# Patient Record
Sex: FEMALE | Race: BLACK OR AFRICAN AMERICAN | NOT HISPANIC OR LATINO | Employment: OTHER | URBAN - METROPOLITAN AREA
[De-identification: names, ages, dates, MRNs, and addresses within clinical notes are randomized per-mention and may not be internally consistent; named-entity substitution may affect disease eponyms.]

---

## 2017-04-10 ENCOUNTER — HISTORICAL (OUTPATIENT)
Dept: RADIOLOGY | Facility: HOSPITAL | Age: 57
End: 2017-04-10

## 2018-10-30 ENCOUNTER — HISTORICAL (OUTPATIENT)
Dept: RADIOLOGY | Facility: HOSPITAL | Age: 58
End: 2018-10-30

## 2020-03-10 ENCOUNTER — HISTORICAL (OUTPATIENT)
Dept: RADIOLOGY | Facility: HOSPITAL | Age: 60
End: 2020-03-10

## 2021-04-07 ENCOUNTER — HISTORICAL (OUTPATIENT)
Dept: RADIOLOGY | Facility: HOSPITAL | Age: 61
End: 2021-04-07

## 2022-03-04 ENCOUNTER — HISTORICAL (OUTPATIENT)
Dept: ADMINISTRATIVE | Facility: HOSPITAL | Age: 62
End: 2022-03-04

## 2022-03-04 ENCOUNTER — HISTORICAL (OUTPATIENT)
Dept: RADIOLOGY | Facility: HOSPITAL | Age: 62
End: 2022-03-04

## 2022-03-08 ENCOUNTER — HISTORICAL (OUTPATIENT)
Dept: RADIOLOGY | Facility: HOSPITAL | Age: 62
End: 2022-03-08

## 2022-03-08 ENCOUNTER — HISTORICAL (OUTPATIENT)
Dept: ADMINISTRATIVE | Facility: HOSPITAL | Age: 62
End: 2022-03-08

## 2022-03-16 ENCOUNTER — HISTORICAL (OUTPATIENT)
Dept: RADIOLOGY | Facility: HOSPITAL | Age: 62
End: 2022-03-16

## 2022-03-16 ENCOUNTER — HISTORICAL (OUTPATIENT)
Dept: ADMINISTRATIVE | Facility: HOSPITAL | Age: 62
End: 2022-03-16

## 2022-04-06 ENCOUNTER — HISTORICAL (OUTPATIENT)
Dept: HEMATOLOGY/ONCOLOGY | Facility: CLINIC | Age: 62
End: 2022-04-06

## 2022-04-11 ENCOUNTER — HISTORICAL (OUTPATIENT)
Dept: ADMINISTRATIVE | Facility: HOSPITAL | Age: 62
End: 2022-04-11

## 2022-04-11 LAB — POC CREATININE: 0.7 (ref 0.6–1.3)

## 2022-05-02 PROBLEM — C50.411 MALIGNANT NEOPLASM OF UPPER-OUTER QUADRANT OF RIGHT BREAST IN FEMALE, ESTROGEN RECEPTOR POSITIVE: Status: ACTIVE | Noted: 2022-05-02

## 2022-05-02 PROBLEM — Z17.1 MALIGNANT NEOPLASM OF UPPER-OUTER QUADRANT OF LEFT BREAST IN FEMALE, ESTROGEN RECEPTOR NEGATIVE: Status: ACTIVE | Noted: 2022-05-02

## 2022-05-02 PROBLEM — Z17.0 MALIGNANT NEOPLASM OF UPPER-OUTER QUADRANT OF RIGHT BREAST IN FEMALE, ESTROGEN RECEPTOR POSITIVE: Status: ACTIVE | Noted: 2022-05-02

## 2022-05-02 PROBLEM — C50.412 MALIGNANT NEOPLASM OF UPPER-OUTER QUADRANT OF LEFT BREAST IN FEMALE, ESTROGEN RECEPTOR NEGATIVE: Status: ACTIVE | Noted: 2022-05-02

## 2022-05-03 ENCOUNTER — OFFICE VISIT (OUTPATIENT)
Dept: SURGERY | Facility: CLINIC | Age: 62
End: 2022-05-03
Payer: COMMERCIAL

## 2022-05-03 VITALS
HEART RATE: 65 BPM | WEIGHT: 205.69 LBS | SYSTOLIC BLOOD PRESSURE: 157 MMHG | TEMPERATURE: 98 F | DIASTOLIC BLOOD PRESSURE: 95 MMHG | HEIGHT: 66 IN | RESPIRATION RATE: 18 BRPM | BODY MASS INDEX: 33.06 KG/M2 | OXYGEN SATURATION: 99 %

## 2022-05-03 DIAGNOSIS — C50.919 METAPLASTIC CARCINOMA OF BREAST: ICD-10-CM

## 2022-05-03 DIAGNOSIS — C50.411 MALIGNANT NEOPLASM OF UPPER-OUTER QUADRANT OF RIGHT BREAST IN FEMALE, ESTROGEN RECEPTOR POSITIVE: Primary | ICD-10-CM

## 2022-05-03 DIAGNOSIS — Z17.1 MALIGNANT NEOPLASM OF UPPER-OUTER QUADRANT OF LEFT BREAST IN FEMALE, ESTROGEN RECEPTOR NEGATIVE: ICD-10-CM

## 2022-05-03 DIAGNOSIS — D05.11 DUCTAL CARCINOMA IN SITU (DCIS) OF RIGHT BREAST: ICD-10-CM

## 2022-05-03 DIAGNOSIS — C50.412 MALIGNANT NEOPLASM OF UPPER-OUTER QUADRANT OF LEFT BREAST IN FEMALE, ESTROGEN RECEPTOR NEGATIVE: ICD-10-CM

## 2022-05-03 DIAGNOSIS — Z17.0 MALIGNANT NEOPLASM OF UPPER-OUTER QUADRANT OF RIGHT BREAST IN FEMALE, ESTROGEN RECEPTOR POSITIVE: Primary | ICD-10-CM

## 2022-05-03 DIAGNOSIS — D05.01 LOBULAR CARCINOMA IN SITU (LCIS) OF RIGHT BREAST: ICD-10-CM

## 2022-05-03 PROCEDURE — 3077F PR MOST RECENT SYSTOLIC BLOOD PRESSURE >= 140 MM HG: ICD-10-PCS | Mod: CPTII,S$GLB,, | Performed by: SURGERY

## 2022-05-03 PROCEDURE — 1159F PR MEDICATION LIST DOCUMENTED IN MEDICAL RECORD: ICD-10-PCS | Mod: CPTII,S$GLB,, | Performed by: SURGERY

## 2022-05-03 PROCEDURE — 99215 PR OFFICE/OUTPT VISIT, EST, LEVL V, 40-54 MIN: ICD-10-PCS | Mod: S$GLB,,, | Performed by: SURGERY

## 2022-05-03 PROCEDURE — 99999 PR PBB SHADOW E&M-EST. PATIENT-LVL V: ICD-10-PCS | Mod: PBBFAC,,, | Performed by: SURGERY

## 2022-05-03 PROCEDURE — 1160F RVW MEDS BY RX/DR IN RCRD: CPT | Mod: CPTII,S$GLB,, | Performed by: SURGERY

## 2022-05-03 PROCEDURE — 1160F PR REVIEW ALL MEDS BY PRESCRIBER/CLIN PHARMACIST DOCUMENTED: ICD-10-PCS | Mod: CPTII,S$GLB,, | Performed by: SURGERY

## 2022-05-03 PROCEDURE — 3077F SYST BP >= 140 MM HG: CPT | Mod: CPTII,S$GLB,, | Performed by: SURGERY

## 2022-05-03 PROCEDURE — 99215 OFFICE O/P EST HI 40 MIN: CPT | Mod: S$GLB,,, | Performed by: SURGERY

## 2022-05-03 PROCEDURE — 1159F MED LIST DOCD IN RCRD: CPT | Mod: CPTII,S$GLB,, | Performed by: SURGERY

## 2022-05-03 PROCEDURE — 3008F BODY MASS INDEX DOCD: CPT | Mod: CPTII,S$GLB,, | Performed by: SURGERY

## 2022-05-03 PROCEDURE — 3080F PR MOST RECENT DIASTOLIC BLOOD PRESSURE >= 90 MM HG: ICD-10-PCS | Mod: CPTII,S$GLB,, | Performed by: SURGERY

## 2022-05-03 PROCEDURE — 3008F PR BODY MASS INDEX (BMI) DOCUMENTED: ICD-10-PCS | Mod: CPTII,S$GLB,, | Performed by: SURGERY

## 2022-05-03 PROCEDURE — 3080F DIAST BP >= 90 MM HG: CPT | Mod: CPTII,S$GLB,, | Performed by: SURGERY

## 2022-05-03 PROCEDURE — 99999 PR PBB SHADOW E&M-EST. PATIENT-LVL V: CPT | Mod: PBBFAC,,, | Performed by: SURGERY

## 2022-05-03 RX ORDER — AMLODIPINE BESYLATE 10 MG/1
10 TABLET ORAL DAILY
COMMUNITY
Start: 2022-04-25

## 2022-05-03 RX ORDER — ATORVASTATIN CALCIUM 20 MG/1
20 TABLET, FILM COATED ORAL NIGHTLY
COMMUNITY
Start: 2021-10-07 | End: 2022-11-08

## 2022-05-03 RX ORDER — NAPROXEN SODIUM 220 MG/1
81 TABLET, FILM COATED ORAL 2 TIMES DAILY
COMMUNITY
Start: 2021-10-07 | End: 2022-11-08

## 2022-05-03 RX ORDER — METFORMIN HYDROCHLORIDE 1000 MG/1
1000 TABLET ORAL
COMMUNITY
Start: 2022-03-03

## 2022-05-03 RX ORDER — LOSARTAN POTASSIUM AND HYDROCHLOROTHIAZIDE 25; 100 MG/1; MG/1
1 TABLET ORAL DAILY
COMMUNITY
Start: 2022-03-29

## 2022-05-03 NOTE — PROGRESS NOTES
[facility] Breast Surg  Breast Surgical Oncology  Follow-Up Patient Office Visit       Referring Provider: Beth Holly MD - GYN  PCP: Sharri Bass RT   Care Team: No care team member to display No care team member to display     Chief Complaint:   Chief Complaint   Patient presents with    Pre-op Exam     Pre-Op        Subjective:   Treatment History:  1.Genetic Testing - negative      Interval History:  5/3/2022 - Elina Ham presents for follow-up after CT imaging and genetic testing to discuss final surgical plan.      HPI:  Elina Ham initially presents on 24 at age 62 Years with bilateral breast cancer (1) AJCC 8th ed clinical anatomic stage IA / prognostic stage IA (cT1b cN0 M0) Right breast 10 o'clock 7 cm FN invasive ductal carcinoma, grade 1, with associated LCIS, ER 43%, VA 94%, HER2 0 - negative on IHC, Ki674% and 9 o'clock 5 cm FN Right breast invasive ductal carcinoma, grade 1, with associated DCIS, ER 81%, VA 3%, HER2 1+ - negative on IHC and Ki67 4%. 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       A detailed patient history was obtained and reviewed.      Additional Breast History:  Patient did not breast feed. Patient denies nipple discharge. Patient denies to previous breast biopsy. Patient denies to a personal history of breast cancer.    Imagin. 3/4/2022 BL SC MG at [facility]- which revealed a 2.7 cm mass in the left breast upper outer quadrant posterior depth. There is possible architectural distortion in the right breast at 9 o'clock anterior depth. There is a focal asymmetry with possible architectural distortion in the right breast at 9 o'clock posterior depth. BIRADS-0; additional imaging needed.    2. 3/8/2022 BL DG  MG/ BL US BREAST LIMITED at [facility]- which revealed on R MG at 9 o'clock anterior depth, there is a persistent area of architectural distortion spanning 0.8 cm. At 10 o'clock right breast, posterior depth, there is a persistent 1.0 cm focal asymmetry with associated architectural distortion and amorphous calcifications. At 1 o'clock left breast, middle depth, there is a 2.7 cm oval mass with irregular margins. These findings correspond to the areas recalled from screening examination dated 3/4/2022. In the left axilla there is an enlarged lymph node. No other suspicious masses, calcifications, or other signs of malignancy are identified. On R US, at 9 o'clock, 5 cm FN, there is an area of architectural distortion with posterior shadowing spanning 0.8 cm which corresponds to the mammographic finding in this region. at 10 o'clock right breast, 7 cm FN, there is a 1.0 x 0.4 x 0.9 cm oval, hypoechoic mass with circumscribed margins. This mass is stable mammographically dating back to the 2010 exam and is thus considered benign. On L US at 1 o'clock 7 cm FN there is a 2.7 x 2.0 x 2.1 mm oval, hypoechoic mass with irregular margins which corresponds to the mammographic finding in this region. In the left axilla, there is a single level 1 lymph node which demonstrates eccentric cortical thickening. This lymph node measures 1.3 x 1.0 x 1.0 cm and corresponds to the mammographic finding in this region. Additional benign appearing lymph nodes are noted in the bilateral axillae. BIRADS-5 highly suspicious; biopsy recommended.      Pathology:  1. 3/16/2022 Ultrasound-guided Core Needle Biopsy Right Breast 9 o'clock 5 cm FN Mass with Distortion - Invasive ductal carcinoma, grade 1, with associated focus of ductal carcinoma in situ, grade 1, and focal perineural invasion is present  ER 81%  MN 3%  HER2 Negative  Ki67 4%    2. 3/16/2022 Stereotactic guided Core Needle Biopsy Right Breast Mass 10 o'clock Posterior Depth Focal  Asymmetry, Architectural Distortion with Calcifications - Invasive ductal carcinoma, grade 1 and focal lobular carcinoma in situ  ER 43%  RI 94%  HER2 Negative  Ki67 4%    3. 3 Ultrasound-guided Core Needle Biopsy Left Breast 1 o'clock 7 cm FN Mass - High grade carcinoma with anaplastic/malignant giant cell component (suggestive for sarcomatoid/metaplastic change)  ER 0%  RI 0%  HER2 Negative  Ki67 High    4. 3 Ultrasound-guided Core Needle Biopsy Left Axillary Node - Portion of reactive lymph node; no evidence of metastatic carcinoma      OB/GYN History:  Age at Menarche Onset: 14  Menopausal Status: postmenopausal at age 52  Pregnancy History:   Age at first live birth: 31  Hormone Replacement Therapy: Nonone    Family History:  Family History   Problem Relation Age of Onset    Breast cancer Maternal Grandmother             Breast cancer Sister     Breast cancer Sister         remission    Breast cancer Maternal Aunt             Breast cancer Maternal Aunt             Breast cancer Maternal Aunt             Breast cancer Maternal Aunt     Breast cancer Maternal Uncle             Stroke Brother                 Other History:  Past Medical History:   Diagnosis Date    Diabetes mellitus     Hypercholesterolemia     Hypertension      History reviewed. No pertinent surgical history.     Social History     Socioeconomic History    Marital status:    Tobacco Use    Smoking status: Never Smoker    Smokeless tobacco: Never Used   Substance and Sexual Activity    Alcohol use: Not Currently     Alcohol/week: 1.0 standard drink     Types: 1 Glasses of wine per week     Comment: drink socially but haven't had a glass of wine since     Drug use: Never    Sexual activity: Yes     Partners: Male     Birth control/protection: None     Comment: We take STD test for the first 2 years we started dating  (7       Review of patient's allergies  "indicates:  No Known Allergies    Review of Systems:  Constitutional: denies fevers, chills, weight loss  HEENT: denies blurry/double vision, changes in hearing, odynophagia, dysphagia  Respiratory: denies cough, shortness of breath  Cardiovascular: denies palpitations, swelling of the extremities  GI: denies abdominal pain, nausea/vomiting, hematochezia, frequent stools  : denies frequency, dysuria, flank pain, hematuria  Skin: denies new rashes  Neurological: denies muscular/sensory deficiencies, loss of coordination, headaches, memory changes  Endo: denies hair loss/thinning, nervousness, hot flashes, heat/cold intolerance, lumps in the neck area  Heme: denies easy bruising and fatigue  Psychological: denies anxious/depressive moods  Musculoskeletal: denies bony pain, muscle cramps, swollen joints    Shortness of breath when walking (attributes it to weight gain)  Diarrhea- has slowed down in occurrences  Tingling to fingertips       Objective:     Vitals:  Blood pressure (!) 157/95, pulse 65, temperature 98.4 °F (36.9 °C), resp. rate 18, height 5' 5.75" (1.67 m), weight 93.3 kg (205 lb 11 oz), SpO2 99 %.      Physical Exam:  General: The patient is awake, alert and oriented times three. The patient is well nourished and in no acute distress.   Neck: There is no evidence of palpable cervical, supraclavicular or axillary adenopathy. The neck is supple. The thyroid is not enlarged.   Musculoskeletal: The patient has a normal range of motion of her bilateral upper extremities.   Chest: Examination of the chest wall fails to reveal any obvious abnormalities. The lungs are clear to auscultation bilaterally without rales, rhonchi, or wheezing.   Cardiovascular: The heart has a regular rate and rhythm without murmurs, gallops or rubs.   Breast: Examination of right breast fails to reveal any dominant masses or areas of significant focal nodularity. The nipple is everted without evidence of discharge. There is no skin " dimpling with movement of the pectoralis. There is post biopsy changes and resolving ecchymosis. Examination of the left breast reveals a dominant mass in the upper outer quadrant which is mobile and not fixed to surrounding tissue. The nipple is everted without evidence of discharge. There is no skin dimpling with movement of the pectoralis. There is no significant skin changes overlying the breast.  Abdomen: The abdomen is soft, flat, nontender and nondistended with no palpable masses or organomegaly.  Integumentary: no rashes or skin lesions present  Neurologic: cranial nerves intact, no signs of peripheral neurological deficit, motor/sensory function intact      Assessment and Plan:     Cancer Staging  Malignant neoplasm of upper-outer quadrant of left breast in female, estrogen receptor negative  Staging form: Breast, AJCC 8th Edition  - Clinical stage from 3/29/2022: Stage IIA (cT2, cN0(f), cM0, G1, ER-, OH-, HER2-) - Signed by Charlene Contreras MD on 5/3/2022    Malignant neoplasm of upper-outer quadrant of right breast in female, estrogen receptor positive  Staging form: Breast, AJCC 8th Edition  - Clinical stage from 3/29/2022: Stage IA (cT1b(2), cN0, cM0, G1, ER+, OH+, HER2-) - Signed by Charlene Contreras MD on 5/3/2022      Patient Active Problem List   Diagnosis    Malignant neoplasm of upper-outer quadrant of right breast in female, estrogen receptor positive    Malignant neoplasm of upper-outer quadrant of left breast in female, estrogen receptor negative        Elina was seen today for pre-op exam.    Diagnoses and all orders for this visit:    Malignant neoplasm of upper-outer quadrant of right breast in female, estrogen receptor positive    Malignant neoplasm of upper-outer quadrant of left breast in female, estrogen receptor negative    Metaplastic carcinoma of breast    Ductal carcinoma in situ (DCIS) of right breast    Lobular carcinoma in situ (LCIS) of right breast       We discussed the need  to proceed with local and systemic treatment. Local treatment options are surgery and radiation. The choices for surgical operations include mastectomy and lumpectomy with radiation. The general operative procedure of mastectomy, the skin sparing nature and attempts made to save underlying muscle with modified mastectomy were discussed. The options of primary reconstruction following mastectomy include either implant reconstruction or tissue transfer type of reconstruction. The pros and cons of both of these reconstructive methods were addressed. Both of these are performed in stages and second operation is usually required before the final completion of the reconstructive process. I also explained to the patient that the reconstructive options are generally by law required to be covered by insurance and would be considered part of a cancer operation and not a cosmetic operation. Sometimes also a reduction or mastopexy is performed on the opposite side for better match, and this operation by itself is also considered part of the cancer treatment.     Following explanation of the mastectomy option, I then explained to her the procedure of lumpectomy. The rationale for lumpectomy, the need to obtain clear margins was specifically addressed. The absolute necessity of adding radiation following lumpectomy with good margins was explained. The logistics of radiation were discussed at length. The patient will further discuss details of radiation with her Radiation Oncologist.     I informed her of the complications which include surgical site infections, hematoma or seroma requiring operation, necrosis of nipple-areola and/or mastectomy flaps requiring debridement or hyperbaric therapy, unplanned re-operations, and delay in adjuvant treatments.     Following this, I also explained to her the procedure of sentinel lymph node mapping and its rationale. We have a 98% success rate identifying the sentinel node. The need to use  radioactive dye and blue dye to ensure proper identification of the node was explained. MagTrace brown dye can also be used in the setting of a mastectomy. Also the small but real risk of anaphylactic shock with blue dye or MagTrace dye was discussed. The need to perform completion dissection should the sentinel lymph node be positive was also explained to the patient.     Following this, I have also briefly discussed with her the rationale for adjuvant systemic treatment in the form of either chemotherapy and/or hormonal therapy. This would depend on the presence of hormone receptors in the tumor. Further recommendations regarding the kind of chemo and the cycles would be finalized by Medical Oncology. She will discuss these issues at length with her medical oncologist. Specifically, we went over the local recurrence rate and survival rates with mastectomy and breast conserving therapy, the small but real risk of lymphedema should we need completion dissection, and the indications for post mastectomy radiation in certain subset of patients.       Plan:     1. Surgery - Bilateral Simple Mastectomy with Bilateral SLNB, MagTrace Injection (In-Office)  Tentative Date: 6/3/2022? Will confirm with Plastic Surgery  2. Preop testing - CMP, CBC, will review COVID vaccine status  3. Surgical Policies discussed  4. Plastic Surgery Referral - re: post-mastectomy reconstruction - Dr. Gibbs    All of questions were answered    Charlene Contreras MD  Breast Surgical Oncology    Total time includes both face-to-face and non-face-to-face time personally spent by myself on the day of the visit.    Non-face-to-face time included:  _X_ preparing to see the patient such as reviewing the patient record  __ obtaining and reviewing separately obtained history  _X_ independently interpreting results  _X_ documenting clinical information in electronic health record.    Face-to-face time included:  _X_ performing an appropriate history and  examination  _X_ communicating results to the patient  _X_ counseling and educating the patient  __ ordering appropriate medications  _X_ ordering appropriate tests  _X_ ordering appropriate procedures (including follow-up)  _X_ answering any questions the patient had    Total Time spent on date of visit: 40 minutes

## 2022-05-19 RX ORDER — SODIUM CHLORIDE, SODIUM LACTATE, POTASSIUM CHLORIDE, CALCIUM CHLORIDE 600; 310; 30; 20 MG/100ML; MG/100ML; MG/100ML; MG/100ML
INJECTION, SOLUTION INTRAVENOUS CONTINUOUS
Status: CANCELLED | OUTPATIENT
Start: 2022-05-20

## 2022-05-31 ENCOUNTER — CLINICAL SUPPORT (OUTPATIENT)
Dept: PREADMISSION TESTING | Facility: HOSPITAL | Age: 62
End: 2022-05-31
Attending: SURGERY
Payer: COMMERCIAL

## 2022-05-31 ENCOUNTER — OFFICE VISIT (OUTPATIENT)
Dept: SURGERY | Facility: CLINIC | Age: 62
End: 2022-05-31
Payer: COMMERCIAL

## 2022-05-31 ENCOUNTER — LAB VISIT (OUTPATIENT)
Dept: LAB | Facility: HOSPITAL | Age: 62
End: 2022-05-31
Attending: SURGERY
Payer: COMMERCIAL

## 2022-05-31 VITALS
WEIGHT: 205 LBS | RESPIRATION RATE: 18 BRPM | BODY MASS INDEX: 34.16 KG/M2 | DIASTOLIC BLOOD PRESSURE: 85 MMHG | HEART RATE: 61 BPM | OXYGEN SATURATION: 99 % | TEMPERATURE: 98 F | SYSTOLIC BLOOD PRESSURE: 141 MMHG | HEIGHT: 65 IN

## 2022-05-31 DIAGNOSIS — C50.412 MALIGNANT NEOPLASM OF UPPER-OUTER QUADRANT OF LEFT BREAST IN FEMALE, ESTROGEN RECEPTOR NEGATIVE: ICD-10-CM

## 2022-05-31 DIAGNOSIS — Z17.1 MALIGNANT NEOPLASM OF UPPER-OUTER QUADRANT OF LEFT BREAST IN FEMALE, ESTROGEN RECEPTOR NEGATIVE: ICD-10-CM

## 2022-05-31 DIAGNOSIS — C50.919 METAPLASTIC CARCINOMA OF BREAST: ICD-10-CM

## 2022-05-31 DIAGNOSIS — Z17.0 MALIGNANT NEOPLASM OF UPPER-OUTER QUADRANT OF RIGHT BREAST IN FEMALE, ESTROGEN RECEPTOR POSITIVE: ICD-10-CM

## 2022-05-31 DIAGNOSIS — Z01.818 PREOPERATIVE EXAMINATION, UNSPECIFIED: Primary | ICD-10-CM

## 2022-05-31 DIAGNOSIS — C50.411 MALIGNANT NEOPLASM OF UPPER-OUTER QUADRANT OF RIGHT BREAST IN FEMALE, ESTROGEN RECEPTOR POSITIVE: Primary | ICD-10-CM

## 2022-05-31 DIAGNOSIS — Z01.818 PREOPERATIVE EXAMINATION, UNSPECIFIED: ICD-10-CM

## 2022-05-31 DIAGNOSIS — Z17.0 MALIGNANT NEOPLASM OF UPPER-OUTER QUADRANT OF RIGHT BREAST IN FEMALE, ESTROGEN RECEPTOR POSITIVE: Primary | ICD-10-CM

## 2022-05-31 DIAGNOSIS — C50.411 MALIGNANT NEOPLASM OF UPPER-OUTER QUADRANT OF RIGHT BREAST IN FEMALE, ESTROGEN RECEPTOR POSITIVE: ICD-10-CM

## 2022-05-31 LAB
ALBUMIN SERPL-MCNC: 4.1 GM/DL (ref 3.4–4.8)
ALBUMIN/GLOB SERPL: 1.4 RATIO (ref 1.1–2)
ALP SERPL-CCNC: 94 UNIT/L (ref 40–150)
ALT SERPL-CCNC: 22 UNIT/L (ref 0–55)
AST SERPL-CCNC: 18 UNIT/L (ref 5–34)
BASOPHILS # BLD AUTO: 0.05 X10(3)/MCL (ref 0–0.2)
BASOPHILS NFR BLD AUTO: 0.7 %
BILIRUBIN DIRECT+TOT PNL SERPL-MCNC: 0.7 MG/DL
BUN SERPL-MCNC: 15.1 MG/DL (ref 9.8–20.1)
CALCIUM SERPL-MCNC: 9.5 MG/DL (ref 8.4–10.2)
CHLORIDE SERPL-SCNC: 100 MMOL/L (ref 98–107)
CO2 SERPL-SCNC: 27 MMOL/L (ref 23–31)
CREAT SERPL-MCNC: 0.87 MG/DL (ref 0.55–1.02)
EOSINOPHIL # BLD AUTO: 0.08 X10(3)/MCL (ref 0–0.9)
EOSINOPHIL NFR BLD AUTO: 1.1 %
ERYTHROCYTE [DISTWIDTH] IN BLOOD BY AUTOMATED COUNT: 12.4 % (ref 11.5–17)
GLOBULIN SER-MCNC: 3 GM/DL (ref 2.4–3.5)
GLUCOSE SERPL-MCNC: 164 MG/DL (ref 82–115)
HCT VFR BLD AUTO: 41.2 % (ref 37–47)
HGB BLD-MCNC: 13.3 GM/DL (ref 12–16)
IMM GRANULOCYTES # BLD AUTO: 0.02 X10(3)/MCL (ref 0–0.02)
IMM GRANULOCYTES NFR BLD AUTO: 0.3 % (ref 0–0.43)
LYMPHOCYTES # BLD AUTO: 2.84 X10(3)/MCL (ref 0.6–4.6)
LYMPHOCYTES NFR BLD AUTO: 37.5 %
MCH RBC QN AUTO: 29 PG (ref 27–31)
MCHC RBC AUTO-ENTMCNC: 32.3 MG/DL (ref 33–36)
MCV RBC AUTO: 90 FL (ref 80–94)
MONOCYTES # BLD AUTO: 0.49 X10(3)/MCL (ref 0.1–1.3)
MONOCYTES NFR BLD AUTO: 6.5 %
NEUTROPHILS # BLD AUTO: 4.1 X10(3)/MCL (ref 2.1–9.2)
NEUTROPHILS NFR BLD AUTO: 53.9 %
NRBC BLD AUTO-RTO: 0 %
PLATELET # BLD AUTO: 400 X10(3)/MCL (ref 130–400)
PMV BLD AUTO: 10.4 FL (ref 9.4–12.4)
POTASSIUM SERPL-SCNC: 3.8 MMOL/L (ref 3.5–5.1)
PROT SERPL-MCNC: 7.1 GM/DL (ref 5.8–7.6)
RBC # BLD AUTO: 4.58 X10(6)/MCL (ref 4.2–5.4)
SARS-COV-2 RNA RESP QL NAA+PROBE: NOT DETECTED
SODIUM SERPL-SCNC: 141 MMOL/L (ref 136–145)
WBC # SPEC AUTO: 7.6 X10(3)/MCL (ref 4.5–11.5)

## 2022-05-31 PROCEDURE — 38790 PR INJECTION FOR LYMPHATIC XRAY: ICD-10-PCS | Mod: 50,S$GLB,ICN, | Performed by: PHYSICIAN ASSISTANT

## 2022-05-31 PROCEDURE — 99999 PR PBB SHADOW E&M-EST. PATIENT-LVL IV: ICD-10-PCS | Mod: PBBFAC,,, | Performed by: PHYSICIAN ASSISTANT

## 2022-05-31 PROCEDURE — 85025 COMPLETE CBC W/AUTO DIFF WBC: CPT

## 2022-05-31 PROCEDURE — 93010 EKG 12-LEAD: ICD-10-PCS | Mod: ,,, | Performed by: INTERNAL MEDICINE

## 2022-05-31 PROCEDURE — 3079F DIAST BP 80-89 MM HG: CPT | Mod: CPTII,S$GLB,ICN, | Performed by: PHYSICIAN ASSISTANT

## 2022-05-31 PROCEDURE — 99499 UNLISTED E&M SERVICE: CPT | Mod: S$GLB,,, | Performed by: PHYSICIAN ASSISTANT

## 2022-05-31 PROCEDURE — 87635 SARS-COV-2 COVID-19 AMP PRB: CPT

## 2022-05-31 PROCEDURE — 38790 INJECT FOR LYMPHATIC X-RAY: CPT | Mod: 50,S$GLB,ICN, | Performed by: PHYSICIAN ASSISTANT

## 2022-05-31 PROCEDURE — 3077F PR MOST RECENT SYSTOLIC BLOOD PRESSURE >= 140 MM HG: ICD-10-PCS | Mod: CPTII,S$GLB,ICN, | Performed by: PHYSICIAN ASSISTANT

## 2022-05-31 PROCEDURE — 3079F PR MOST RECENT DIASTOLIC BLOOD PRESSURE 80-89 MM HG: ICD-10-PCS | Mod: CPTII,S$GLB,ICN, | Performed by: PHYSICIAN ASSISTANT

## 2022-05-31 PROCEDURE — 80053 COMPREHEN METABOLIC PANEL: CPT

## 2022-05-31 PROCEDURE — 3077F SYST BP >= 140 MM HG: CPT | Mod: CPTII,S$GLB,ICN, | Performed by: PHYSICIAN ASSISTANT

## 2022-05-31 PROCEDURE — 36415 COLL VENOUS BLD VENIPUNCTURE: CPT

## 2022-05-31 PROCEDURE — 1159F MED LIST DOCD IN RCRD: CPT | Mod: CPTII,S$GLB,ICN, | Performed by: PHYSICIAN ASSISTANT

## 2022-05-31 PROCEDURE — 93005 ELECTROCARDIOGRAM TRACING: CPT

## 2022-05-31 PROCEDURE — 99999 PR PBB SHADOW E&M-EST. PATIENT-LVL IV: CPT | Mod: PBBFAC,,, | Performed by: PHYSICIAN ASSISTANT

## 2022-05-31 PROCEDURE — 1159F PR MEDICATION LIST DOCUMENTED IN MEDICAL RECORD: ICD-10-PCS | Mod: CPTII,S$GLB,ICN, | Performed by: PHYSICIAN ASSISTANT

## 2022-05-31 PROCEDURE — 3008F BODY MASS INDEX DOCD: CPT | Mod: CPTII,S$GLB,ICN, | Performed by: PHYSICIAN ASSISTANT

## 2022-05-31 PROCEDURE — 3008F PR BODY MASS INDEX (BMI) DOCUMENTED: ICD-10-PCS | Mod: CPTII,S$GLB,ICN, | Performed by: PHYSICIAN ASSISTANT

## 2022-05-31 PROCEDURE — 99499 NO LOS: ICD-10-PCS | Mod: S$GLB,,, | Performed by: PHYSICIAN ASSISTANT

## 2022-05-31 PROCEDURE — 93010 ELECTROCARDIOGRAM REPORT: CPT | Mod: ,,, | Performed by: INTERNAL MEDICINE

## 2022-05-31 RX ORDER — MUPIROCIN 20 MG/G
OINTMENT TOPICAL
COMMUNITY
Start: 2022-05-30 | End: 2022-09-13

## 2022-05-31 RX ORDER — ONDANSETRON 4 MG/1
TABLET, ORALLY DISINTEGRATING ORAL
COMMUNITY
Start: 2022-05-30 | End: 2022-07-10

## 2022-05-31 RX ORDER — CYCLOBENZAPRINE HCL 5 MG
TABLET ORAL
COMMUNITY
Start: 2022-05-30 | End: 2022-07-10

## 2022-05-31 RX ORDER — SULFAMETHOXAZOLE AND TRIMETHOPRIM 800; 160 MG/1; MG/1
1 TABLET ORAL 2 TIMES DAILY
COMMUNITY
Start: 2022-05-30 | End: 2022-06-28

## 2022-05-31 NOTE — PROGRESS NOTES
DATE OF SERVICE: 05/31/2022    Provider: Viola Troncoso PA-C    PREOPERATIVE DIAGNOSIS:   Encounter Diagnoses   Name Primary?    Malignant neoplasm of upper-outer quadrant of right breast in female, estrogen receptor positive Yes    Malignant neoplasm of upper-outer quadrant of left breast in female, estrogen receptor negative        POSTOPERATIVE DIAGNOSIS:   Encounter Diagnoses   Name Primary?    Malignant neoplasm of upper-outer quadrant of right breast in female, estrogen receptor positive Yes    Malignant neoplasm of upper-outer quadrant of left breast in female, estrogen receptor negative        PROCEDURE: Injection of non-radioactive, non-contrast/non-visualization agent - 2mL of superparamagnetic iron oxide (SPIO) nanoparticles (56 mg Iron and 64 mg Carboxydextran/2 mL)    ANESTHESIA:  2.5 mL of 1% Lidocaine with epinephrine was injected at the lateral subareolar border of each of the bilateral breasts      TECHNIQUE:    1. 2.0 mL of superparamagnetic iron oxide (SPIO) nanoparticles was injected subareolar in each of the bilateral breasts for [place] node localization with a SentiMag probe.       PLAN:  Bilateral [place] Lymph Node Localization performed today as above with planned SentiMag probe localization anticipated at the time of bilateral SLNB        All of her questions were answered.     Viola Troncoso PA-C

## 2022-06-03 ENCOUNTER — HOSPITAL ENCOUNTER (OUTPATIENT)
Facility: HOSPITAL | Age: 62
Discharge: HOME | End: 2022-06-04
Attending: SURGERY | Admitting: SURGERY
Payer: COMMERCIAL

## 2022-06-03 ENCOUNTER — ANESTHESIA (OUTPATIENT)
Dept: SURGERY | Facility: HOSPITAL | Age: 62
End: 2022-06-03
Payer: COMMERCIAL

## 2022-06-03 ENCOUNTER — ANESTHESIA EVENT (OUTPATIENT)
Dept: SURGERY | Facility: HOSPITAL | Age: 62
End: 2022-06-03
Payer: COMMERCIAL

## 2022-06-03 DIAGNOSIS — C50.411 MALIGNANT NEOPLASM OF UPPER-OUTER QUADRANT OF RIGHT BREAST IN FEMALE, ESTROGEN RECEPTOR POSITIVE: ICD-10-CM

## 2022-06-03 DIAGNOSIS — C50.412 MALIGNANT NEOPLASM OF UPPER-OUTER QUADRANT OF LEFT BREAST IN FEMALE, ESTROGEN RECEPTOR NEGATIVE: ICD-10-CM

## 2022-06-03 DIAGNOSIS — Z85.3 PERSONAL HISTORY OF BREAST CANCER: ICD-10-CM

## 2022-06-03 DIAGNOSIS — Z17.1 MALIGNANT NEOPLASM OF UPPER-OUTER QUADRANT OF LEFT BREAST IN FEMALE, ESTROGEN RECEPTOR NEGATIVE: ICD-10-CM

## 2022-06-03 DIAGNOSIS — Z17.0 MALIGNANT NEOPLASM OF UPPER-OUTER QUADRANT OF RIGHT BREAST IN FEMALE, ESTROGEN RECEPTOR POSITIVE: ICD-10-CM

## 2022-06-03 DIAGNOSIS — Z01.818 PRE-OPERATIVE CLEARANCE: ICD-10-CM

## 2022-06-03 DIAGNOSIS — C50.919 METAPLASTIC CARCINOMA OF BREAST: ICD-10-CM

## 2022-06-03 DIAGNOSIS — Z42.1 ENCOUNTER FOR BREAST RECONSTRUCTION FOLLOWING MASTECTOMY: ICD-10-CM

## 2022-06-03 LAB
POCT GLUCOSE: 194 MG/DL (ref 70–110)
POCT GLUCOSE: 205 MG/DL (ref 70–110)
POCT GLUCOSE: 263 MG/DL (ref 70–110)
POCT GLUCOSE: 271 MG/DL (ref 70–110)

## 2022-06-03 PROCEDURE — 71000033 HC RECOVERY, INTIAL HOUR: Performed by: SURGERY

## 2022-06-03 PROCEDURE — 19303 PR MASTECTOMY, SIMPLE, COMPLETE: ICD-10-PCS | Mod: 50,,, | Performed by: SURGERY

## 2022-06-03 PROCEDURE — 15777 ACELLULAR DERM MATRIX IMPLT: CPT | Mod: ,,, | Performed by: SURGERY

## 2022-06-03 PROCEDURE — 19303 MAST SIMPLE COMPLETE: CPT | Mod: 50,,, | Performed by: SURGERY

## 2022-06-03 PROCEDURE — 15860 IV NJX TST VASC FLO FLAP/GRF: CPT | Mod: 51,,, | Performed by: SURGERY

## 2022-06-03 PROCEDURE — 63600175 PHARM REV CODE 636 W HCPCS

## 2022-06-03 PROCEDURE — 37000008 HC ANESTHESIA 1ST 15 MINUTES: Performed by: SURGERY

## 2022-06-03 PROCEDURE — 63600175 PHARM REV CODE 636 W HCPCS: Performed by: SURGERY

## 2022-06-03 PROCEDURE — 15777 PR ACELLULAR DERM MATRIX IMPLT: ICD-10-PCS | Mod: ,,, | Performed by: SURGERY

## 2022-06-03 PROCEDURE — 19357 PR BREAST RECONSTRUC W TISS EXPANDR: ICD-10-PCS | Mod: 50,,, | Performed by: SURGERY

## 2022-06-03 PROCEDURE — 38525 BIOPSY/REMOVAL LYMPH NODES: CPT | Mod: 50,,, | Performed by: SURGERY

## 2022-06-03 PROCEDURE — 38525 PR BIOPSY/REM LYMPH NODES, AXILLARY: ICD-10-PCS | Mod: 50,,, | Performed by: SURGERY

## 2022-06-03 PROCEDURE — 25000003 PHARM REV CODE 250: Performed by: ANESTHESIOLOGY

## 2022-06-03 PROCEDURE — 71000015 HC POSTOP RECOV 1ST HR: Performed by: SURGERY

## 2022-06-03 PROCEDURE — 63600175 PHARM REV CODE 636 W HCPCS: Performed by: NURSE ANESTHETIST, CERTIFIED REGISTERED

## 2022-06-03 PROCEDURE — 25000003 PHARM REV CODE 250: Performed by: SURGERY

## 2022-06-03 PROCEDURE — 25000003 PHARM REV CODE 250

## 2022-06-03 PROCEDURE — 82962 GLUCOSE BLOOD TEST: CPT | Performed by: SURGERY

## 2022-06-03 PROCEDURE — 15860 PR IV INJ TO TEST BLOOD FLOW IN FLAP/GRAFT: ICD-10-PCS | Mod: 51,,, | Performed by: SURGERY

## 2022-06-03 PROCEDURE — C9290 INJ, BUPIVACAINE LIPOSOME: HCPCS | Performed by: SURGERY

## 2022-06-03 PROCEDURE — 63600175 PHARM REV CODE 636 W HCPCS: Performed by: ANESTHESIOLOGY

## 2022-06-03 PROCEDURE — 37000009 HC ANESTHESIA EA ADD 15 MINS: Performed by: SURGERY

## 2022-06-03 PROCEDURE — 71000016 HC POSTOP RECOV ADDL HR: Performed by: SURGERY

## 2022-06-03 PROCEDURE — C1729 CATH, DRAINAGE: HCPCS | Performed by: SURGERY

## 2022-06-03 PROCEDURE — 38792 PR IDENTIFY SENTINEL 2DE: ICD-10-PCS | Mod: 50,,, | Performed by: SURGERY

## 2022-06-03 PROCEDURE — 71000039 HC RECOVERY, EACH ADD'L HOUR: Performed by: SURGERY

## 2022-06-03 PROCEDURE — 36000707: Performed by: SURGERY

## 2022-06-03 PROCEDURE — 27201423 OPTIME MED/SURG SUP & DEVICES STERILE SUPPLY: Performed by: SURGERY

## 2022-06-03 PROCEDURE — C1789 PROSTHESIS, BREAST, IMP: HCPCS | Performed by: SURGERY

## 2022-06-03 PROCEDURE — 38792 RA TRACER ID OF SENTINL NODE: CPT | Mod: 50,,, | Performed by: SURGERY

## 2022-06-03 PROCEDURE — 19357 TISS XPNDR PLMT BRST RCNSTJ: CPT | Mod: 50,,, | Performed by: SURGERY

## 2022-06-03 PROCEDURE — 36000706: Performed by: SURGERY

## 2022-06-03 PROCEDURE — 25000003 PHARM REV CODE 250: Performed by: NURSE ANESTHETIST, CERTIFIED REGISTERED

## 2022-06-03 PROCEDURE — 63600175 PHARM REV CODE 636 W HCPCS: Performed by: PHYSICIAN ASSISTANT

## 2022-06-03 DEVICE — TISSUE ALLODERM SELECT 1.2-2.0: Type: IMPLANTABLE DEVICE | Site: BREAST | Status: FUNCTIONAL

## 2022-06-03 DEVICE — EXPANDER NATRELLE FH EP 450CC: Type: IMPLANTABLE DEVICE | Site: BREAST | Status: FUNCTIONAL

## 2022-06-03 RX ORDER — GLUCAGON 1 MG
1 KIT INJECTION
Status: DISCONTINUED | OUTPATIENT
Start: 2022-06-03 | End: 2022-06-04 | Stop reason: HOSPADM

## 2022-06-03 RX ORDER — GENTAMICIN SULFATE 40 MG/ML
INJECTION, SOLUTION INTRAMUSCULAR; INTRAVENOUS
Status: DISCONTINUED | OUTPATIENT
Start: 2022-06-03 | End: 2022-06-03 | Stop reason: HOSPADM

## 2022-06-03 RX ORDER — GENTAMICIN SULFATE 40 MG/ML
INJECTION, SOLUTION INTRAMUSCULAR; INTRAVENOUS
Status: DISPENSED
Start: 2022-06-03 | End: 2022-06-03

## 2022-06-03 RX ORDER — CEFAZOLIN SODIUM 1 G/3ML
INJECTION, POWDER, FOR SOLUTION INTRAMUSCULAR; INTRAVENOUS
Status: DISPENSED
Start: 2022-06-03 | End: 2022-06-03

## 2022-06-03 RX ORDER — ONDANSETRON 2 MG/ML
4 INJECTION INTRAMUSCULAR; INTRAVENOUS ONCE
Status: COMPLETED | OUTPATIENT
Start: 2022-06-03 | End: 2022-06-03

## 2022-06-03 RX ORDER — CYCLOBENZAPRINE HCL 10 MG
10 TABLET ORAL 3 TIMES DAILY PRN
Status: DISCONTINUED | OUTPATIENT
Start: 2022-06-03 | End: 2022-06-04 | Stop reason: HOSPADM

## 2022-06-03 RX ORDER — PROPOFOL 10 MG/ML
VIAL (ML) INTRAVENOUS
Status: DISCONTINUED | OUTPATIENT
Start: 2022-06-03 | End: 2022-06-03

## 2022-06-03 RX ORDER — ACETAMINOPHEN 325 MG/1
TABLET ORAL
Status: COMPLETED
Start: 2022-06-03 | End: 2022-06-03

## 2022-06-03 RX ORDER — HYDROCODONE BITARTRATE AND ACETAMINOPHEN 5; 325 MG/1; MG/1
1 TABLET ORAL
Status: DISCONTINUED | OUTPATIENT
Start: 2022-06-03 | End: 2022-06-04 | Stop reason: HOSPADM

## 2022-06-03 RX ORDER — SODIUM CHLORIDE, SODIUM LACTATE, POTASSIUM CHLORIDE, CALCIUM CHLORIDE 600; 310; 30; 20 MG/100ML; MG/100ML; MG/100ML; MG/100ML
INJECTION, SOLUTION INTRAVENOUS CONTINUOUS
Status: DISCONTINUED | OUTPATIENT
Start: 2022-06-03 | End: 2022-06-03

## 2022-06-03 RX ORDER — BUPIVACAINE HYDROCHLORIDE AND EPINEPHRINE 5; 5 MG/ML; UG/ML
INJECTION, SOLUTION EPIDURAL; INTRACAUDAL; PERINEURAL
Status: DISCONTINUED | OUTPATIENT
Start: 2022-06-03 | End: 2022-06-03 | Stop reason: HOSPADM

## 2022-06-03 RX ORDER — HYDROMORPHONE HYDROCHLORIDE 2 MG/ML
INJECTION, SOLUTION INTRAMUSCULAR; INTRAVENOUS; SUBCUTANEOUS
Status: DISCONTINUED | OUTPATIENT
Start: 2022-06-03 | End: 2022-06-03

## 2022-06-03 RX ORDER — CEFAZOLIN SODIUM 2 G/50ML
2 SOLUTION INTRAVENOUS
Status: DISCONTINUED | OUTPATIENT
Start: 2022-06-03 | End: 2022-06-03

## 2022-06-03 RX ORDER — METHYLENE BLUE 5 MG/ML
INJECTION INTRAVENOUS
Status: DISPENSED
Start: 2022-06-03 | End: 2022-06-03

## 2022-06-03 RX ORDER — ONDANSETRON 2 MG/ML
INJECTION INTRAMUSCULAR; INTRAVENOUS
Status: DISPENSED
Start: 2022-06-03 | End: 2022-06-03

## 2022-06-03 RX ORDER — CEFAZOLIN SODIUM 1 G/3ML
INJECTION, POWDER, FOR SOLUTION INTRAMUSCULAR; INTRAVENOUS
Status: DISCONTINUED | OUTPATIENT
Start: 2022-06-03 | End: 2022-06-03 | Stop reason: HOSPADM

## 2022-06-03 RX ORDER — GENTAMICIN SULFATE 80 MG/100ML
80 INJECTION, SOLUTION INTRAVENOUS
Status: DISCONTINUED | OUTPATIENT
Start: 2022-06-03 | End: 2022-06-03

## 2022-06-03 RX ORDER — PROCHLORPERAZINE EDISYLATE 5 MG/ML
5 INJECTION INTRAMUSCULAR; INTRAVENOUS EVERY 6 HOURS PRN
Status: DISCONTINUED | OUTPATIENT
Start: 2022-06-03 | End: 2022-06-04 | Stop reason: HOSPADM

## 2022-06-03 RX ORDER — IBUPROFEN 200 MG
16 TABLET ORAL
Status: DISCONTINUED | OUTPATIENT
Start: 2022-06-03 | End: 2022-06-04 | Stop reason: HOSPADM

## 2022-06-03 RX ORDER — FENTANYL CITRATE 50 UG/ML
INJECTION, SOLUTION INTRAMUSCULAR; INTRAVENOUS
Status: DISCONTINUED | OUTPATIENT
Start: 2022-06-03 | End: 2022-06-03

## 2022-06-03 RX ORDER — CEFAZOLIN SODIUM 2 G/50ML
2 SOLUTION INTRAVENOUS
Status: COMPLETED | OUTPATIENT
Start: 2022-06-03 | End: 2022-06-03

## 2022-06-03 RX ORDER — SODIUM CHLORIDE 0.9 % (FLUSH) 0.9 %
10 SYRINGE (ML) INJECTION
Status: DISCONTINUED | OUTPATIENT
Start: 2022-06-03 | End: 2022-06-04 | Stop reason: HOSPADM

## 2022-06-03 RX ORDER — WATER FOR INJECTION,STERILE
VIAL (ML) INJECTION
Status: DISPENSED
Start: 2022-06-03 | End: 2022-06-03

## 2022-06-03 RX ORDER — MIDAZOLAM HYDROCHLORIDE 1 MG/ML
INJECTION INTRAMUSCULAR; INTRAVENOUS
Status: COMPLETED
Start: 2022-06-03 | End: 2022-06-03

## 2022-06-03 RX ORDER — FAMOTIDINE 10 MG/ML
20 INJECTION INTRAVENOUS ONCE
Status: COMPLETED | OUTPATIENT
Start: 2022-06-03 | End: 2022-06-03

## 2022-06-03 RX ORDER — DIPHENHYDRAMINE HYDROCHLORIDE 50 MG/ML
25 INJECTION INTRAMUSCULAR; INTRAVENOUS ONCE AS NEEDED
Status: DISCONTINUED | OUTPATIENT
Start: 2022-06-03 | End: 2022-06-04 | Stop reason: HOSPADM

## 2022-06-03 RX ORDER — MORPHINE SULFATE 4 MG/ML
2 INJECTION, SOLUTION INTRAMUSCULAR; INTRAVENOUS EVERY 4 HOURS PRN
Status: DISCONTINUED | OUTPATIENT
Start: 2022-06-03 | End: 2022-06-04 | Stop reason: HOSPADM

## 2022-06-03 RX ORDER — MEPERIDINE HYDROCHLORIDE 25 MG/ML
12.5 INJECTION INTRAMUSCULAR; INTRAVENOUS; SUBCUTANEOUS EVERY 10 MIN PRN
Status: ACTIVE | OUTPATIENT
Start: 2022-06-03 | End: 2022-06-03

## 2022-06-03 RX ORDER — MIDAZOLAM HYDROCHLORIDE 1 MG/ML
2 INJECTION INTRAMUSCULAR; INTRAVENOUS ONCE
Status: COMPLETED | OUTPATIENT
Start: 2022-06-03 | End: 2022-06-03

## 2022-06-03 RX ORDER — CLINDAMYCIN PHOSPHATE 600 MG/50ML
INJECTION, SOLUTION INTRAVENOUS
Status: DISPENSED
Start: 2022-06-03 | End: 2022-06-03

## 2022-06-03 RX ORDER — CLINDAMYCIN PHOSPHATE 150 MG/ML
600 INJECTION, SOLUTION INTRAVENOUS
Status: DISCONTINUED | OUTPATIENT
Start: 2022-06-03 | End: 2022-06-04 | Stop reason: HOSPADM

## 2022-06-03 RX ORDER — INSULIN ASPART 100 [IU]/ML
1-10 INJECTION, SOLUTION INTRAVENOUS; SUBCUTANEOUS
Status: DISCONTINUED | OUTPATIENT
Start: 2022-06-03 | End: 2022-06-04 | Stop reason: HOSPADM

## 2022-06-03 RX ORDER — BUPIVACAINE HYDROCHLORIDE AND EPINEPHRINE 5; 5 MG/ML; UG/ML
INJECTION, SOLUTION EPIDURAL; INTRACAUDAL; PERINEURAL
Status: DISPENSED
Start: 2022-06-03 | End: 2022-06-03

## 2022-06-03 RX ORDER — FENTANYL CITRATE 50 UG/ML
25 INJECTION, SOLUTION INTRAMUSCULAR; INTRAVENOUS EVERY 5 MIN PRN
Status: DISCONTINUED | OUTPATIENT
Start: 2022-06-03 | End: 2022-06-04 | Stop reason: HOSPADM

## 2022-06-03 RX ORDER — METHYLENE BLUE 5 MG/ML
INJECTION INTRAVENOUS
Status: DISCONTINUED | OUTPATIENT
Start: 2022-06-03 | End: 2022-06-03 | Stop reason: HOSPADM

## 2022-06-03 RX ORDER — HYDROMORPHONE HYDROCHLORIDE 2 MG/ML
0.2 INJECTION, SOLUTION INTRAMUSCULAR; INTRAVENOUS; SUBCUTANEOUS EVERY 5 MIN PRN
Status: DISCONTINUED | OUTPATIENT
Start: 2022-06-03 | End: 2022-06-04 | Stop reason: HOSPADM

## 2022-06-03 RX ORDER — LIDOCAINE HYDROCHLORIDE 10 MG/ML
INJECTION, SOLUTION EPIDURAL; INFILTRATION; INTRACAUDAL; PERINEURAL
Status: DISCONTINUED | OUTPATIENT
Start: 2022-06-03 | End: 2022-06-03

## 2022-06-03 RX ORDER — IBUPROFEN 200 MG
24 TABLET ORAL
Status: DISCONTINUED | OUTPATIENT
Start: 2022-06-03 | End: 2022-06-04 | Stop reason: HOSPADM

## 2022-06-03 RX ORDER — GABAPENTIN 300 MG/1
300 CAPSULE ORAL ONCE
Status: COMPLETED | OUTPATIENT
Start: 2022-06-03 | End: 2022-06-03

## 2022-06-03 RX ORDER — LIDOCAINE HYDROCHLORIDE 10 MG/ML
1 INJECTION, SOLUTION EPIDURAL; INFILTRATION; INTRACAUDAL; PERINEURAL ONCE
Status: DISCONTINUED | OUTPATIENT
Start: 2022-06-03 | End: 2022-06-04 | Stop reason: HOSPADM

## 2022-06-03 RX ORDER — GENTAMICIN SULFATE 80 MG/50ML
INJECTION, SOLUTION INTRAVENOUS
Status: DISPENSED
Start: 2022-06-03 | End: 2022-06-03

## 2022-06-03 RX ORDER — ACETAMINOPHEN 325 MG/1
650 TABLET ORAL ONCE
Status: COMPLETED | OUTPATIENT
Start: 2022-06-03 | End: 2022-06-03

## 2022-06-03 RX ORDER — SODIUM CHLORIDE 9 MG/ML
INJECTION, SOLUTION INTRAVENOUS CONTINUOUS
Status: DISCONTINUED | OUTPATIENT
Start: 2022-06-03 | End: 2022-06-04 | Stop reason: HOSPADM

## 2022-06-03 RX ORDER — GABAPENTIN 300 MG/1
CAPSULE ORAL
Status: DISPENSED
Start: 2022-06-03 | End: 2022-06-03

## 2022-06-03 RX ORDER — FAMOTIDINE 10 MG/ML
INJECTION INTRAVENOUS
Status: COMPLETED
Start: 2022-06-03 | End: 2022-06-03

## 2022-06-03 RX ORDER — ONDANSETRON 2 MG/ML
4 INJECTION INTRAMUSCULAR; INTRAVENOUS EVERY 12 HOURS PRN
Status: DISCONTINUED | OUTPATIENT
Start: 2022-06-03 | End: 2022-06-04 | Stop reason: HOSPADM

## 2022-06-03 RX ORDER — ROCURONIUM BROMIDE 10 MG/ML
INJECTION, SOLUTION INTRAVENOUS
Status: DISCONTINUED | OUTPATIENT
Start: 2022-06-03 | End: 2022-06-03

## 2022-06-03 RX ORDER — HYDROCODONE BITARTRATE AND ACETAMINOPHEN 5; 325 MG/1; MG/1
1 TABLET ORAL EVERY 4 HOURS PRN
Status: DISCONTINUED | OUTPATIENT
Start: 2022-06-03 | End: 2022-06-04 | Stop reason: HOSPADM

## 2022-06-03 RX ADMIN — ROCURONIUM BROMIDE 50 MG: 10 SOLUTION INTRAVENOUS at 07:06

## 2022-06-03 RX ADMIN — HYDROCODONE BITARTRATE AND ACETAMINOPHEN 1 TABLET: 5; 325 TABLET ORAL at 08:06

## 2022-06-03 RX ADMIN — LIDOCAINE HYDROCHLORIDE 40 MG: 10 INJECTION, SOLUTION EPIDURAL; INFILTRATION; INTRACAUDAL; PERINEURAL at 07:06

## 2022-06-03 RX ADMIN — SODIUM CHLORIDE, POTASSIUM CHLORIDE, SODIUM LACTATE AND CALCIUM CHLORIDE: 600; 310; 30; 20 INJECTION, SOLUTION INTRAVENOUS at 06:06

## 2022-06-03 RX ADMIN — ACETAMINOPHEN 650 MG: 325 TABLET ORAL at 07:06

## 2022-06-03 RX ADMIN — FAMOTIDINE 20 MG: 10 INJECTION INTRAVENOUS at 07:06

## 2022-06-03 RX ADMIN — CEFAZOLIN SODIUM 2 G: 2 SOLUTION INTRAVENOUS at 12:06

## 2022-06-03 RX ADMIN — CEFAZOLIN SODIUM 2 G: 2 SOLUTION INTRAVENOUS at 07:06

## 2022-06-03 RX ADMIN — HYDROCODONE BITARTRATE AND ACETAMINOPHEN 1 TABLET: 5; 325 TABLET ORAL at 02:06

## 2022-06-03 RX ADMIN — SUGAMMADEX 120 MG: 100 INJECTION, SOLUTION INTRAVENOUS at 10:06

## 2022-06-03 RX ADMIN — MIDAZOLAM HYDROCHLORIDE 2 MG: 1 INJECTION, SOLUTION INTRAMUSCULAR; INTRAVENOUS at 07:06

## 2022-06-03 RX ADMIN — SODIUM CHLORIDE: 9 INJECTION, SOLUTION INTRAVENOUS at 05:06

## 2022-06-03 RX ADMIN — INSULIN HUMAN 10 UNITS: 100 INJECTION, SOLUTION PARENTERAL at 12:06

## 2022-06-03 RX ADMIN — ONDANSETRON 4 MG: 2 INJECTION INTRAMUSCULAR; INTRAVENOUS at 03:06

## 2022-06-03 RX ADMIN — FENTANYL CITRATE 100 MCG: 50 INJECTION, SOLUTION INTRAMUSCULAR; INTRAVENOUS at 07:06

## 2022-06-03 RX ADMIN — GABAPENTIN 300 MG: 300 CAPSULE ORAL at 07:06

## 2022-06-03 RX ADMIN — INSULIN HUMAN 5 UNITS: 100 INJECTION, SOLUTION PARENTERAL at 11:06

## 2022-06-03 RX ADMIN — GENTAMICIN SULFATE 50 ML: 40 INJECTION, SOLUTION INTRAMUSCULAR; INTRAVENOUS at 07:06

## 2022-06-03 RX ADMIN — HYDROMORPHONE HYDROCHLORIDE 0.4 MG: 2 INJECTION INTRAMUSCULAR; INTRAVENOUS; SUBCUTANEOUS at 08:06

## 2022-06-03 RX ADMIN — INSULIN ASPART 4 UNITS: 100 INJECTION, SOLUTION INTRAVENOUS; SUBCUTANEOUS at 04:06

## 2022-06-03 RX ADMIN — PROPOFOL 150 MG: 10 INJECTION, EMULSION INTRAVENOUS at 07:06

## 2022-06-03 RX ADMIN — INSULIN ASPART 4 UNITS: 100 INJECTION, SOLUTION INTRAVENOUS; SUBCUTANEOUS at 09:06

## 2022-06-03 RX ADMIN — MIDAZOLAM HYDROCHLORIDE 2 MG: 1 INJECTION INTRAMUSCULAR; INTRAVENOUS at 07:06

## 2022-06-03 RX ADMIN — CLINDAMYCIN PHOSPHATE 600 MG: 150 INJECTION, SOLUTION INTRAVENOUS at 07:06

## 2022-06-03 RX ADMIN — ONDANSETRON 4 MG: 2 INJECTION INTRAMUSCULAR; INTRAVENOUS at 11:06

## 2022-06-03 NOTE — H&P
[facility] Breast Surg  Breast Surgical Oncology  Follow-Up Patient Office Visit       Referring Provider: Beth Holly MD - GYN  PCP: Sharri Bass, RT   Care Team:     Chief Complaint:   Breast cancer     Subjective:   Treatment History:  1.Genetic Testing - negative      Interval History:  6/3/2022 - She presents today for surgery.      HPI:  Elina Ham initially presents on 22 at age 62 Years with bilateral breast cancer (1) AJCC 8th ed clinical anatomic stage IA / prognostic stage IA (cT1b cN0 M0) Right breast 10 o'clock 7 cm FN invasive ductal carcinoma, grade 1, with associated LCIS, ER 43%, WA 94%, HER2 0 - negative on IHC, Ki674% and 9 o'clock 5 cm FN Right breast invasive ductal carcinoma, grade 1, with associated DCIS, ER 81%, WA 3%, HER2 1+ - negative on IHC and Ki67 4%. 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       A detailed patient history was obtained and reviewed.      Additional Breast History:  Patient did not breast feed. Patient denies nipple discharge. Patient denies to previous breast biopsy. Patient denies to a personal history of breast cancer.    Imagin. 3/4/2022 BL SC MG at [facility]- which revealed a 2.7 cm mass in the left breast upper outer quadrant posterior depth. There is possible architectural distortion in the right breast at 9 o'clock anterior depth. There is a focal asymmetry with possible architectural distortion in the right breast at 9 o'clock posterior depth. BIRADS-0; additional imaging needed.    2. 3/8/2022 BL DG MG/ BL US BREAST LIMITED at [facility]- which revealed on R MG at 9 o'clock anterior depth, there is a persistent area of architectural distortion spanning 0.8 cm. At 10 o'clock right breast,  posterior depth, there is a persistent 1.0 cm focal asymmetry with associated architectural distortion and amorphous calcifications. At 1 o'clock left breast, middle depth, there is a 2.7 cm oval mass with irregular margins. These findings correspond to the areas recalled from screening examination dated 3/4/2022. In the left axilla there is an enlarged lymph node. No other suspicious masses, calcifications, or other signs of malignancy are identified. On R US, at 9 o'clock, 5 cm FN, there is an area of architectural distortion with posterior shadowing spanning 0.8 cm which corresponds to the mammographic finding in this region. at 10 o'clock right breast, 7 cm FN, there is a 1.0 x 0.4 x 0.9 cm oval, hypoechoic mass with circumscribed margins. This mass is stable mammographically dating back to the 2010 exam and is thus considered benign. On L US at 1 o'clock 7 cm FN there is a 2.7 x 2.0 x 2.1 mm oval, hypoechoic mass with irregular margins which corresponds to the mammographic finding in this region. In the left axilla, there is a single level 1 lymph node which demonstrates eccentric cortical thickening. This lymph node measures 1.3 x 1.0 x 1.0 cm and corresponds to the mammographic finding in this region. Additional benign appearing lymph nodes are noted in the bilateral axillae. BIRADS-5 highly suspicious; biopsy recommended.      Pathology:  1. 3/16/2022 Ultrasound-guided Core Needle Biopsy Right Breast 9 o'clock 5 cm FN Mass with Distortion - Invasive ductal carcinoma, grade 1, with associated focus of ductal carcinoma in situ, grade 1, and focal perineural invasion is present  ER 81%  WV 3%  HER2 Negative  Ki67 4%    2. 3/16/2022 Stereotactic guided Core Needle Biopsy Right Breast Mass 10 o'clock Posterior Depth Focal Asymmetry, Architectural Distortion with Calcifications - Invasive ductal carcinoma, grade 1 and focal lobular carcinoma in situ  ER 43%  WV 94%  HER2 Negative  Ki67 4%    3. 3/16/2022  Ultrasound-guided Core Needle Biopsy Left Breast 1 o'clock 7 cm FN Mass - High grade carcinoma with anaplastic/malignant giant cell component (suggestive for sarcomatoid/metaplastic change)  ER 0%  CT 0%  HER2 Negative  Ki67 High    4. 3 Ultrasound-guided Core Needle Biopsy Left Axillary Node - Portion of reactive lymph node; no evidence of metastatic carcinoma      OB/GYN History:  Age at Menarche Onset: 14  Menopausal Status: postmenopausal at age 52  Pregnancy History:   Age at first live birth: 31  Hormone Replacement Therapy: Nonone    Family History:  Family History   Problem Relation Age of Onset    Breast cancer Maternal Grandmother             Breast cancer Sister     Breast cancer Sister         remission    Breast cancer Maternal Aunt             Breast cancer Maternal Aunt             Breast cancer Maternal Aunt             Breast cancer Maternal Aunt     Breast cancer Maternal Uncle             Stroke Brother                 Other History:  Past Medical History:   Diagnosis Date    Cancer 3/21/2022    Diabetes mellitus     Hypercholesterolemia     Hypertension      Past Surgical History:   Procedure Laterality Date     SECTION  92 and 3/15/95        Social History     Socioeconomic History    Marital status:    Tobacco Use    Smoking status: Never Smoker    Smokeless tobacco: Never Used   Substance and Sexual Activity    Alcohol use: Never     Types: 1 Glasses of wine per week     Comment: drink socially but haven't had a glass of wine since     Drug use: Never    Sexual activity: Yes     Partners: Male     Birth control/protection: None     Comment: We take STD test for the first 2 years we started dating  (5       Review of patient's allergies indicates:  No Known Allergies    Review of Systems:  Constitutional: denies fevers, chills, weight loss  HEENT: denies blurry/double vision, changes in hearing,  "odynophagia, dysphagia  Respiratory: denies cough, shortness of breath  Cardiovascular: denies palpitations, swelling of the extremities  GI: denies abdominal pain, nausea/vomiting, hematochezia, frequent stools  : denies frequency, dysuria, flank pain, hematuria  Skin: denies new rashes  Neurological: denies muscular/sensory deficiencies, loss of coordination, headaches, memory changes  Endo: denies hair loss/thinning, nervousness, hot flashes, heat/cold intolerance, lumps in the neck area  Heme: denies easy bruising and fatigue  Psychological: denies anxious/depressive moods  Musculoskeletal: denies bony pain, muscle cramps, swollen joints    Shortness of breath when walking (attributes it to weight gain)  Diarrhea- has slowed down in occurrences  Tingling to fingertips       Objective:     Vitals:  Blood pressure (!) 148/81, pulse 65, temperature 98.3 °F (36.8 °C), temperature source Oral, resp. rate 20, height 5' 6" (1.676 m), weight 94 kg (207 lb 3.7 oz), SpO2 98 %, not currently breastfeeding.      Physical Exam:  General: The patient is awake, alert and oriented times three. The patient is well nourished and in no acute distress.   Neck: There is no evidence of palpable cervical, supraclavicular or axillary adenopathy. The neck is supple. The thyroid is not enlarged.   Musculoskeletal: The patient has a normal range of motion of her bilateral upper extremities.   Chest: Examination of the chest wall fails to reveal any obvious abnormalities. The lungs are clear to auscultation bilaterally without rales, rhonchi, or wheezing.   Cardiovascular: The heart has a regular rate and rhythm without murmurs, gallops or rubs.   Breast: Examination of right breast fails to reveal any dominant masses or areas of significant focal nodularity. The nipple is everted without evidence of discharge. There is no skin dimpling with movement of the pectoralis. There is post biopsy changes and resolving ecchymosis. Examination of " the left breast reveals a dominant mass in the upper outer quadrant which is mobile and not fixed to surrounding tissue. The nipple is everted without evidence of discharge. There is no skin dimpling with movement of the pectoralis. There is no significant skin changes overlying the breast.  Abdomen: The abdomen is soft, flat, nontender and nondistended with no palpable masses or organomegaly.  Integumentary: no rashes or skin lesions present  Neurologic: cranial nerves intact, no signs of peripheral neurological deficit, motor/sensory function intact      Assessment and Plan:     Cancer Staging  Malignant neoplasm of upper-outer quadrant of left breast in female, estrogen receptor negative  Staging form: Breast, AJCC 8th Edition  - Clinical stage from 3/29/2022: Stage IIA (cT2, cN0(f), cM0, G1, ER-, DE-, HER2-) - Signed by Charlene Contreras MD on 5/3/2022    Malignant neoplasm of upper-outer quadrant of right breast in female, estrogen receptor positive  Staging form: Breast, AJCC 8th Edition  - Clinical stage from 3/29/2022: Stage IA (cT1b(2), cN0, cM0, G1, ER+, DE+, HER2-) - Signed by Charlene Contreras MD on 5/3/2022      Patient Active Problem List   Diagnosis    Malignant neoplasm of upper-outer quadrant of right breast in female, estrogen receptor positive    Malignant neoplasm of upper-outer quadrant of left breast in female, estrogen receptor negative        Elina was seen today for pre-op exam.    Diagnoses and all orders for this visit:    Malignant neoplasm of upper-outer quadrant of right breast in female, estrogen receptor positive    Malignant neoplasm of upper-outer quadrant of left breast in female, estrogen receptor negative    Metaplastic carcinoma of breast    Ductal carcinoma in situ (DCIS) of right breast    Lobular carcinoma in situ (LCIS) of right breast       We discussed the need to proceed with local and systemic treatment. Local treatment options are surgery and radiation. The choices  for surgical operations include mastectomy and lumpectomy with radiation. The general operative procedure of mastectomy, the skin sparing nature and attempts made to save underlying muscle with modified mastectomy were discussed. The options of primary reconstruction following mastectomy include either implant reconstruction or tissue transfer type of reconstruction. The pros and cons of both of these reconstructive methods were addressed. Both of these are performed in stages and second operation is usually required before the final completion of the reconstructive process. I also explained to the patient that the reconstructive options are generally by law required to be covered by insurance and would be considered part of a cancer operation and not a cosmetic operation. Sometimes also a reduction or mastopexy is performed on the opposite side for better match, and this operation by itself is also considered part of the cancer treatment.     Following explanation of the mastectomy option, I then explained to her the procedure of lumpectomy. The rationale for lumpectomy, the need to obtain clear margins was specifically addressed. The absolute necessity of adding radiation following lumpectomy with good margins was explained. The logistics of radiation were discussed at length. The patient will further discuss details of radiation with her Radiation Oncologist.     I informed her of the complications which include surgical site infections, hematoma or seroma requiring operation, necrosis of nipple-areola and/or mastectomy flaps requiring debridement or hyperbaric therapy, unplanned re-operations, and delay in adjuvant treatments.     Following this, I also explained to her the procedure of sentinel lymph node mapping and its rationale. We have a 98% success rate identifying the sentinel node. The need to use radioactive dye and blue dye to ensure proper identification of the node was explained. MagTrace brown dye  can also be used in the setting of a mastectomy. Also the small but real risk of anaphylactic shock with blue dye or MagTrace dye was discussed. The need to perform completion dissection should the sentinel lymph node be positive was also explained to the patient.     Following this, I have also briefly discussed with her the rationale for adjuvant systemic treatment in the form of either chemotherapy and/or hormonal therapy. This would depend on the presence of hormone receptors in the tumor. Further recommendations regarding the kind of chemo and the cycles would be finalized by Medical Oncology. She will discuss these issues at length with her medical oncologist. Specifically, we went over the local recurrence rate and survival rates with mastectomy and breast conserving therapy, the small but real risk of lymphedema should we need completion dissection, and the indications for post mastectomy radiation in certain subset of patients.       Plan:     1. Surgery - Bilateral Simple Mastectomy with Bilateral SLNB, MagTrace Injection (In-Office)  2. Plastic Surgery Referral - re: post-mastectomy reconstruction - Dr. Gibbs

## 2022-06-03 NOTE — ANESTHESIA PREPROCEDURE EVALUATION
06/03/2022  Elina Ham is a 62 y.o., female.      Pre-op Assessment    I have reviewed the Patient Summary Reports.     I have reviewed the Nursing Notes. I have reviewed the NPO Status.   I have reviewed the Medications.     Review of Systems  Anesthesia Hx:  No problems with previous Anesthesia    Social:  Non-Smoker    Cardiovascular:   Hypertension Denies MI.    Denies Angina.  Denies CHF.    Pulmonary:   Denies COPD.  Denies Asthma.    Renal/:   Denies Chronic Renal Disease.     Hepatic/GI:   Denies Hiatal Hernia.  Denies GERD.    Neurological:   Denies CVA. Denies Seizures.    Endocrine:   Diabetes, well controlled, type 2 Denies Hypothyroidism. Denies Hyperthyroidism.  Obesity / BMI > 30      Physical Exam  General: Well nourished, Cooperative, Alert and Oriented    Airway:  Mallampati: I   Mouth Opening: Normal  TM Distance: Normal  Tongue: Normal  Neck ROM: Normal ROM    Dental:  Intact        Anesthesia Plan  Type of Anesthesia, risks & benefits discussed:    Anesthesia Type: Gen ETT, Gen Supraglottic Airway  Intra-op Monitoring Plan: Standard ASA Monitors  Post Op Pain Control Plan: multimodal analgesia  Induction:  IV  Airway Plan: Direct  Informed Consent: Informed consent signed with the Patient and all parties understand the risks and agree with anesthesia plan.  All questions answered.   ASA Score: 2    Ready For Surgery From Anesthesia Perspective.     .

## 2022-06-03 NOTE — TRANSFER OF CARE
"Anesthesia Transfer of Care Note    Patient: Elina Ham    Procedure(s) Performed: Procedure(s) (LRB):  MASTECTOMY, BILATERAL (Bilateral)  BIOPSY, LYMPH NODE, SENTINEL (Bilateral)  INSERTION, TISSUE EXPANDER, BREAST (Bilateral breast reconstruction with tissue expanders and acellular dermal matrix and use of intraoperative fluoroscein angiogragry) (Bilateral)    Patient location: PACU    Anesthesia Type: general    Transport from OR: Transported from OR on room air with adequate spontaneous ventilation    Post pain: adequate analgesia    Post assessment: no apparent anesthetic complications    Post vital signs: stable    Level of consciousness: responds to stimulation    Nausea/Vomiting: no nausea/vomiting    Complications: none    Transfer of care protocol was followed      Last vitals:   Visit Vitals  /89 (BP Location: Right arm, Patient Position: Lying)   Pulse 83   Temp 36.1 °C (97 °F) (Temporal)   Resp 16   Ht 5' 6" (1.676 m)   Wt 94 kg (207 lb 3.7 oz)   SpO2 100%   Breastfeeding No   BMI 33.45 kg/m²     "

## 2022-06-03 NOTE — PLAN OF CARE
Problem: Adult Inpatient Plan of Care  Goal: Plan of Care Review  6/3/2022 1453 by Barbara Narvaez RN  Outcome: Ongoing, Progressing  6/3/2022 1452 by Barbara Narvaez RN  Outcome: Ongoing, Progressing  Goal: Patient-Specific Goal (Individualized)  6/3/2022 1453 by Barbara Narvaez RN  Outcome: Ongoing, Progressing  6/3/2022 1452 by Barbara Narvaez RN  Outcome: Ongoing, Progressing  Goal: Absence of Hospital-Acquired Illness or Injury  6/3/2022 1453 by Barbara Narvaez RN  Outcome: Ongoing, Progressing  6/3/2022 1452 by Barbara Narvaez RN  Outcome: Ongoing, Progressing  Intervention: Prevent Infection  6/3/2022 1453 by Barbara Narvaez RN  Flowsheets (Taken 6/3/2022 1453)  Infection Prevention:   hand hygiene promoted   rest/sleep promoted  6/3/2022 1452 by Barbara Narvaez RN  Flowsheets (Taken 6/3/2022 1452)  Infection Prevention:   hand hygiene promoted   rest/sleep promoted  Goal: Optimal Comfort and Wellbeing  6/3/2022 1453 by Barbara Narvaez RN  Outcome: Ongoing, Progressing  6/3/2022 1452 by Barbara Narvaez RN  Outcome: Ongoing, Progressing  Intervention: Provide Person-Centered Care  Flowsheets (Taken 6/3/2022 1452)  Trust Relationship/Rapport:   care explained   emotional support provided   thoughts/feelings acknowledged  Goal: Readiness for Transition of Care  6/3/2022 1453 by Barbara Narvaez RN  Outcome: Ongoing, Progressing  6/3/2022 1452 by Barbara Narvaez RN  Outcome: Ongoing, Progressing     Problem: Infection  Goal: Absence of Infection Signs and Symptoms  6/3/2022 1453 by Barbara Narvaez RN  Outcome: Ongoing, Progressing  6/3/2022 1452 by Barbara Narvaez RN  Outcome: Ongoing, Progressing  Intervention: Prevent or Manage Infection  Flowsheets (Taken 6/3/2022 1453)  Isolation Precautions: precautions initiated

## 2022-06-03 NOTE — ANESTHESIA POSTPROCEDURE EVALUATION
Anesthesia Post Evaluation    Patient: Elina Ham    Procedure(s) Performed: Procedure(s) (LRB):  MASTECTOMY, BILATERAL (Bilateral)  BIOPSY, LYMPH NODE, SENTINEL (Bilateral)  INSERTION, TISSUE EXPANDER, BREAST (Bilateral breast reconstruction with tissue expanders and acellular dermal matrix and use of intraoperative fluoroscein angiogragry) (Bilateral)    Final Anesthesia Type: general      Patient location during evaluation: PACU  Patient participation: Yes- Able to Participate  Level of consciousness: awake and alert  Post-procedure vital signs: reviewed and stable  Pain management: adequate  Airway patency: patent    PONV status at discharge: No PONV  Anesthetic complications: no      Cardiovascular status: blood pressure returned to baseline  Respiratory status: unassisted and spontaneous ventilation  Hydration status: euvolemic  Follow-up not needed.          Vitals Value Taken Time   /66 06/03/22 1200   Temp 36.1 °C (97 °F) 06/03/22 1112   Pulse 76 06/03/22 1201   Resp 24 06/03/22 1201   SpO2 98 % 06/03/22 1201   Vitals shown include unvalidated device data.      No case tracking events are documented in the log.      Pain/Loan Score: Pain Rating Prior to Med Admin: 0 (6/3/2022  7:15 AM)  Loan Score: 9 (6/3/2022 11:40 AM)

## 2022-06-03 NOTE — PROGRESS NOTES
Report received from Silva Gay RN, patient in stable condition. Alert and oriented.  Pain 6/10 to bilateral breast. HILL drains x 4. Cedeno draining clear yellow urine. Dressing to breast clean dry and intact.

## 2022-06-03 NOTE — CARE UPDATE
Updated Dr. Nael LAWS on patients post-op cbg results x2 and the 5 and 10 units of regular insulin given per anesthesia order.  She stated she will order  sliding scale cbg orders.

## 2022-06-03 NOTE — OP NOTE
[facility] [address] [place] [address]    PATIENT NAME:      BARB CRAFT   YOB: 1960  CSN:               [id number]  MRN:               [id number]  ADMIT DATE:        06/03/2022 05:55:00  PHYSICIAN:         Nick Gibbs MD                          OPERATIVE REPORT      DATE OF SURGERY:    06/03/2022 00:00:00    SURGEON:  Nick Gibbs MD    PROCEDURES:    1. Bilateral breast reconstruction and placement of tissue expanders and   acellular dermal matrix.  2. Use of intraoperative fluorescein angiography.    INDICATION FOR PROCEDURE:  This is a 62-year-old female with recently diagnosed   bilateral breast cancer.  She is referred to me by Dr. Contreras for evaluation for   reconstruction.  Options were discussed with the patient.  She elected to   proceed with first stage reconstruction, placement of tissue expanders and   acellular dermal matrix.  The risks, benefits and potential complications of   surgery were discussed in detail with the patient.  She signed the informed   consent.    DESCRIPTION OF PROCEDURE:  The patient was identified in the preoperative   holding area.  Informed consent was reviewed with her.  She was marked.  She was   then taken to the operating room and placed in supine position.  General   endotracheal anesthesia was provided. Prepped and draped in a sterile surgical   fashion.  Time-out was taken. Everyone were in agreement. We started the   procedure on the patient's right breast after Dr. Contreras completed her portion   of the surgery on the right breast and copiously irrigated out the right breast   pocket with Betadine and Constantino solution.  Mastectomy flaps were noted to be in   excellent condition.  We used electrocautery ensuring hemostasis throughout and   then elevated the pectoralis major muscle off the chest wall, releasing its IMF   and inferior-most  sternal insertions using electrocautery and then contour   perforated AlloDerm was then obtained, soaked in Betadine and then secured along   the IMF into the pectoralis major muscle on the lateral chest wall.  450 cc   Allergan tissue expander was then obtained after we once again copiously   irrigated out the wound, changed our gloves and prepped the patient again.  Air   was removed from the expander. It was then placed underneath the AlloDerm and   pectoralis major muscle. It was infiltrated with 300 cc of sterile saline using   a sterile filling system. AlloDerm was then approximated to the pectoralis major   muscle using 0 Vicryl sutures in interrupted fashion.  Exparel was injected   throughout the field.  Two 15 round Aneudy drains were placed in the wound bed,   exited inferolaterally secured to the skin with 2-0 silk suture.  Intraoperative fluorescein   angiography was performed.  Excellent perfusion was noted to the skin flaps.    The wound was then closed with 0 Vicryl sutures in interrupted fashion along the   deep dermis and then a 2-0 subcuticular Stratafix.  Dr. Contreras finished her   portion of the procedure. Please refer to her dictation.  Similarly, I copiously   irrigated out the left breast pocket, ensured hemostasis throughout, I elevated   the pectoralis major muscle, releasing its IMF and lower sternal insertion,   soaked contour perforated AlloDerm and some Betadine and then secured to the IMF   pectoralis major muscle and the lateral chest wall.  450 cc tissue expander   similarly placed after changing our gloves once again prepping the patient and   copiously irrigating out the wound with Betadine and Constantino solution. 300 cc of   sterile saline was infiltrated into the tissue expander and was further   approximated to the pectoralis major muscle using 0 Vicryl sutures.  After it   was injected throughout the field.  Two 15-round Aneudy drains were placed   through the skin secured 2-0 silk  suture.  intraoperative fluorescein angiography   was used.  Excellent perfusion was noted of the skin flaps.  Wounds were then   closed with 0 Vicryl sutures in an interrupted fashion in deep dermis and 2-0   subcuticular Stratafix.  Dermabond tape was used as a dressing.  There were no   complications.  All counts were correct.        ______________________________  MD VAHE Rodriguez/EVELIO  DD:  06/03/2022  Time:  11:36AM  DT:  06/03/2022  Time:  01:39PM  Job #:  [id number]      OPERATIVE REPORT

## 2022-06-03 NOTE — ANESTHESIA PROCEDURE NOTES
Intubation    Date/Time: 6/3/2022 7:34 AM  Performed by: Fran Hurt CRNA  Authorized by: Senthil Bledsoe DO     Intubation:     Induction:  Inhalational - ETT/trach    Intubated:  Postinduction    Mask Ventilation:  Easy mask    Attempts:  1    Attempted By:  CRNA    Method of Intubation:  Direct    Blade:  Andreina 3    Laryngeal View Grade: Grade I - full view of cords      Difficult Airway Encountered?: No      Complications:  None    Airway Device:  Oral endotracheal tube    Airway Device Size:  7.0    Style/Cuff Inflation:  Cuffed (inflated to minimal occlusive pressure)    Tube secured:  21    Secured at:  The lips    Placement Verified By:  Capnometry    Complicating Factors:  None    Findings Post-Intubation:  BS equal bilateral and atraumatic/condition of teeth unchanged

## 2022-06-03 NOTE — PATIENT INSTRUCTIONS
BREAST SURGERY POST-OP INSTRUCTIONS  DR. CARLOS HUNT PA-C     YOU HAVE HAD:  Bilateral Simple Mastectomy and Bilateral [place] Lymph Node Biopsy     How do I care for my incisions?  You and your caregiver should look at your incision daily. Call your doctor if you see any redness or drainage from your incision.  You will be given a support bra, wear this for 24 hours a day for first week (unless showering or sponge bathing) for comfort and to help decrease amount of swelling. If the bra fits too loose or too tight you may go to your local store a purchase a front opening sports bra that fits snug.   Your incision will be closed with sutures (stitches) under your skin. These sutures dissolve on their own, so they do not need to be removed.  · If you go home with Steri-StripsTM on your incision, they will loosen and fall off by themselves. If they havent fallen off within 14 days, you may remove them.  · If you go home with glue over your sutures (stitches), it will also loosen and peel off, similarly to the Steri-Strips.  ** If you have had a Mastectomy and/or Reconstruction and/or Axillary Lymph Node Dissection:  You may have 1-2 Gary-Victor Drains in place. Please refer to the additional instructions discussing care of your drains.     Is it normal to feel new sensations?  As you are healing, you may feel a several different sensations in your breast. Tenderness, numbness, and twinges are common examples. These sensations usually come and go, and will lessen over time, usually within the first few months after surgery. As you continue to heal, you may feel scar tissue along your incision site. It will feel hard. This is common and will soften over the next several months.     Can I shower?  Please discuss when/if you can shower with your plastic surgeon.  Do not take tub baths, swim, or use hot tubs or saunas until you discuss it with your doctor at the first appointment after your  surgery.    Will I have pain when I am home?  The length of time each person has pain or discomfort varies. You will be given a prescription for pain medication before you go home. Follow the guidelines below to manage your pain.  · Take your medication as directed and as needed.  · Call your doctor if the pain medication prescribed for you doesnt relieve your pain.  · Do not drive or drink alcohol while you are taking prescription pain medication.  · As your incision heals, you will have less pain and need less pain medication. A mild pain reliever such as acetaminophen (Tylenol) or ibuprofen (Advil) will relieve aches and discomfort. However, large quantities of acetaminophen may be harmful to your liver. Do not take more acetaminophen than the amount directed on the bottle or as instructed by your doctor or nurse.  · Pain medication should help you as you resume your normal activities. Pain medication is most effective 30 to 45 minutes after taking it.  · Keep track of when you take your pain medication. Taking it when your pain first begins is more effective than waiting for the pain to get worse.  Pain medication may cause constipation (having fewer bowel movements than what is normal for you).    How can I prevent constipation?  · Go to the bathroom at the same time every day. Your body will get used to going at that time.  · If you feel the urge to go, do not put it off. Try to use the bathroom 5 to 15 minutes after meals.  · After breakfast is a good time to move your bowels because the reflexes in your colon are strongest then.  · Exercise if you can; walking is an excellent form of exercise.  · Drink 8 (8-ounce) glasses (2 liters) of liquids daily, if you can. Drink water, juices, soups, ice cream shakes, and other drinks that do not have caffeine. Beverages with caffeine, such as coffee and soda, pull fluid out of the body.  · Slowly increase the fiber in your diet to 25 to 35 grams per day. Fruits,  vegetables, whole grains, and cereals contain fiber. If you have an ostomy or have had recent bowel surgery, check with your doctor or nurse before making any changes in your diet.  · Both over-the-counter and prescription medications are available to treat constipation. Start with 1 of the following over-the-counter medications first:  o Docusate sodium (Colace®) 100 mg. Take __1___ capsules __1___ time a day. This is a stool softener that causes few side effects. Do not take it with mineral oil.  o Polyethylene glycol (MiraLAX®) 17 grams daily.  o Senna (Senokot®) 2 tablets at bedtime. This is a stimulant laxative, which can cause cramping.  · If you havent had a bowel movement in 2 days, call your doctor or nurse.    Will I be able to eat?  You can resume eating when you go home after surgery. Eating a balanced diet high in protein will help you heal after surgery. Your diet should include a healthy protein source at each meal, as well as fruits, vegetables, and whole grains. If you have questions about your diet, ask to see a dietitian.    When is it safe for me to drive and what activities can I perform?  Please discuss when/if you can drive with your plastic surgeon.  You should not lift anything heavier than 10 the first week. After this time, you may gradually increase the amount of weight but discuss with your plastic surgeon first. You want to take it easy the first 2 weeks, no strenuous or repetitive movements such as vacuuming or scrubbing. Walking is okay. Ask the doctor if you have questions.    How long until I have the pathology results?  The pathology report usually takes to 7 to 10 business days.    When is my first appointment after my surgery?  You should be given or schedule a follow-up appointment with Dr. Contreras's office (Viola Troncoso PA-C) 1 to 2 weeks after your surgery. Follow up with Plastic Surgeon as directed.    How can I cope with my feelings?   After surgery for a serious  illness, you may have new and upsetting feelings. Many patients say they felt sad, worried, nervous, irritable, or angry at one time or another. You may find that you cannot control some of these feelings. If this happens, its a good idea to seek emotional support.  The first step in coping is to talk about how you feel. Family and friends can help. Your nurse, doctor, and  can reassure, support, and guide you. It is always a good idea to let these professionals know how you, your family, and your friends are feeling emotionally. Many resources are available to patients and their families. Whether you are in the hospital or at home, we are here to help you and your family and friends handle the emotional aspects of your illness.    What if I have other questions?  If you have any questions or concerns, please talk with your doctor or nurse. You can reach them Monday through Thursday from 9:00 AM to 5:00 PM and Friday from 9:00 AM to 12:00 PM at [phone].  After 5:00 PM M-Th or 12:00 PM Fri, during the weekend, and on holidays, please call [phone] and ask for the doctor on call.    PLEASE CALL YOUR DOCTOR OR NURSE IF YOU HAVE:  · A temperature of 101° F (38.3° C) or higher  · Shortness of breath  · Warmer than normal skin around your incision  · Increased discomfort in the area  · Increased redness around your incision  · New or increased swelling around your incision  · Discharge from your incision

## 2022-06-03 NOTE — BRIEF OP NOTE
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]    Post-op Diagnosis:  Post-Op Diagnosis Codes:     * Malignant neoplasm of upper-outer quadrant of right breast in female, estrogen receptor positive [C50.411, Z17.0]     * Malignant neoplasm of upper-outer quadrant of left breast in female, estrogen receptor negative [C50.412, Z17.1]     * 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 Bilateral normal appearing lymph nodes      Estimated Blood Loss: 20 ml         Specimens:   Specimen (24h ago, onward)             Start     Ordered    06/03/22 1019  Specimen to Pathology  RELEASE UPON ORDERING        Comments: Specimen B: RIGHT AXILLARY SENTINEL NODE #1 HOT AND BROWN @ 9999    Specimen C: RIGHT AXILLARY SENTINEL NODE #2 HOT AND BROWN @ 3450    Specimen D: RIGHT AXILLARY SENTINEL NODE #3 HOT AND BROWN @ 9999            Specimen G: LEFT AXILLARY SENTINEL NODE #1 HOT AND BROWN @ 9999    Specimen H: LEFT AXILLARY SENTINEL NODE #2  HOT AND BROWN @ 9999    Specimen I: LEFT AXILLARY SENTINEL NODE #3 BROWN AND PALPABLE    Specimen J: LEFT AXILLARY SENTINEL NODE #4 HOT AND BROWN @ 9999    Specimen K: LEFT AXILLA MIGUEL SENTINEL NODE #5 HOT AND BROWN @ 9999    Specimen L: LEFT AXILLARY SENTINEL NODE #6 HOT AND BROWN @ 5064     References:    Click here for ordering Quick Tip   Question:  Release to patient  Answer:  Immediate    06/03/22 3466

## 2022-06-03 NOTE — CARE UPDATE
Post-op [id number] Dr. Peña.  He ordered regular insulin 5 units IV now-see mar for time given

## 2022-06-03 NOTE — CARE UPDATE
Patient received from the OR, she is asleep, respirations full-regular-deep-clear,hob up 30 degrees,oral airway in place, chin lift maintained.

## 2022-06-03 NOTE — OP NOTE
DATE OF PROCEDURE: 6/3/2022    SURGEON: Charlene Contreras M.D.    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]     POSTOPERATIVE DIAGNOSIS: Post-Op Diagnosis Codes:     * Malignant neoplasm of upper-outer quadrant of right breast in female, estrogen receptor positive [C50.411, Z17.0]     * Malignant neoplasm of upper-outer quadrant of left breast in female, estrogen receptor negative [C50.412, Z17.1]     * 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 female brought to the operating room for definitive surgical management of bilateral breast cancer. The patient has elected to undergo bilateral simple mastectomy with sentinel lymph node biopsy for ewa assessment. The patient was informed of the possible risks and complications of the procedure, including but not limited to anesthetic risks, bleeding, infection, and need for additional surgery.  The patient concurred with the proposed plan, and has given informed consent.  The site of surgery was properly noted/marked in the preoperative holding area.    The patient's bilateral breast was injected with Superparamagnetic iron oxide (MagTrace) in office to facilitate sentinel lymph node identification. The patient was then brought to the operating room and placed in the supine position with both  upper extremities extended.  General anesthesia was administered. Perioperative antibiotics were administered and a time out was performed confirming the patient, site, and procedure. The bilateral chest and axilla was then prepped and draped in the usual sterile fashion.    The Right Mastectomy  The right mastectomy was performed first. A large elliptical skin incision was made to right breast tissue that included the nipple-areolar complex. Flaps were raised in the avascular plane between subcutaneous tissue and breast tissue was used for to raise flaps from the clavicle superiorly, the sternum medially, the anterior rectus sheath inferiorly, and the lateral border of the pectoralis major muscle laterally. Hemostasis was maintained in the flaps. Next, the breast tissue and underlying pectoralis fascia were excised from the pectoralis major muscle, progressing from medial to lateral. At the lateral border of the pectoralis major muscle, the breast tissue was sung laterally and a lateral pedicle identified where breast tissue gave way to fat of axilla. The lateral pedicle was incised, removed and the specimen was marked. Superiorly was a short suture and laterally was a long suture. The resulting mastectomy specimen was marked using a short stitch superiorly and a long stitch laterally. The breast was sent to pathology for permanent evaluation. The operative field was irrigated with normal saline and all bleeding points were secured with Bovie electrocautery.     Right [place] Lymph Node Biopsy was then performed  Using the hand-held SentiMag probe, activity was noted and localized in the right axilla. A small transverse inferior axillary incision over the area of activity. Dissection was carried down through the clavipectoral fascia using electrocautery. The lateral border of the pectoralis of the right axilla was excised and the axilla was entered. Using a hand-held SentiMag probe the axilla was assessed for a  sentinel lymph node. A Brown afferent lymphatic channel coming from the breast upper outer quadrant to level 1 axillary lymph tissue. [place] lymph nodes were identified as follows:  right axillary sentinel lymph node #1, Superparamagnetic iron oxide (MagTrace) identified and count: 9999  right axillary sentinel lymph node #2, Superparamagnetic iron oxide (MagTrace) identified and count: 3450  right axillary sentinel lymph node #3, Superparamagnetic iron oxide (MagTrace) identified and count: 9999      The substances used for sentinel lymph node biopsy in this patient: Superparamagnetic iron oxide (MagTrace)  Number of lymph nodes removed with MagTrace: 3  Number of lymph nodes removed with Isosulfan (or Methylene) blue dye and Radiotracer: 0  Number of lymph nodes removed with only Radiotracer signal: 0  Number of lymph nodes removed with only Isosulfan (or Methylene) blue dye: 0  Number of lymph nodes removed with only palpable: 0    The sentinel lymph nodes were not sent to Pathologist for intra-operative Frozen Section and permanent pathology review will be performed.    No further dissection was undertaken.    Drain placement and incision closer was completed by the plastic surgery service.        The Left Mastectomy  The left mastectomy was performed next. A large elliptical skin incision was made to right breast tissue that included the nipple-areolar complex as well as palpable mass in the upper outer quadrant. Flaps were raised in the avascular plane between subcutaneous tissue and breast tissue was used for to raise flaps from the clavicle superiorly, the sternum medially, the anterior rectus sheath inferiorly, and the lateral border of the pectoralis major muscle laterally. Hemostasis was maintained in the flaps. Next, the breast tissue and underlying pectoralis fascia were excised from the pectoralis major muscle, progressing from medial to lateral. At the lateral border of the pectoralis major muscle,  the breast tissue was sung laterally and a lateral pedicle identified where breast tissue gave way to fat of axilla. The lateral pedicle was incised, removed and the specimen was marked. Superiorly was a short suture and laterally was a long suture. The resulting mastectomy specimen was marked using a short stitch superiorly and a long stitch laterally. The breast was sent to pathology for permanent evaluation. The operative field was irrigated with normal saline and all bleeding points were secured with Bovie electrocautery.    Left [place] [address] Lymph Node Biopsy was then performed  Using the hand-held SentiMag probe, activity was noted and localized in the left axilla. A small transverse inferior axillary incision over the area of activity. Dissection was carried down through the clavipectoral fascia using electrocautery. The lateral border of the pectoralis of the left axilla was excised and the axilla was entered. Using a hand-held SentiMag probe the axilla was assessed for a sentinel lymph node. A Brown afferent lymphatic channel coming from the breast upper outer quadrant to level 1 axillary lymph tissue. [address] lymph nodes were identified as follows:  left axillary sentinel lymph node #1, Superparamagnetic iron oxide (MagTrace) identified and count: 9999  left axillary sentinel lymph node #2, Superparamagnetic iron oxide (MagTrace) identified and count: 9999  left axillary sentinel lymph node #3, Superparamagnetic iron oxide (MagTrace) identified and count: 243 and mostly palpable  left axillary sentinel lymph node #4, Superparamagnetic iron oxide (MagTrace) identified and count: 9999  left axillary sentinel lymph node #5, Superparamagnetic iron oxide (MagTrace) identified and count: 9999  left axillary sentinel lymph node #6, Superparamagnetic iron oxide (MagTrace) identified and count: 3467    The substances used for sentinel lymph node biopsy in this patient: Superparamagnetic iron oxide (MagTrace)   Number  of lymph nodes removed with MagTrace: 6  Number of lymph nodes removed with Isosulfan (or Methylene) blue dye and Radiotracer: 0  Number of lymph nodes removed with only Radiotracer signal: 0  Number of lymph nodes removed with only Isosulfan (or Methylene) blue dye: 0  Number of lymph nodes removed with only palpable: 0    The sentinel lymph nodes were not sent to Pathologist for intra-operative Frozen Section and permanent pathology review will be performed.    No further dissection was undertaken.      Drain placement and incision closer was completed by the plastic surgery service.      Significant Surgical Tasks Conducted by the Assistant(s), if Applicable: The skilled assistance of the Physician Assistant, Viola Troncoso PA-C, was necessary for the successful completion of this case. She was essential for proper positioning of the patient, manipulation of instruments, proper exposure, manipulation of tissue, and wound closure.       ESTIMATED BLOOD LOSS: 20 ml    Implants:   Implant Name Type Inv. Item Serial No.  Lot No. LRB No. Used Action   TISSUE ALLODERM SELECT 1.2-2.0 - [id number]  TISSUE ALLODERM SELECT 1.2-2.0  [employer] [id number] Right 1 Implanted   TISSUE ALLODERM SELECT 1.2-2.0 - [id number]  TISSUE ALLODERM SELECT 1.2-2.0  [employer] [id number] Left 1 Implanted   EXPANDER NATRELLE  EP 450CC - [id number]  EXPANDER NATRELLE  EP 450CC [id number] [employer] [id number] Right 1 Implanted   EXPANDER NATRELLE  EP 450CC - [id number]  EXPANDER NATRELLE  EP 450CC [id number] [employer] [id number] Left 1 Implanted       Specimens:   Specimen (24h ago, onward)             Start     Ordered    06/03/22 1019  Specimen to Pathology  RELEASE UPON ORDERING        Comments: Specimen B: RIGHT AXILLARY SENTINEL NODE #1 HOT AND BROWN @ 9999    Specimen C: RIGHT AXILLARY SENTINEL NODE #2 HOT AND BROWN @ 3450    Specimen D: RIGHT AXILLARY SENTINEL NODE #3 HOT AND BROWN @  9999            Specimen G: LEFT AXILLARY SENTINEL NODE #1 HOT AND BROWN @ 9999    Specimen H: LEFT AXILLARY SENTINEL NODE #2 HOT AND BROWN @ 9999    Specimen I: LEFT AXILLARY SENTINEL NODE #3 BROWN AND PALPABLE    Specimen J: LEFT AXILLARY SENTINEL NODE #4 HOT AND BROWN @ 9999    Specimen K: LEFT AXILLA MIGUEL SENTINEL NODE #5 HOT AND BROWN @ 9999    Specimen L: LEFT AXILLARY SENTINEL NODE #6 HOT AND BROWN @ 3467     References:    Click here for ordering Quick Tip   Question:  Release to patient  Answer:  Immediate    06/03/22 1019                        Condition: Good    Disposition: PACU - hemodynamically stable.

## 2022-06-03 NOTE — CARE UPDATE
Patient awakened,yu,rejected oral airway,oriented/reassured her,she denied c/o,respirations full-regular-deep-clear, will maintain quiet for her to rest.

## 2022-06-04 PROCEDURE — 63600175 PHARM REV CODE 636 W HCPCS: Performed by: PHYSICIAN ASSISTANT

## 2022-06-04 PROCEDURE — 25000003 PHARM REV CODE 250: Performed by: SURGERY

## 2022-06-04 RX ORDER — FAMOTIDINE 10 MG/ML
INJECTION INTRAVENOUS
Status: DISCONTINUED
Start: 2022-06-04 | End: 2022-06-04 | Stop reason: HOSPADM

## 2022-06-04 RX ADMIN — INSULIN ASPART 2 UNITS: 100 INJECTION, SOLUTION INTRAVENOUS; SUBCUTANEOUS at 07:06

## 2022-06-04 RX ADMIN — HYDROCODONE BITARTRATE AND ACETAMINOPHEN 1 TABLET: 5; 325 TABLET ORAL at 01:06

## 2022-06-04 NOTE — PROGRESS NOTES
PRS  No complaints  AFVSS  Incisions c/d/I, drains serosanguinous  Flaps well perfused, no signs of infection, hematoma or breakdown    A/P:  Stable for discharge home.  Follow up next week.  Do not remove dressings, sponge bathe. Drain care teaching.  Prescriptions given.

## 2022-06-04 NOTE — PROGRESS NOTES
Dr. Contreras at bedside, incisions assessed, okay to discharge and follow up in office. Patient verbalized understanding and agrees with plan of care.

## 2022-06-04 NOTE — PROGRESS NOTES
at bedside, incisions assessed, okay to discharge patient, follow up in office on Monday 6/6 @ 9 am

## 2022-06-04 NOTE — DISCHARGE INSTRUCTIONS
Patient Education       Mastectomy Discharge Instructions   About this topic   This is a surgery to remove breast tissue. This is done to:  Treat breast cancer  Prevent breast cancer  Treat side effects from your past breast cancer care             What care is needed at home?  Take your medication as ordered by your doctor.  Keep your dressing and incision clean, intact, and dry. Do not remove dressing until doctor say otherwise.  Do not soak in a bathtub, hot tub, or swimming pool until your surgery cuts are fully healed or told otherwise by the doctor.  Do not raise your arms above the level of heart or over your head.  Walk around the house often when you get home. Try to walk a little more each day.  Your doctor may have you wear special stockings. These will help to prevent blood clots.  Keep your breast area clean and dry.  If you have drainage tubes, empty the bulb when it gets full. You may have to measure the amount of fluid and write the amount on a paper or diary. The amount of drainage should go down or become a little less each day.   Talk to your doctor about when you can wear a bra. You can meet with the staff about the best place to try a new bra. They can tell you about getting a weight or breast mold also called a prosthetic breast to wear in your bra cup.  No blood pressure or lab draws on both arms.   Do not lift anything over 10 pounds (4.5 kg).   Ask your doctor when you may go back to your normal activities like work, driving, or sex.  What follow-up care is needed?   Be sure to keep your follow up visit.  If you have stitches or staples, you will need to have them taken out. Your doctor will often want to do this in 1 to 2 weeks. If the doctor used skin glue, the glue will fall off on its own.  The test results from the tissue will help your doctor understand what kind of problem you have in your breast. Talk to your doctor about breast reconstruction surgery and if that would be right for  you. Together you can make a plan for more care.  Talk to your doctor about other treatments you may need like radiation or chemotherapy. You may need to see other experts like a plastic surgeon, radiation or medical oncologist, or a physical therapist.  If you had lymph nodes removed, you may need special care to reduce swelling in your arm.  What drugs may be needed?   The doctor may order drugs to:  Help with pain  Prevent or fight an infection  Kill cancer cells  Block hormone receptors  Will physical activity be limited?   You may have to limit your activity for a while. Talk to your doctor about the right amount of activity for you.  What problems could happen?   Infection  Fluid buildup in your arm or hand if lymph nodes were removed  You may feel sad or depressed about the change of your body  When do I need to call the doctor?   Signs of infection. These include a fever of 100.4°F (38°C) or higher, chills, wound that will not heal.  Signs of wound infection. These include swelling, redness, warmth around the wound; too much pain when touched; yellowish, greenish, or bloody discharge; foul smell coming from the cut site; cut site opens up.  Drain seems clogged and there is fluid under your surgery cuts  Stitches that hold the drain to your skin are coming loose or are missing.  Cough, shortness of breath, chest pain  Upset stomach and throwing up that are not helped by nausea drugs  Redness, warmth, swelling, stiffness, or hardness in the arm or hand on the side where the lymph nodes were taken out  Lumps or skin changes in tissue left on mastectomy side  Low mood  Heavy bleeding coming from the cut sites or the area where the drains come through your skin.  What follow-up care is needed?   Your doctor may ask you to make visits to the office to check on your progress. Be sure to keep these visits.  If you have stitches or staples, you will need to have them taken out. Your doctor will often want to do this  in 1 to 2 weeks. If the doctor used skin glue, the glue will fall off on its own.  The test results from the tissue will help your doctor understand what kind of problem you have in your breast. Talk to your doctor about breast reconstruction surgery and if that would be right for you. Together you can make a plan for more care.  Talk to your doctor about other treatments you may need like radiation or chemotherapy. You may need to see other experts like a plastic surgeon, radiation or medical oncologist, or a physical therapist.  If you had lymph nodes removed, you may need special care to reduce swelling in your arm.  What drugs may be needed?   The doctor may order drugs to:  Help with pain  Prevent or fight an infection  Kill cancer cells  Block hormone receptors  Will physical activity be limited?   You may have to limit your activity for a while. Talk to your doctor about the right amount of activity for you.  What problems could happen?   Infection  Fluid buildup in your arm or hand if lymph nodes were removed  You may feel sad or depressed about the change of your body  When do I need to call the doctor?   Signs of infection. These include a fever of 100.4°F (38°C) or higher, chills, wound that will not heal.  Signs of wound infection. These include swelling, redness, warmth around the wound; too much pain when touched; yellowish, greenish, or bloody discharge; foul smell coming from the cut site; cut site opens up.  Drain seems clogged and there is fluid under your surgery cuts  Stitches that hold the drain to your skin are coming loose or are missing.  Cough, shortness of breath, chest pain  Upset stomach and throwing up that are not helped by nausea drugs  Redness, warmth, swelling, stiffness, or hardness in the arm or hand on the side where the lymph nodes were taken out  Lumps or skin changes in tissue left on mastectomy side  Low mood  Heavy bleeding coming from the cut sites or the area where the  drains come through your skin.    Do not remove dressing or bra, sponge bathe, keep scheduled follow up appointments    Where can I learn more?   American Cancer Society  http://www.cancer.org/cancer/breastcancer/detailedguide/breast-cancer-treating-surgery   American Cancer Society  https://www.cancer.org/treatment/treatments-and-side-effects/physical-side-effects/lymphedema/for-people-with-lymphedema.html   American Society of Clinical Oncology  http://www.cancer.net/patient/All+About+Cancer/Cancer.Net+Feature+Articles/After+Treatment+and+Survivorship/Mastectomy%3A+What+to+Know   Last Reviewed Date   2020-10-28  Consumer Information Use and Disclaimer   This information is not specific medical advice and does not replace information you receive from your health care provider. This is only a brief summary of general information. It does NOT include all information about conditions, illnesses, injuries, tests, procedures, treatments, therapies, discharge instructions or life-style choices that may apply to you. You must talk with your health care provider for complete information about your health and treatment options. This information should not be used to decide whether or not to accept your health care providers advice, instructions or recommendations. Only your health care provider has the knowledge and training to provide advice that is right for you.  Copyright   Copyright © 2021 [employer], [employer]. and its affiliates and/or licensors. All rights reserved.

## 2022-06-04 NOTE — NURSING
Report received from Leanne Hanna LPN, patient in stable condtion, HILL drains x 4 intact, dressing to bilateral breast clean, dry and intact. Patient states pain to bilateral breast 5/10. Assessment within normal limits.

## 2022-06-04 NOTE — DISCHARGE SUMMARY
[facility] Surgical - Med Surg  Discharge Note  Short Stay    Procedure(s) (LRB):  MASTECTOMY, BILATERAL (Bilateral)  BIOPSY, LYMPH NODE, SENTINEL (Bilateral)  INSERTION, TISSUE EXPANDER, BREAST (Bilateral breast reconstruction with tissue expanders and acellular dermal matrix and use of intraoperative fluoroscein angiogragry) (Bilateral)    OUTCOME: Patient tolerated treatment/procedure well without complication and is now ready for discharge.    DISPOSITION: Home or Self Care    FINAL DIAGNOSIS:  <principal problem not specified>    FOLLOWUP: In clinic    DISCHARGE INSTRUCTIONS:  No discharge procedures on file.     TIME SPENT ON DISCHARGE: 5 minutes

## 2022-06-04 NOTE — PLAN OF CARE
Patient discharged in stable condition via wheelchair. Accompanied by friend Ashley. Discharge instructions given to patient and friend, verbalized understanding

## 2022-06-05 LAB — POCT GLUCOSE: 156 MG/DL (ref 70–110)

## 2022-06-06 LAB — POCT GLUCOSE: 229 MG/DL (ref 70–110)

## 2022-06-07 VITALS
RESPIRATION RATE: 18 BRPM | HEART RATE: 68 BPM | SYSTOLIC BLOOD PRESSURE: 150 MMHG | OXYGEN SATURATION: 97 % | DIASTOLIC BLOOD PRESSURE: 78 MMHG | BODY MASS INDEX: 33.31 KG/M2 | HEIGHT: 66 IN | WEIGHT: 207.25 LBS | TEMPERATURE: 99 F

## 2022-06-14 ENCOUNTER — TELEPHONE (OUTPATIENT)
Dept: SURGERY | Facility: CLINIC | Age: 62
End: 2022-06-14
Payer: COMMERCIAL

## 2022-06-14 NOTE — TELEPHONE ENCOUNTER
Patient called with her pathology results.            ----- Message from Charlene Contreras MD sent at 6/13/2022  7:01 PM CDT -----  Please call the patient and inform pathology results revealed the cancer was completely removed, but cancer was found in 1 of the left axillary lymph nodes. The all the other lymph nodes from both the right and left axilla were normal. Further discussion will be had at their post-op/follow-up appointment.

## 2022-06-15 ENCOUNTER — OFFICE VISIT (OUTPATIENT)
Dept: SURGERY | Facility: CLINIC | Age: 62
End: 2022-06-15
Payer: COMMERCIAL

## 2022-06-15 VITALS
DIASTOLIC BLOOD PRESSURE: 84 MMHG | RESPIRATION RATE: 18 BRPM | WEIGHT: 202 LBS | OXYGEN SATURATION: 99 % | HEIGHT: 66 IN | HEART RATE: 72 BPM | BODY MASS INDEX: 32.47 KG/M2 | TEMPERATURE: 98 F | SYSTOLIC BLOOD PRESSURE: 133 MMHG

## 2022-06-15 DIAGNOSIS — D05.01 LOBULAR CARCINOMA IN SITU (LCIS) OF RIGHT BREAST: ICD-10-CM

## 2022-06-15 DIAGNOSIS — Z17.0 MALIGNANT NEOPLASM OF UPPER-OUTER QUADRANT OF RIGHT BREAST IN FEMALE, ESTROGEN RECEPTOR POSITIVE: Primary | ICD-10-CM

## 2022-06-15 DIAGNOSIS — Z90.13 S/P BILATERAL MASTECTOMY: ICD-10-CM

## 2022-06-15 DIAGNOSIS — Z17.1 MALIGNANT NEOPLASM OF UPPER-OUTER QUADRANT OF LEFT BREAST IN FEMALE, ESTROGEN RECEPTOR NEGATIVE: ICD-10-CM

## 2022-06-15 DIAGNOSIS — D05.11 DUCTAL CARCINOMA IN SITU (DCIS) OF RIGHT BREAST: ICD-10-CM

## 2022-06-15 DIAGNOSIS — C50.411 MALIGNANT NEOPLASM OF UPPER-OUTER QUADRANT OF RIGHT BREAST IN FEMALE, ESTROGEN RECEPTOR POSITIVE: Primary | ICD-10-CM

## 2022-06-15 DIAGNOSIS — C50.412 MALIGNANT NEOPLASM OF UPPER-OUTER QUADRANT OF LEFT BREAST IN FEMALE, ESTROGEN RECEPTOR NEGATIVE: ICD-10-CM

## 2022-06-15 PROCEDURE — 99999 PR PBB SHADOW E&M-EST. PATIENT-LVL IV: ICD-10-PCS | Mod: PBBFAC,,,

## 2022-06-15 PROCEDURE — 99999 PR PBB SHADOW E&M-EST. PATIENT-LVL IV: CPT | Mod: PBBFAC,,,

## 2022-06-15 PROCEDURE — 1159F PR MEDICATION LIST DOCUMENTED IN MEDICAL RECORD: ICD-10-PCS | Mod: CPTII,S$GLB,, | Performed by: PHYSICIAN ASSISTANT

## 2022-06-15 PROCEDURE — 3079F DIAST BP 80-89 MM HG: CPT | Mod: CPTII,S$GLB,, | Performed by: PHYSICIAN ASSISTANT

## 2022-06-15 PROCEDURE — 3008F PR BODY MASS INDEX (BMI) DOCUMENTED: ICD-10-PCS | Mod: CPTII,S$GLB,, | Performed by: PHYSICIAN ASSISTANT

## 2022-06-15 PROCEDURE — 1159F MED LIST DOCD IN RCRD: CPT | Mod: CPTII,S$GLB,, | Performed by: PHYSICIAN ASSISTANT

## 2022-06-15 PROCEDURE — 3075F SYST BP GE 130 - 139MM HG: CPT | Mod: CPTII,S$GLB,, | Performed by: PHYSICIAN ASSISTANT

## 2022-06-15 PROCEDURE — 3079F PR MOST RECENT DIASTOLIC BLOOD PRESSURE 80-89 MM HG: ICD-10-PCS | Mod: CPTII,S$GLB,, | Performed by: PHYSICIAN ASSISTANT

## 2022-06-15 PROCEDURE — 3075F PR MOST RECENT SYSTOLIC BLOOD PRESS GE 130-139MM HG: ICD-10-PCS | Mod: CPTII,S$GLB,, | Performed by: PHYSICIAN ASSISTANT

## 2022-06-15 PROCEDURE — 1160F PR REVIEW ALL MEDS BY PRESCRIBER/CLIN PHARMACIST DOCUMENTED: ICD-10-PCS | Mod: CPTII,S$GLB,, | Performed by: PHYSICIAN ASSISTANT

## 2022-06-15 PROCEDURE — 1160F RVW MEDS BY RX/DR IN RCRD: CPT | Mod: CPTII,S$GLB,, | Performed by: PHYSICIAN ASSISTANT

## 2022-06-15 PROCEDURE — 99024 POSTOP FOLLOW-UP VISIT: CPT | Mod: S$GLB,,, | Performed by: PHYSICIAN ASSISTANT

## 2022-06-15 PROCEDURE — 99024 PR POST-OP FOLLOW-UP VISIT: ICD-10-PCS | Mod: S$GLB,,, | Performed by: PHYSICIAN ASSISTANT

## 2022-06-15 PROCEDURE — 3008F BODY MASS INDEX DOCD: CPT | Mod: CPTII,S$GLB,, | Performed by: PHYSICIAN ASSISTANT

## 2022-06-15 NOTE — PROGRESS NOTES
[facility] Breast Surg  Breast Surgical Oncology  Post Op Patient Office Visit       Referring Provider: Beth Holly MD - GYN  PCP: Sharri Bass, RT   Care Team: No care team member to display No care team member to display     Chief Complaint:   Chief Complaint   Patient presents with    Post-op Evaluation     Post op visit        Subjective:   Treatment History:  1.Genetic Testing - negative  2. S/p bilateral simple mastectomy, bilateral sentinel lymph node biopsy, and TE placement - 6/3/2022     Interval History:  6/15/2022 - Elina Ham presents for post op. She is s/p bilateral simple mastectomy, bilateral sentinel lymph node biopsy, and TE placement.  Surgical pathology of the left mastectomy revealed a single focus of invasive ductal carcinoma grade 3 measuring 25 mm. Margins clear. Six sentinel lymph nodes were examined with one positive for metastatic carcinoma, 5 mm. Pathological staging of the left is consistent with pT2 pN1a.  Surgical pathology of the right mastectomy revealed two foci of invasive ductal carcinoma grade 1 measuring 10 mm and 4 mm. Low grade DCIS and LCIS were also present. All margins clear. Four sentinel lymph ndoes are negative for metastatic carcinoma. Pathological staging of the right is consistent with pT1b pN0.     HPI:  Elina Ham initially presents on 03/29/22 at age 62 Years with bilateral breast cancer (1) AJCC 8th ed clinical anatomic stage IA / prognostic stage IA (cT1b cN0 M0) Right breast 10 o'clock 7 cm FN invasive ductal carcinoma, grade 1, with associated LCIS, ER 43%, AR 94%, HER2 0 - negative on IHC, Ki674% and 9 o'clock 5 cm FN Right breast invasive ductal carcinoma, grade 1, with associated DCIS, ER 81%, AR 3%, HER2 1+ - negative on IHC and Ki67 4%. (2) AJCC 8th ed clinical anatomic stage IIA / prognostic stage IIB (cT2 cN0 M0) Left breast1 o'clock 7 cm FN carcinoma with anaplastic malignant giant cell formation, suggestive of  sarcomatoid/metaplastic change, grade 3, ER 0%, DC 0%, HER2 0 - negative on IHC and Ki67 92%.  She had genetic testing which was negative.    A detailed patient history was obtained and reviewed.    Imagin. 3/4/2022 BL SC MG at [facility]- which revealed a 2.7 cm mass in the left breast upper outer quadrant posterior depth. There is possible architectural distortion in the right breast at 9 o'clock anterior depth. There is a focal asymmetry with possible architectural distortion in the right breast at 9 o'clock posterior depth. BIRADS-0; additional imaging needed.    2. 3/8/2022 BL DG MG/ BL US BREAST LIMITED at [facility]- which revealed on R MG at 9 o'clock anterior depth, there is a persistent area of architectural distortion spanning 0.8 cm. At 10 o'clock right breast, posterior depth, there is a persistent 1.0 cm focal asymmetry with associated architectural distortion and amorphous calcifications. At 1 o'clock left breast, middle depth, there is a 2.7 cm oval mass with irregular margins. These findings correspond to the areas recalled from screening examination dated 3/4/2022. In the left axilla there is an enlarged lymph node. No other suspicious masses, calcifications, or other signs of malignancy are identified. On R US, at 9 o'clock, 5 cm FN, there is an area of architectural distortion with posterior shadowing spanning 0.8 cm which corresponds to the mammographic finding in this region. at 10 o'clock right breast, 7 cm FN, there is a 1.0 x 0.4 x 0.9 cm oval, hypoechoic mass with circumscribed margins. This mass is stable mammographically dating back to the  exam and is thus considered benign. On L US at 1 o'clock 7 cm FN there is a 2.7 x 2.0 x 2.1 mm oval, hypoechoic mass with irregular margins which corresponds to the mammographic finding in this region. In the left axilla, there is a single level 1 lymph node which demonstrates eccentric cortical thickening. This lymph node measures 1.3 x 1.0 x 1.0 cm  and corresponds to the mammographic finding in this region. Additional benign appearing lymph nodes are noted in the bilateral axillae. BIRADS-5 highly suspicious; biopsy recommended.      Pathology:  1. 3 Ultrasound-guided Core Needle Biopsy Right Breast 9 o'clock 5 cm FN Mass with Distortion - Invasive ductal carcinoma, grade 1, with associated focus of ductal carcinoma in situ, grade 1, and focal perineural invasion is present  ER 81%  AL 3%  HER2 Negative  Ki67 4%    2. 3 Stereotactic guided Core Needle Biopsy Right Breast Mass 10 o'clock Posterior Depth Focal Asymmetry, Architectural Distortion with Calcifications - Invasive ductal carcinoma, grade 1 and focal lobular carcinoma in situ  ER 43%  AL 94%  HER2 Negative  Ki67 4%    3. 3 Ultrasound-guided Core Needle Biopsy Left Breast 1 o'clock 7 cm FN Mass - High grade carcinoma with anaplastic/malignant giant cell component (suggestive for sarcomatoid/metaplastic change)  ER 0%  AL 0%  HER2 Negative  Ki67 High    4. 3 Ultrasound-guided Core Needle Biopsy Left Axillary Node - Portion of reactive lymph node; no evidence of metastatic carcinoma    5. Bilateral simple mastectomy and Bilateral SLNB 6/3/2022  - Left mastectomy and SLNB - a single focus of invasive ductal carcinoma grade 3 measuring 25 mm. Margins clear. Six sentinel lymph nodes were examined with one positive for metastatic carcinoma, 5 mm.   - Right mastectomy and SLNB - two foci of invasive ductal carcinoma grade 1 measuring 10 mm and 4 mm. Low grade DCIS and LCIS were also present. All margins clear. Four sentinel lymph ndoes are negative for metastatic carcinoma.    OB/GYN History:  Age at Menarche Onset: 14  Menopausal Status: postmenopausal at age 52  Pregnancy History:   Age at first live birth: 31  Hormone Replacement Therapy: None    Family History:  Family History   Problem Relation Age of Onset    Breast cancer Maternal Grandmother              Breast cancer Sister     Breast cancer Sister         remission    Breast cancer Maternal Aunt             Breast cancer Maternal Aunt             Breast cancer Maternal Aunt             Breast cancer Maternal Aunt     Breast cancer Maternal Uncle             Stroke Brother                 Other History:  Past Medical History:   Diagnosis Date    Cancer 3/21/2022    Diabetes mellitus     Hypercholesterolemia     Hypertension      Past Surgical History:   Procedure Laterality Date    BILATERAL MASTECTOMY Bilateral 6/3/2022    Procedure: MASTECTOMY, BILATERAL;  Surgeon: Charlene Contreras MD;  Location: [facility] OR;  Service: General;  Laterality: Bilateral;     SECTION  92 and 3/15/95    INSERTION OF BREAST TISSUE EXPANDER Bilateral 6/3/2022    Procedure: INSERTION, TISSUE EXPANDER, BREAST (Bilateral breast reconstruction with tissue expanders and acellular dermal matrix and use of intraoperative fluoroscein angiogragry);  Surgeon: Nick Gibbs MD;  Location: [facility] OR;  Service: Plastics;  Laterality: Bilateral;    SENTINEL LYMPH NODE BIOPSY Bilateral 6/3/2022    Procedure: BIOPSY, LYMPH NODE, SENTINEL;  Surgeon: Charlene Contreras MD;  Location: [facility] OR;  Service: General;  Laterality: Bilateral;  MAGTRACE Injection in office  Need SentiMag Probe        Social History     Socioeconomic History    Marital status:    Tobacco Use    Smoking status: Never Smoker    Smokeless tobacco: Never Used   Substance and Sexual Activity    Alcohol use: Never     Types: 1 Glasses of wine per week     Comment: drink socially but haven't had a glass of wine since     Drug use: Never    Sexual activity: Yes     Partners: Male     Birth control/protection: None     Comment: We take STD test for the first 2 years we started dating  (5       Review of patient's allergies indicates:  No Known Allergies    Review of Systems:  Pertinent items are noted in HPI.        Objective:     Vitals:  Vitals:    06/15/22 1045   BP: 133/84   Pulse: 72   Resp: 18   Temp: 98.1 °F (36.7 °C)     Physical Exam:  General: The patient is awake, alert and oriented times three. The patient is well nourished and in no acute distress.    Musculoskeletal: The patient has a normal range of motion of her bilateral upper extremities.    Breast: The incisions are healing well. No tenderness, swelling, or redness.  Integumentary: no rashes or skin lesions present  Neurologic: cranial nerves intact, no signs of peripheral neurological deficit, motor/sensory function intact      Assessment and Plan:     Cancer Staging  Malignant neoplasm of upper-outer quadrant of left breast in female, estrogen receptor negative  Staging form: Breast, AJCC 8th Edition  - Clinical stage from 3/29/2022: Stage IIA (cT2, cN0(f), cM0, G1, ER-, NH-, HER2-) - Signed by Charlene Contreras MD on 5/3/2022  - Pathologic stage from 6/3/2022: Stage IIIA (pT2, pN1a, cM0, G3, ER-, NH-, HER2-) - Signed by VETO eBllo on 6/15/2022    Malignant neoplasm of upper-outer quadrant of right breast in female, estrogen receptor positive  Staging form: Breast, AJCC 8th Edition  - Clinical stage from 3/29/2022: Stage IA (cT1b(2), cN0, cM0, G1, ER+, NH+, HER2-) - Signed by Charlene Contreras MD on 5/3/2022  - Pathologic stage from 6/3/2022: Stage IA (pT1b, pN0, cM0, G1, ER+, NH+, HER2-) - Signed by VETO Bello on 6/15/2022      Patient Active Problem List   Diagnosis    Malignant neoplasm of upper-outer quadrant of right breast in female, estrogen receptor positive    Malignant neoplasm of upper-outer quadrant of left breast in female, estrogen receptor negative        Elina was seen today for post-op evaluation.    Diagnoses and all orders for this visit:    Malignant neoplasm of upper-outer quadrant of right breast in female, estrogen receptor positive    Malignant neoplasm of upper-outer quadrant of left breast in female,  estrogen receptor negative    Ductal carcinoma in situ (DCIS) of right breast    Lobular carcinoma in situ (LCIS) of right breast    S/P bilateral mastectomy            Plan:     1. Referral to medical oncology and radiation oncology to discuss adjuvant treatment.     2. Continue follow up with plastics for reconstruction.    3. RTC in 3 months.        All of her questions were answered.     Viola Troncoso PA-C

## 2022-06-27 ENCOUNTER — DOCUMENTATION ONLY (OUTPATIENT)
Dept: HEMATOLOGY/ONCOLOGY | Facility: CLINIC | Age: 62
End: 2022-06-27
Payer: COMMERCIAL

## 2022-06-27 NOTE — PROGRESS NOTES
Heme/Onc Progress Note    PATIENT: Elina Ham  MRN: [id number]  DATE: 6/27/2022  Chief Complaint: No chief complaint on file.    1.Stage IA (pT1b, pN0, cM0, G1, ER+, TN+, HER2-)   Malignant neoplasm of upper-outer quadrant of right breast     2. Stage IIIA (pT2, pN1a, cM0, G3, ER-, TN-, HER2-)  Malignant neoplasm of upper-outer left breast, triple negative           3/16/2022 Cancer Staged     1. Ultrasound-guided Core Needle Biopsy Right Breast 9 o'clock 5 cm FN Mass with Distortion - Invasive ductal carcinoma, grade 1, with associated focus of ductal carcinoma in situ, grade 1, and focal perineural invasion is present  ER 81%TN 3%HER2 Negative  Ki67 4%    2.Stereotactic guided Core Needle Biopsy Right Breast Mass 10 o'clock Posterior Depth Focal Asymmetry, Architectural Distortion with Calcifications - Invasive ductal carcinoma, grade 1 and focal lobular carcinoma in situ  ER 43%TN 94%HER2 Negative  Ki67 4%    3. Ultrasound-guided Core Needle Biopsy Left Breast 1 o'clock 7 cm FN Mass - High grade carcinoma with anaplastic/malignant giant cell component (suggestive for sarcomatoid/metaplastic change)  ER 0%TN 0%HER2 Negative  Ki67 High    4.Ultrasound-guided Core Needle Biopsy Left Axillary Node - Portion of reactive lymph node; no evidence of metastatic carcinoma      6/3/2022 Surgery     Bilateral simple mastectomy and Bilateral SLNB 6/3/2022  - Left mastectomy and SLNB - a single focus of invasive ductal carcinoma grade 3 measuring 25 mm. Margins clear. Six sentinel lymph nodes were examined with one positive for metastatic carcinoma, 5 mm.   G3, ER-, TN-, HER2     - Right mastectomy and SLNB - two foci of invasive ductal carcinoma grade 1 measuring 10 mm and 4 mm. Low grade DCIS and LCIS were also present. All margins clear. Four sentinel lymph ndoes are negative for metastatic carcinoma.  G1, ER+, TN+, HER2-)      06/27/2022  HPI    Past Medical History:   Diagnosis Date    Cancer 3/21/2022     Diabetes mellitus     Hypercholesterolemia     Hypertension         Current Outpatient Medications:     amLODIPine (NORVASC) 10 MG tablet, Take 10 mg by mouth once daily., Disp: , Rfl:     aspirin 81 MG Chew, Take 81 mg by mouth 2 (two) times a day. Stopped using until after sx, Disp: , Rfl:     atorvastatin (LIPITOR) 20 MG tablet, Take 20 mg by mouth every evening., Disp: , Rfl:     cyclobenzaprine (FLEXERIL) 5 MG tablet, Has not started yet, Disp: , Rfl:     losartan-hydrochlorothiazide 100-25 mg (HYZAAR) 100-25 mg per tablet, Take by mouth., Disp: , Rfl:     metFORMIN (GLUCOPHAGE) 1000 MG tablet, TAKE ONE TABLET BY MOUTH TWICE A DAY WITH A MEAL, Disp: , Rfl:     mupirocin (BACTROBAN) 2 % ointment, APPLY TO DRAIN SITES TWICE DAILY, Disp: , Rfl:     ondansetron (ZOFRAN-ODT) 4 MG TbDL, DISSOLVE 1 TABLET UNDER THE TONGUE EVERY 4-6 HOURS AS NEEDED FOR NAUSEA, Disp: , Rfl:     sulfamethoxazole-trimethoprim 800-160mg (BACTRIM DS) 800-160 mg Tab, Take 1 tablet by mouth 2 (two) times daily. for 14 days., Disp: , Rfl:      Review of Systems:   Pertinent positives and negatives included in the HPI. Otherwise a complete review of systems is negative.      Objective:   There were no vitals filed for this visit.      Physical Exam    ECOG SCORE          Assessment and Plan     Problem List Items Addressed This Visit        Oncology    Malignant neoplasm of upper-outer quadrant of right breast in female, estrogen receptor positive - Primary    Malignant neoplasm of upper-outer quadrant of left breast in female, estrogen receptor negative            No follow-ups on file.      Answers for HPI/ROS submitted by the patient on 6/25/2022  appetite change : No  unexpected weight change: No  mouth sores: No  visual disturbance: No  cough: No  shortness of breath: No  chest pain: No  abdominal pain: No  diarrhea: No  frequency: No  back pain: No  rash: No  headaches: No  adenopathy: No  nervous/ anxious: No

## 2022-06-27 NOTE — ASSESSMENT & PLAN NOTE
Repeat ER [facility] breast panel on surgical specimen  Echo results from cardiology   bone scan   Port placement with surgery  CBC, CMP, CA 19 9, CA15 3  Return to clinic in 2-3 weeks with the above    She will eventually need follow-up of the abnormal CT of the abdomen pelvis with question of pancreatic cysts.  Will eventually get her to Dr. Jeromy Queen for follow-up.  Think this patient would be best served by adjuvant chemotherapy with Adriamycin Cytoxan, and weekly Taxol.  I still think she would benefit from an adjuvant aromatase inhibitor from the contralateral side as it was ER positive.  After she completes therapy.   I Do think this patient could tolerate dose dense chemotherapy.

## 2022-06-28 ENCOUNTER — OFFICE VISIT (OUTPATIENT)
Dept: HEMATOLOGY/ONCOLOGY | Facility: CLINIC | Age: 62
End: 2022-06-28
Payer: COMMERCIAL

## 2022-06-28 VITALS
HEART RATE: 85 BPM | SYSTOLIC BLOOD PRESSURE: 126 MMHG | HEIGHT: 66 IN | DIASTOLIC BLOOD PRESSURE: 84 MMHG | OXYGEN SATURATION: 98 % | TEMPERATURE: 98 F | BODY MASS INDEX: 32.99 KG/M2 | WEIGHT: 205.25 LBS

## 2022-06-28 DIAGNOSIS — Z17.1 MALIGNANT NEOPLASM OF UPPER-OUTER QUADRANT OF LEFT BREAST IN FEMALE, ESTROGEN RECEPTOR NEGATIVE: ICD-10-CM

## 2022-06-28 DIAGNOSIS — C50.411 MALIGNANT NEOPLASM OF UPPER-OUTER QUADRANT OF RIGHT BREAST IN FEMALE, ESTROGEN RECEPTOR POSITIVE: Primary | ICD-10-CM

## 2022-06-28 DIAGNOSIS — Z17.0 MALIGNANT NEOPLASM OF UPPER-OUTER QUADRANT OF RIGHT BREAST IN FEMALE, ESTROGEN RECEPTOR POSITIVE: Primary | ICD-10-CM

## 2022-06-28 DIAGNOSIS — C50.412 MALIGNANT NEOPLASM OF UPPER-OUTER QUADRANT OF LEFT BREAST IN FEMALE, ESTROGEN RECEPTOR NEGATIVE: ICD-10-CM

## 2022-06-28 LAB
ALBUMIN SERPL-MCNC: 4 GM/DL (ref 3.4–4.8)
ALBUMIN/GLOB SERPL: 1.2 RATIO (ref 1.1–2)
ALP SERPL-CCNC: 128 UNIT/L (ref 40–150)
ALT SERPL-CCNC: 38 UNIT/L (ref 0–55)
AST SERPL-CCNC: 22 UNIT/L (ref 5–34)
BASOPHILS # BLD AUTO: 0.04 X10(3)/MCL (ref 0–0.2)
BASOPHILS NFR BLD AUTO: 0.5 %
BILIRUBIN DIRECT+TOT PNL SERPL-MCNC: 0.6 MG/DL
BUN SERPL-MCNC: 11.7 MG/DL (ref 9.8–20.1)
CALCIUM SERPL-MCNC: 10.5 MG/DL (ref 8.4–10.2)
CANCER AG19-9 SERPL-ACNC: <2.06 UNIT/ML (ref 0–37)
CHLORIDE SERPL-SCNC: 102 MMOL/L (ref 98–107)
CO2 SERPL-SCNC: 30 MMOL/L (ref 23–31)
CREAT SERPL-MCNC: 0.8 MG/DL (ref 0.55–1.02)
EOSINOPHIL # BLD AUTO: 0.09 X10(3)/MCL (ref 0–0.9)
EOSINOPHIL NFR BLD AUTO: 1.1 %
ERYTHROCYTE [DISTWIDTH] IN BLOOD BY AUTOMATED COUNT: 12.5 % (ref 11.5–17)
GLOBULIN SER-MCNC: 3.3 GM/DL (ref 2.4–3.5)
GLUCOSE SERPL-MCNC: 149 MG/DL (ref 82–115)
HCT VFR BLD AUTO: 39.9 % (ref 37–47)
HGB BLD-MCNC: 12.9 GM/DL (ref 12–16)
IMM GRANULOCYTES # BLD AUTO: 0.01 X10(3)/MCL (ref 0–0.02)
IMM GRANULOCYTES NFR BLD AUTO: 0.1 % (ref 0–0.43)
LYMPHOCYTES # BLD AUTO: 1.95 X10(3)/MCL (ref 0.6–4.6)
LYMPHOCYTES NFR BLD AUTO: 24 %
MCH RBC QN AUTO: 29.1 PG (ref 27–31)
MCHC RBC AUTO-ENTMCNC: 32.3 MG/DL (ref 33–36)
MCV RBC AUTO: 90.1 FL (ref 80–94)
MONOCYTES # BLD AUTO: 0.57 X10(3)/MCL (ref 0.1–1.3)
MONOCYTES NFR BLD AUTO: 7 %
NEUTROPHILS # BLD AUTO: 5.5 X10(3)/MCL (ref 2.1–9.2)
NEUTROPHILS NFR BLD AUTO: 67.3 %
PLATELET # BLD AUTO: 415 X10(3)/MCL (ref 130–400)
PMV BLD AUTO: 9.3 FL (ref 9.4–12.4)
POTASSIUM SERPL-SCNC: 4 MMOL/L (ref 3.5–5.1)
PROT SERPL-MCNC: 7.3 GM/DL (ref 5.8–7.6)
RBC # BLD AUTO: 4.43 X10(6)/MCL (ref 4.2–5.4)
SODIUM SERPL-SCNC: 141 MMOL/L (ref 136–145)
WBC # SPEC AUTO: 8.1 X10(3)/MCL (ref 4.5–11.5)

## 2022-06-28 PROCEDURE — 3008F BODY MASS INDEX DOCD: CPT | Mod: CPTII,S$GLB,, | Performed by: INTERNAL MEDICINE

## 2022-06-28 PROCEDURE — 3008F PR BODY MASS INDEX (BMI) DOCUMENTED: ICD-10-PCS | Mod: CPTII,S$GLB,, | Performed by: INTERNAL MEDICINE

## 2022-06-28 PROCEDURE — 80053 COMPREHEN METABOLIC PANEL: CPT | Performed by: INTERNAL MEDICINE

## 2022-06-28 PROCEDURE — 85025 COMPLETE CBC W/AUTO DIFF WBC: CPT | Performed by: INTERNAL MEDICINE

## 2022-06-28 PROCEDURE — 36415 COLL VENOUS BLD VENIPUNCTURE: CPT | Performed by: INTERNAL MEDICINE

## 2022-06-28 PROCEDURE — 99205 OFFICE O/P NEW HI 60 MIN: CPT | Mod: S$GLB,,, | Performed by: INTERNAL MEDICINE

## 2022-06-28 PROCEDURE — 1159F MED LIST DOCD IN RCRD: CPT | Mod: CPTII,S$GLB,, | Performed by: INTERNAL MEDICINE

## 2022-06-28 PROCEDURE — 1160F RVW MEDS BY RX/DR IN RCRD: CPT | Mod: CPTII,S$GLB,, | Performed by: INTERNAL MEDICINE

## 2022-06-28 PROCEDURE — 86301 IMMUNOASSAY TUMOR CA 19-9: CPT | Performed by: INTERNAL MEDICINE

## 2022-06-28 PROCEDURE — 3079F DIAST BP 80-89 MM HG: CPT | Mod: CPTII,S$GLB,, | Performed by: INTERNAL MEDICINE

## 2022-06-28 PROCEDURE — 1160F PR REVIEW ALL MEDS BY PRESCRIBER/CLIN PHARMACIST DOCUMENTED: ICD-10-PCS | Mod: CPTII,S$GLB,, | Performed by: INTERNAL MEDICINE

## 2022-06-28 PROCEDURE — 3074F PR MOST RECENT SYSTOLIC BLOOD PRESSURE < 130 MM HG: ICD-10-PCS | Mod: CPTII,S$GLB,, | Performed by: INTERNAL MEDICINE

## 2022-06-28 PROCEDURE — 99205 PR OFFICE/OUTPT VISIT, NEW, LEVL V, 60-74 MIN: ICD-10-PCS | Mod: S$GLB,,, | Performed by: INTERNAL MEDICINE

## 2022-06-28 PROCEDURE — 3079F PR MOST RECENT DIASTOLIC BLOOD PRESSURE 80-89 MM HG: ICD-10-PCS | Mod: CPTII,S$GLB,, | Performed by: INTERNAL MEDICINE

## 2022-06-28 PROCEDURE — 99999 PR PBB SHADOW E&M-EST. PATIENT-LVL IV: CPT | Mod: PBBFAC,,, | Performed by: INTERNAL MEDICINE

## 2022-06-28 PROCEDURE — 99999 PR PBB SHADOW E&M-EST. PATIENT-LVL IV: ICD-10-PCS | Mod: PBBFAC,,, | Performed by: INTERNAL MEDICINE

## 2022-06-28 PROCEDURE — 3074F SYST BP LT 130 MM HG: CPT | Mod: CPTII,S$GLB,, | Performed by: INTERNAL MEDICINE

## 2022-06-28 PROCEDURE — 1159F PR MEDICATION LIST DOCUMENTED IN MEDICAL RECORD: ICD-10-PCS | Mod: CPTII,S$GLB,, | Performed by: INTERNAL MEDICINE

## 2022-06-28 NOTE — PROGRESS NOTES
Subjective:        PATIENT: Elina Ham  MRN: [id number]  DATE: 6/28/2022  Chief Complaint: New BC patient    Current Therapy: [Could not find a treatment plan. This SmartLink may be configured incorrectly. Contact a  for help.]  Current Treatment: [Could not find a treatment plan. This SmartLink may be configured incorrectly. Contact a  for help.]         Oncology History Overview Note   Oncology History   Malignant neoplasm of upper-outer quadrant of right breast in female, estrogen receptor positive   3/16/2022 Cancer Staged    1. Ultrasound-guided Core Needle Biopsy Right Breast 9 o'clock 5 cm FN Mass with Distortion - Invasive ductal carcinoma, grade 1, with associated focus of ductal carcinoma in situ, grade 1, and focal perineural invasion is present  ER 81%MO 3%HER2 Negative  Ki67 4%    2.Stereotactic guided Core Needle Biopsy Right Breast Mass 10 o'clock Posterior Depth Focal Asymmetry, Architectural Distortion with Calcifications - Invasive ductal carcinoma, grade 1 and focal lobular carcinoma in situ  ER 43%MO 94%HER2 Negative  Ki67 4%    3. Ultrasound-guided Core Needle Biopsy Left Breast 1 o'clock 7 cm FN Mass - High grade carcinoma with anaplastic/malignant giant cell component (suggestive for sarcomatoid/metaplastic change)  ER 0%MO 0%HER2 Negative  Ki67 High    4.Ultrasound-guided Core Needle Biopsy Left Axillary Node - Portion of reactive lymph node; no evidence of metastatic carcinoma     6/3/2022 Surgery    Bilateral simple mastectomy and Bilateral SLNB 6/3/2022  - Left mastectomy and SLNB - a single focus of invasive ductal carcinoma grade 3 measuring 25 mm. Margins clear. Six sentinel lymph nodes were examined with one positive for metastatic carcinoma, 5 mm.   G3, ER-, MO-, HER2    - Right mastectomy and SLNB - two foci of invasive ductal carcinoma grade 1 measuring 10 mm and 4 mm. Low grade DCIS and LCIS were also present. All margins clear. Four  sentinel lymph ndoes are negative for metastatic carcinoma.  G1, ER+, NJ+, HER2-)      Malignant neoplasm of upper-outer quadrant of left breast in female, estrogen receptor negative   6/3/2022 Surgery    Bilateral simple mastectomy and Bilateral SLNB 6/3/2022  - Left mastectomy and SLNB - a single focus of invasive ductal carcinoma grade 3 measuring 25 mm. Margins clear. Six sentinel lymph nodes were examined with one positive for metastatic carcinoma, 5 mm.   G3, ER-, NJ-, HER2    - Right mastectomy and SLNB - two foci of invasive ductal carcinoma grade 1 measuring 10 mm and 4 mm. Low grade DCIS and LCIS were also present. All margins clear. Four sentinel lymph ndoes are negative for metastatic carcinoma.  G1, ER+, NJ+, HER2-)           Malignant neoplasm of upper-outer quadrant of right breast in female, estrogen receptor positive   Malignant neoplasm of upper-outer quadrant of left breast in female, estrogen receptor negative       06/28/2022  This is a 62-year-old female initially had abnormal mammograms in March of 2022. She was noted to have bilateral breast cancer, stage I A right breast carcinoma clinically and a T2 left breast carcinoma.  She underwent CT scan of chest abdomen pelvis on 04/11/2022 which showed a small cystic pancreatic tail that needs to be followed.  There was no convincing evidence of metastatic disease.  But she did have some suspected left axillary adenopathy.  She underwent high risk genetic testing because of her family history and bilateral breast cancer.  She was found to have a variant of undetermined significance.    Genetic testing was appropriate for this patient because of her personal and family history. Analysis consisted of sequencing and large rearrangement analyses of the following genes: APC, LIZZY, BARD1, BMPR1A, BRCA1, BRCA2, BRIP1, CDH1, CDK4, CDKN2A (p14ARF and [id number]), CHEK2, EPCAM (large rearrangement only), GREM1, MLH1, MSH2, MSH6, MUTYH, NBN, PALB2, PMS2, POLE,  POLD1, PTEN, RAD51C, RAD51D, SMAD4, STK11, TP53. . This comprehensive genetic analysis indicated that there was no deleterious mutation found in any of these genes. However, there was a variant of uncertain significance (VUS):AXIN2 [id number].    The ultrasound-guided biopsy of left axillary node showed no evidence of active disease.  As well the patient was then taken to the operating room, on 2022 and underwent, bilateral mastectomy sentinel lymph node biopsy.  In addition she underwent bilateral breast tissue expanders potential reconstructive surgery.            Past Medical History:   Diagnosis Date    Cancer 3/21/2022    Diabetes mellitus     Hypercholesterolemia     Hypertension       .  Past Surgical History:   Procedure Laterality Date    BILATERAL MASTECTOMY Bilateral 6/3/2022    Procedure: MASTECTOMY, BILATERAL;  Surgeon: Charlene Contreras MD;  Location: [facility] OR;  Service: General;  Laterality: Bilateral;     SECTION  92 and 3/15/95    COLONOSCOPY      INSERTION OF BREAST TISSUE EXPANDER Bilateral 6/3/2022    Procedure: INSERTION, TISSUE EXPANDER, BREAST (Bilateral breast reconstruction with tissue expanders and acellular dermal matrix and use of intraoperative fluoroscein angiogragry);  Surgeon: Nick Gibbs MD;  Location: [facility] OR;  Service: Plastics;  Laterality: Bilateral;    SENTINEL LYMPH NODE BIOPSY Bilateral 6/3/2022    Procedure: BIOPSY, LYMPH NODE, SENTINEL;  Surgeon: Charlene Contreras MD;  Location: [facility] OR;  Service: General;  Laterality: Bilateral;  MAGTRACE Injection in office  Need SentiMag Probe      .  Family History   Problem Relation Age of Onset    Breast cancer Maternal Grandmother             Breast cancer Sister     Breast cancer Sister         remission    Breast cancer Maternal Aunt             Breast cancer Maternal Aunt             Breast cancer Maternal Aunt             Breast cancer Maternal Aunt     Breast cancer  Maternal Uncle             Stroke Brother             Pancreatic cancer Brother       Social History     Socioeconomic History    Marital status:    Tobacco Use    Smoking status: Never Smoker    Smokeless tobacco: Never Used   Substance and Sexual Activity    Alcohol use: Never     Types: 1 Glasses of wine per week     Comment: drink socially but haven't had a glass of wine since     Drug use: Never    Sexual activity: Yes     Partners: Male     Birth control/protection: None     Comment: We take STD test for the first 2 years we started dating  (5      .Review of patient's allergies indicates:  No Known Allergies     Current Outpatient Medications:     amLODIPine (NORVASC) 10 MG tablet, Take 10 mg by mouth once daily., Disp: , Rfl:     aspirin 81 MG Chew, Take 81 mg by mouth 2 (two) times a day. Stopped using until after sx, Disp: , Rfl:     cyclobenzaprine (FLEXERIL) 5 MG tablet, Has not started yet, Disp: , Rfl:     losartan-hydrochlorothiazide 100-25 mg (HYZAAR) 100-25 mg per tablet, Take by mouth., Disp: , Rfl:     metFORMIN (GLUCOPHAGE) 1000 MG tablet, TAKE ONE TABLET BY MOUTH TWICE A DAY WITH A MEAL, Disp: , Rfl:     mupirocin (BACTROBAN) 2 % ointment, APPLY TO DRAIN SITES TWICE DAILY, Disp: , Rfl:     atorvastatin (LIPITOR) 20 MG tablet, Take 20 mg by mouth every evening., Disp: , Rfl:     ondansetron (ZOFRAN-ODT) 4 MG TbDL, DISSOLVE 1 TABLET UNDER THE TONGUE EVERY 4-6 HOURS AS NEEDED FOR NAUSEA, Disp: , Rfl:    Review of Systems   Constitutional: Negative for appetite change and unexpected weight change.   HENT: Negative for mouth sores.    Eyes: Negative for visual disturbance.   Respiratory: Negative for cough and shortness of breath.    Cardiovascular: Negative for chest pain.   Gastrointestinal: Negative for abdominal pain and diarrhea.   Genitourinary: Negative for frequency.   Musculoskeletal: Negative for back pain.   Integumentary:  Negative for rash.    Neurological: Negative for headaches.   Hematological: Negative for adenopathy.   Psychiatric/Behavioral: The patient is not nervous/anxious.          Objective:     Vitals:    06/28/22 1404   BP: 126/84   Pulse: 85   Temp: 98.2 °F (36.8 °C)         Physical Exam  Constitutional:       Appearance: Normal appearance. She is normal weight.   HENT:      Head: Normocephalic and atraumatic.      Mouth/Throat:      Mouth: Mucous membranes are moist.      Pharynx: Oropharynx is clear.   Eyes:      Conjunctiva/sclera: Conjunctivae normal.      Pupils: Pupils are equal, round, and reactive to light.   Cardiovascular:      Rate and Rhythm: Normal rate and regular rhythm.      Pulses: Normal pulses.      Heart sounds: Normal heart sounds.   Pulmonary:      Effort: Pulmonary effort is normal.      Breath sounds: Normal breath sounds.   Chest:      Chest wall: Mass present.   Breasts:      Right: Absent.      Left: Absent.        Comments: Bilateral expanders  Abdominal:      General: Abdomen is flat. Bowel sounds are normal.      Palpations: Abdomen is soft.   Musculoskeletal:         General: Normal range of motion.      Cervical back: Neck supple.   Skin:     General: Skin is warm and dry.      Capillary Refill: Capillary refill takes less than 2 seconds.   Neurological:      General: No focal deficit present.      Mental Status: She is alert. Mental status is at baseline.   Psychiatric:         Mood and Affect: Mood normal.         Behavior: Behavior normal.         Thought Content: Thought content normal.         Judgment: Judgment normal.           ECOG SCORE    0 - Fully active-able to carry on all pre-disease performance without restriction        .Lab Review:  No visits with results within 1 Week(s) from this visit.   Latest known visit with results is:   Admission on 06/03/2022, Discharged on 06/04/2022   Component Date Value Ref Range Status    POCT Glucose 06/03/2022 194 (A) 70 - 110 mg/dL Final    Case Report  06/03/2022    Final                    Value:Surgical Pathology                                Case: [id number]                                  Authorizing Provider:  Charlene Contreras MD       Collected:           06/03/2022 08:51 AM          Ordering Location:     [facility] Surgical Received:            06/03/2022 01:40 PM                                 - Periop Services                                                            Pathologist:           Austin Lopez MD                                                           Specimens:   1) - Breast, Right, RIGHT BREAST SIMPLE MASTECTOMY ( SHORT SUPERIOR LONG LATERAL)                   2) - Axilla, Right, RIGHT AXILLARY SENTINEL NODE #1 HOT AND BROWN @ 9999                            3) - Axilla, Right, RIGHT AXILLARY SENTINEL NODE #2 HOT AND BROWN @ 3450                            4) - Axilla, Right, RIGHT AXILLARY SENTINEL NODE #3 HOT AND BROWN @ 9999                            5) - Axilla, Right, RIGHT AXILLARY SENTINEL NODE #4 PALPABLE                                                                  6) - Breast, Left, LEFT SIMPLE MASTECTOMY( SHORT SUPERIOR, LONG LATERAL)                            7) - Axilla, Left, LEFT AXILLARY SENTINEL NODE #1 HOT AND BROWN @ 9999                              8) - Axilla, Left, LEFT AXILLARY SENTINEL NODE #2 HOT AND BROWN @ 9999                              9) - Axilla, Left, LEFT AXILLARY SENTINEL NODE #3 BROWN AND PALPABLE                                10) - Axilla, Left, LEFT AXILLARY SENTINEL NODE #4 HOT AND BROWN @ 9999                             11) - Axilla, Left, LEFT AXILLARY SENTINEL NODE #5 HOT AND BROWN @ 9999                             12) - Axilla, Left, LEFT AXILLARY SENTINEL NODE #6 HOT AND BROWN @ 3467                    Final Diagnosis 06/03/2022    Final                    Value:This result contains rich text formatting which cannot be displayed here.    Comments 06/03/2022    Final                     Value:This result contains rich text formatting which cannot be displayed here.    Clinical Information 06/03/2022    Final                    Value:This result contains rich text formatting which cannot be displayed here.    Microscopic Description 06/03/2022    Final                    Value:This result contains rich text formatting which cannot be displayed here.    Gross Description 06/03/2022    Final                    Value:This result contains rich text formatting which cannot be displayed here.    Report Footnotes 06/03/2022    Final                    Value:This result contains rich text formatting which cannot be displayed here.    POCT Glucose 06/03/2022 263 (A) 70 - 110 mg/dL Final    POCT Glucose 06/03/2022 271 (A) 70 - 110 mg/dL Final    POCT Glucose 06/03/2022 205 (A) 70 - 110 mg/dL Final    POCT Glucose 06/04/2022 156 (A) 70 - 110 mg/dL Final    POCT Glucose 06/03/2022 229 (A) 70 - 110 mg/dL Final           Assessment/Plan:     Bilateral breast cancer  NCCN guidelines discussed  Adjuvant therapy  Indications for adjuvant therapy:  Endocrine, chemotherapy, radiation  Endocrine Therapy .  The side effects of anti-hormonal therapy were discussed.  Including but not limited to: Vasomotor hot flashes, dysuria, arthralgia, hyperlipidemia, osteopenia, osteoporosis. patient had the opportunity to ask questions.    Chemo side effects  The side effects of chemotherapy were discussed.  Including but not limited to: Nausea, vomiting, alopecia, neuropathy, neutropenia, blood transfusion, nephropathy, secondary malignancies, congestive heart failure, allergic reactions to name a few. patient with the opportunity to have questions answered.    Cool cap  Port-A-Cath placement        Problem List Items Addressed This Visit        Oncology    Malignant neoplasm of upper-outer quadrant of right breast in female, estrogen receptor positive - Primary    Relevant Orders    CBC Auto Differential     Comprehensive Metabolic Panel    Cancer Antigen 15-3    Cancer Antigen 19-9    NM Bone Scan Whole Body    Malignant neoplasm of upper-outer quadrant of left breast in female, estrogen receptor negative    Current Assessment & Plan     Repeat ER NH breast panel on surgical specimen  Echo results from cardiology   bone scan   Port placement with surgery  CBC, CMP, CA 19 9, CA15 3  Return to clinic in 2-3 weeks with the above    She will eventually need follow-up of the abnormal CT of the abdomen pelvis with question of pancreatic cysts.  Will eventually get her to Dr. Jeromy Queen for follow-up.  Think this patient would be best served by adjuvant chemotherapy with Adriamycin Cytoxan, and weekly Taxol.  I still think she would benefit from an adjuvant aromatase inhibitor from the contralateral side as it was ER positive.  After she completes therapy.   I Do think this patient could tolerate dose dense chemotherapy.               Relevant Orders    CBC Auto Differential    Comprehensive Metabolic Panel    Cancer Antigen 15-3    Cancer Antigen 19-9    NM Bone Scan Whole Body            Follow up in about 3 weeks (around 7/19/2022) for EOV.

## 2022-07-01 LAB — CANCER AG15-3 SERPL IA-ACNC: 12 U/ML

## 2022-07-05 ENCOUNTER — HOSPITAL ENCOUNTER (OUTPATIENT)
Dept: RADIOLOGY | Facility: HOSPITAL | Age: 62
Discharge: HOME | End: 2022-07-05
Attending: INTERNAL MEDICINE
Payer: COMMERCIAL

## 2022-07-05 DIAGNOSIS — Z17.0 MALIGNANT NEOPLASM OF UPPER-OUTER QUADRANT OF RIGHT BREAST IN FEMALE, ESTROGEN RECEPTOR POSITIVE: ICD-10-CM

## 2022-07-05 DIAGNOSIS — Z17.1 MALIGNANT NEOPLASM OF UPPER-OUTER QUADRANT OF LEFT BREAST IN FEMALE, ESTROGEN RECEPTOR NEGATIVE: ICD-10-CM

## 2022-07-05 DIAGNOSIS — C50.412 MALIGNANT NEOPLASM OF UPPER-OUTER QUADRANT OF LEFT BREAST IN FEMALE, ESTROGEN RECEPTOR NEGATIVE: ICD-10-CM

## 2022-07-05 DIAGNOSIS — C50.411 MALIGNANT NEOPLASM OF UPPER-OUTER QUADRANT OF RIGHT BREAST IN FEMALE, ESTROGEN RECEPTOR POSITIVE: ICD-10-CM

## 2022-07-05 PROCEDURE — 78306 BONE IMAGING WHOLE BODY: CPT | Mod: TC

## 2022-07-05 PROCEDURE — A9503 TC99M MEDRONATE: HCPCS

## 2022-07-07 ENCOUNTER — OFFICE VISIT (OUTPATIENT)
Dept: SURGERY | Facility: CLINIC | Age: 62
End: 2022-07-07
Payer: COMMERCIAL

## 2022-07-07 VITALS
SYSTOLIC BLOOD PRESSURE: 152 MMHG | BODY MASS INDEX: 33.53 KG/M2 | TEMPERATURE: 98 F | RESPIRATION RATE: 18 BRPM | WEIGHT: 208.63 LBS | OXYGEN SATURATION: 100 % | HEART RATE: 71 BPM | DIASTOLIC BLOOD PRESSURE: 91 MMHG | HEIGHT: 66 IN

## 2022-07-07 DIAGNOSIS — C50.411 MALIGNANT NEOPLASM OF UPPER-OUTER QUADRANT OF RIGHT BREAST IN FEMALE, ESTROGEN RECEPTOR POSITIVE: ICD-10-CM

## 2022-07-07 DIAGNOSIS — Z17.1 MALIGNANT NEOPLASM OF UPPER-OUTER QUADRANT OF LEFT BREAST IN FEMALE, ESTROGEN RECEPTOR NEGATIVE: ICD-10-CM

## 2022-07-07 DIAGNOSIS — Z17.0 MALIGNANT NEOPLASM OF UPPER-OUTER QUADRANT OF RIGHT BREAST IN FEMALE, ESTROGEN RECEPTOR POSITIVE: ICD-10-CM

## 2022-07-07 DIAGNOSIS — I87.2 VENOUS INSUFFICIENCY: Primary | ICD-10-CM

## 2022-07-07 DIAGNOSIS — C50.412 MALIGNANT NEOPLASM OF UPPER-OUTER QUADRANT OF LEFT BREAST IN FEMALE, ESTROGEN RECEPTOR NEGATIVE: ICD-10-CM

## 2022-07-07 PROCEDURE — 3008F BODY MASS INDEX DOCD: CPT | Mod: CPTII,S$GLB,, | Performed by: SURGERY

## 2022-07-07 PROCEDURE — 99999 PR PBB SHADOW E&M-EST. PATIENT-LVL V: ICD-10-PCS | Mod: PBBFAC,,, | Performed by: SURGERY

## 2022-07-07 PROCEDURE — 99999 PR PBB SHADOW E&M-EST. PATIENT-LVL V: CPT | Mod: PBBFAC,,, | Performed by: SURGERY

## 2022-07-07 PROCEDURE — 1159F MED LIST DOCD IN RCRD: CPT | Mod: CPTII,S$GLB,, | Performed by: SURGERY

## 2022-07-07 PROCEDURE — 1160F RVW MEDS BY RX/DR IN RCRD: CPT | Mod: CPTII,S$GLB,, | Performed by: SURGERY

## 2022-07-07 PROCEDURE — 3077F SYST BP >= 140 MM HG: CPT | Mod: CPTII,S$GLB,, | Performed by: SURGERY

## 2022-07-07 PROCEDURE — 3008F PR BODY MASS INDEX (BMI) DOCUMENTED: ICD-10-PCS | Mod: CPTII,S$GLB,, | Performed by: SURGERY

## 2022-07-07 PROCEDURE — 99215 OFFICE O/P EST HI 40 MIN: CPT | Mod: 24,S$GLB,, | Performed by: SURGERY

## 2022-07-07 PROCEDURE — 99215 PR OFFICE/OUTPT VISIT, EST, LEVL V, 40-54 MIN: ICD-10-PCS | Mod: 24,S$GLB,, | Performed by: SURGERY

## 2022-07-07 PROCEDURE — 1160F PR REVIEW ALL MEDS BY PRESCRIBER/CLIN PHARMACIST DOCUMENTED: ICD-10-PCS | Mod: CPTII,S$GLB,, | Performed by: SURGERY

## 2022-07-07 PROCEDURE — 1159F PR MEDICATION LIST DOCUMENTED IN MEDICAL RECORD: ICD-10-PCS | Mod: CPTII,S$GLB,, | Performed by: SURGERY

## 2022-07-07 PROCEDURE — 3080F PR MOST RECENT DIASTOLIC BLOOD PRESSURE >= 90 MM HG: ICD-10-PCS | Mod: CPTII,S$GLB,, | Performed by: SURGERY

## 2022-07-07 PROCEDURE — 3080F DIAST BP >= 90 MM HG: CPT | Mod: CPTII,S$GLB,, | Performed by: SURGERY

## 2022-07-07 PROCEDURE — 3077F PR MOST RECENT SYSTOLIC BLOOD PRESSURE >= 140 MM HG: ICD-10-PCS | Mod: CPTII,S$GLB,, | Performed by: SURGERY

## 2022-07-08 NOTE — PROGRESS NOTES
[facility] Breast Surg  Breast Surgical Oncology  Follow-Up Patient Office Visit       Referring Provider: No ref. provider found  PCP:  Sharri Bass NP  Medical Oncology: Gato Briones MD    Radiation Oncology: [facility]    Chief Complaint:   Chief Complaint   Patient presents with    Follow-up     Followup, no complaints        Subjective:   Treatment History:  1.Genetic Testing - negative  2. S/p bilateral simple mastectomy, bilateral sentinel lymph node biopsy, and TE placement - 6/3/2022       Interval History:  2022 - Elina Ham after evaluation by Medical Oncology and review of her final pathology the recommendation is to proceed with chemotherapy.    HPI:  Elina Ham initially presents on 24 at age 62 Years with bilateral breast cancer (1) AJCC 8th ed clinical anatomic stage IA / prognostic stage IA (cT1b cN0 M0) Right breast 10 o'clock 7 cm FN invasive ductal carcinoma, grade 1, with associated LCIS, ER 43%, SC 94%, HER2 0 - negative on IHC, Ki674% and 9 o'clock 5 cm FN Right breast invasive ductal carcinoma, grade 1, with associated DCIS, ER 81%, SC 3%, HER2 1+ - negative on IHC and Ki67 4%. 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       A detailed patient history was obtained and reviewed.      Additional Breast History:  Patient did not breast feed. Patient denies nipple discharge. Patient denies to previous breast biopsy. Patient denies to a personal history of breast cancer.    Imagin. 3/4/2022 BL SC MG at [facility]- which revealed a 2.7 cm mass in the left breast upper outer quadrant posterior depth. There is possible architectural distortion in the right breast at 9 o'clock anterior depth. There is a focal asymmetry  with possible architectural distortion in the right breast at 9 o'clock posterior depth. BIRADS-0; additional imaging needed.    2. 3/8/2022 BL DG MG/ BL US BREAST LIMITED at [facility]- which revealed on R MG at 9 o'clock anterior depth, there is a persistent area of architectural distortion spanning 0.8 cm. At 10 o'clock right breast, posterior depth, there is a persistent 1.0 cm focal asymmetry with associated architectural distortion and amorphous calcifications. At 1 o'clock left breast, middle depth, there is a 2.7 cm oval mass with irregular margins. These findings correspond to the areas recalled from screening examination dated 3/4/2022. In the left axilla there is an enlarged lymph node. No other suspicious masses, calcifications, or other signs of malignancy are identified. On R US, at 9 o'clock, 5 cm FN, there is an area of architectural distortion with posterior shadowing spanning 0.8 cm which corresponds to the mammographic finding in this region. at 10 o'clock right breast, 7 cm FN, there is a 1.0 x 0.4 x 0.9 cm oval, hypoechoic mass with circumscribed margins. This mass is stable mammographically dating back to the 2010 exam and is thus considered benign. On L US at 1 o'clock 7 cm FN there is a 2.7 x 2.0 x 2.1 mm oval, hypoechoic mass with irregular margins which corresponds to the mammographic finding in this region. In the left axilla, there is a single level 1 lymph node which demonstrates eccentric cortical thickening. This lymph node measures 1.3 x 1.0 x 1.0 cm and corresponds to the mammographic finding in this region. Additional benign appearing lymph nodes are noted in the bilateral axillae. BIRADS-5 highly suspicious; biopsy recommended.      Pathology:  1. 3/16/2022 Ultrasound-guided Core Needle Biopsy Right Breast 9 o'clock 5 cm FN Mass with Distortion - Invasive ductal carcinoma, grade 1, with associated focus of ductal carcinoma in situ, grade 1, and focal perineural invasion is present  ER  81%  KY 3%  HER2 Negative  Ki67 4%    2. 3 Stereotactic guided Core Needle Biopsy Right Breast Mass 10 o'clock Posterior Depth Focal Asymmetry, Architectural Distortion with Calcifications - Invasive ductal carcinoma, grade 1 and focal lobular carcinoma in situ  ER 43%  KY 94%  HER2 Negative  Ki67 4%    3. 3 Ultrasound-guided Core Needle Biopsy Left Breast 1 o'clock 7 cm FN Mass - High grade carcinoma with anaplastic/malignant giant cell component (suggestive for sarcomatoid/metaplastic change)  ER 0%  KY 0%  HER2 Negative  Ki67 High    4. 3 Ultrasound-guided Core Needle Biopsy Left Axillary Node - Portion of reactive lymph node; no evidence of metastatic carcinoma    5. Bilateral simple mastectomy and Bilateral SLNB 6/3/2022  - Left mastectomy and SLNB - a single focus of invasive ductal carcinoma grade 3 measuring 25 mm. Margins clear. Six sentinel lymph nodes were examined with one positive for metastatic carcinoma, 5 mm.   - Right mastectomy and SLNB - two foci of invasive ductal carcinoma grade 1 measuring 10 mm and 4 mm. Low grade DCIS and LCIS were also present. All margins clear. Four sentinel lymph ndoes are negative for metastatic carcinoma    OB/GYN History:  Age at Menarche Onset: 14  Menopausal Status: postmenopausal at age 52  Pregnancy History:   Age at first live birth: 31  Hormone Replacement Therapy: None    Other Relevant History:  Prior thoracic RT: none  Genetic testing: Yes, VUS  Ashkenazi Mandaeism descent: No    Family History:  Family History   Problem Relation Age of Onset    Breast cancer Maternal Grandmother             Breast cancer Sister     Breast cancer Sister         remission    Breast cancer Maternal Aunt             Breast cancer Maternal Aunt             Breast cancer Maternal Aunt             Breast cancer Maternal Aunt     Breast cancer Maternal Uncle             Stroke Brother             Pancreatic  cancer Brother         Past History:  Past Medical History:   Diagnosis Date    Cancer 3/21/2022    Diabetes mellitus     Hypercholesterolemia     Hypertension         Past Surgical History:   Procedure Laterality Date    BILATERAL MASTECTOMY Bilateral 6/3/2022    Procedure: MASTECTOMY, BILATERAL;  Surgeon: Charlene Contreras MD;  Location: [facility] OR;  Service: General;  Laterality: Bilateral;     SECTION  92 and 3/15/95    COLONOSCOPY      INSERTION OF BREAST TISSUE EXPANDER Bilateral 6/3/2022    Procedure: INSERTION, TISSUE EXPANDER, BREAST (Bilateral breast reconstruction with tissue expanders and acellular dermal matrix and use of intraoperative fluoroscein angiogragry);  Surgeon: Nick Gibbs MD;  Location: [facility] OR;  Service: Plastics;  Laterality: Bilateral;    SENTINEL LYMPH NODE BIOPSY Bilateral 6/3/2022    Procedure: BIOPSY, LYMPH NODE, SENTINEL;  Surgeon: Charlene Contreras MD;  Location: [facility] OR;  Service: General;  Laterality: Bilateral;  MAGTRACE Injection in office  Need SentiMag Probe        Social History     Socioeconomic History    Marital status:    Tobacco Use    Smoking status: Never Smoker    Smokeless tobacco: Never Used   Substance and Sexual Activity    Alcohol use: Never     Types: 1 Glasses of wine per week     Comment: drink socially but haven't had a glass of wine since     Drug use: Never    Sexual activity: Yes     Partners: Male     Birth control/protection: None     Comment: We take STD test for the first 2 years we started dating  (5        Body mass index is 33.67 kg/m².     Allergy/Medications:   Review of patient's allergies indicates:  No Known Allergies       Current Outpatient Medications:     amLODIPine (NORVASC) 10 MG tablet, Take 10 mg by mouth once daily., Disp: , Rfl:     aspirin 81 MG Chew, Take 81 mg by mouth 2 (two) times a day. Stopped using until after sx, Disp: , Rfl:     atorvastatin (LIPITOR) 20 MG tablet, Take 20 mg by  "mouth every evening., Disp: , Rfl:     cyclobenzaprine (FLEXERIL) 5 MG tablet, Has not started yet, Disp: , Rfl:     losartan-hydrochlorothiazide 100-25 mg (HYZAAR) 100-25 mg per tablet, Take by mouth., Disp: , Rfl:     metFORMIN (GLUCOPHAGE) 1000 MG tablet, TAKE ONE TABLET BY MOUTH TWICE A DAY WITH A MEAL, Disp: , Rfl:     mupirocin (BACTROBAN) 2 % ointment, APPLY TO DRAIN SITES TWICE DAILY, Disp: , Rfl:     ondansetron (ZOFRAN-ODT) 4 MG TbDL, DISSOLVE 1 TABLET UNDER THE TONGUE EVERY 4-6 HOURS AS NEEDED FOR NAUSEA, Disp: , Rfl:        Review of Systems:  Constitutional: denies fevers, chills, weight loss  HEENT: denies blurry/double vision, changes in hearing, odynophagia, dysphagia  Respiratory: denies cough, shortness of breath  Cardiovascular: denies palpitations, swelling of the extremities  GI: denies abdominal pain, nausea/vomiting, hematochezia, frequent stools  : denies frequency, dysuria, flank pain, hematuria  Skin: denies new rashes  Neurological: denies muscular/sensory deficiencies, loss of coordination, headaches, memory changes  Endo: denies hair loss/thinning, nervousness, hot flashes, heat/cold intolerance, lumps in the neck area  Heme: denies easy bruising and fatigue  Psychological: denies anxious/depressive moods  Musculoskeletal: denies bony pain, muscle cramps, swollen joints    Objective:     Vitals:  Blood pressure (!) 152/91, pulse 71, temperature 97.9 °F (36.6 °C), temperature source Oral, resp. rate 18, height 5' 6" (1.676 m), weight 94.6 kg (208 lb 9.6 oz), SpO2 100 %.      Physical Exam:  General: The patient is awake, alert and oriented times three. The patient is well nourished and in no acute distress.   Neck: There is no evidence of palpable cervical, supraclavicular or axillary adenopathy. The neck is supple. The thyroid is not enlarged.   Musculoskeletal: The patient has a normal range of motion of her bilateral upper extremities.   Chest: Examination of the chest wall " fails to reveal any obvious abnormalities. The lungs are clear to auscultation bilaterally without rales, rhonchi, or wheezing.   Cardiovascular: The heart has a regular rate and rhythm without murmurs, gallops or rubs.   Abdomen: The abdomen is soft. No tenderness  Integumentary: no rashes or skin lesions present  Neurologic: cranial nerves intact, no signs of peripheral neurological deficit, motor/sensory function intact    Assessment and Plan:     Encounter Diagnoses   Name Primary?    Malignant neoplasm of upper-outer quadrant of left breast in female, estrogen receptor negative Yes    Malignant neoplasm of upper-outer quadrant of right breast in female, estrogen receptor positive     Venous insufficiency         She requires chemotherapy and has venous insufficiency.     I then spent about twenty minutes with the patient explaining the procedure of the port placement.  I explained to her that this would be an outpatient procedure with local anesthesia and sedation.  The port would be placed under her skin with a catheter which runs into the major vein in her neck or chest.  I explained to her my initial access site would be the subclavian vein which runs beneath the clavicle. If not successful, then the second option would be to do another incision in the neck to access the internal jugular vein.  The risks and complications of pain, bleeding, infection, blood clot, scarring, the inability to access the port, clogging of the port and pneumothorax/hemothorax requiring chest tube or additional surgery were all explained in detail.      Informed consent was obtained, and we shall arrange the surgery at the earliest available opportunity.         Plan:       1. Surgery - Mediport placement  Tentative Date: 7/15/2022  2. Preop - CBC, CMP, and EKG as appropriate and if not already completed  3. Surgical policies and instructions were discussed in detail    All of questions were answered    Charlene Contreras  MD  Breast Surgical Oncology     OFFICE VISIT CODING:    Non-face-to-face time included:  Yes Preparing to see the patient such as reviewing the patient record  Yes Obtaining and reviewing separately obtained history  No Independently interpreting results  Yes Documenting clinical information in electronic health record  No Ordering appropriate medications  Yes Ordering appropriate tests  Yes Ordering appropriate procedures (including follow-up)  Yes Referring and communicating with other health care professionals (not separately reported)  Yes Care Coordination (not separately reported)    Face-to-face time included:  Yes Performing a medically necessary appropriate history, examination, and/or evaluation  Yes Communicating results to the patient/family/caregiver  Yes Counseling and educating the patient/family/caregiver  Yes Answering patient/family/caregiver questions    Total Time: 40 minutes    Total time includes both face-to-face and non-face-to-face time personally spent by myself on the day of the visit.

## 2022-07-08 NOTE — H&P (VIEW-ONLY)
[facility] Breast Surg  Breast Surgical Oncology  Follow-Up Patient Office Visit       Referring Provider: No ref. provider found  PCP:  Sharri Bass NP  Medical Oncology: Gato Briones MD    Radiation Oncology: Oncologics    Chief Complaint:   Chief Complaint   Patient presents with    Follow-up     Followup, no complaints        Subjective:   Treatment History:  1.Genetic Testing - negative  2. S/p bilateral simple mastectomy, bilateral sentinel lymph node biopsy, and TE placement - 6/3/2022       Interval History:  2022 - Elina Ham after evaluation by Medical Oncology and review of her final pathology the recommendation is to proceed with chemotherapy.    HPI:  Elina Ham initially presents on 22 at age 62 Years with bilateral breast cancer (1) AJCC 8th ed clinical anatomic stage IA / prognostic stage IA (cT1b cN0 M0) Right breast 10 o'clock 7 cm FN invasive ductal carcinoma, grade 1, with associated LCIS, ER 43%, ME 94%, HER2 0 - negative on IHC, Ki674% and 9 o'clock 5 cm FN Right breast invasive ductal carcinoma, grade 1, with associated DCIS, ER 81%, ME 3%, HER2 1+ - negative on IHC and Ki67 4%. 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       A detailed patient history was obtained and reviewed.      Additional Breast History:  Patient did not breast feed. Patient denies nipple discharge. Patient denies to previous breast biopsy. Patient denies to a personal history of breast cancer.    Imagin. 3/4/2022 BL SC MG at [facility]- which revealed a 2.7 cm mass in the left breast upper outer quadrant posterior depth. There is possible architectural distortion in the right breast at 9 o'clock anterior depth. There is a focal asymmetry  with possible architectural distortion in the right breast at 9 o'clock posterior depth. BIRADS-0; additional imaging needed.    2. 3/8/2022 BL DG MG/ BL US BREAST LIMITED at [facility]- which revealed on R MG at 9 o'clock anterior depth, there is a persistent area of architectural distortion spanning 0.8 cm. At 10 o'clock right breast, posterior depth, there is a persistent 1.0 cm focal asymmetry with associated architectural distortion and amorphous calcifications. At 1 o'clock left breast, middle depth, there is a 2.7 cm oval mass with irregular margins. These findings correspond to the areas recalled from screening examination dated 3/4/2022. In the left axilla there is an enlarged lymph node. No other suspicious masses, calcifications, or other signs of malignancy are identified. On R US, at 9 o'clock, 5 cm FN, there is an area of architectural distortion with posterior shadowing spanning 0.8 cm which corresponds to the mammographic finding in this region. at 10 o'clock right breast, 7 cm FN, there is a 1.0 x 0.4 x 0.9 cm oval, hypoechoic mass with circumscribed margins. This mass is stable mammographically dating back to the 2010 exam and is thus considered benign. On L US at 1 o'clock 7 cm FN there is a 2.7 x 2.0 x 2.1 mm oval, hypoechoic mass with irregular margins which corresponds to the mammographic finding in this region. In the left axilla, there is a single level 1 lymph node which demonstrates eccentric cortical thickening. This lymph node measures 1.3 x 1.0 x 1.0 cm and corresponds to the mammographic finding in this region. Additional benign appearing lymph nodes are noted in the bilateral axillae. BIRADS-5 highly suspicious; biopsy recommended.      Pathology:  1. 3/16/2022 Ultrasound-guided Core Needle Biopsy Right Breast 9 o'clock 5 cm FN Mass with Distortion - Invasive ductal carcinoma, grade 1, with associated focus of ductal carcinoma in situ, grade 1, and focal perineural invasion is present  ER  81%  WA 3%  HER2 Negative  Ki67 4%    2. 3 Stereotactic guided Core Needle Biopsy Right Breast Mass 10 o'clock Posterior Depth Focal Asymmetry, Architectural Distortion with Calcifications - Invasive ductal carcinoma, grade 1 and focal lobular carcinoma in situ  ER 43%  WA 94%  HER2 Negative  Ki67 4%    3. 3 Ultrasound-guided Core Needle Biopsy Left Breast 1 o'clock 7 cm FN Mass - High grade carcinoma with anaplastic/malignant giant cell component (suggestive for sarcomatoid/metaplastic change)  ER 0%  WA 0%  HER2 Negative  Ki67 High    4. 3 Ultrasound-guided Core Needle Biopsy Left Axillary Node - Portion of reactive lymph node; no evidence of metastatic carcinoma    5. Bilateral simple mastectomy and Bilateral SLNB 6/3/2022  - Left mastectomy and SLNB - a single focus of invasive ductal carcinoma grade 3 measuring 25 mm. Margins clear. Six sentinel lymph nodes were examined with one positive for metastatic carcinoma, 5 mm.   - Right mastectomy and SLNB - two foci of invasive ductal carcinoma grade 1 measuring 10 mm and 4 mm. Low grade DCIS and LCIS were also present. All margins clear. Four sentinel lymph ndoes are negative for metastatic carcinoma    OB/GYN History:  Age at Menarche Onset: 14  Menopausal Status: postmenopausal at age 52  Pregnancy History:   Age at first live birth: 31  Hormone Replacement Therapy: None    Other Relevant History:  Prior thoracic RT: none  Genetic testing: Yes, VUS  Ashkenazi Rastafari descent: No    Family History:  Family History   Problem Relation Age of Onset    Breast cancer Maternal Grandmother             Breast cancer Sister     Breast cancer Sister         remission    Breast cancer Maternal Aunt             Breast cancer Maternal Aunt             Breast cancer Maternal Aunt             Breast cancer Maternal Aunt     Breast cancer Maternal Uncle             Stroke Brother             Pancreatic  cancer Brother         Past History:  Past Medical History:   Diagnosis Date    Cancer 3/21/2022    Diabetes mellitus     Hypercholesterolemia     Hypertension         Past Surgical History:   Procedure Laterality Date    BILATERAL MASTECTOMY Bilateral 6/3/2022    Procedure: MASTECTOMY, BILATERAL;  Surgeon: Charlene Contreras MD;  Location: [facility] OR;  Service: General;  Laterality: Bilateral;     SECTION  92 and 3/15/95    COLONOSCOPY      INSERTION OF BREAST TISSUE EXPANDER Bilateral 6/3/2022    Procedure: INSERTION, TISSUE EXPANDER, BREAST (Bilateral breast reconstruction with tissue expanders and acellular dermal matrix and use of intraoperative fluoroscein angiogragry);  Surgeon: Nick Gibbs MD;  Location: [facility] OR;  Service: Plastics;  Laterality: Bilateral;    SENTINEL LYMPH NODE BIOPSY Bilateral 6/3/2022    Procedure: BIOPSY, LYMPH NODE, SENTINEL;  Surgeon: Charlene Contreras MD;  Location: [facility] OR;  Service: General;  Laterality: Bilateral;  MAGTRACE Injection in office  Need SentiMag Probe        Social History     Socioeconomic History    Marital status:    Tobacco Use    Smoking status: Never Smoker    Smokeless tobacco: Never Used   Substance and Sexual Activity    Alcohol use: Never     Types: 1 Glasses of wine per week     Comment: drink socially but haven't had a glass of wine since     Drug use: Never    Sexual activity: Yes     Partners: Male     Birth control/protection: None     Comment: We take STD test for the first 2 years we started dating  (5        Body mass index is 33.67 kg/m².     Allergy/Medications:   Review of patient's allergies indicates:  No Known Allergies       Current Outpatient Medications:     amLODIPine (NORVASC) 10 MG tablet, Take 10 mg by mouth once daily., Disp: , Rfl:     aspirin 81 MG Chew, Take 81 mg by mouth 2 (two) times a day. Stopped using until after sx, Disp: , Rfl:     atorvastatin (LIPITOR) 20 MG tablet, Take 20 mg by  "mouth every evening., Disp: , Rfl:     cyclobenzaprine (FLEXERIL) 5 MG tablet, Has not started yet, Disp: , Rfl:     losartan-hydrochlorothiazide 100-25 mg (HYZAAR) 100-25 mg per tablet, Take by mouth., Disp: , Rfl:     metFORMIN (GLUCOPHAGE) 1000 MG tablet, TAKE ONE TABLET BY MOUTH TWICE A DAY WITH A MEAL, Disp: , Rfl:     mupirocin (BACTROBAN) 2 % ointment, APPLY TO DRAIN SITES TWICE DAILY, Disp: , Rfl:     ondansetron (ZOFRAN-ODT) 4 MG TbDL, DISSOLVE 1 TABLET UNDER THE TONGUE EVERY 4-6 HOURS AS NEEDED FOR NAUSEA, Disp: , Rfl:        Review of Systems:  Constitutional: denies fevers, chills, weight loss  HEENT: denies blurry/double vision, changes in hearing, odynophagia, dysphagia  Respiratory: denies cough, shortness of breath  Cardiovascular: denies palpitations, swelling of the extremities  GI: denies abdominal pain, nausea/vomiting, hematochezia, frequent stools  : denies frequency, dysuria, flank pain, hematuria  Skin: denies new rashes  Neurological: denies muscular/sensory deficiencies, loss of coordination, headaches, memory changes  Endo: denies hair loss/thinning, nervousness, hot flashes, heat/cold intolerance, lumps in the neck area  Heme: denies easy bruising and fatigue  Psychological: denies anxious/depressive moods  Musculoskeletal: denies bony pain, muscle cramps, swollen joints    Objective:     Vitals:  Blood pressure (!) 152/91, pulse 71, temperature 97.9 °F (36.6 °C), temperature source Oral, resp. rate 18, height 5' 6" (1.676 m), weight 94.6 kg (208 lb 9.6 oz), SpO2 100 %.      Physical Exam:  General: The patient is awake, alert and oriented times three. The patient is well nourished and in no acute distress.   Neck: There is no evidence of palpable cervical, supraclavicular or axillary adenopathy. The neck is supple. The thyroid is not enlarged.   Musculoskeletal: The patient has a normal range of motion of her bilateral upper extremities.   Chest: Examination of the chest wall " fails to reveal any obvious abnormalities. The lungs are clear to auscultation bilaterally without rales, rhonchi, or wheezing.   Cardiovascular: The heart has a regular rate and rhythm without murmurs, gallops or rubs.   Abdomen: The abdomen is soft. No tenderness  Integumentary: no rashes or skin lesions present  Neurologic: cranial nerves intact, no signs of peripheral neurological deficit, motor/sensory function intact    Assessment and Plan:     Encounter Diagnoses   Name Primary?    Malignant neoplasm of upper-outer quadrant of left breast in female, estrogen receptor negative Yes    Malignant neoplasm of upper-outer quadrant of right breast in female, estrogen receptor positive     Venous insufficiency         She requires chemotherapy and has venous insufficiency.     I then spent about twenty minutes with the patient explaining the procedure of the port placement.  I explained to her that this would be an outpatient procedure with local anesthesia and sedation.  The port would be placed under her skin with a catheter which runs into the major vein in her neck or chest.  I explained to her my initial access site would be the subclavian vein which runs beneath the clavicle. If not successful, then the second option would be to do another incision in the neck to access the internal jugular vein.  The risks and complications of pain, bleeding, infection, blood clot, scarring, the inability to access the port, clogging of the port and pneumothorax/hemothorax requiring chest tube or additional surgery were all explained in detail.      Informed consent was obtained, and we shall arrange the surgery at the earliest available opportunity.         Plan:       1. Surgery - Mediport placement  Tentative Date: 7/15/2022  2. Preop - CBC, CMP, and EKG as appropriate and if not already completed  3. Surgical policies and instructions were discussed in detail    All of questions were answered    Charlene Contreras  MD  Breast Surgical Oncology     OFFICE VISIT CODING:    Non-face-to-face time included:  Yes Preparing to see the patient such as reviewing the patient record  Yes Obtaining and reviewing separately obtained history  No Independently interpreting results  Yes Documenting clinical information in electronic health record  No Ordering appropriate medications  Yes Ordering appropriate tests  Yes Ordering appropriate procedures (including follow-up)  Yes Referring and communicating with other health care professionals (not separately reported)  Yes Care Coordination (not separately reported)    Face-to-face time included:  Yes Performing a medically necessary appropriate history, examination, and/or evaluation  Yes Communicating results to the patient/family/caregiver  Yes Counseling and educating the patient/family/caregiver  Yes Answering patient/family/caregiver questions    Total Time: 40 minutes    Total time includes both face-to-face and non-face-to-face time personally spent by myself on the day of the visit.

## 2022-07-12 ENCOUNTER — HOSPITAL ENCOUNTER (OUTPATIENT)
Dept: RADIOLOGY | Facility: HOSPITAL | Age: 62
Discharge: HOME | End: 2022-07-12
Attending: SURGERY
Payer: COMMERCIAL

## 2022-07-12 DIAGNOSIS — Z17.1 MALIGNANT NEOPLASM OF UPPER-OUTER QUADRANT OF LEFT BREAST IN FEMALE, ESTROGEN RECEPTOR NEGATIVE: ICD-10-CM

## 2022-07-12 DIAGNOSIS — I87.2 VENOUS INSUFFICIENCY: ICD-10-CM

## 2022-07-12 DIAGNOSIS — C50.412 MALIGNANT NEOPLASM OF UPPER-OUTER QUADRANT OF LEFT BREAST IN FEMALE, ESTROGEN RECEPTOR NEGATIVE: ICD-10-CM

## 2022-07-12 DIAGNOSIS — Z17.0 MALIGNANT NEOPLASM OF UPPER-OUTER QUADRANT OF RIGHT BREAST IN FEMALE, ESTROGEN RECEPTOR POSITIVE: ICD-10-CM

## 2022-07-12 DIAGNOSIS — C50.411 MALIGNANT NEOPLASM OF UPPER-OUTER QUADRANT OF RIGHT BREAST IN FEMALE, ESTROGEN RECEPTOR POSITIVE: ICD-10-CM

## 2022-07-12 PROCEDURE — 71045 X-RAY EXAM CHEST 1 VIEW: CPT | Mod: TC

## 2022-07-12 PROCEDURE — 93005 ELECTROCARDIOGRAM TRACING: CPT

## 2022-07-12 PROCEDURE — 93010 EKG 12-LEAD: ICD-10-PCS | Mod: ,,, | Performed by: INTERNAL MEDICINE

## 2022-07-12 PROCEDURE — 93010 ELECTROCARDIOGRAM REPORT: CPT | Mod: ,,, | Performed by: INTERNAL MEDICINE

## 2022-07-15 ENCOUNTER — ANESTHESIA EVENT (OUTPATIENT)
Dept: SURGERY | Facility: HOSPITAL | Age: 62
End: 2022-07-15
Payer: COMMERCIAL

## 2022-07-15 ENCOUNTER — ANESTHESIA (OUTPATIENT)
Dept: SURGERY | Facility: HOSPITAL | Age: 62
End: 2022-07-15
Payer: COMMERCIAL

## 2022-07-15 ENCOUNTER — HOSPITAL ENCOUNTER (OUTPATIENT)
Facility: HOSPITAL | Age: 62
Discharge: HOME | End: 2022-07-15
Attending: SURGERY | Admitting: SURGERY
Payer: COMMERCIAL

## 2022-07-15 DIAGNOSIS — C50.412 MALIGNANT NEOPLASM OF UPPER-OUTER QUADRANT OF LEFT BREAST IN FEMALE, ESTROGEN RECEPTOR NEGATIVE: ICD-10-CM

## 2022-07-15 DIAGNOSIS — C50.411 MALIGNANT NEOPLASM OF UPPER-OUTER QUADRANT OF RIGHT BREAST IN FEMALE, ESTROGEN RECEPTOR POSITIVE: ICD-10-CM

## 2022-07-15 DIAGNOSIS — Z17.0 MALIGNANT NEOPLASM OF UPPER-OUTER QUADRANT OF RIGHT BREAST IN FEMALE, ESTROGEN RECEPTOR POSITIVE: ICD-10-CM

## 2022-07-15 DIAGNOSIS — I87.2 VENOUS INSUFFICIENCY: ICD-10-CM

## 2022-07-15 DIAGNOSIS — Z17.1 MALIGNANT NEOPLASM OF UPPER-OUTER QUADRANT OF LEFT BREAST IN FEMALE, ESTROGEN RECEPTOR NEGATIVE: ICD-10-CM

## 2022-07-15 LAB — POCT GLUCOSE: 195 MG/DL (ref 70–110)

## 2022-07-15 PROCEDURE — 36561 INSERT TUNNELED CV CATH: CPT | Mod: RT,,, | Performed by: SURGERY

## 2022-07-15 PROCEDURE — C1788 PORT, INDWELLING, IMP: HCPCS | Performed by: SURGERY

## 2022-07-15 PROCEDURE — 36000707: Performed by: SURGERY

## 2022-07-15 PROCEDURE — 36000706: Performed by: SURGERY

## 2022-07-15 PROCEDURE — 63600175 PHARM REV CODE 636 W HCPCS: Performed by: SURGERY

## 2022-07-15 PROCEDURE — 71000015 HC POSTOP RECOV 1ST HR: Performed by: SURGERY

## 2022-07-15 PROCEDURE — 25000003 PHARM REV CODE 250: Performed by: SURGERY

## 2022-07-15 PROCEDURE — 71000016 HC POSTOP RECOV ADDL HR: Performed by: SURGERY

## 2022-07-15 PROCEDURE — 25000003 PHARM REV CODE 250: Performed by: NURSE ANESTHETIST, CERTIFIED REGISTERED

## 2022-07-15 PROCEDURE — 36561 PR INSERT TUNNELED CV CATH WITH PORT: ICD-10-PCS | Mod: RT,,, | Performed by: SURGERY

## 2022-07-15 PROCEDURE — 63600175 PHARM REV CODE 636 W HCPCS: Performed by: ANESTHESIOLOGY

## 2022-07-15 PROCEDURE — 82962 GLUCOSE BLOOD TEST: CPT | Performed by: SURGERY

## 2022-07-15 PROCEDURE — 37000009 HC ANESTHESIA EA ADD 15 MINS: Performed by: SURGERY

## 2022-07-15 PROCEDURE — 63600175 PHARM REV CODE 636 W HCPCS

## 2022-07-15 PROCEDURE — 37000008 HC ANESTHESIA 1ST 15 MINUTES: Performed by: SURGERY

## 2022-07-15 PROCEDURE — 63600175 PHARM REV CODE 636 W HCPCS: Performed by: NURSE ANESTHETIST, CERTIFIED REGISTERED

## 2022-07-15 DEVICE — PORT POWER SLIM: Type: IMPLANTABLE DEVICE | Site: CHEST | Status: FUNCTIONAL

## 2022-07-15 RX ORDER — MEPERIDINE HYDROCHLORIDE 25 MG/ML
12.5 INJECTION INTRAMUSCULAR; INTRAVENOUS; SUBCUTANEOUS ONCE
Status: CANCELLED | OUTPATIENT
Start: 2022-07-15 | End: 2022-07-15

## 2022-07-15 RX ORDER — CEFAZOLIN SODIUM 1 G/3ML
INJECTION, POWDER, FOR SOLUTION INTRAMUSCULAR; INTRAVENOUS
Status: DISCONTINUED | OUTPATIENT
Start: 2022-07-15 | End: 2022-07-15 | Stop reason: HOSPADM

## 2022-07-15 RX ORDER — PROPOFOL 10 MG/ML
VIAL (ML) INTRAVENOUS
Status: DISCONTINUED | OUTPATIENT
Start: 2022-07-15 | End: 2022-07-15

## 2022-07-15 RX ORDER — ONDANSETRON 2 MG/ML
INJECTION INTRAMUSCULAR; INTRAVENOUS
Status: DISCONTINUED | OUTPATIENT
Start: 2022-07-15 | End: 2022-07-15

## 2022-07-15 RX ORDER — CEFAZOLIN SODIUM 1 G/3ML
INJECTION, POWDER, FOR SOLUTION INTRAMUSCULAR; INTRAVENOUS
Status: DISCONTINUED
Start: 2022-07-15 | End: 2022-07-15 | Stop reason: HOSPADM

## 2022-07-15 RX ORDER — HEPARIN SODIUM 5000 [USP'U]/ML
INJECTION, SOLUTION INTRAVENOUS; SUBCUTANEOUS
Status: DISCONTINUED
Start: 2022-07-15 | End: 2022-07-15 | Stop reason: HOSPADM

## 2022-07-15 RX ORDER — LIDOCAINE HYDROCHLORIDE 10 MG/ML
INJECTION, SOLUTION EPIDURAL; INFILTRATION; INTRACAUDAL; PERINEURAL
Status: DISCONTINUED | OUTPATIENT
Start: 2022-07-15 | End: 2022-07-15

## 2022-07-15 RX ORDER — SODIUM CHLORIDE 9 MG/ML
INJECTION, SOLUTION INTRAMUSCULAR; INTRAVENOUS; SUBCUTANEOUS
Status: DISCONTINUED
Start: 2022-07-15 | End: 2022-07-15 | Stop reason: HOSPADM

## 2022-07-15 RX ORDER — PROPOFOL 10 MG/ML
VIAL (ML) INTRAVENOUS CONTINUOUS PRN
Status: DISCONTINUED | OUTPATIENT
Start: 2022-07-15 | End: 2022-07-15

## 2022-07-15 RX ORDER — SODIUM CHLORIDE, SODIUM LACTATE, POTASSIUM CHLORIDE, CALCIUM CHLORIDE 600; 310; 30; 20 MG/100ML; MG/100ML; MG/100ML; MG/100ML
INJECTION, SOLUTION INTRAVENOUS CONTINUOUS
Status: DISCONTINUED | OUTPATIENT
Start: 2022-07-15 | End: 2022-07-15 | Stop reason: HOSPADM

## 2022-07-15 RX ORDER — MIDAZOLAM HYDROCHLORIDE 1 MG/ML
2 INJECTION INTRAMUSCULAR; INTRAVENOUS ONCE AS NEEDED
Status: COMPLETED | OUTPATIENT
Start: 2022-07-15 | End: 2022-07-15

## 2022-07-15 RX ORDER — LIDOCAINE HYDROCHLORIDE 10 MG/ML
INJECTION INFILTRATION; PERINEURAL
Status: DISCONTINUED | OUTPATIENT
Start: 2022-07-15 | End: 2022-07-15 | Stop reason: HOSPADM

## 2022-07-15 RX ORDER — HYDROMORPHONE HYDROCHLORIDE 2 MG/ML
0.4 INJECTION, SOLUTION INTRAMUSCULAR; INTRAVENOUS; SUBCUTANEOUS EVERY 5 MIN PRN
Status: CANCELLED | OUTPATIENT
Start: 2022-07-15

## 2022-07-15 RX ORDER — HEPARIN SODIUM 5000 [USP'U]/ML
INJECTION, SOLUTION INTRAVENOUS; SUBCUTANEOUS
Status: DISCONTINUED | OUTPATIENT
Start: 2022-07-15 | End: 2022-07-15 | Stop reason: HOSPADM

## 2022-07-15 RX ORDER — MIDAZOLAM HYDROCHLORIDE 1 MG/ML
INJECTION INTRAMUSCULAR; INTRAVENOUS
Status: COMPLETED
Start: 2022-07-15 | End: 2022-07-15

## 2022-07-15 RX ORDER — ONDANSETRON 2 MG/ML
4 INJECTION INTRAMUSCULAR; INTRAVENOUS ONCE
Status: CANCELLED | OUTPATIENT
Start: 2022-07-15 | End: 2022-07-15

## 2022-07-15 RX ORDER — CEFAZOLIN SODIUM 2 G/50ML
2 SOLUTION INTRAVENOUS
Status: COMPLETED | OUTPATIENT
Start: 2022-07-15 | End: 2022-07-15

## 2022-07-15 RX ORDER — SODIUM CHLORIDE 0.9 % (FLUSH) 0.9 %
10 SYRINGE (ML) INJECTION
Status: CANCELLED | OUTPATIENT
Start: 2022-07-15

## 2022-07-15 RX ORDER — SODIUM CHLORIDE, SODIUM GLUCONATE, SODIUM ACETATE, POTASSIUM CHLORIDE AND MAGNESIUM CHLORIDE 30; 37; 368; 526; 502 MG/100ML; MG/100ML; MG/100ML; MG/100ML; MG/100ML
1000 INJECTION, SOLUTION INTRAVENOUS CONTINUOUS
Status: DISCONTINUED | OUTPATIENT
Start: 2022-07-15 | End: 2022-07-15 | Stop reason: HOSPADM

## 2022-07-15 RX ORDER — LIDOCAINE HYDROCHLORIDE 10 MG/ML
INJECTION INFILTRATION; PERINEURAL
Status: DISCONTINUED
Start: 2022-07-15 | End: 2022-07-15 | Stop reason: HOSPADM

## 2022-07-15 RX ORDER — FENTANYL CITRATE 50 UG/ML
INJECTION, SOLUTION INTRAMUSCULAR; INTRAVENOUS
Status: DISCONTINUED | OUTPATIENT
Start: 2022-07-15 | End: 2022-07-15

## 2022-07-15 RX ORDER — DEXAMETHASONE SODIUM PHOSPHATE 4 MG/ML
INJECTION, SOLUTION INTRA-ARTICULAR; INTRALESIONAL; INTRAMUSCULAR; INTRAVENOUS; SOFT TISSUE
Status: DISCONTINUED | OUTPATIENT
Start: 2022-07-15 | End: 2022-07-15

## 2022-07-15 RX ORDER — BUPIVACAINE HYDROCHLORIDE 2.5 MG/ML
INJECTION, SOLUTION EPIDURAL; INFILTRATION; INTRACAUDAL
Status: DISCONTINUED | OUTPATIENT
Start: 2022-07-15 | End: 2022-07-15 | Stop reason: HOSPADM

## 2022-07-15 RX ORDER — BUPIVACAINE HYDROCHLORIDE 2.5 MG/ML
INJECTION, SOLUTION EPIDURAL; INFILTRATION; INTRACAUDAL
Status: DISCONTINUED
Start: 2022-07-15 | End: 2022-07-15 | Stop reason: HOSPADM

## 2022-07-15 RX ADMIN — MIDAZOLAM HYDROCHLORIDE 2 MG: 1 INJECTION INTRAMUSCULAR; INTRAVENOUS at 09:07

## 2022-07-15 RX ADMIN — FENTANYL CITRATE 50 MCG: 50 INJECTION, SOLUTION INTRAMUSCULAR; INTRAVENOUS at 10:07

## 2022-07-15 RX ADMIN — LIDOCAINE HYDROCHLORIDE 50 MG: 10 INJECTION, SOLUTION EPIDURAL; INFILTRATION; INTRACAUDAL; PERINEURAL at 10:07

## 2022-07-15 RX ADMIN — CEFAZOLIN SODIUM 2 G: 2 SOLUTION INTRAVENOUS at 10:07

## 2022-07-15 RX ADMIN — MIDAZOLAM HYDROCHLORIDE 2 MG: 1 INJECTION, SOLUTION INTRAMUSCULAR; INTRAVENOUS at 09:07

## 2022-07-15 RX ADMIN — DEXAMETHASONE SODIUM PHOSPHATE 4 MG: 4 INJECTION, SOLUTION INTRA-ARTICULAR; INTRALESIONAL; INTRAMUSCULAR; INTRAVENOUS; SOFT TISSUE at 10:07

## 2022-07-15 RX ADMIN — PROPOFOL 30 MG: 10 INJECTION, EMULSION INTRAVENOUS at 10:07

## 2022-07-15 RX ADMIN — PROPOFOL 70 MCG/KG/MIN: 10 INJECTION, EMULSION INTRAVENOUS at 10:07

## 2022-07-15 RX ADMIN — SODIUM CHLORIDE, POTASSIUM CHLORIDE, SODIUM LACTATE AND CALCIUM CHLORIDE: 600; 310; 30; 20 INJECTION, SOLUTION INTRAVENOUS at 09:07

## 2022-07-15 RX ADMIN — ONDANSETRON 4 MG: 2 INJECTION INTRAMUSCULAR; INTRAVENOUS at 10:07

## 2022-07-15 NOTE — PLAN OF CARE
1315 Pt has met all discharge criteria. Pt with no complaints or concerns. Pt states ready to go home. Son here to  pt.

## 2022-07-15 NOTE — OP NOTE
DATE OF PROCEDURE: 7/15/2022    SURGEON: Charlene Contreras M.D.    ASSISTANT:  None    PREOPERATIVE DIAGNOSIS: Malignant neoplasm of upper-outer quadrant of left breast in female, estrogen receptor negative [C50.412, Z17.1]  Malignant neoplasm of upper-outer quadrant of right breast in female, estrogen receptor positive [C50.411, Z17.0]     POSTOPERATIVE DIAGNOSIS: Post-Op Diagnosis Codes:     * Malignant neoplasm of upper-outer quadrant of left breast in female, estrogen receptor negative [C50.412, Z17.1]     * Malignant neoplasm of upper-outer quadrant of right breast in female, estrogen receptor positive [C50.411, Z17.0]     * Venous insufficiency [I87.2]     ANESTHESIA: General Anesthesiologist: Jorge Luis Mckeon MD  CRNA: Laureen Roberson CRNA     PROCEDURES PERFORMED:   Placement, Mediport:    Right Internal Jugular Vein Mediport placement with ultrasound and fluoroscopic guidance    PROCEDURE IN DETAIL:   She is in need of adjuvant chemotherapy and has venous insufficiency. I explained to her that this would be an outpatient procedure with local anesthesia and sedation. The port would be placed under her skin with a catheter which runs into the major vein in her neck or chest. I explained to her my initial access site would be the subclavian vein beneath the clavicle on the left side. If not successful, then the second option would be to do another incision in the neck to access the internal jugular vein. The risks and complications of pain, bleeding, infection, blood clot, scarring, the inability to access the port, clogging of the port, and pneumo/hemothorax with possible chest tube or additional surgery were all explained in detail. Informed consent was obtained.    The patient was then brought to the operating room and placed supine on the operative table. Sedation anesthesia was initiated. The skin of the Left and Right neck, chest, and shoulder were prepped and draped in standard sterile surgical  fashion. A time-out was completed verifying correct patient, procedure, site, positioning and equipment prior to beginning the procedure.     The Mediport placement  The skin of the Left and Right neck, chest, and shoulder were prepped and draped in standard sterile surgical fashion. Initial attempts were made at the left and right subclavian veins without success. Using ultrasound, the right internal jugular vein was assessed and found to be of good caliber. An incision was planned of the right infraclavicular area about 2 fingerbreadths below the clavicle. The incision was made with a 15-blade. The subcutaneous tissue was dissected using electrocautery. Hemostasis was checked and maintained. Using ultrasound-guidance and Seldinger technique the right external jugular vein was again assessed and accessed (images from ultrasound-guidance present in ). A guide-wire was then introduced. Using the C-Arm the positioning of the wire was checked and determine to be in the appropriate location. A sheath was then inserted over the guide-wire and the inner cannula and guide-wire were both removed. The catheter which had already been flushed and checked was then inserted into the subclavian vein. The C-Arm was then used to confirm and adjust the catheter so that it was positioned in the superior vena cava. The length of the catheter was measure at 25 cm from the entry point.    A subcutaneous pocket was then created for the Mediport. The catheter was cut and attached to the Mediport. The port was flushed and checked with Heparinized solution. The port was placed into the pocket. The Mediport was checked and flushed with heparinized solution. The subcutaneous tissues were closed with 3-0 Monocryl and the skin closed with running 4-0 Monocryl subcuticular stitches. Dermabond was applied followed by sterile dressings.         ESTIMATED BLOOD LOSS: 5 ml    Implants:   Implant Name Type Inv. Item Serial No.   Lot No. LRB No. Used Action   PORT POWER SLIM - [id number]  PORT POWER SLIM [id number] [employer]  Right 1 Implanted       Specimens:   Specimen (24h ago, onward)            None                  Condition: Good    Disposition: PACU - hemodynamically stable.    Attestation: I performed the procedure.

## 2022-07-15 NOTE — ANESTHESIA POSTPROCEDURE EVALUATION
Anesthesia Post Evaluation    Patient: Elina Ham    Procedure(s) Performed: Procedure(s) (LRB):  Placement, Mediport (Right)    Final Anesthesia Type: MAC      Patient location during evaluation: OPS  Patient participation: Yes- Able to Participate  Level of consciousness: awake and alert  Post-procedure vital signs: reviewed and stable  Pain management: adequate  Airway patency: patent    PONV status at discharge: No PONV  Anesthetic complications: no      Cardiovascular status: blood pressure returned to baseline  Respiratory status: unassisted and room air  Hydration status: euvolemic  Follow-up not needed.          Vitals Value Taken Time   /81 07/15/22 1126   Temp 36.5 07/15/22 1126   Pulse 67 07/15/22 1126   Resp 16 07/15/22 1126   SpO2 99 07/15/22 1126         No case tracking events are documented in the log.      Pain/Loan Score: No data recorded

## 2022-07-15 NOTE — DISCHARGE INSTRUCTIONS
Mediport/Portacath Discharge Instructions     About this topic   A portacath or port is a type of central line. A central line catheter is a very long intravenous (IV) line. A port has two main parts: A round disc and long tube called a catheter. The port looks like a small bump under your skin. It is most often in the chest area, but may be in your upper arm or lower abdomen. The long catheter tube goes under the skin and into a large vein near your heart. Once you have a port, you do not need to have IV needles stuck into your arms to have lab tests drawn or fluids given.    Once this port is in place, the doctor or nurse will use a special needle to get into the disc part. This is called accessing the port. The needle can stay in for a week at a time and will be covered with a clear bandage and taped in place. When the needle is not in the port, you will not have any holes in your skin because the port is under the skin. It can stay in place for months or years until your treatment is no longer needed. Sometimes, this kind of IV is used to draw blood for tests.  Your doctor can give you any kind of fluids, drugs, or blood products through your port. They will not bother your veins because the port goes into such a large vein in your body. You can also have all kinds of blood draws through your port. If your port is not accessed, you will just see a small bump under your skin. There will be nothing that is sticking out from your skin.         What care is needed at home?   This is what you will need to know:  You can take the bandage off in 48 hours.  Wash your hands before and after touching your dressing and incision. You may take a bath or shower after removing the bandage. Wash your incision with soap and water, pat dry, then leave open to air. The skin glue (dermabond) will peel off within 1-2 weeks. Do not peel or pick at it.  Check your incision daily for signs and symptoms of infection: redness, drainage  or pus, foul odor, warmth, and swelling.   No lifting anything over 10 pounds (4.5 kg) until the doctor say otherwise.  Gradually go back to your normal activities like work, driving, or sex.  You may use an ice pack for comfort and swelling. Place ice pack over incision with a towel between your skin and the ice pack. Leave on 15 minutes and take off. Repeat 3-4 times during the day.   How to care for your port.  Wash your hands with soap and water for at least 30 seconds before touching the needle or port. Do not allow anyone to touch your needle or port with dirty hands or if they have not had special training.  If there is no needle in it, the port will be fully under your skin and you will be able to do your normal activities.  Your port will need to be flushed so a clot does not form at the end. A nurse will flush the port with saline and then heparin to avoid having a clot form inside or at the end of the tube. The nurse will do this each time you are finished getting fluid or drugs, and when the it's time to pull the needle out of your skin. If you are allergic to heparin or had a bad side effect from it, talk to your doctor.  You will get an ID card that will tell other people that you have an implanted port. It is very important to keep this card with you at all times to let doctors and nurses know what kind of port you have.  Will physical activity be limited?   You may have to limit your activity when you have a port. Avoid activities that make you tired. Avoid sports where you might get hit in the port. Talk to your doctor about the right amount of activity for you.  Avoid activities that pull on your arms or chest.  No pulling with arm on the side of the mediport and not picking up heavy objects with the arm on the side of the port.  What problems could happen?   Infection  Bruising  Blood clot  Scar inside the large blood vessel if the port is in for a long time  When do I need to call the doctor?    Signs of infection. These include a fever of 100.4°F (38°C) or higher, chills.  Signs of wound infection. These include swelling, redness, warmth around the wound; too much pain when touched; yellowish, greenish, or bloody discharge; foul smell coming from the cut site; cut site opens up.  Pain, irritation, discomfort, or swelling at the port site or other areas of your chest when using the port.  If you hear a rushing sound in your ear when you flush your port.  You have concerns about your port.

## 2022-07-15 NOTE — ANESTHESIA POSTPROCEDURE EVALUATION
Anesthesia Post Evaluation    Patient: Elina Ham    Procedure(s) Performed: Procedure(s) (LRB):  Placement, Mediport (Right)    Final Anesthesia Type: general      Patient location during evaluation: PACU  Patient participation: Yes- Able to Participate  Level of consciousness: awake  Post-procedure vital signs: reviewed and stable  Pain management: adequate  Airway patency: patent    PONV status at discharge: vomiting (controlled) and nausea (inadequately controlled)  Anesthetic complications: no      Cardiovascular status: hemodynamically stable  Respiratory status: spontaneous ventilation and unassisted  Hydration status: euvolemic  Follow-up not needed.  Comments:              Vitals Value Taken Time   /80 07/15/22 1248   Temp  07/15/22 1314   Pulse 61 07/15/22 1248   Resp  07/15/22 1314   SpO2 95 % 07/15/22 1248         No case tracking events are documented in the log.      Pain/Loan Score: Loan Score: 10 (7/15/2022 11:35 AM)

## 2022-07-15 NOTE — PATIENT INSTRUCTIONS
Patient Education        How to Care for a Portacath   About this topic   A central line catheter is a very long intravenous (IV) line. A portacath or port is a type of central line. A port has 2 main parts, a round disc and a long tube called a catheter. The port and catheter are completely inside your body. The port looks like a small bump under your skin. It is most often in the chest area, but may be in your upper arm or lower abdomen. The long catheter goes under the skin and into a large vein that leads just above your heart.  Some ports can be used to inject dye when you have special tests. This is a power-injectable port. Ask your doctor about your port before you have any tests. Also, your doctor will give you a card that tells information about your port. Keep this with you and share it with your other doctors.  Once this port is in place, the doctor or nurse will use a special needle to get into the disc part. This is called accessing the port. The needle can stay in for a week at a time and will be covered with a clear bandage and taped in place. When the needle is not in the port, you will not have any holes in your skin because the port is under the skin. It can stay in place for months or years until your treatment is no longer needed. This kind of IV is used to draw blood for tests whenever possible to save your other veins from needlesticks.  General   Always wash your hands carefully before you touch your port, needle, tubing, or dressing. Everyone who touches your port or access needle should wash their hands first and wear gloves.       When you are at home, there may be times when your port is still accessed. This means it still has a needle in place.  Do not get your port wet while the needle is in place. Take a sponge bath or cover the area with plastic wrap before you shower.  Make sure your dressing stays clean and dry. This will help keep the needle secure and in place.  If your port is  not accessed, it will just look like a small raised area under the skin.  You can shower, take a bath, or swim in a pool.  You do not need to have a dressing over your port.  Wear clothes that do not rub on your port.  Talk to your doctor about what activities are safe for you.  Flush your port before and after drugs and blood draws. Flush your port at least one time every 4 weeks. Talk to your doctor about what you should use to flush your port with.  Changing the Port Dressing: Keeping the skin clean and changing the dressing as ordered may help to prevent infection. You will always change the dressing if it is wet, loose, or dirty. You or someone in your family can learn how to do this. Others have a nurse come to your home to help with this care.  Gather supplies and place them on a clean work space. Your supplies may come in a kit. You will need clean gloves, sterile gloves, masks, chlorhexidine swab sticks, skin preparation wipes, tape, and sterile dressings.  Always wash your hands well with soap and water before touching the needle and tubing. This will help to avoid spreading germs. Wear a mask when changing the dressing. Also, have the patient wear a mask or keep their head turned away while the dressing is being changed. Wear clean gloves when taking off the old dressing.  Take off the old dressing. Avoid using scissors or sharp tools. They could cut the extension tubing that is connected to the needle. Do not pull on the tubing when taking off the dressing.  Check the site for swelling, drainage, or redness.  Wash your hands again. Put on sterile gloves.  Wash the site with a chlorhexidine swab using a back-and-forth scrubbing motion for 30 seconds. Start at the exit site, wash the tubing if it is staying in, and wash away from the site. Clean the entire area where the new dressing will go, about 4 inches (10 cm). Let the cleaned area air dry for at least 30 seconds or until completely dry. Do not fan,  wave, or blow on the site to help it dry faster.  Your doctor may order a small sponge to go around the needle where it goes into the port. Put this sponge on the skin so it circles the needle. Make sure it is right-side up as marked on the sponge.  Once the cleaned area is dry, use a skin preparation wipe around the port where the new dressing will go. Allow this skin prep to dry completely before putting the dressing on. This protects the skin, helping to prevent irritation from changing the dressing and helps the dressing stick.  Lay the tubing on the skin in a loose [place] if it is long or in a straight line if it is short. It should not lay on top of where the port is in the skin or lay on itself. Put the dressing on all sides of the tubing. Be sure to cover where the needle goes in to the skin. The dressing should be snug but not stretched or it will pull on the skin. Most dressings are clear and will stick to the skin. If it is not sticking, put medical tape on the edges only to hold it in place. If you often have trouble with it sticking, talk to your doctor or nurse about the dressing you use or the way you are changing your dressing. They will help you get the dressing to stick better.  Throw away the old dressing, mask, used swabs, and used wipes.  Remove gloves.  Wash your hands with soap and water.  Accessing and Flushing the Port: The port should be flushed each month with a sterile fluid. It should also be flushed before and after each use. This will help keep the port and catheter from getting blocked.  Wash your hands with soap and water.  Gather supplies and place them on a clean work space. You will need:  Clean gloves  Masks for the person who is accessing the port and one for the patient  Chlorhexidine swab sticks  Alcohol wipes  Special needle to access the port connected to tubing. This needle is bent in the middle and connected to a short piece of clear tubing.  10 mL syringes filled with the  fluid to flush the catheter. Your doctor may order heparin, normal saline, or a special flush for you to use. Your doctor may have you use a smaller amount of fluid to flush the line if the catheter is in a child.  Injection cap  Numbing cream is sometimes used to numb the skin before accessing the port.  Wear a mask when changing the dressing. Also, have the patient wear a mask or keep their head turned away while the dressing is being changed.  Put on clean gloves and take off the old dressing. Throw it away.  Wash your hands with soap and water.  Apply numbing cream on skin over port and wait required time for cream to work.  Wipe off cream with clean gauze.  Now put on sterile gloves. Connect the injection cap to the tubing. Fill injection cap, tubing, and needle with flush solution. Clamp tubing and leave the syringe attached to the tubing.  Clean skin over port with chlorhexidine swabs and allow to dry. Do not fan, wave, or blow on the site to help it dry faster.  Hold the edges of the port in place with your thumb and index finger of the hand that you do not write with. Do not touch the place where you will insert the needle because it is clean.  Insert the needle at a 90-degree angle from the skin in the center of the port. Push the needle in firmly until it touches the back of the port. Do not push more once you reach the back of the port.  Unclamp the tubing and pull back on the syringe to check for a blood return. You do not need to fill the syringe with blood, just enough into the tubing to check that the blood is there. If there is a blood return, flush slowly and steadily until the syringe is almost empty. Clamp the tubing before you get to the very last amount of flush solution, less than 1 mL in the syringe.  If there is not a blood return, reposition the needle and try again. If there is still no blood return, start over with a new needle. If you are not able to get a blood return with a new needle,  contact your doctor.  Take off syringe.  Cover needle and tubing with a clear dressing.  Throw out any used swabs, wipes, or materials.  Wash your hands with soap and water.  Flushing the Port and Taking Out the Needle (Deaccessing)   Wash your hands with soap and water.  Gather supplies and place them on a clean work space. You will need:  Masks for the person who is accessing the port and one for the patient  Clean gloves  Alcohol wipe  10 mL syringes filled with the fluid to flush the catheter. Your doctor may order heparin, normal saline, or a special flush solution for you to use. Your doctor may have you use a smaller amount of fluid to flush the line if the catheter is in a child.  Wash your hands with soap and water.  Wearing clean gloves, scrub the injection cap with an alcohol wipe for 15 seconds and let dry. Do not fan, wave, or blow on the site to help it dry faster.  Attach the flush syringe to the injection cap. Unclamp the tubing and pull back on the syringe to check for a blood return. If there is a blood return, flush slowly and steadily until the syringe is almost empty. Clamp the tubing before you get to the very last bit of flush solution, less than 1 mL, and remove the syringe.  If there is not a blood return, reposition the needle and try again. If there is still no blood return, start over with a new needle. If you are not able to get a blood return with a new needle, contact your doctor.  After flushing the port, put on the mask and remove the clear dressing. Hold the port steady with your thumb and index fingers of one hand. With your other hand, remove the port needle. Take care that you do not stick your fingers or skin with the needle. Most needles have a safety catch or cover. Your nurse will show you how to cover the needle when it is out.  Dispose of the needle in the proper container.  If there is bleeding, put gentle pressure on the site with a sterile gauze until it stops.  Throw  out your mask, used swabs, wipes, or materials.  Wash your hands with soap and water.  What problems could happen?   Infection  Bruising  Blood clot  Scar inside the large blood vessel if the port is in for a long time  Catheter moves to wrong position inside the vein  Fluid leaks into the surrounding skin and tissue  When do I need to call the doctor?   Fever of 100.4°F (38°C) or higher  Problems with your port site like:  Site starts bleeding and does not stop with gentle pressure  Site becomes more red, or the skin over or around your port breaks open  Rash, bumps, itching, or irritation where the dressing goes onto the skin  Drainage or pus from the port site  Problems with the port like:  Your port seems to have moved under your skin  Not able to get the needle into the port  Pain, irritation, or swelling when you flush the port  Not able to get the drugs or flush solution through the port  Not able to get a blood return from your port  You have any concerns about your port  Sudden shortness of breath or chest pain  Pain in your arm or chest when you flush the port  Swishing sound in one of your ears when you flush the port  Swelling in your neck, jaw, face, or upper chest  Your veins in your face, upper arms, neck, or chest are swelling up or sticking out  Change in skin color on your upper chest, neck, jaw, or face  Teach Back: Helping You Understand   The Teach Back Method helps you understand the information we are giving you. After you talk with the staff, tell them in your own words what you learned. This helps to make sure the staff has described each thing clearly. It also helps to explain things that may have been confusing. Before going home, make sure you can do these:  I can tell you about my condition.  I can tell you how I will take care of my port.  I can tell you what I will do if I have fever, redness, or drainage from the port site.  Where can I learn more?   American Society of Clinical  Oncology  https://www.cancer.net/navigating-cancer-care/how-cancer-treated/chemotherapy/catheters-and-ports-cancer-treatment   [facility]  https://www.[facility].org.uk/information-and-support/treating/chemotherapy/being-treated-with-chemotherapy/implantable-ports.html#14524   Last Reviewed Date   2021-09-27  Consumer Information Use and Disclaimer   This information is not specific medical advice and does not replace information you receive from your health care provider. This is only a brief summary of general information. It does NOT include all information about conditions, illnesses, injuries, tests, procedures, treatments, therapies, discharge instructions or life-style choices that may apply to you. You must talk with your health care provider for complete information about your health and treatment options. This information should not be used to decide whether or not to accept your health care providers advice, instructions or recommendations. Only your health care provider has the knowledge and training to provide advice that is right for you.  Copyright   Copyright © 2021 [employer] and its affiliates and/or licensors. All rights reserved.

## 2022-07-15 NOTE — ANESTHESIA PREPROCEDURE EVALUATION
07/15/2022  Elina Ham is a 62 y.o., female   Pre-operative evaluation for Procedure(s) (LRB):  Placement, Mediport (N/A)    BP (!) 155/82   Pulse 65   Temp 36.7 °C (98.1 °F) (Oral)   SpO2 98%   Breastfeeding No     Patient Active Problem List   Diagnosis    Malignant neoplasm of upper-outer quadrant of right breast in female, estrogen receptor positive    Malignant neoplasm of upper-outer quadrant of left breast in female, estrogen receptor negative    Venous insufficiency       Review of patient's allergies indicates:  No Known Allergies    Current Outpatient Medications   Medication Instructions    amLODIPine (NORVASC) 10 mg, Oral, Daily    aspirin 81 mg, Oral, 2 times daily, Stopped using until after sx    atorvastatin (LIPITOR) 20 mg, Oral, Nightly    losartan-hydrochlorothiazide 100-25 mg (HYZAAR) 100-25 mg per tablet 1 tablet, Oral, Daily    metFORMIN (GLUCOPHAGE) 1,000 mg, Oral, With breakfast    mupirocin (BACTROBAN) 2 % ointment APPLY TO DRAIN SITES TWICE DAILY       Past Surgical History:   Procedure Laterality Date    BILATERAL MASTECTOMY Bilateral 6/3/2022    Procedure: MASTECTOMY, BILATERAL;  Surgeon: Charlene Contreras MD;  Location: [facility] OR;  Service: General;  Laterality: Bilateral;     SECTION  92 and 3/15/95    COLONOSCOPY      INSERTION OF BREAST TISSUE EXPANDER Bilateral 6/3/2022    Procedure: INSERTION, TISSUE EXPANDER, BREAST (Bilateral breast reconstruction with tissue expanders and acellular dermal matrix and use of intraoperative fluoroscein angiogragry);  Surgeon: Nick Gibbs MD;  Location: [facility] OR;  Service: Plastics;  Laterality: Bilateral;    SENTINEL LYMPH NODE BIOPSY Bilateral 6/3/2022    Procedure: BIOPSY, LYMPH NODE, SENTINEL;  Surgeon: Charlene Contreras MD;  Location: [facility] OR;  Service: General;  Laterality: Bilateral;  MAGTRACE Injection  in office  Need SentiMag Probe       Social History     Socioeconomic History    Marital status:    Tobacco Use    Smoking status: Never Smoker    Smokeless tobacco: Never Used   Substance and Sexual Activity    Alcohol use: Not Currently     Alcohol/week: 1.0 standard drink     Types: 1 Glasses of wine per week     Comment: drink socially but haven't had a glass of wine since 2/13    Drug use: Never    Sexual activity: Yes     Partners: Male     Birth control/protection: None     Comment: We take STD test for the first 2 years we started dating  (5       Lab Results   Component Value Date    WBC 8.1 06/28/2022    HGB 12.9 06/28/2022    HCT 39.9 06/28/2022    MCV 90.1 06/28/2022     (H) 06/28/2022          BMP  Lab Results   Component Value Date     06/28/2022    K 4.0 06/28/2022    CHLORIDE 102 06/28/2022    CO2 30 06/28/2022    GLUCOSE 149 (H) 06/28/2022    BUN 11.7 06/28/2022    CREATININE 0.80 06/28/2022    CALCIUM 10.5 (H) 06/28/2022    EGFRNONAA >60 10/07/2021        INR  No results for input(s): PT, INR, PROTIME, APTT in the last 72 hours.        Diagnostic Studies:      EKG:  Results for orders placed or performed during the hospital encounter of 06/03/22   EKG 12-LEAD    Collection Time: 07/12/22 10:41 AM    Narrative    Test Reason : C50.412,Z17.1,C50.411,Z17.0,C50.919,    Vent. Rate : 070 BPM     Atrial Rate : 070 BPM     P-R Int : 174 ms          QRS Dur : 080 ms      QT Int : 408 ms       P-R-T Axes : 036 043 022 degrees     QTc Int : 440 ms    Normal sinus rhythm  Normal ECG  When compared with ECG of 31-MAY-2022 09:51,  No significant change was found  Confirmed by Jaylen Dudley MD ([id number]) on 7/12/2022 9:17:56 PM    Referred By: CARLOS RODRIGUEZ           Confirmed By:Jaylen Dudley MD       .      Pre-op Assessment          Review of Systems  Anesthesia Hx:  No problems with previous Anesthesia  Denies Family Hx of Anesthesia complications.    Cardiovascular:  Cardiovascular Normal   No Cardiac Complaints   Pulmonary:  Pulmonary Normal No Pulmonary Complaints   Hepatic/GI:   No Current GERD Sx       Physical Exam  General: Alert and Oriented    Airway:  Mallampati: II   Mouth Opening: Normal  TM Distance: Normal  Tongue: Normal  Neck ROM: Normal ROM    Dental:  Intact    Chest/Lungs:  Clear to auscultation, Normal Respiratory Rate    Heart:  Rate: Normal  Rhythm: Regular Rhythm        Anesthesia Plan  Type of Anesthesia, risks & benefits discussed:    Anesthesia Type: Gen Supraglottic Airway  Intra-op Monitoring Plan: Standard ASA Monitors  Post Op Pain Control Plan: multimodal analgesia  Induction:  IV and Inhalation  Airway Plan: Direct  Informed Consent: Informed consent signed with the Patient and all parties understand the risks and agree with anesthesia plan.  All questions answered. Patient consented to blood products? No  ASA Score: 3  Day of Surgery Review of History & Physical: H&P Update referred to the surgeon/provider.  Anesthesia Plan Notes: Disc possible need to convert to GETA    Discussed Anesthetic Plan w/ Pt/Family. Questions Entertained. Accepted.      Ready For Surgery From Anesthesia Perspective.     .

## 2022-07-15 NOTE — TRANSFER OF CARE
"Anesthesia Transfer of Care Note    Patient: Elina Ham    Procedure(s) Performed: Procedure(s) (LRB):  Placement, Mediport (Right)    Patient location: PACU    Anesthesia Type: MAC    Transport from OR: Transported from OR on room air with adequate spontaneous ventilation    Post pain: adequate analgesia    Post assessment: no apparent anesthetic complications    Post vital signs: stable    Level of consciousness: awake    Nausea/Vomiting: no nausea/vomiting    Complications: none    Transfer of care protocol was followed      Last vitals:   Visit Vitals  BP (!) 155/82   Pulse 65   Temp 36.7 °C (98.1 °F) (Oral)   Ht 5' 6" (1.676 m)   Wt 94.2 kg (207 lb 10.8 oz)   SpO2 98%   Breastfeeding No   BMI 33.52 kg/m²     "

## 2022-07-15 NOTE — INTERVAL H&P NOTE
The patient has been examined and the H&P has been reviewed:    I concur with the findings and no changes have occurred since H&P was written.    Surgery risks, benefits and alternative options discussed and understood by patient/family.          Active Hospital Problems    Diagnosis  POA    *Venous insufficiency [I87.2]  Unknown      Resolved Hospital Problems   No resolved problems to display.     Mediport placement today    All of questions were answered    Charlene Contreras MD  Breast Surgical Oncology

## 2022-07-15 NOTE — BRIEF OP NOTE
[facility]  Brief Operative Note     SUMMARY     Surgery Date: 7/15/2022     Surgeon: Charlene Contreras MD    Assist: None    Pre-op Diagnosis:  Malignant neoplasm of upper-outer quadrant of left breast in female, estrogen receptor negative [C50.412, Z17.1]  Malignant neoplasm of upper-outer quadrant of right breast in female, estrogen receptor positive [C50.411, Z17.0]    Post-op Diagnosis:  Post-Op Diagnosis Codes:     * Malignant neoplasm of upper-outer quadrant of left breast in female, estrogen receptor negative [C50.412, Z17.1]     * Malignant neoplasm of upper-outer quadrant of right breast in female, estrogen receptor positive [C50.411, Z17.0]     * Venous insufficiency [I87.2]    Procedure(s) (LRB):  Placement, Mediport (Right)   Right Internal Jugular Vein Mediport placement with ultrasound and fluoroscopic guidance    Anesthesia: General    Findings/Key Components: Placement of Mediport for adjuvant chemotherapy into the internal jugular vein    Estimated Blood Loss: 5 ml         Specimens:   Specimen (24h ago, onward)            None          Discharge Note    SUMMARY     Admit Date: 7/15/2022    Discharge Date and Time:  07/15/2022 12:44 PM    Hospital Course (synopsis of major diagnoses, care, treatment, and services provided during the course of the hospital stay): She is status post left internal jugular vein Mediport placement     Final Diagnosis: Post-Op Diagnosis Codes:     * Malignant neoplasm of upper-outer quadrant of left breast in female, estrogen receptor negative [C50.412, Z17.1]     * Malignant neoplasm of upper-outer quadrant of right breast in female, estrogen receptor positive [C50.411, Z17.0]     * Venous insufficiency [I87.2]    Disposition: Home or Self Care    Follow Up/Patient Instructions: Follow-up to be scheduled.     Medications:  Reconciled Home Medications:      Medication List      ASK your doctor about these medications    amLODIPine 10 MG  tablet  Commonly known as: NORVASC  Take 10 mg by mouth once daily.     aspirin 81 MG Chew  Take 81 mg by mouth 2 (two) times a day. Stopped using until after sx     atorvastatin 20 MG tablet  Commonly known as: LIPITOR  Take 20 mg by mouth every evening.     losartan-hydrochlorothiazide 100-25 mg 100-25 mg per tablet  Commonly known as: HYZAAR  Take 1 tablet by mouth once daily.     metFORMIN 1000 MG tablet  Commonly known as: GLUCOPHAGE  Take 1,000 mg by mouth daily with breakfast.     mupirocin 2 % ointment  Commonly known as: BACTROBAN  APPLY TO DRAIN SITES TWICE DAILY          No discharge procedures on file.

## 2022-07-18 ENCOUNTER — PATIENT MESSAGE (OUTPATIENT)
Dept: HEMATOLOGY/ONCOLOGY | Facility: CLINIC | Age: 62
End: 2022-07-18
Payer: COMMERCIAL

## 2022-07-20 VITALS
HEART RATE: 67 BPM | RESPIRATION RATE: 17 BRPM | DIASTOLIC BLOOD PRESSURE: 85 MMHG | WEIGHT: 207.69 LBS | BODY MASS INDEX: 33.38 KG/M2 | SYSTOLIC BLOOD PRESSURE: 135 MMHG | TEMPERATURE: 98 F | OXYGEN SATURATION: 95 % | HEIGHT: 66 IN

## 2022-07-20 DIAGNOSIS — C50.411 MALIGNANT NEOPLASM OF UPPER-OUTER QUADRANT OF RIGHT BREAST IN FEMALE, ESTROGEN RECEPTOR POSITIVE: Primary | ICD-10-CM

## 2022-07-20 DIAGNOSIS — Z17.0 MALIGNANT NEOPLASM OF UPPER-OUTER QUADRANT OF RIGHT BREAST IN FEMALE, ESTROGEN RECEPTOR POSITIVE: Primary | ICD-10-CM

## 2022-07-22 NOTE — PROGRESS NOTES
Subjective:        PATIENT: Elina Ham  MRN: [id number]  DATE: 7/25/2022  Chief Complaint: Results (Scan results)    Current Treatment: OP BREAST PACLITAXEL WEEKLY FOLLOWED BY DOSE DENSE DOXORUBICIN CYCLOPHOSPHAMIDE Q2W     Oncology History Overview Note   Oncology History   Malignant neoplasm of upper-outer quadrant of right breast in female, estrogen receptor positive   3/16/2022 Cancer Staged    1. Ultrasound-guided Core Needle Biopsy Right Breast 9 o'clock 5 cm FN Mass with Distortion - Invasive ductal carcinoma, grade 1, with associated focus of ductal carcinoma in situ, grade 1, and focal perineural invasion is present  ER 81%MO 3%HER2 Negative  Ki67 4%    2.Stereotactic guided Core Needle Biopsy Right Breast Mass 10 o'clock Posterior Depth Focal Asymmetry, Architectural Distortion with Calcifications - Invasive ductal carcinoma, grade 1 and focal lobular carcinoma in situ  ER 43%MO 94%HER2 Negative  Ki67 4%    3. Ultrasound-guided Core Needle Biopsy Left Breast 1 o'clock 7 cm FN Mass - High grade carcinoma with anaplastic/malignant giant cell component (suggestive for sarcomatoid/metaplastic change)  ER 0%MO 0%HER2 Negative  Ki67 High    4.Ultrasound-guided Core Needle Biopsy Left Axillary Node - Portion of reactive lymph node; no evidence of metastatic carcinoma     6/3/2022 Surgery    Bilateral simple mastectomy and Bilateral SLNB 6/3/2022  - Left mastectomy and SLNB - a single focus of invasive ductal carcinoma grade 3 measuring 25 mm. Margins clear. Six sentinel lymph nodes were examined with one positive for metastatic carcinoma, 5 mm.   G3, ER-, MO-, HER2    - Right mastectomy and SLNB - two foci of invasive ductal carcinoma grade 1 measuring 10 mm and 4 mm. Low grade DCIS and LCIS were also present. All margins clear. Four sentinel lymph ndoes are negative for metastatic carcinoma.  G1, ER+, MO+, HER2-)      Malignant neoplasm of upper-outer quadrant of left breast in female, estrogen receptor  negative   6/3/2022 Surgery    Bilateral simple mastectomy and Bilateral SLNB 6/3/2022  - Left mastectomy and SLNB - a single focus of invasive ductal carcinoma grade 3 measuring 25 mm. Margins clear. Six sentinel lymph nodes were examined with one positive for metastatic carcinoma, 5 mm.   G3, ER-, HI-, HER2    - Right mastectomy and SLNB - two foci of invasive ductal carcinoma grade 1 measuring 10 mm and 4 mm. Low grade DCIS and LCIS were also present. All margins clear. Four sentinel lymph ndoes are negative for metastatic carcinoma.  G1, ER+, HI+, HER2-)           Malignant neoplasm of upper-outer quadrant of right breast in female, estrogen receptor positive   Malignant neoplasm of upper-outer quadrant of left breast in female, estrogen receptor negative   8/2/2022 -  Chemotherapy    Treatment Summary   Plan Name: OP BREAST PACLITAXEL WEEKLY FOLLOWED BY DOSE DENSE DOXORUBICIN CYCLOPHOSPHAMIDE Q2W  Treatment Goal: Curative  Status: Active  Start Date: 8/2/2022 (Planned)  End Date: 12/7/2022 (Planned)  Provider: Gato Briones MD  Chemotherapy: dexAMETHasone (DECADRON) 4 MG Tab, 4 mg (100 % of original dose 4 mg), Oral, Daily, 0 of 1 cycle, Start date: 8/2/2022, End date: --  Dose modification: 4 mg (original dose 4 mg, Cycle 0)  DOXOrubicin chemo injection 124 mg, 60 mg/m2, Intravenous, Clinic/HOD 1 time, 0 of 4 cycles  cyclophosphamide 600 mg/m2 = 1,240 mg in sodium chloride 0.9% 250 mL chemo infusion, 600 mg/m2, Intravenous, Clinic/HOD 1 time, 0 of 4 cycles  PACLitaxeL (TAXOL) 80 mg/m2 = 168 mg in sodium chloride 0.9% 250 mL chemo infusion, 80 mg/m2 = 168 mg, Intravenous, Clinic/HOD 1 time, 0 of 12 cycles         07/25/2022  This is a 62-year-old female initially had abnormal mammograms in March of 2022. She was noted to have bilateral breast cancer, stage I A right breast carcinoma clinically and a T2 left breast carcinoma.  She underwent CT scan of chest abdomen pelvis on 04/11/2022 which showed a small  cystic pancreatic tail that needs to be followed.  There was no convincing evidence of metastatic disease.  But she did have some suspected left axillary adenopathy.  She underwent high risk genetic testing because of her family history and bilateral breast cancer.  She was found to have a variant of undetermined significance.    Genetic testing was appropriate for this patient because of her personal and family history. Analysis consisted of sequencing and large rearrangement analyses of the following genes: APC, LIZZY, BARD1, BMPR1A, BRCA1, BRCA2, BRIP1, CDH1, CDK4, CDKN2A (p14ARF and [id number]), CHEK2, EPCAM (large rearrangement only), GREM1, MLH1, MSH2, MSH6, MUTYH, NBN, PALB2, PMS2, POLE, POLD1, PTEN, RAD51C, RAD51D, SMAD4, STK11, TP53. . This comprehensive genetic analysis indicated that there was no deleterious mutation found in any of these genes. However, there was a variant of uncertain significance (VUS):AXIN2 [id number].    The ultrasound-guided biopsy of left axillary node showed no evidence of active disease.  As well the patient was then taken to the operating room, on 2022 and underwent, bilateral mastectomy sentinel lymph node biopsy.  In addition she underwent bilateral breast tissue expanders potential reconstructive surgery.      2022:Patient's nuclear medicine bone scan shows no evidence of metastatic disease.  she did not get a repeat CT scan of the chest abdomen pelvis.  She did have a port placed.  And there is no recent echocardiogram.      Past Medical History:   Diagnosis Date    Cancer 3/21/2022    Diabetes mellitus     Hypercholesterolemia     Hypertension       .  Past Surgical History:   Procedure Laterality Date    BILATERAL MASTECTOMY Bilateral 6/3/2022    Procedure: MASTECTOMY, BILATERAL;  Surgeon: Charlene Contreras MD;  Location: [facility];  Service: General;  Laterality: Bilateral;     SECTION  92 and 3/15/95    COLONOSCOPY      INSERTION OF BREAST TISSUE  EXPANDER Bilateral 6/3/2022    Procedure: INSERTION, TISSUE EXPANDER, BREAST (Bilateral breast reconstruction with tissue expanders and acellular dermal matrix and use of intraoperative fluoroscein angiogragry);  Surgeon: Nick Gibbs MD;  Location: [facility] OR;  Service: Plastics;  Laterality: Bilateral;    MEDIPORT INSERTION, SINGLE      SENTINEL LYMPH NODE BIOPSY Bilateral 6/3/2022    Procedure: BIOPSY, LYMPH NODE, SENTINEL;  Surgeon: Charlene Contreras MD;  Location: [facility] OR;  Service: General;  Laterality: Bilateral;  MAGTRACE Injection in office  Need SentiMag Probe      .  Family History   Problem Relation Age of Onset    Breast cancer Maternal Grandmother             Breast cancer Sister     Breast cancer Sister         remission    Breast cancer Maternal Aunt             Breast cancer Maternal Aunt             Breast cancer Maternal Aunt             Breast cancer Maternal Aunt     Breast cancer Maternal Uncle             Stroke Brother             Pancreatic cancer Brother       Social History     Socioeconomic History    Marital status:    Tobacco Use    Smoking status: Never Smoker    Smokeless tobacco: Never Used   Substance and Sexual Activity    Alcohol use: Not Currently     Alcohol/week: 1.0 standard drink     Types: 1 Glasses of wine per week     Comment: drink socially but haven't had a glass of wine since     Drug use: Never    Sexual activity: Yes     Partners: Male     Birth control/protection: None     Comment: We take STD test for the first 2 years we started dating  (5      .Review of patient's allergies indicates:  No Known Allergies     Current Outpatient Medications:     amLODIPine (NORVASC) 10 MG tablet, Take 10 mg by mouth once daily., Disp: , Rfl:     aspirin 81 MG Chew, Take 81 mg by mouth 2 (two) times a day. Stopped using until after sx, Disp: , Rfl:     atorvastatin (LIPITOR) 20 MG tablet, Take 20 mg by mouth  every evening., Disp: , Rfl:     losartan-hydrochlorothiazide 100-25 mg (HYZAAR) 100-25 mg per tablet, Take 1 tablet by mouth once daily., Disp: , Rfl:     metFORMIN (GLUCOPHAGE) 1000 MG tablet, Take 1,000 mg by mouth daily with breakfast., Disp: , Rfl:     mupirocin (BACTROBAN) 2 % ointment, APPLY TO DRAIN SITES TWICE DAILY, Disp: , Rfl:     [START ON 8/2/2022] dexAMETHasone (DECADRON) 4 MG Tab, Take 1 tablet (4 mg total) by mouth once daily. Take as directed on days 2, 3, and 4 of your chemotherapy cycle., Disp: 24 tablet, Rfl: 0    [START ON 8/1/2022] ondansetron (ZOFRAN-ODT) 8 MG TbDL, Take 1 tablet (8 mg total) by mouth every 8 (eight) hours as needed (nausea/vomiting)., Disp: 60 tablet, Rfl: 5   Review of Systems   Constitutional: Negative for appetite change and unexpected weight change.   HENT: Negative for mouth sores.    Eyes: Negative for visual disturbance.   Respiratory: Negative for cough and shortness of breath.    Cardiovascular: Negative for chest pain.   Gastrointestinal: Negative for abdominal pain and diarrhea.   Genitourinary: Negative for frequency.   Musculoskeletal: Negative for back pain.   Integumentary:  Negative for rash.   Neurological: Negative for headaches.   Hematological: Negative for adenopathy.   Psychiatric/Behavioral: The patient is not nervous/anxious.          Objective:     Vitals:    07/25/22 1455   BP: (!) 148/83   Pulse: 78   Temp: 98.2 °F (36.8 °C)         Physical Exam  Constitutional:       Appearance: Normal appearance. She is normal weight.   HENT:      Head: Normocephalic and atraumatic.      Mouth/Throat:      Mouth: Mucous membranes are moist.      Pharynx: Oropharynx is clear.   Eyes:      Conjunctiva/sclera: Conjunctivae normal.      Pupils: Pupils are equal, round, and reactive to light.   Cardiovascular:      Rate and Rhythm: Normal rate and regular rhythm.      Pulses: Normal pulses.      Heart sounds: Normal heart sounds.   Pulmonary:      Effort:  Pulmonary effort is normal.      Breath sounds: Normal breath sounds.   Chest:      Chest wall: Mass present.   Breasts:      Right: Absent.      Left: Absent.        Comments: Bilateral expanders  Abdominal:      General: Abdomen is flat. Bowel sounds are normal.      Palpations: Abdomen is soft.   Musculoskeletal:         General: Normal range of motion.      Cervical back: Neck supple.   Skin:     General: Skin is warm and dry.      Capillary Refill: Capillary refill takes less than 2 seconds.   Neurological:      General: No focal deficit present.      Mental Status: She is alert. Mental status is at baseline.   Psychiatric:         Mood and Affect: Mood normal.         Behavior: Behavior normal.         Thought Content: Thought content normal.         Judgment: Judgment normal.         ECOG--0     .Lab Review:  Office Visit on 07/25/2022   Component Date Value Ref Range Status    WBC 07/25/2022 7.0  4.5 - 11.5 x10(3)/mcL Final    RBC 07/25/2022 4.36  4.20 - 5.40 x10(6)/mcL Final    Hgb 07/25/2022 12.7  12.0 - 16.0 gm/dL Final    Hct 07/25/2022 38.8  37.0 - 47.0 % Final    MCV 07/25/2022 89.0  80.0 - 94.0 fL Final    MCH 07/25/2022 29.1  27.0 - 31.0 pg Final    MCHC 07/25/2022 32.7 (A) 33.0 - 36.0 mg/dL Final    RDW 07/25/2022 12.4  11.5 - 17.0 % Final    Platelet 07/25/2022 403 (A) 130 - 400 x10(3)/mcL Final    MPV 07/25/2022 9.5  7.4 - 10.4 fL Final    Neut % 07/25/2022 63.1  % Final    Lymph % 07/25/2022 28.3  % Final    Mono % 07/25/2022 7.2  % Final    Eos % 07/25/2022 0.7  % Final    Basophil % 07/25/2022 0.6  % Final    Lymph # 07/25/2022 1.98  0.6 - 4.6 x10(3)/mcL Final    Neut # 07/25/2022 4.4  2.1 - 9.2 x10(3)/mcL Final    Mono # 07/25/2022 0.50  0.1 - 1.3 x10(3)/mcL Final    Eos # 07/25/2022 0.05  0 - 0.9 x10(3)/mcL Final    Baso # 07/25/2022 0.04  0 - 0.2 x10(3)/mcL Final    IG# 07/25/2022 0.01  0 - 0.04 x10(3)/mcL Final    IG% 07/25/2022 0.1  % Final     Assessment/Plan:      Problem List Items Addressed This Visit        Oncology    Malignant neoplasm of upper-outer quadrant of right breast in female, estrogen receptor positive - Primary    Relevant Orders    Ambulatory referral/consult to [facility]    Ambulatory referral/consult to Chemotherapy Infusion    CBC Auto Differential (Completed)    Comprehensive Metabolic Panel    CBC Auto Differential    Comprehensive Metabolic Panel    Malignant neoplasm of upper-outer quadrant of left breast in female, estrogen receptor negative    Current Assessment & Plan     Repeat ER NC breast panel on surgical specimen  Echo results from cardiology  Bilateral breast cancer  NCCN guidelines discussed  Adjuvant therapy  Indications for adjuvant therapy:  Endocrine, chemotherapy, radiation  Endocrine Therapy .  The side effects of anti-hormonal therapy were discussed.  Including but not limited to: Vasomotor hot flashes, dysuria, arthralgia, hyperlipidemia, osteopenia, osteoporosis. patient had the opportunity to ask questions.    Chemo side effects  The side effects of chemotherapy were discussed.  Including but not limited to: Nausea, vomiting, alopecia, neuropathy, neutropenia, blood transfusion, nephropathy, secondary malignancies, congestive heart failure, allergic reactions to name a few. patient with the opportunity to have questions answered.    Cool cap    She will eventually need follow-up of the abnormal CT of the abdomen pelvis with question of pancreatic cysts.  Will eventually get her to Dr. Jeromy Queen for follow-up.  Think this patient would be best served by adjuvant chemotherapy with Adriamycin Cytoxan, and weekly Taxol.  I still think she would benefit from an adjuvant aromatase inhibitor from the contralateral side as it was ER positive.  After she completes therapy.   I Do think this patient could tolerate dose dense chemotherapy.      Start Taxol weekly--wean off steroids  F/u on repeat ER/pr  GET echo  Report  Plan repeat  CT abd/pelvis after Taxol    Plan Taxol x 12 followed by dose dense AC  And then Femara (for contralateral side) --xrt           Relevant Medications    dexAMETHasone (DECADRON) 4 MG Tab (Start on 8/2/2022)    ondansetron (ZOFRAN-ODT) 8 MG TbDL (Start on 8/1/2022)    Other Relevant Orders    Ambulatory referral/consult to [facility]    Ambulatory referral/consult to Chemotherapy Infusion            Follow up in about 15 days (around 8/9/2022) for with Sujatha, Treatment visit.    Orders, CBC every 2 weeks, CMP every 3 weeks  Wean off steroids ASAP

## 2022-07-22 NOTE — ASSESSMENT & PLAN NOTE
Repeat ER DC breast panel on surgical specimen  Echo results from cardiology  Bilateral breast cancer  NCCN guidelines discussed  Adjuvant therapy  Indications for adjuvant therapy:  Endocrine, chemotherapy, radiation  Endocrine Therapy .  The side effects of anti-hormonal therapy were discussed.  Including but not limited to: Vasomotor hot flashes, dysuria, arthralgia, hyperlipidemia, osteopenia, osteoporosis. patient had the opportunity to ask questions.    Chemo side effects  The side effects of chemotherapy were discussed.  Including but not limited to: Nausea, vomiting, alopecia, neuropathy, neutropenia, blood transfusion, nephropathy, secondary malignancies, congestive heart failure, allergic reactions to name a few. patient with the opportunity to have questions answered.    Cool cap    She will eventually need follow-up of the abnormal CT of the abdomen pelvis with question of pancreatic cysts.  Will eventually get her to Dr. Jeromy Queen for follow-up.  Think this patient would be best served by adjuvant chemotherapy with Adriamycin Cytoxan, and weekly Taxol.  I still think she would benefit from an adjuvant aromatase inhibitor from the contralateral side as it was ER positive.  After she completes therapy.   I Do think this patient could tolerate dose dense chemotherapy.      Start Taxol weekly--wean off steroids  F/u on repeat ER/pr  GET echo  Report  Plan repeat CT abd/pelvis after Taxol    Plan Taxol x 12 followed by dose dense AC  And then Femara (for contralateral side) --xrt

## 2022-07-22 NOTE — PLAN OF CARE
START ON PATHWAY REGIMEN - Breast    [id number]        Paclitaxel (Taxol)       Doxorubicin (Adriamycin)       Cyclophosphamide       [id number]           Additional Orders: Assess LVEF prior to initiation of doxorubicin and as   clinically indicated during and after treatment. Doxorubicin can cause   myocardial damage, including acute left ventricular failure. Risk of   cardiomyopathy is generally proportional to cumulative   anthracycline/anthracenedione exposure. Use of concomitant cardiotoxic agents,   previous radiotherapy to the mediastinum, or presence/history of cardiovascular   disease can additionally increase cardiomyopathy risk. See PI for details.    **Always confirm dose/schedule in your pharmacy ordering system**    Patient Characteristics:  Postoperative without Neoadjuvant Therapy (Pathologic Staging), Invasive   Disease, Adjuvant Therapy, HER2 Negative/Unknown/Equivocal, ER Negative/Unknown,   Node Positive  Therapeutic Status: Postoperative without Neoadjuvant Therapy (Pathologic   Staging)  AJCC Grade: G3  AJCC N Category: pN1  AJCC M Category: cM0  ER Status: Negative (-)  AJCC 8 Stage Grouping: IIIA  HER2 Status: Negative (-)  Oncotype Dx Recurrence Score: Not Appropriate  AJCC T Category: pT2  TN Status: Negative (-)  Adjuvant Therapy Status: No Adjuvant Therapy Received Yet or Changing Initial   Adjuvant Regimen due to Tolerance  Intent of Therapy:  Curative Intent, Discussed with Patient

## 2022-07-25 ENCOUNTER — OFFICE VISIT (OUTPATIENT)
Dept: HEMATOLOGY/ONCOLOGY | Facility: CLINIC | Age: 62
End: 2022-07-25
Payer: COMMERCIAL

## 2022-07-25 VITALS
OXYGEN SATURATION: 100 % | HEIGHT: 66 IN | TEMPERATURE: 98 F | WEIGHT: 206.56 LBS | BODY MASS INDEX: 33.2 KG/M2 | HEART RATE: 78 BPM | DIASTOLIC BLOOD PRESSURE: 83 MMHG | SYSTOLIC BLOOD PRESSURE: 148 MMHG

## 2022-07-25 DIAGNOSIS — Z17.1 MALIGNANT NEOPLASM OF UPPER-OUTER QUADRANT OF LEFT BREAST IN FEMALE, ESTROGEN RECEPTOR NEGATIVE: ICD-10-CM

## 2022-07-25 DIAGNOSIS — Z17.0 MALIGNANT NEOPLASM OF UPPER-OUTER QUADRANT OF RIGHT BREAST IN FEMALE, ESTROGEN RECEPTOR POSITIVE: Primary | ICD-10-CM

## 2022-07-25 DIAGNOSIS — C50.411 MALIGNANT NEOPLASM OF UPPER-OUTER QUADRANT OF RIGHT BREAST IN FEMALE, ESTROGEN RECEPTOR POSITIVE: Primary | ICD-10-CM

## 2022-07-25 DIAGNOSIS — C50.412 MALIGNANT NEOPLASM OF UPPER-OUTER QUADRANT OF LEFT BREAST IN FEMALE, ESTROGEN RECEPTOR NEGATIVE: ICD-10-CM

## 2022-07-25 LAB
ALBUMIN SERPL-MCNC: 4.4 GM/DL (ref 3.4–4.8)
ALBUMIN/GLOB SERPL: 1.3 RATIO (ref 1.1–2)
ALP SERPL-CCNC: 114 UNIT/L (ref 40–150)
ALT SERPL-CCNC: 18 UNIT/L (ref 0–55)
AST SERPL-CCNC: 15 UNIT/L (ref 5–34)
BASOPHILS # BLD AUTO: 0.04 X10(3)/MCL (ref 0–0.2)
BASOPHILS NFR BLD AUTO: 0.6 %
BILIRUBIN DIRECT+TOT PNL SERPL-MCNC: 0.9 MG/DL
BUN SERPL-MCNC: 10.6 MG/DL (ref 9.8–20.1)
CALCIUM SERPL-MCNC: 10 MG/DL (ref 8.4–10.2)
CHLORIDE SERPL-SCNC: 102 MMOL/L (ref 98–107)
CO2 SERPL-SCNC: 27 MMOL/L (ref 23–31)
CREAT SERPL-MCNC: 0.82 MG/DL (ref 0.55–1.02)
EOSINOPHIL # BLD AUTO: 0.05 X10(3)/MCL (ref 0–0.9)
EOSINOPHIL NFR BLD AUTO: 0.7 %
ERYTHROCYTE [DISTWIDTH] IN BLOOD BY AUTOMATED COUNT: 12.4 % (ref 11.5–17)
GLOBULIN SER-MCNC: 3.3 GM/DL (ref 2.4–3.5)
GLUCOSE SERPL-MCNC: 172 MG/DL (ref 82–115)
HCT VFR BLD AUTO: 38.8 % (ref 37–47)
HGB BLD-MCNC: 12.7 GM/DL (ref 12–16)
IMM GRANULOCYTES # BLD AUTO: 0.01 X10(3)/MCL (ref 0–0.04)
IMM GRANULOCYTES NFR BLD AUTO: 0.1 %
LYMPHOCYTES # BLD AUTO: 1.98 X10(3)/MCL (ref 0.6–4.6)
LYMPHOCYTES NFR BLD AUTO: 28.3 %
MCH RBC QN AUTO: 29.1 PG (ref 27–31)
MCHC RBC AUTO-ENTMCNC: 32.7 MG/DL (ref 33–36)
MCV RBC AUTO: 89 FL (ref 80–94)
MONOCYTES # BLD AUTO: 0.5 X10(3)/MCL (ref 0.1–1.3)
MONOCYTES NFR BLD AUTO: 7.2 %
NEUTROPHILS # BLD AUTO: 4.4 X10(3)/MCL (ref 2.1–9.2)
NEUTROPHILS NFR BLD AUTO: 63.1 %
PLATELET # BLD AUTO: 403 X10(3)/MCL (ref 130–400)
PMV BLD AUTO: 9.5 FL (ref 7.4–10.4)
POTASSIUM SERPL-SCNC: 3.8 MMOL/L (ref 3.5–5.1)
PROT SERPL-MCNC: 7.7 GM/DL (ref 5.8–7.6)
RBC # BLD AUTO: 4.36 X10(6)/MCL (ref 4.2–5.4)
SODIUM SERPL-SCNC: 140 MMOL/L (ref 136–145)
WBC # SPEC AUTO: 7 X10(3)/MCL (ref 4.5–11.5)

## 2022-07-25 PROCEDURE — 3079F DIAST BP 80-89 MM HG: CPT | Mod: CPTII,S$GLB,, | Performed by: INTERNAL MEDICINE

## 2022-07-25 PROCEDURE — 85025 COMPLETE CBC W/AUTO DIFF WBC: CPT | Performed by: INTERNAL MEDICINE

## 2022-07-25 PROCEDURE — 1159F MED LIST DOCD IN RCRD: CPT | Mod: CPTII,S$GLB,, | Performed by: INTERNAL MEDICINE

## 2022-07-25 PROCEDURE — 1160F RVW MEDS BY RX/DR IN RCRD: CPT | Mod: CPTII,S$GLB,, | Performed by: INTERNAL MEDICINE

## 2022-07-25 PROCEDURE — 3077F SYST BP >= 140 MM HG: CPT | Mod: CPTII,S$GLB,, | Performed by: INTERNAL MEDICINE

## 2022-07-25 PROCEDURE — 3079F PR MOST RECENT DIASTOLIC BLOOD PRESSURE 80-89 MM HG: ICD-10-PCS | Mod: CPTII,S$GLB,, | Performed by: INTERNAL MEDICINE

## 2022-07-25 PROCEDURE — 80053 COMPREHEN METABOLIC PANEL: CPT | Performed by: INTERNAL MEDICINE

## 2022-07-25 PROCEDURE — 99999 PR PBB SHADOW E&M-EST. PATIENT-LVL V: CPT | Mod: PBBFAC,,, | Performed by: INTERNAL MEDICINE

## 2022-07-25 PROCEDURE — 3008F PR BODY MASS INDEX (BMI) DOCUMENTED: ICD-10-PCS | Mod: CPTII,S$GLB,, | Performed by: INTERNAL MEDICINE

## 2022-07-25 PROCEDURE — 1160F PR REVIEW ALL MEDS BY PRESCRIBER/CLIN PHARMACIST DOCUMENTED: ICD-10-PCS | Mod: CPTII,S$GLB,, | Performed by: INTERNAL MEDICINE

## 2022-07-25 PROCEDURE — 36415 COLL VENOUS BLD VENIPUNCTURE: CPT | Performed by: INTERNAL MEDICINE

## 2022-07-25 PROCEDURE — 3077F PR MOST RECENT SYSTOLIC BLOOD PRESSURE >= 140 MM HG: ICD-10-PCS | Mod: CPTII,S$GLB,, | Performed by: INTERNAL MEDICINE

## 2022-07-25 PROCEDURE — 3008F BODY MASS INDEX DOCD: CPT | Mod: CPTII,S$GLB,, | Performed by: INTERNAL MEDICINE

## 2022-07-25 PROCEDURE — 99999 PR PBB SHADOW E&M-EST. PATIENT-LVL V: ICD-10-PCS | Mod: PBBFAC,,, | Performed by: INTERNAL MEDICINE

## 2022-07-25 PROCEDURE — 1159F PR MEDICATION LIST DOCUMENTED IN MEDICAL RECORD: ICD-10-PCS | Mod: CPTII,S$GLB,, | Performed by: INTERNAL MEDICINE

## 2022-07-25 PROCEDURE — 99214 PR OFFICE/OUTPT VISIT, EST, LEVL IV, 30-39 MIN: ICD-10-PCS | Mod: S$GLB,,, | Performed by: INTERNAL MEDICINE

## 2022-07-25 PROCEDURE — 99214 OFFICE O/P EST MOD 30 MIN: CPT | Mod: S$GLB,,, | Performed by: INTERNAL MEDICINE

## 2022-07-25 RX ORDER — DIPHENHYDRAMINE HYDROCHLORIDE 50 MG/ML
50 INJECTION INTRAMUSCULAR; INTRAVENOUS ONCE AS NEEDED
Status: CANCELLED | OUTPATIENT
Start: 2022-08-02

## 2022-07-25 RX ORDER — SODIUM CHLORIDE 0.9 % (FLUSH) 0.9 %
10 SYRINGE (ML) INJECTION
Status: CANCELLED | OUTPATIENT
Start: 2022-08-02

## 2022-07-25 RX ORDER — EPINEPHRINE 0.3 MG/.3ML
0.3 INJECTION SUBCUTANEOUS ONCE AS NEEDED
Status: CANCELLED | OUTPATIENT
Start: 2022-08-02

## 2022-07-25 RX ORDER — FAMOTIDINE 10 MG/ML
20 INJECTION INTRAVENOUS
Status: CANCELLED | OUTPATIENT
Start: 2022-08-02

## 2022-07-25 RX ORDER — ONDANSETRON 8 MG/1
8 TABLET, ORALLY DISINTEGRATING ORAL EVERY 8 HOURS PRN
Qty: 60 TABLET | Refills: 5 | Status: SHIPPED | OUTPATIENT
Start: 2022-08-01 | End: 2022-07-25 | Stop reason: SDUPTHER

## 2022-07-25 RX ORDER — DEXAMETHASONE 4 MG/1
4 TABLET ORAL DAILY
Qty: 24 TABLET | Refills: 0 | Status: SHIPPED | OUTPATIENT
Start: 2022-08-02 | End: 2022-07-25 | Stop reason: SDUPTHER

## 2022-07-25 RX ORDER — DEXAMETHASONE 4 MG/1
4 TABLET ORAL DAILY
Qty: 24 TABLET | Refills: 0 | Status: SHIPPED | OUTPATIENT
Start: 2022-08-02 | End: 2022-08-08

## 2022-07-25 RX ORDER — HEPARIN 100 UNIT/ML
500 SYRINGE INTRAVENOUS
Status: CANCELLED | OUTPATIENT
Start: 2022-08-02

## 2022-07-25 RX ORDER — ONDANSETRON 8 MG/1
8 TABLET, ORALLY DISINTEGRATING ORAL EVERY 8 HOURS PRN
Qty: 60 TABLET | Refills: 5 | Status: SHIPPED | OUTPATIENT
Start: 2022-08-01 | End: 2022-12-27

## 2022-07-26 NOTE — PATIENT INSTRUCTIONS
Paclitaxel (TAXOL) Weekly  Instructions for Anti nausea regimen  Day of treatment: Eat breakfast and take regular morning medication. Wear a button down shirt and apply LMX numbing cream to mediport site at least 30 minutes before treatment and cover with large bandaid or plastic wrap. Bring something to read, listen to music or laptop computer. There is free wireless internet available.  Eating ice starting 30 minutes before chemotherapy and continuing for at least 30 minutes after completion may help reduce risk of mouth sores.  Icing the fingers and toes starting 30 minutes before chemotherapy and continuing for at least 30 minutes after completion may help reduce numbness and tingling in hands and feet (neuropathy).  Day 2,3,4 of treatment: Dexamethasone tab 1 with breakfast  Continue every day throughout treatment:   Nausea: Ondansetron (Zofran) 8mg every 8 hours as needed for nausea and vomiting    Call clinic [phone] if Zofran not relieving nausea  Diarrhea (loose bowels): Imodium AD as needed  Constipation: Stool softener twice a day (Dulcolax, Senokot S, Colace, Surfak, Miralax) may take generic of any medication. If no bowel movement in 2 days, take a laxative like Milk of Magnesia, Ex-lax or Magnesium citrate  Indigestion/Heartburn: Over the counter Pepcid, Prilosec, Rolaids, Tums, Mylanta  Mouth rinse 3 to 4 times a day. Swish and spit every day throughout treatment  1 cup warm water  1/2 tsp salt & 1/2 tsp baking soda  And/or Use Biotene toothpaste and mouthwash or any alcohol free mouthwash     Drink 4-6 pints of liquids a day (non-caffeine, non alcohol). Can have caffeine but does not count toward liquid intake.        What safety precautions must I take while on chemotherapy?  Chemotherapy medicines are very strong. They will be in all of your bodily fluids, including your urine, stool, saliva, sweat, tears, vaginal secretions, and semen. You must carefully follow some safety precautions to  prevent harm to others while you are using these medicines. Here are some recommended precautions:     Make sure that people who help care for you wear disposable gloves if they are going to come into contact with any of your bodily fluids for 48 hours. Women who are pregnant or breastfeeding should not handle any of your bodily fluids.     Wash any clothes, towels, and linens that may have your bodily fluids on them twice in a washing machine using very hot water.     Dispose of adult diapers, tampons, and sanitary napkins by first sealing them in a plastic bag.     Use a condom when having sex for at least 2 weeks after receiving your chemotherapy.     Do not share beverages or food.     If you are issued a hazardous waste container, make sure you understand the directions for using it.     Wash your hands thoroughly with warm water and soap after using the bathroom. Dry your hands with disposable paper towels.    When using the toilet for 48 hours after chemotherapy:     Close the lid of the toilet prior to flushing. This helps to avoid splashing.     Both men and women should sit to use the toilet. This helps avoid splashing.      Flush it twice after each use, including after vomiting.      Patient Treatment Information  Paclitaxel  Your chemotherapy treatment is called paclitaxel ([id number]) or Taxol® (TAX-all). It is commonly used to  treat breast cancer and has also been used to treat other diseases. Paclitaxel prevents cancer cells from dividing  and growing, and can cause the cancer cells to shrink and die.  What Do I Need to Know Before Starting Treatment?  Be sure to tell your healthcare provider about any prescription or over-the-counter products you are taking,  including dietary supplements, vitamins, herbal medicines and homeopathic remedies.  Use an effective birth control method during your treatment. Paclitaxel can cause harm to a fetus, so tell your  healthcare provider right away if you or  your partner becomes pregnant.  Do not breastfeed during treatment. It is not known if paclitaxel passes into breast milk.  Some chemotherapy drugs can cause sterility. Talk with your healthcare provider about your options if you want  to have children in the future.  Do not get any immunizations or vaccinations while taking paclitaxel without the approval of your healthcare  provider.  Chemotherapy can sometimes cause changes in the amount of electrolytes in your body, such as sodium,  potassium and magnesium. Your healthcare provider will check your blood for these changes and treat any  problems that are found.  What Do I Need to Know Before Starting Paclitaxel?  Paclitaxel can cause an allergic reaction. Your healthcare provider will give you medicine before your treatment  to reduce your risk of an allergic reaction. Tell your healthcare provider right away if you have trouble breathing,  flushing, a rapid heartbeat, chest pain, stomach pain, sudden swelling, hives or a rash during your treatment.  Paclitaxel can increase your risk of bleeding and infection, including pneumonia (see What Are the Possible  Side Effects?). Tell your healthcare provider right away if you have any signs of infection, such as a fever;  cough; flu-like symptoms; painful or frequent urination; warm, red or painful skin; or red bumps or bruises on  your skin. Also tell your healthcare provider if you have an illness that puts you at high risk for infections, such as  other cancers, HIV or diabetes that is not well controlled. Your healthcare provider will check your blood cells  during treatment and decide if your dose needs to be changed or stopped.  Paclitaxel can cause some of the nerve cells in your hands and feet to stop working properly. This condition is  called neuropathy (see What Are the Possible Side Effects?). Neuropathy usually improves slowly a few weeks  after treatment is done. You may have a higher risk for  neuropathy if you had this condition before starting  treatment or if you have taken the drug cisplatin in the past. Tell your healthcare provider if you have numbness,  tingling, pain or a burning sensation in your hands or feet during treatment.  Paclitaxel can cause skin irritation if it accidentally leaks out of your vein during the infusion or if the same vein is  used for every treatment. Tell your healthcare provider right away if you have redness, pain, burning or swelling  around the injection site during your treatment.  In rare cases, paclitaxel can cause an irregular heartbeat. Tell your healthcare provider if your heart is beating  faster or slower than usual or if it is beating erratically. You may need a pacemaker to treat an irregular  heartbeat.  In very rare cases, paclitaxel can cause Ashley-Jacques syndrome or toxic epidermal necrolysis, two serious  skin conditions. Tell your healthcare provider right away if you have swelling around the eyes, a rash that is  severe, or a rash that looks like pimples or blisters on the skin, in the mouth or on the genitals.  [facility]  Phone Number: [phone]  Paclitaxel (Breast) 2  Paclitaxel can cause blood pressure changes during the infusion. Tell your healthcare provider if you feel  lightheaded or have a headache during your treatment.  Tell your healthcare provider if you have liver disease or liver problems. You may have a higher risk of side  effects if your liver doesnt work well. Paclitaxel can also cause liver problems. Tell your healthcare provider if  your skin or the whites of your eyes look yellow.  In rare cases, the allergic reactions, skin conditions, infections, bleeding, and heart and liver problems caused by  paclitaxel can be severe and life threatening.  Your treatment can interact with other substances, including:   Phenytoin (Dilantin®), fosphenytoin (Cerebyx®), phenobarbital (Luminal®) or carbamazepine  (Tegretol®)   Rifabutin (Mycobutin®), rifampin (Rifadin®) or isoniazid   [facility]   Fluconazole (Diflucan®), itraconazole (Sporanox®), ketoconazole (Nizoral®), voriconazole (Vfend®) or  posaconazole (Noxafil®)   Aprepitant or fosaprepitant (Emend®)   Fluvoxamine (Luvox®)   Amiodarone (Cordarone® or Nexterone®)   Verapamil (Verelan®, Calan® or Covera-HS®), diltiazem (Cardizem®), nifedipine (Procardia XL®, Procardia®  or Adalat® CC) or felodipine (Plendil®)   Drugs used to treat HIV, such as amprenavir (Agenerase®), atazanavir (Reyataz®), boceprevir (Victrelis®),  darunavir (Prezista®), etravirine (Intelence®), indinavir (Crixivan®), nelfinavir (Viracept®), ritonavir (Norvir®),  saquinavir (Invirase®), delavirdine (Rescriptor®), fosamprenavir (Lexiva®), efavirenz (Sustiva®) or nevirapine  (Viramune®)   Boceprevir (Victrelis®) or telaprevir (IncivekÔ)   Clarithromycin (Biaxin®), erythromycin (E-mycin®) or telithromycin (Ketek®)   Cyclosporine (Gengraf®, Neoral® or Sandimmune®), sirolimus (Rapamune®) or tacrolimus (Prograf®)   Grapefruit juice or starfruit   Rosiglitazone (Avandia®) or repaglinide (Prandin®)   Gemfibrozil (Lopid®)   Midazolam (Versed®) or triazolam (Halcion®)   Buspirone (Buspar®)   Eletriptan (Relpax®)   Sildenafil (Viagra®)   Lovastatin (Mevacor® or Altoprev®) or simvastatin (Zocor®)   Montelukast (Singulair®)  Please note this list is a summary and does not contain all possible interactions. Call your healthcare provider if  you are taking any substances that can interact with your treatment.  You should not take this treatment if you are allergic to paclitaxel; other drugs that contain Cremophor EL®  (polyoxyethylated castor oil), such as cyclosporine injection (Sandimmune®) or teniposide (Vumon®); or any  components of these drugs.  How Is the Treatment Given?  Your healthcare provider will give you your treatment by injection into a vein. Your dose will be based on your  weight and height.  Your healthcare provider will determine your number of treatments.  Your healthcare provider may give you a medicine called dexamethasone (dex-a-METH-a-sone) to take by  mouth at home before your treatment. You may also get other medicines by injection before your treatment.  These medicines help prevent certain side effects caused by paclitaxel. If you take dexamethasone at home,  make sure to take it as directed. If you miss a dose or take the medicine at the wrong time, tell your healthcare  provider before your treatment.  Your healthcare provider will give you medicines before your treatment for nausea and vomiting. You may take  these medicines either by mouth or by injection into a vein.  Do not share any medicine with others. Sharing medicine with anyone else could be harmful.  Paclitaxel (Breast) 3  When Should I Call My Healthcare Provider?  Call your healthcare provider right away if you have any of the following symptoms:   Shaking chills or a fever of 100.5º F or higher   Unusual bleeding, easy bruising or pinpoint red spots on your skin   Vomiting that is severe or that lasts several hours   Painful or frequent urination, blood in your urine, urinating less or not being able to urinate   Diarrhea that causes an additional four bowel movements a day, diarrhea that lasts more than one day,  diarrhea at night, or diarrhea with fever, cramps or bloody stools   Constipation that lasts more than two to three days or constipation with stomach pain   Irregular or rapid heartbeat, chest pain, chest tightness or shortness of breath   Severe rash with itching, shortness of breath, or a swollen tongue or lips   Severe fluid retention that causes sudden weight gain or swelling in the stomach, hands or feet   Swelling around the eyes   A severe rash or a rash that looks like pimples or blisters on the skin, in the mouth or on the genitals   Seizures, dizziness or lightheadedness   Inability to eat or weight  "loss  What Are the Possible Side Effects?  All drugs can cause side effects, but every person reacts differently to each drug. The following chart lists the  possible side effects that can occur with your treatment, how to recognize and minimize symptoms, and possible  treatments. Side effects listed as "Common" usually occur in more than 25 percent of patients; those listed as  "Less Common" usually occur in about 5 to 25 percent of patients; and those that are "Rare" usually occur in  fewer than 5 percent of patients. Other side effects may vary in how often they occur.  Side Effect How to Minimize Side Effect Possible Treatments  Risk of Infection (Common)   Fever and chills   Painful urination   Sore throat   Cough, shortness of breath or  difficulty breathing   Nasal congestion or runny nose   Swelling or redness of the skin at  the site of a wound   Wash your hands often.   Brush and floss your teeth daily.   Clean cuts right away with warm water, soap  and antiseptic.   When your white blood cell count is low, stay  away from crowds and people with colds or  other illnesses.   Your healthcare provider may give  you an antibiotic to treat or prevent  infection or a medicine to increase  your white blood cell count.   Your healthcare provider may  decrease your dose or delay further  treatment.  Nausea/Vomiting (Common.  Symptoms are generally mild to  moderate but can be severe.)   Feeling queasy or sick to your  stomach   Eat small, frequent meals and bland foods, such  as bananas, rice, applesauce and toast.   Eat food cold or at room temperature so the  smell of food will not bother you.   Avoid fried, spicy or fatty foods.   Eat and drink slowly.   Drink plenty of liquids during the day, but to  avoid bloating, drink small amounts of liquid  during meals.   Your healthcare provider will give  you medicine to help reduce nausea  and vomiting.  Neuropathy (Common. Symptoms  are generally mild to moderate " but  can be severe.)   Numbness or tingling feeling in the  hands or feet   Muscle cramps   Loss of balance   Difficulty buttoning buttons or  picking up objects   Decreased awareness of heat or  cold in fingertips and toes   Difficulty hearing   Try to avoid the cold or extreme heat.   Wear mittens or gloves, and socks and scarves.   If your fingers are numb, be careful with sharp  objects.   Beware of hot coffee mugs, pots and pans and  dishwater--you may not feel the heat until you  are burned.   If you feel unsteady, be careful on stairs and in  the shower.   Your healthcare provider may  decrease your dose or delay further  treatment.  Paclitaxel (Breast) 4  Side Effect How to Minimize Side Effect Possible Treatments  Fluid Retention (Less Common.  Symptoms are generally mild to  moderate but can be severe.)   Swelling around the eyes, lower  legs, ankles, feet or abdominal area   Slight weight gain   Check your weight regularly.   Try to avoid eating salty foods, as this can  cause fluid retention.   Your healthcare provider may give  you a diuretic (water pill) to reduce  the amount of fluid in your body.   Your healthcare provider may  decrease your dose or delay further  treatment.  Mouth Sores and Pain (Less  Common. Symptoms are generally  mild to moderate.)   Pain, swelling or redness of the  mouth, tongue and throat   Coated tongue   Difficulty talking, swallowing or  eating   Bleeding ulcers and infection   Brush teeth two to four times a day using a soft  bristle brush and fluoride toothpaste.   Use dental floss daily.   Ask your healthcare provider to recommend a  mouthwash that does not contain alcohol.   Sip water during the day and use sugar-free  candy or gum to keep your mouth wet.   Eat food cold or at room temperature.   Eat soft or pureed food.   Avoid food that is acidic, spicy, salty, dry or  rough, such as toast.   Your healthcare provider may give  you medicine to help treat pain.    Your healthcare provider may give  you medicine to treat fungal or viral  infections.  Diarrhea (Less Common.  Symptoms are generally mild to  moderate but can be severe.)   Loose or watery stools several  times a day   Abdominal cramping, gas and  bloating   Eat small, frequent meals and bland foods, such  as bananas, rice, applesauce and toast.   Avoid caffeine; alcohol; raw fruits and  vegetables; raw eggs; undercooked meats;  spicy, fatty and greasy foods; milk and dairy  products; foods that cause gas, such as beans  and other legumes; high fiber and high-fat foods;  foods left un-refrigerated for more than two  hours (one hour for egg dishes and cream or  mayonnaise-based foods); bulk laxatives; and  stool softeners.   Drink eight to ten glasses of clear  liquids every day.   Your healthcare provider may  prescribe medicine to help treat  diarrhea.  Anemia (Less Common)   Fatigue or weakness   Dizziness   Pale skin   Feeling out of breath   Feeling cold   Plan rest periods throughout the day.   Organize daily activities so that you conserve  your energy.   Try to eat a well balanced diet and drink plenty  of fluids.   Stand up slowly to avoid getting dizzy.   Your healthcare provider may give  you a medicine to increase your red  blood cell count.   Your healthcare provider may  decrease your dose or delay further  treatment.  Risk of Bleeding (Rare)   Unusual bleeding or easy bruising   Black or tar-like stools   Blood in your urine   Pinpoint red spots on your skin   Bleeding gums or nosebleeds   Avoid aspirin and aspirin-like drugs, such as  ibuprofen.   Use caution with sharp objects, such as razors  and nail cutters.   Avoid activities that can cause cuts, bumps and  bruises.   Your healthcare provider may give  you medicine to increase your  platelet count.   Your healthcare provider may  decrease your dose or delay further  treatment.  The following side effects are known to occur with paclitaxel but  were not reported in the clinical trial.  Alopecia or Hair Loss (Usually  causes complete hair loss.)   More than normal amount of hair  loss in your brush, in the shower or  on your pillow after sleeping   Loss of body hair  You may not be able to prevent alopecia, but here are tips to help with hair loss:   Use a soft hairbrush. Do not use brush rollers, color treat your hair or get a permanent.   Dont wash your hair every day. When you do wash, use a mild shampoo.   Avoid using a hairdryer, or use a low setting if you must use one.   Have your hair cut short; this will make it look lou.   Your insurance might cover a wig. Ask your healthcare provider for a prescription for a hair  prosthesis. You can match your hair color and style more easily if you shop for a wig before  losing a lot of hair.   Use sunscreen or wear a hat or scarf to protect your scalp from the sun.  Nail Changes   Darkening of the nails that usually  grows out with the nail   Vertical or horizontal bands on the  nails   Trim nails often and keep them clean.   Wear gloves for cleaning and gardening to  minimize damage and prevent infection.   Dont wear nail polish or false fingernails until the  nails have grown out and returned to normal.   Your healthcare provider may give  you an antibiotic if you have an  infection in the nail bed.  Paclitaxel (Breast) 5  Side Effect How to Minimize Side Effect Possible Treatments  Constipation   No bowel movement for one to two  days   Small, hard dry stools   Bloating, gas, cramps and pain   Drink plenty of fluids to help loosen your bowels.   Drink warm or hot liquids if you do not have  mouth sores.   Your healthcare provider may suggest eating  foods that are high in fiber, such as bran,  vegetables, whole wheat breads and fruit.   Have prunes or prune juice, which act like  laxatives.   Try exercising to help loosen bowels.   Your healthcare provider may  recommend a stool softener.  Radiation  Recall   Redness, tenderness or swelling on  areas of the skin that have  previously been treated with  radiation   May cause wet sores, peeling skin  or discoloration after the skin has  healed   Stay out of the sun and avoid tanning beds.   If you are in the sun, wear protective clothing  and use sunscreen with an SPF of 30 or higher.   Your healthcare provider may give  you a corticosteroid to reduce  swelling.  Anorexia or Appetite Loss   Not having an appetite   Feeling too nauseated to eat   Metallic or medicinal taste   Change in taste causing dislike for  certain foods   Try eating six to eight small meals or snacks  each day instead of three larger meals.   Vary your diet and try new foods and recipes.   Take a walk before meals, when possible. This  may make you feel hungrier.   Eat with friends or family. When eating alone,  listen to the radio or watch TV.   Cook dinners ahead of time and  freeze them in small portions to  minimize cooking smells.   Let others help with meals, but ask  that they prepare food in small  portions that you can freeze. Dont  hesitate to tell others which foods  you dont like.   Add mild spices to change flavor.   Have meals delivered to you through  a program such as Meals on Wheels.  What Are the Other Possible Side Effects?  The chart below lists additional side effects found with this treatment. It does not list all possible side effects. For  more information, talk with your healthcare provider.  Common Side Effects Rare Side Effects   Muscle or joint pain   Weakness   Heart failure or heart attack   Blood clots in the lungs or respiratory failure   Dizziness or headache   Seizures   Vision changes, eye irritation, increased tearing or  conjunctivitis   Decreased hearing or ringing in ears   Decreased kidney function  Notes  Paclitaxel (Breast) 6

## 2022-07-27 ENCOUNTER — OFFICE VISIT (OUTPATIENT)
Dept: SURGERY | Facility: CLINIC | Age: 62
End: 2022-07-27
Payer: COMMERCIAL

## 2022-07-27 VITALS
DIASTOLIC BLOOD PRESSURE: 85 MMHG | BODY MASS INDEX: 33.17 KG/M2 | OXYGEN SATURATION: 99 % | SYSTOLIC BLOOD PRESSURE: 147 MMHG | TEMPERATURE: 98 F | HEIGHT: 66 IN | HEART RATE: 70 BPM | WEIGHT: 206.38 LBS | RESPIRATION RATE: 18 BRPM

## 2022-07-27 DIAGNOSIS — I87.2 VENOUS INSUFFICIENCY: ICD-10-CM

## 2022-07-27 DIAGNOSIS — Z17.1 MALIGNANT NEOPLASM OF UPPER-OUTER QUADRANT OF LEFT BREAST IN FEMALE, ESTROGEN RECEPTOR NEGATIVE: ICD-10-CM

## 2022-07-27 DIAGNOSIS — Z17.0 MALIGNANT NEOPLASM OF UPPER-OUTER QUADRANT OF RIGHT BREAST IN FEMALE, ESTROGEN RECEPTOR POSITIVE: Primary | ICD-10-CM

## 2022-07-27 DIAGNOSIS — C50.412 MALIGNANT NEOPLASM OF UPPER-OUTER QUADRANT OF LEFT BREAST IN FEMALE, ESTROGEN RECEPTOR NEGATIVE: ICD-10-CM

## 2022-07-27 DIAGNOSIS — C50.411 MALIGNANT NEOPLASM OF UPPER-OUTER QUADRANT OF RIGHT BREAST IN FEMALE, ESTROGEN RECEPTOR POSITIVE: Primary | ICD-10-CM

## 2022-07-27 PROBLEM — E11.9 DIABETES MELLITUS WITHOUT COMPLICATION: Status: ACTIVE | Noted: 2022-07-27

## 2022-07-27 PROCEDURE — 99999 PR PBB SHADOW E&M-EST. PATIENT-LVL IV: CPT | Mod: PBBFAC,,, | Performed by: PHYSICIAN ASSISTANT

## 2022-07-27 PROCEDURE — 3079F PR MOST RECENT DIASTOLIC BLOOD PRESSURE 80-89 MM HG: ICD-10-PCS | Mod: CPTII,S$GLB,, | Performed by: PHYSICIAN ASSISTANT

## 2022-07-27 PROCEDURE — 3008F PR BODY MASS INDEX (BMI) DOCUMENTED: ICD-10-PCS | Mod: CPTII,S$GLB,, | Performed by: PHYSICIAN ASSISTANT

## 2022-07-27 PROCEDURE — 1159F MED LIST DOCD IN RCRD: CPT | Mod: CPTII,S$GLB,, | Performed by: PHYSICIAN ASSISTANT

## 2022-07-27 PROCEDURE — 3077F SYST BP >= 140 MM HG: CPT | Mod: CPTII,S$GLB,, | Performed by: PHYSICIAN ASSISTANT

## 2022-07-27 PROCEDURE — 3008F BODY MASS INDEX DOCD: CPT | Mod: CPTII,S$GLB,, | Performed by: PHYSICIAN ASSISTANT

## 2022-07-27 PROCEDURE — 99024 PR POST-OP FOLLOW-UP VISIT: ICD-10-PCS | Mod: S$GLB,,, | Performed by: PHYSICIAN ASSISTANT

## 2022-07-27 PROCEDURE — 3077F PR MOST RECENT SYSTOLIC BLOOD PRESSURE >= 140 MM HG: ICD-10-PCS | Mod: CPTII,S$GLB,, | Performed by: PHYSICIAN ASSISTANT

## 2022-07-27 PROCEDURE — 3079F DIAST BP 80-89 MM HG: CPT | Mod: CPTII,S$GLB,, | Performed by: PHYSICIAN ASSISTANT

## 2022-07-27 PROCEDURE — 99999 PR PBB SHADOW E&M-EST. PATIENT-LVL IV: ICD-10-PCS | Mod: PBBFAC,,, | Performed by: PHYSICIAN ASSISTANT

## 2022-07-27 PROCEDURE — 99024 POSTOP FOLLOW-UP VISIT: CPT | Mod: S$GLB,,, | Performed by: PHYSICIAN ASSISTANT

## 2022-07-27 PROCEDURE — 1159F PR MEDICATION LIST DOCUMENTED IN MEDICAL RECORD: ICD-10-PCS | Mod: CPTII,S$GLB,, | Performed by: PHYSICIAN ASSISTANT

## 2022-07-27 NOTE — PROGRESS NOTES
[facility] Breast Surg  Breast Surgical Oncology  Follow-Up Patient Office Visit       Referring Provider: No ref. provider found  PCP:  Sharri Bass NP  Medical Oncology: Gato Briones MD    Radiation Oncology: [facility]    Chief Complaint:   Chief Complaint   Patient presents with    Post-op Evaluation     Post-op evaluation, no pain, healing well        Subjective:   Treatment History:  1.Genetic Testing - negative  2. S/p bilateral simple mastectomy, bilateral sentinel lymph node biopsy, and TE placement - 6/3/2022   3. Mediport placement 7/15/2022    Interval History:  2022 - Elina Ham is here for follow up after Mediport placement.     HPI:  Elina Ham initially presents on 24 at age 62 Years with bilateral breast cancer (1) AJCC 8th ed clinical anatomic stage IA / prognostic stage IA (cT1b cN0 M0) Right breast 10 o'clock 7 cm FN invasive ductal carcinoma, grade 1, with associated LCIS, ER 43%, TN 94%, HER2 0 - negative on IHC, Ki674% and 9 o'clock 5 cm FN Right breast invasive ductal carcinoma, grade 1, with associated DCIS, ER 81%, TN 3%, HER2 1+ - negative on IHC and Ki67 4%. 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     A detailed patient history was obtained and reviewed.    Additional Breast History:  Patient did not breast feed. Patient denies nipple discharge. Patient denies to previous breast biopsy. Patient denies to a personal history of breast cancer.    Imagin. 3/4/2022 BL SC MG at [facility]- which revealed a 2.7 cm mass in the left breast upper outer quadrant posterior depth. There is possible architectural distortion in the right breast at 9 o'clock anterior depth. There is a focal asymmetry with possible  architectural distortion in the right breast at 9 o'clock posterior depth. BIRADS-0; additional imaging needed.    2. 3/8/2022 BL DG MG/ BL US BREAST LIMITED at [facility]- which revealed on R MG at 9 o'clock anterior depth, there is a persistent area of architectural distortion spanning 0.8 cm. At 10 o'clock right breast, posterior depth, there is a persistent 1.0 cm focal asymmetry with associated architectural distortion and amorphous calcifications. At 1 o'clock left breast, middle depth, there is a 2.7 cm oval mass with irregular margins. These findings correspond to the areas recalled from screening examination dated 3/4/2022. In the left axilla there is an enlarged lymph node. No other suspicious masses, calcifications, or other signs of malignancy are identified. On R US, at 9 o'clock, 5 cm FN, there is an area of architectural distortion with posterior shadowing spanning 0.8 cm which corresponds to the mammographic finding in this region. at 10 o'clock right breast, 7 cm FN, there is a 1.0 x 0.4 x 0.9 cm oval, hypoechoic mass with circumscribed margins. This mass is stable mammographically dating back to the 2010 exam and is thus considered benign. On L US at 1 o'clock 7 cm FN there is a 2.7 x 2.0 x 2.1 mm oval, hypoechoic mass with irregular margins which corresponds to the mammographic finding in this region. In the left axilla, there is a single level 1 lymph node which demonstrates eccentric cortical thickening. This lymph node measures 1.3 x 1.0 x 1.0 cm and corresponds to the mammographic finding in this region. Additional benign appearing lymph nodes are noted in the bilateral axillae. BIRADS-5 highly suspicious; biopsy recommended.    Pathology:  1. 3/16/2022 Ultrasound-guided Core Needle Biopsy Right Breast 9 o'clock 5 cm FN Mass with Distortion - Invasive ductal carcinoma, grade 1, with associated focus of ductal carcinoma in situ, grade 1, and focal perineural invasion is present  ER 81%  TX 3%  HER2  Negative  Ki67 4%    2. 3 Stereotactic guided Core Needle Biopsy Right Breast Mass 10 o'clock Posterior Depth Focal Asymmetry, Architectural Distortion with Calcifications - Invasive ductal carcinoma, grade 1 and focal lobular carcinoma in situ  ER 43%  GA 94%  HER2 Negative  Ki67 4%    3. 3 Ultrasound-guided Core Needle Biopsy Left Breast 1 o'clock 7 cm FN Mass - High grade carcinoma with anaplastic/malignant giant cell component (suggestive for sarcomatoid/metaplastic change)  ER 0%  GA 0%  HER2 Negative  Ki67 High    4. 3 Ultrasound-guided Core Needle Biopsy Left Axillary Node - Portion of reactive lymph node; no evidence of metastatic carcinoma    5. Bilateral simple mastectomy and Bilateral SLNB 6/3/2022  - Left mastectomy and SLNB - a single focus of invasive ductal carcinoma grade 3 measuring 25 mm. Margins clear. Six sentinel lymph nodes were examined with one positive for metastatic carcinoma, 5 mm.   - Right mastectomy and SLNB - two foci of invasive ductal carcinoma grade 1 measuring 10 mm and 4 mm. Low grade DCIS and LCIS were also present. All margins clear. Four sentinel lymph ndoes are negative for metastatic carcinoma    OB/GYN History:  Age at Menarche Onset: 14  Menopausal Status: postmenopausal at age 52  Pregnancy History:   Age at first live birth: 31  Hormone Replacement Therapy: None    Other Relevant History:  Prior thoracic RT: none  Genetic testing: Yes, VUS  Ashkenazi Holiness descent: No    Family History:  Family History   Problem Relation Age of Onset    Breast cancer Maternal Grandmother             Breast cancer Sister     Breast cancer Sister         remission    Breast cancer Maternal Aunt             Breast cancer Maternal Aunt             Breast cancer Maternal Aunt             Breast cancer Maternal Aunt     Breast cancer Maternal Uncle             Stroke Brother             Pancreatic cancer Brother          Past History:  Past Medical History:   Diagnosis Date    Cancer 3/21/2022    Diabetes mellitus     Hypercholesterolemia     Hypertension         Past Surgical History:   Procedure Laterality Date    BILATERAL MASTECTOMY Bilateral 6/3/2022    Procedure: MASTECTOMY, BILATERAL;  Surgeon: Charlene Contreras MD;  Location: [facility] OR;  Service: General;  Laterality: Bilateral;     SECTION  92 and 3/15/95    COLONOSCOPY      INSERTION OF BREAST TISSUE EXPANDER Bilateral 6/3/2022    Procedure: INSERTION, TISSUE EXPANDER, BREAST (Bilateral breast reconstruction with tissue expanders and acellular dermal matrix and use of intraoperative fluoroscein angiogragry);  Surgeon: Nick Gibbs MD;  Location: [facility] OR;  Service: Plastics;  Laterality: Bilateral;    MEDIPORT INSERTION, SINGLE      SENTINEL LYMPH NODE BIOPSY Bilateral 6/3/2022    Procedure: BIOPSY, LYMPH NODE, SENTINEL;  Surgeon: Charlene Contreras MD;  Location: [facility] OR;  Service: General;  Laterality: Bilateral;  MAGTRACE Injection in office  Need SentiMag Probe        Social History     Socioeconomic History    Marital status:    Tobacco Use    Smoking status: Never Smoker    Smokeless tobacco: Never Used   Substance and Sexual Activity    Alcohol use: Not Currently     Alcohol/week: 1.0 standard drink     Types: 1 Glasses of wine per week     Comment: drink socially but haven't had a glass of wine since     Drug use: Never    Sexual activity: Yes     Partners: Male     Birth control/protection: None     Comment: We take STD test for the first 2 years we started dating  (5        Body mass index is 33.31 kg/m².     Allergy/Medications:   Review of patient's allergies indicates:  No Known Allergies       Current Outpatient Medications:     amLODIPine (NORVASC) 10 MG tablet, Take 10 mg by mouth once daily., Disp: , Rfl:     aspirin 81 MG Chew, Take 81 mg by mouth 2 (two) times a day. Stopped using until after sx, Disp: ,  "Rfl:     atorvastatin (LIPITOR) 20 MG tablet, Take 20 mg by mouth every evening., Disp: , Rfl:     [START ON 8/2/2022] dexAMETHasone (DECADRON) 4 MG Tab, Take 1 tablet (4 mg total) by mouth once daily. Take as directed on days 2, 3, and 4 of your chemotherapy cycle., Disp: 24 tablet, Rfl: 0    losartan-hydrochlorothiazide 100-25 mg (HYZAAR) 100-25 mg per tablet, Take 1 tablet by mouth once daily., Disp: , Rfl:     metFORMIN (GLUCOPHAGE) 1000 MG tablet, Take 1,000 mg by mouth daily with breakfast., Disp: , Rfl:     mupirocin (BACTROBAN) 2 % ointment, APPLY TO DRAIN SITES TWICE DAILY, Disp: , Rfl:     [START ON 8/1/2022] ondansetron (ZOFRAN-ODT) 8 MG TbDL, Take 1 tablet (8 mg total) by mouth every 8 (eight) hours as needed (nausea/vomiting)., Disp: 60 tablet, Rfl: 5       Review of Systems:  Pertinent items are noted in HPI.      Objective:     Vitals:  Blood pressure (!) 147/85, pulse 70, temperature 97.7 °F (36.5 °C), temperature source Oral, resp. rate 18, height 5' 6" (1.676 m), weight 93.6 kg (206 lb 6.4 oz), SpO2 99 %.      Physical Exam:  General: The patient is awake, alert and oriented times three. The patient is well nourished and in no acute distress.    Musculoskeletal: The patient has a normal range of motion of her bilateral upper extremities.    Breast: The incisions are healing well. No tenderness, swelling, or redness. Mediport incnision healing well.  Integumentary: no rashes or skin lesions present  Neurologic: cranial nerves intact, no signs of peripheral neurological deficit, motor/sensory function intact      Assessment and Plan:     No diagnosis found.     She is doing well post op. Incision is healing well.       Plan:       RTC in 3 months for CBE and breast cancer surviellance.    She is scheduled to start chemotherapy next week.       All of her questions were answered. She was advised to call if she develops any questions or concerns.    Viola Troncoso PA-C          "

## 2022-07-27 NOTE — PROGRESS NOTES
Prescription sent on 3/24/20 # 60 with 2 refills.    Refill denied at this time.   Subjective:       Patient ID: Elina Ham is a 62 y.o. female.      Chief Complaint: Chemotherapy Education      Current Treatment: OP BREAST PACLITAXEL WEEKLY FOLLOWED BY DOSE DENSE DOXORUBICIN CYCLOPHOSPHAMIDE Q2W           Oncology History Overview Note        Oncology History   Malignant neoplasm of upper-outer quadrant of right breast in female, estrogen receptor positive   3/16/2022 Cancer Staged     1. Ultrasound-guided Core Needle Biopsy Right Breast 9 o'clock 5 cm FN Mass with Distortion - Invasive ductal carcinoma, grade 1, with associated focus of ductal carcinoma in situ, grade 1, and focal perineural invasion is present  ER 81%TX 3%HER2 Negative  Ki67 4%    2.Stereotactic guided Core Needle Biopsy Right Breast Mass 10 o'clock Posterior Depth Focal Asymmetry, Architectural Distortion with Calcifications - Invasive ductal carcinoma, grade 1 and focal lobular carcinoma in situ  ER 43%TX 94%HER2 Negative  Ki67 4%    3. Ultrasound-guided Core Needle Biopsy Left Breast 1 o'clock 7 cm FN Mass - High grade carcinoma with anaplastic/malignant giant cell component (suggestive for sarcomatoid/metaplastic change)  ER 0%TX 0%HER2 Negative  Ki67 High    4.Ultrasound-guided Core Needle Biopsy Left Axillary Node - Portion of reactive lymph node; no evidence of metastatic carcinoma      6/3/2022 Surgery     Bilateral simple mastectomy and Bilateral SLNB 6/3/2022  - Left mastectomy and SLNB - a single focus of invasive ductal carcinoma grade 3 measuring 25 mm. Margins clear. Six sentinel lymph nodes were examined with one positive for metastatic carcinoma, 5 mm.   G3, ER-, TX-, HER2     - Right mastectomy and SLNB - two foci of invasive ductal carcinoma grade 1 measuring 10 mm and 4 mm. Low grade DCIS and LCIS were also present. All margins clear. Four sentinel lymph ndoes are negative for metastatic carcinoma.  G1, ER+, TX+, HER2-)       Malignant neoplasm of upper-outer quadrant of left breast in female, estrogen  receptor negative   6/3/2022 Surgery     Bilateral simple mastectomy and Bilateral SLNB 6/3/2022  - Left mastectomy and SLNB - a single focus of invasive ductal carcinoma grade 3 measuring 25 mm. Margins clear. Six sentinel lymph nodes were examined with one positive for metastatic carcinoma, 5 mm.   G3, ER-, VT-, HER2     - Right mastectomy and SLNB - two foci of invasive ductal carcinoma grade 1 measuring 10 mm and 4 mm. Low grade DCIS and LCIS were also present. All margins clear. Four sentinel lymph ndoes are negative for metastatic carcinoma.  G1, ER+, VT+, HER2-)              Malignant neoplasm of upper-outer quadrant of right breast in female, estrogen receptor positive    Malignant neoplasm of upper-outer quadrant of left breast in female, estrogen receptor negative    8/2/2022 -  Chemotherapy      Treatment Summary   Plan Name: OP BREAST PACLITAXEL WEEKLY FOLLOWED BY DOSE DENSE DOXORUBICIN CYCLOPHOSPHAMIDE Q2W  Treatment Goal: Curative  Status: Active  Start Date: 8/2/2022 (Planned)  End Date: 12/7/2022 (Planned)  Provider: Gato Briones MD  Chemotherapy: dexAMETHasone (DECADRON) 4 MG Tab, 4 mg (100 % of original dose 4 mg), Oral, Daily, 0 of 1 cycle, Start date: 8/2/2022, End date: --  Dose modification: 4 mg (original dose 4 mg, Cycle 0)  DOXOrubicin chemo injection 124 mg, 60 mg/m2, Intravenous, Clinic/HOD 1 time, 0 of 4 cycles  cyclophosphamide 600 mg/m2 = 1,240 mg in sodium chloride 0.9% 250 mL chemo infusion, 600 mg/m2, Intravenous, Clinic/HOD 1 time, 0 of 4 cycles  PACLitaxeL (TAXOL) 80 mg/m2 = 168 mg in sodium chloride 0.9% 250 mL chemo infusion, 80 mg/m2 = 168 mg, Intravenous, Clinic/HOD 1 time, 0 of 12 cycles           07/25/2022  This is a 62-year-old female initially had abnormal mammograms in March of 2022. She was noted to have bilateral breast cancer, stage I A right breast carcinoma clinically and a T2 left breast carcinoma.  She underwent CT scan of chest abdomen pelvis on 04/11/2022  which showed a small cystic pancreatic tail that needs to be followed.  There was no convincing evidence of metastatic disease.  But she did have some suspected left axillary adenopathy.  She underwent high risk genetic testing because of her family history and bilateral breast cancer.  She was found to have a variant of undetermined significance.     Genetic testing was appropriate for this patient because of her personal and family history. Analysis consisted of sequencing and large rearrangement analyses of the following genes: APC, LIZZY, BARD1, BMPR1A, BRCA1, BRCA2, BRIP1, CDH1, CDK4, CDKN2A (p14ARF and [id number]), CHEK2, EPCAM (large rearrangement only), GREM1, MLH1, MSH2, MSH6, MUTYH, NBN, PALB2, PMS2, POLE, POLD1, PTEN, RAD51C, RAD51D, SMAD4, STK11, TP53. . This comprehensive genetic analysis indicated that there was no deleterious mutation found in any of these genes. However, there was a variant of uncertain significance (VUS):AXIN2 [id number].     The ultrasound-guided biopsy of left axillary node showed no evidence of active disease.  As well the patient was then taken to the operating room, on 06/03/2022 and underwent, bilateral mastectomy sentinel lymph node biopsy.  In addition she underwent bilateral breast tissue expanders potential reconstructive surgery.        07/25/2022:Patient's nuclear medicine bone scan shows no evidence of metastatic disease.  she did not get a repeat CT scan of the chest abdomen pelvis.  She did have a port placed.  And there is no recent echocardiogram.    Past Medical History:   Diagnosis Date    Cancer 3/21/2022    Diabetes mellitus     Hypercholesterolemia     Hypertension       Review of patient's allergies indicates:  No Known Allergies     Current Outpatient Medications:     amLODIPine (NORVASC) 10 MG tablet, Take 10 mg by mouth once daily., Disp: , Rfl:     aspirin 81 MG Chew, Take 81 mg by mouth 2 (two) times a day. Stopped using until after sx, Disp: , Rfl:      atorvastatin (LIPITOR) 20 MG tablet, Take 20 mg by mouth every evening., Disp: , Rfl:     [START ON 8/2/2022] dexAMETHasone (DECADRON) 4 MG Tab, Take 1 tablet (4 mg total) by mouth once daily. Take as directed on days 2, 3, and 4 of your chemotherapy cycle. (Patient taking differently: Take 4 mg by mouth once daily. Take as directed on days 2, 3, and 4 of your chemotherapy cycle.), Disp: 24 tablet, Rfl: 0    losartan-hydrochlorothiazide 100-25 mg (HYZAAR) 100-25 mg per tablet, Take 1 tablet by mouth once daily., Disp: , Rfl:     metFORMIN (GLUCOPHAGE) 1000 MG tablet, Take 1,000 mg by mouth daily with breakfast., Disp: , Rfl:     mupirocin (BACTROBAN) 2 % ointment, APPLY TO DRAIN SITES TWICE DAILY, Disp: , Rfl:     [START ON 8/1/2022] ondansetron (ZOFRAN-ODT) 8 MG TbDL, Take 1 tablet (8 mg total) by mouth every 8 (eight) hours as needed (nausea/vomiting)., Disp: 60 tablet, Rfl: 5  Review of Systems   Constitutional: Negative.  Negative for activity change, appetite change, chills, diaphoresis, fatigue, fever and unexpected weight change.   HENT: Negative.  Negative for mouth dryness, mouth sores, sore throat and trouble swallowing.    Eyes: Negative.  Negative for photophobia and visual disturbance.   Respiratory: Negative for cough, chest tightness, shortness of breath and wheezing.    Cardiovascular: Negative for chest pain and leg swelling.        Right Upper chest wall mediport  2D Echocardiogram done in February. Results requested.   Gastrointestinal: Negative for abdominal pain, constipation, diarrhea and nausea.   Endocrine: Negative.    Genitourinary: Negative.  Negative for difficulty urinating, frequency and hot flashes.   Musculoskeletal: Negative.  Negative for arthralgias and back pain.   Integumentary:  Negative for rash.        Tissue expanders bilateral chest wall   Allergic/Immunologic: Negative.  Negative for food allergies.   Neurological: Negative.  Negative for dizziness, weakness,  light-headedness and headaches.   Hematological: Negative for adenopathy.   Psychiatric/Behavioral: Negative.  Negative for sleep disturbance. The patient is not nervous/anxious.               ECOG SCORE    0 - Fully active-able to carry on all pre-disease performance without restriction       Office Visit on 07/25/2022   Component Date Value    Sodium Level 07/25/2022 140     Potassium Level 07/25/2022 3.8     Chloride 07/25/2022 102     Carbon Dioxide 07/25/2022 27     Glucose Level 07/25/2022 172 (A)    Blood Urea Nitrogen 07/25/2022 10.6     Creatinine 07/25/2022 0.82     Calcium Level Total 07/25/2022 10.0     Protein Total 07/25/2022 7.7 (A)    Albumin Level 07/25/2022 4.4     Globulin 07/25/2022 3.3     Albumin/Globulin Ratio 07/25/2022 1.3     Bilirubin Total 07/25/2022 0.9     Alkaline Phosphatase 07/25/2022 114     Alanine Aminotransferase 07/25/2022 18     Aspartate Aminotransfera* 07/25/2022 15     Estimated GFR- Am* 07/25/2022 >60     WBC 07/25/2022 7.0     RBC 07/25/2022 4.36     Hgb 07/25/2022 12.7     Hct 07/25/2022 38.8     MCV 07/25/2022 89.0     MCH 07/25/2022 29.1     MCHC 07/25/2022 32.7 (A)    RDW 07/25/2022 12.4     Platelet 07/25/2022 403 (A)    MPV 07/25/2022 9.5     Neut % 07/25/2022 63.1     Lymph % 07/25/2022 28.3     Mono % 07/25/2022 7.2     Eos % 07/25/2022 0.7     Basophil % 07/25/2022 0.6     Lymph # 07/25/2022 1.98     Neut # 07/25/2022 4.4     Mono # 07/25/2022 0.50     Eos # 07/25/2022 0.05     Baso # 07/25/2022 0.04     IG# 07/25/2022 0.01     IG% 07/25/2022 0.1             Chemotherapy Patient Education  Education  Chemo Medications: Paclitaxel weekly x 12 weeks  Intoduction to Unit Hours, Services, Routines, After Hours Telephone Number: Verbal Instructions given to patient/family, Written Instructions given to patient/family, Patient/Family verbalizes understanding  Consent Form Signed: Verbal Instructions given to patient/family,  Written Instructions given to patient/family, Patient/Family verbalizes understanding  Chemo Teaching Packet: Verbal Instructions given to patient/family, Written Instructions given to patient/family, Patient/Family verbalizes understanding  Community Resources: Verbal Instructions given to patient/family, Patient/Family verbalizes understanding  Dental Care During Chemo: Verbal Instructions given to patient/family, Written Instructions given to patient/family, Patient/Family verbalizes understanding  Vascular Access/Intravenous Therapy: Verbal Instructions given to patient/family, Written Instructions given to patient/family, Patient/Family verbalizes understanding  Bowel Prophylaxis/Protocol: Verbal Instructions given to patient/family, Written Instructions given to patient/family, Patient/Family verbalizes understanding    Instructions in expected side effects  Nausea/Vomiting : Verbal Instructions given to patient/family, Written Instructions given to patient/family, Patient/Family verbalizes understanding  Myelosuppression: Verbal Instructions given to patient/family, Written Instructions given to patient/family, Patient/Family verbalizes understanding  Fatigue: Verbal Instructions given to patient/family, Written Instructions given to patient/family, Patient/Family verbalizes understanding  Anorexia: Verbal Instructions given to patient/family, Written Instructions given to patient/family, Patient/Family verbalizes understanding  Alopecia: Verbal Instructions given to patient/family, Written Instructions given to patient/family, Patient/Family verbalizes understanding  Stomatitis: Verbal Instructions given to patient/family, Written Instructions given to patient/family, Patient/Family verbalizes understanding  Diarrhea: Verbal Instructions given to patient/family, Written Instructions given to patient/family, Patient/Family verbalizes understanding  Gastritis: Verbal Instructions given to patient/family,  Written Instructions given to patient/family, Patient/Family verbalizes understanding  Instructed to report increase of 2-3 loose stools/day over pretreatment: Verbal Instructions given to patient/family, Written Instructions given to patient/family, Patient/Family verbalizes understanding    S/S Infection   Report Temperature of 100.4 or >: Verbal Instructions given to patient/family, Written Instructions given to patient/family, Patient/Family verbalizes understanding         Nutritional Screening   Nutritional Screening for Dietary Consult:  (Seen by Dietician)    Basic Nutritional Instructions  Taste Alterations: Verbal Instructions given to patient/family, Patient/Family verbalizes understanding    Hydration Instructions  Renal Toxicity: Verbal Instructions given to patient/family, Written Instructions given to patient/family, Patient/Family verbalizes understanding  Drink 2 Liters of fluids daily: Verbal Instructions given to patient/family, Written Instructions given to patient/family, Patient/Family verbalizes understanding    Thrombocytopenia Precautions  Bruising: Verbal Instructions given to patient/family, Written Instructions given to patient/family, Patient/Family verbalizes understanding  Bleeding: Verbal Instructions given to patient/family, Written Instructions given to patient/family, Patient/Family verbalizes understanding    Assessment:       Problem List Items Addressed This Visit        Cardiac/Vascular    Hypertension       Oncology    Malignant neoplasm of upper-outer quadrant of right breast in female, estrogen receptor positive - Primary    Malignant neoplasm of upper-outer quadrant of left breast in female, estrogen receptor negative       Endocrine    Diabetes mellitus without complication             Plan:         Prescriptions per Dr. Briones called in to [facility] on [facility]: Zofran ODT 8mg tab 1 PO every 8 hours PRN nausea. Dispense 30refill 4; Dexamethasone 4mg tab 1 PO with  breakfast for 3 days after chemotherapy. Dispense 18 refill 0.    RTC 8/2/2022 at 1430 week #1 of chemotherapy      Treatment Plan Information   OP BREAST PACLITAXEL WEEKLY FOLLOWED BY DOSE DENSE DOXORUBICIN CYCLOPHOSPHAMIDE Q2W   Gato Briones MD   Upcoming Treatment Dates - OP BREAST PACLITAXEL WEEKLY FOLLOWED BY DOSE DENSE DOXORUBICIN CYCLOPHOSPHAMIDE Q2W    8/2/2022       Pre-Medications       diphenhydrAMINE (BENADRYL) 50 mg in sodium chloride 0.9% 50 mL IVPB       dexamethasone (DECADRON) 10 mg in sodium chloride 0.9% 50 mL IVPB       famotidine (PF) injection 20 mg       Chemotherapy       PACLitaxeL (TAXOL) 80 mg/m2 = 168 mg in sodium chloride 0.9% 250 mL chemo infusion  8/9/2022       Pre-Medications       diphenhydrAMINE (BENADRYL) 50 mg in sodium chloride 0.9% 50 mL IVPB       dexamethasone (DECADRON) 10 mg in sodium chloride 0.9% 50 mL IVPB       famotidine (PF) injection 20 mg       Chemotherapy       PACLitaxeL (TAXOL) 80 mg/m2 = 168 mg in sodium chloride 0.9% 250 mL chemo infusion  8/16/2022       Pre-Medications       diphenhydrAMINE (BENADRYL) 50 mg in sodium chloride 0.9% 50 mL IVPB       dexamethasone (DECADRON) 10 mg in sodium chloride 0.9% 50 mL IVPB       famotidine (PF) injection 20 mg       Chemotherapy       PACLitaxeL (TAXOL) 80 mg/m2 = 168 mg in sodium chloride 0.9% 250 mL chemo infusion  8/23/2022       Pre-Medications       diphenhydrAMINE (BENADRYL) 50 mg in sodium chloride 0.9% 50 mL IVPB       dexamethasone (DECADRON) 10 mg in sodium chloride 0.9% 50 mL IVPB       famotidine (PF) injection 20 mg       Chemotherapy       PACLitaxeL (TAXOL) 80 mg/m2 = 168 mg in sodium chloride 0.9% 250 mL chemo infusion

## 2022-07-29 ENCOUNTER — DOCUMENTATION ONLY (OUTPATIENT)
Dept: HEMATOLOGY/ONCOLOGY | Facility: CLINIC | Age: 62
End: 2022-07-29

## 2022-07-29 ENCOUNTER — OFFICE VISIT (OUTPATIENT)
Dept: HEMATOLOGY/ONCOLOGY | Facility: CLINIC | Age: 62
End: 2022-07-29
Payer: COMMERCIAL

## 2022-07-29 ENCOUNTER — CLINICAL SUPPORT (OUTPATIENT)
Dept: HEMATOLOGY/ONCOLOGY | Facility: CLINIC | Age: 62
End: 2022-07-29
Payer: COMMERCIAL

## 2022-07-29 VITALS
SYSTOLIC BLOOD PRESSURE: 142 MMHG | WEIGHT: 207.13 LBS | DIASTOLIC BLOOD PRESSURE: 79 MMHG | HEART RATE: 77 BPM | OXYGEN SATURATION: 100 % | BODY MASS INDEX: 33.43 KG/M2

## 2022-07-29 DIAGNOSIS — E11.9 DIABETES MELLITUS WITHOUT COMPLICATION: ICD-10-CM

## 2022-07-29 DIAGNOSIS — Z17.1 MALIGNANT NEOPLASM OF UPPER-OUTER QUADRANT OF LEFT BREAST IN FEMALE, ESTROGEN RECEPTOR NEGATIVE: ICD-10-CM

## 2022-07-29 DIAGNOSIS — Z17.0 MALIGNANT NEOPLASM OF UPPER-OUTER QUADRANT OF RIGHT BREAST IN FEMALE, ESTROGEN RECEPTOR POSITIVE: Primary | ICD-10-CM

## 2022-07-29 DIAGNOSIS — C50.411 MALIGNANT NEOPLASM OF UPPER-OUTER QUADRANT OF RIGHT BREAST IN FEMALE, ESTROGEN RECEPTOR POSITIVE: Primary | ICD-10-CM

## 2022-07-29 DIAGNOSIS — C50.412 MALIGNANT NEOPLASM OF UPPER-OUTER QUADRANT OF LEFT BREAST IN FEMALE, ESTROGEN RECEPTOR NEGATIVE: ICD-10-CM

## 2022-07-29 DIAGNOSIS — I10 HYPERTENSION, UNSPECIFIED TYPE: ICD-10-CM

## 2022-07-29 PROCEDURE — 99215 OFFICE O/P EST HI 40 MIN: CPT | Mod: S$GLB,,, | Performed by: CLINICAL NURSE SPECIALIST

## 2022-07-29 PROCEDURE — 3008F PR BODY MASS INDEX (BMI) DOCUMENTED: ICD-10-PCS | Mod: CPTII,S$GLB,, | Performed by: CLINICAL NURSE SPECIALIST

## 2022-07-29 PROCEDURE — 99999 PR PBB SHADOW E&M-EST. PATIENT-LVL IV: CPT | Mod: PBBFAC,,, | Performed by: CLINICAL NURSE SPECIALIST

## 2022-07-29 PROCEDURE — 3078F DIAST BP <80 MM HG: CPT | Mod: CPTII,S$GLB,, | Performed by: CLINICAL NURSE SPECIALIST

## 2022-07-29 PROCEDURE — 1159F PR MEDICATION LIST DOCUMENTED IN MEDICAL RECORD: ICD-10-PCS | Mod: CPTII,S$GLB,, | Performed by: CLINICAL NURSE SPECIALIST

## 2022-07-29 PROCEDURE — 99999 PR PBB SHADOW E&M-EST. PATIENT-LVL IV: ICD-10-PCS | Mod: PBBFAC,,, | Performed by: CLINICAL NURSE SPECIALIST

## 2022-07-29 PROCEDURE — 3077F PR MOST RECENT SYSTOLIC BLOOD PRESSURE >= 140 MM HG: ICD-10-PCS | Mod: CPTII,S$GLB,, | Performed by: CLINICAL NURSE SPECIALIST

## 2022-07-29 PROCEDURE — 99999 PR PBB SHADOW E&M-EST. PATIENT-LVL I: ICD-10-PCS | Mod: PBBFAC,,,

## 2022-07-29 PROCEDURE — 99215 PR OFFICE/OUTPT VISIT, EST, LEVL V, 40-54 MIN: ICD-10-PCS | Mod: S$GLB,,, | Performed by: CLINICAL NURSE SPECIALIST

## 2022-07-29 PROCEDURE — 3008F BODY MASS INDEX DOCD: CPT | Mod: CPTII,S$GLB,, | Performed by: CLINICAL NURSE SPECIALIST

## 2022-07-29 PROCEDURE — 1159F MED LIST DOCD IN RCRD: CPT | Mod: CPTII,S$GLB,, | Performed by: CLINICAL NURSE SPECIALIST

## 2022-07-29 PROCEDURE — 3078F PR MOST RECENT DIASTOLIC BLOOD PRESSURE < 80 MM HG: ICD-10-PCS | Mod: CPTII,S$GLB,, | Performed by: CLINICAL NURSE SPECIALIST

## 2022-07-29 PROCEDURE — 99999 PR PBB SHADOW E&M-EST. PATIENT-LVL I: CPT | Mod: PBBFAC,,,

## 2022-07-29 PROCEDURE — 3077F SYST BP >= 140 MM HG: CPT | Mod: CPTII,S$GLB,, | Performed by: CLINICAL NURSE SPECIALIST

## 2022-07-29 NOTE — NURSING
I met with Elina for patient education and explained my role at the Cancer Center. We discussed her emotional wellbeing and the support she can receive from me. She denied feelings of depression or anxiety. We toured the infusion room and knows she can reach out to me if any needs arise.   Oncology Navigation   Intake  Date of Diagnosis: 3/29/2022  Cancer Type: Breast  Date Worked: 7/29/2022  Start of Treatment: 8/3/2022  Diagnosis to Treat Timeline (days): 127 days     Treatment     Surgery: Completed  Surgical Oncologist: Charlene Contreras MD  Type of Surgery: double mastectomy  Surgery Schedule Date: 6/3/2022    Medical Oncologist: Sergio Briones MD  Chemotherapy: Planned  Chemotherapy Regimen: Paclitaxel             ER: Negative  ID: Negative  Her2: Negative       Support Systems: Children; Friends / neighbors     Acuity      Follow Up  No follow-ups on file.

## 2022-07-29 NOTE — PROGRESS NOTES
"Oncology Nutrition Evaluation      Elina Ham   1960    Referring Provider:   Gato Briones MD    Reason for Visit:  Patient education    Nutrition Recommendations:  1. Regular diet as tolerated          Nutrition Assessment    This is a 62 y.o.female with a medical diagnosis of breast CA. She reports good appetite and po intake, wt is stable. No c/o n/v/c/d.       PMHx:   Past Medical History:   Diagnosis Date    Cancer 3/21/2022    Diabetes mellitus     Hypercholesterolemia     Hypertension        Allergies: Patient has no known allergies.    Current Medications:    Current Outpatient Medications:     amLODIPine (NORVASC) 10 MG tablet, Take 10 mg by mouth once daily., Disp: , Rfl:     aspirin 81 MG Chew, Take 81 mg by mouth 2 (two) times a day. Stopped using until after sx, Disp: , Rfl:     atorvastatin (LIPITOR) 20 MG tablet, Take 20 mg by mouth every evening., Disp: , Rfl:     [START ON 8/2/2022] dexAMETHasone (DECADRON) 4 MG Tab, Take 1 tablet (4 mg total) by mouth once daily. Take as directed on days 2, 3, and 4 of your chemotherapy cycle. (Patient taking differently: Take 4 mg by mouth once daily. Take as directed on days 2, 3, and 4 of your chemotherapy cycle.), Disp: 24 tablet, Rfl: 0    losartan-hydrochlorothiazide 100-25 mg (HYZAAR) 100-25 mg per tablet, Take 1 tablet by mouth once daily., Disp: , Rfl:     metFORMIN (GLUCOPHAGE) 1000 MG tablet, Take 1,000 mg by mouth daily with breakfast., Disp: , Rfl:     mupirocin (BACTROBAN) 2 % ointment, APPLY TO DRAIN SITES TWICE DAILY, Disp: , Rfl:     [START ON 8/1/2022] ondansetron (ZOFRAN-ODT) 8 MG TbDL, Take 1 tablet (8 mg total) by mouth every 8 (eight) hours as needed (nausea/vomiting)., Disp: 60 tablet, Rfl: 5    Labs: 7/25: WNL      Anthropometrics    Weight:   Wt Readings from Last 1 Encounters:   07/29/22 93.9 kg (207 lb 1.6 oz)     Height:   Ht Readings from Last 1 Encounters:   07/27/22 5' 6" (1.676 m)       Usual Body Weight: 93.9 " kg (207 lb)  % Weight Change: 0%     BMI: 33.4    Ideal Weight: 59 kg (130 lb)  Percent Ideal Weight: 159%        Nutrition Diagnosis    Nutrition Problem  none    Nutrition Intervention    Interventions(treatment strategy):  none    Nutrition Monitoring and Evaluation    Ongoing monitoring not warranted at this time. Please consult [facility]ADE prn.      Marycruz Luna MS, RD, , LDN

## 2022-08-01 ENCOUNTER — HOSPITAL ENCOUNTER (OUTPATIENT)
Dept: RADIOLOGY | Facility: HOSPITAL | Age: 62
Discharge: HOME | End: 2022-08-01
Attending: SURGERY
Payer: COMMERCIAL

## 2022-08-01 DIAGNOSIS — Z17.0 MALIGNANT NEOPLASM OF UPPER-OUTER QUADRANT OF RIGHT BREAST IN FEMALE, ESTROGEN RECEPTOR POSITIVE: ICD-10-CM

## 2022-08-01 DIAGNOSIS — C50.411 MALIGNANT NEOPLASM OF UPPER-OUTER QUADRANT OF RIGHT BREAST IN FEMALE, ESTROGEN RECEPTOR POSITIVE: ICD-10-CM

## 2022-08-01 LAB
CREAT SERPL-MCNC: 0.7 MG/DL (ref 0.5–1.4)
SAMPLE: NORMAL

## 2022-08-01 PROCEDURE — 74174 CTA ABD&PLVS W/CONTRAST: CPT | Mod: TC

## 2022-08-01 PROCEDURE — 25500020 PHARM REV CODE 255: Performed by: SURGERY

## 2022-08-01 RX ADMIN — IOPAMIDOL 100 ML: 755 INJECTION, SOLUTION INTRAVENOUS at 03:08

## 2022-08-03 ENCOUNTER — INFUSION (OUTPATIENT)
Dept: INFUSION THERAPY | Facility: HOSPITAL | Age: 62
End: 2022-08-03
Attending: INTERNAL MEDICINE
Payer: COMMERCIAL

## 2022-08-03 VITALS
SYSTOLIC BLOOD PRESSURE: 131 MMHG | DIASTOLIC BLOOD PRESSURE: 80 MMHG | TEMPERATURE: 98 F | RESPIRATION RATE: 18 BRPM | HEART RATE: 80 BPM

## 2022-08-03 DIAGNOSIS — C50.411 MALIGNANT NEOPLASM OF UPPER-OUTER QUADRANT OF RIGHT BREAST IN FEMALE, ESTROGEN RECEPTOR POSITIVE: Primary | ICD-10-CM

## 2022-08-03 DIAGNOSIS — C50.412 MALIGNANT NEOPLASM OF UPPER-OUTER QUADRANT OF LEFT BREAST IN FEMALE, ESTROGEN RECEPTOR NEGATIVE: ICD-10-CM

## 2022-08-03 DIAGNOSIS — Z17.1 MALIGNANT NEOPLASM OF UPPER-OUTER QUADRANT OF LEFT BREAST IN FEMALE, ESTROGEN RECEPTOR NEGATIVE: ICD-10-CM

## 2022-08-03 DIAGNOSIS — Z17.0 MALIGNANT NEOPLASM OF UPPER-OUTER QUADRANT OF RIGHT BREAST IN FEMALE, ESTROGEN RECEPTOR POSITIVE: Primary | ICD-10-CM

## 2022-08-03 PROCEDURE — 96415 CHEMO IV INFUSION ADDL HR: CPT

## 2022-08-03 PROCEDURE — 25000003 PHARM REV CODE 250: Performed by: INTERNAL MEDICINE

## 2022-08-03 PROCEDURE — 96375 TX/PRO/DX INJ NEW DRUG ADDON: CPT

## 2022-08-03 PROCEDURE — 63600175 PHARM REV CODE 636 W HCPCS: Performed by: INTERNAL MEDICINE

## 2022-08-03 PROCEDURE — 96367 TX/PROPH/DG ADDL SEQ IV INF: CPT

## 2022-08-03 PROCEDURE — 96413 CHEMO IV INFUSION 1 HR: CPT

## 2022-08-03 RX ORDER — HEPARIN 100 UNIT/ML
500 SYRINGE INTRAVENOUS
Status: DISCONTINUED | OUTPATIENT
Start: 2022-08-03 | End: 2022-08-03 | Stop reason: HOSPADM

## 2022-08-03 RX ORDER — SODIUM CHLORIDE 0.9 % (FLUSH) 0.9 %
10 SYRINGE (ML) INJECTION
Status: DISCONTINUED | OUTPATIENT
Start: 2022-08-03 | End: 2022-08-03 | Stop reason: HOSPADM

## 2022-08-03 RX ORDER — EPINEPHRINE 0.3 MG/.3ML
0.3 INJECTION SUBCUTANEOUS ONCE AS NEEDED
Status: DISCONTINUED | OUTPATIENT
Start: 2022-08-03 | End: 2022-08-03 | Stop reason: HOSPADM

## 2022-08-03 RX ORDER — DIPHENHYDRAMINE HYDROCHLORIDE 50 MG/ML
50 INJECTION INTRAMUSCULAR; INTRAVENOUS ONCE AS NEEDED
Status: DISCONTINUED | OUTPATIENT
Start: 2022-08-03 | End: 2022-08-03 | Stop reason: HOSPADM

## 2022-08-03 RX ORDER — FAMOTIDINE 10 MG/ML
20 INJECTION INTRAVENOUS
Status: COMPLETED | OUTPATIENT
Start: 2022-08-03 | End: 2022-08-03

## 2022-08-03 RX ADMIN — DEXAMETHASONE SODIUM PHOSPHATE 10 MG: 4 INJECTION INTRA-ARTICULAR; INTRALESIONAL; INTRAMUSCULAR; INTRAVENOUS; SOFT TISSUE at 10:08

## 2022-08-03 RX ADMIN — FAMOTIDINE 20 MG: 10 INJECTION, SOLUTION INTRAVENOUS at 10:08

## 2022-08-03 RX ADMIN — DIPHENHYDRAMINE HYDROCHLORIDE 50 MG: 50 INJECTION, SOLUTION INTRAMUSCULAR; INTRAVENOUS at 10:08

## 2022-08-03 RX ADMIN — ONDANSETRON 8 MG: 2 INJECTION INTRAMUSCULAR; INTRAVENOUS at 10:08

## 2022-08-03 RX ADMIN — PACLITAXEL 168 MG: 6 INJECTION, SOLUTION INTRAVENOUS at 10:08

## 2022-08-04 LAB
C1INH SERPL-MCNC: NORMAL MG/DL
DHEA SERPL-MCNC: NORMAL
ESTRIOL SERPL-MCNC: NORMAL NG/ML
ESTROGEN SERPL-MCNC: NORMAL PG/ML
INSULIN SERPL-ACNC: NORMAL U[IU]/ML
LAB AP CLINICAL INFORMATION: NORMAL
LAB AP GROSS DESCRIPTION: NORMAL
LAB AP REPORT FOOTNOTES: NORMAL
T3RU NFR SERPL: NORMAL %

## 2022-08-05 NOTE — ASSESSMENT & PLAN NOTE
Repeat ER MI breast panel on surgical specimen  Echo results from cardiology  Bilateral breast cancer  NCCN guidelines discussed  Adjuvant therapy  Indications for adjuvant therapy:  Endocrine, chemotherapy, radiation  Endocrine Therapy .  The side effects of anti-hormonal therapy were discussed.  Including but not limited to: Vasomotor hot flashes, dysuria, arthralgia, hyperlipidemia, osteopenia, osteoporosis. patient had the opportunity to ask questions.    Chemo side effects  The side effects of chemotherapy were discussed.  Including but not limited to: Nausea, vomiting, alopecia, neuropathy, neutropenia, blood transfusion, nephropathy, secondary malignancies, congestive heart failure, allergic reactions to name a few. patient with the opportunity to have questions answered.    Cool cap    She will eventually need follow-up of the abnormal CT of the abdomen pelvis with question of pancreatic cysts.  Will eventually get her to Dr. Jeromy Queen for follow-up.  Think this patient would be best served by adjuvant chemotherapy with Adriamycin Cytoxan, and weekly Taxol.  I still think she would benefit from an adjuvant aromatase inhibitor from the contralateral side as it was ER positive.  After she completes therapy.   I Do think this patient could tolerate dose dense chemotherapy.      Start Taxol weekly--wean off steroids  F/u on repeat ER/pr  GET echo  Report  Plan repeat CT abd/pelvis after Taxol    Plan Taxol x 12 followed by dose dense AC  And then Femara (for contralateral side) --xrt

## 2022-08-05 NOTE — PROGRESS NOTES
Subjective:        PATIENT: Elina Ham  MRN: [id number]  DATE: 8/8/2022  Chief Complaint: Follow-up (Has some questions)    Current Treatment: OP BREAST PACLITAXEL WEEKLY FOLLOWED BY DOSE DENSE DOXORUBICIN CYCLOPHOSPHAMIDE Q2W         Oncology History Overview Note   Oncology History   Malignant neoplasm of upper-outer quadrant of right breast in female, estrogen receptor positive   3/16/2022 Cancer Staged    1. Ultrasound-guided Core Needle Biopsy Right Breast 9 o'clock 5 cm FN Mass with Distortion - Invasive ductal carcinoma, grade 1, with associated focus of ductal carcinoma in situ, grade 1, and focal perineural invasion is present  ER 81%TN 3%HER2 Negative  Ki67 4%    2.Stereotactic guided Core Needle Biopsy Right Breast Mass 10 o'clock Posterior Depth Focal Asymmetry, Architectural Distortion with Calcifications - Invasive ductal carcinoma, grade 1 and focal lobular carcinoma in situ  ER 43%TN 94%HER2 Negative  Ki67 4%    3. Ultrasound-guided Core Needle Biopsy Left Breast 1 o'clock 7 cm FN Mass - High grade carcinoma with anaplastic/malignant giant cell component (suggestive for sarcomatoid/metaplastic change)  ER 0%TN 0%HER2 Negative  Ki67 High    4.Ultrasound-guided Core Needle Biopsy Left Axillary Node - Portion of reactive lymph node; no evidence of metastatic carcinoma     6/3/2022 Surgery    Bilateral simple mastectomy and Bilateral SLNB 6/3/2022  - Left mastectomy and SLNB - a single focus of invasive ductal carcinoma grade 3 measuring 25 mm. Margins clear. Six sentinel lymph nodes were examined with one positive for metastatic carcinoma, 5 mm.   G3, ER-, TN-, HER2    - Right mastectomy and SLNB - two foci of invasive ductal carcinoma grade 1 measuring 10 mm and 4 mm. Low grade DCIS and LCIS were also present. All margins clear. Four sentinel lymph ndoes are negative for metastatic carcinoma.  G1, ER+, TN+, HER2-)                     Malignant neoplasm of upper-outer quadrant of left breast in  female, estrogen receptor negative   8/3/2022 -  Chemotherapy    OP BREAST PACLITAXEL WEEKLY FOLLOWED BY DOSE DENSE DOXORUBICIN CYCLOPHOSPHAMIDE Q2W     2022   Patient presents for toxicity check. She received her first cycle of taxol. She tolerated well. No N/V, no neuropathy. She had some mild fatigue. No change in her counts.       Past Medical History:   Diagnosis Date    Cancer 3/21/2022    Diabetes mellitus     Hypercholesterolemia     Hypertension       .  Past Surgical History:   Procedure Laterality Date    BILATERAL MASTECTOMY Bilateral 6/3/2022    Procedure: MASTECTOMY, BILATERAL;  Surgeon: Charlene Contreras MD;  Location: [facility] OR;  Service: General;  Laterality: Bilateral;     SECTION  92 and 3/15/95    COLONOSCOPY      INSERTION OF BREAST TISSUE EXPANDER Bilateral 6/3/2022    Procedure: INSERTION, TISSUE EXPANDER, BREAST (Bilateral breast reconstruction with tissue expanders and acellular dermal matrix and use of intraoperative fluoroscein angiogragry);  Surgeon: Nick Gibbs MD;  Location: [facility] OR;  Service: Plastics;  Laterality: Bilateral;    MEDIPORT INSERTION, SINGLE      SENTINEL LYMPH NODE BIOPSY Bilateral 6/3/2022    Procedure: BIOPSY, LYMPH NODE, SENTINEL;  Surgeon: Charlene Contreras MD;  Location: [facility] OR;  Service: General;  Laterality: Bilateral;  MAGTRACE Injection in office  Need SentiMag Probe      .  Family History   Problem Relation Age of Onset    Breast cancer Maternal Grandmother             Breast cancer Sister     Breast cancer Sister         remission    Breast cancer Maternal Aunt             Breast cancer Maternal Aunt             Breast cancer Maternal Aunt             Breast cancer Maternal Aunt     Breast cancer Maternal Uncle             Stroke Brother             Pancreatic cancer Brother       Social History     Socioeconomic History    Marital status:    Tobacco Use    Smoking  status: Never Smoker    Smokeless tobacco: Never Used   Substance and Sexual Activity    Alcohol use: Not Currently     Alcohol/week: 1.0 standard drink     Types: 1 Glasses of wine per week     Comment: drink socially but haven't had a glass of wine since 2/13    Drug use: Never    Sexual activity: Yes     Partners: Male     Birth control/protection: None     Comment: We take STD test for the first 2 years we started dating  (5      .Review of patient's allergies indicates:  No Known Allergies     Current Outpatient Medications:     amLODIPine (NORVASC) 10 MG tablet, Take 10 mg by mouth once daily., Disp: , Rfl:     aspirin 81 MG Chew, Take 81 mg by mouth 2 (two) times a day. Stopped using until after sx, Disp: , Rfl:     atorvastatin (LIPITOR) 20 MG tablet, Take 20 mg by mouth every evening., Disp: , Rfl:     losartan-hydrochlorothiazide 100-25 mg (HYZAAR) 100-25 mg per tablet, Take 1 tablet by mouth once daily., Disp: , Rfl:     metFORMIN (GLUCOPHAGE) 1000 MG tablet, Take 1,000 mg by mouth daily with breakfast., Disp: , Rfl:     mupirocin (BACTROBAN) 2 % ointment, APPLY TO DRAIN SITES TWICE DAILY, Disp: , Rfl:     ondansetron (ZOFRAN-ODT) 8 MG TbDL, Take 1 tablet (8 mg total) by mouth every 8 (eight) hours as needed (nausea/vomiting). (Patient not taking: Reported on 8/8/2022), Disp: 60 tablet, Rfl: 5   Review of Systems   Constitutional: Negative for appetite change and unexpected weight change.   HENT: Negative for mouth sores.    Eyes: Negative for visual disturbance.   Respiratory: Negative for cough and shortness of breath.    Cardiovascular: Negative for chest pain.   Gastrointestinal: Negative for abdominal pain and diarrhea.   Genitourinary: Negative for frequency.   Musculoskeletal: Negative for back pain.   Integumentary:  Negative for rash.   Neurological: Negative for headaches.   Hematological: Negative for adenopathy.   Psychiatric/Behavioral: The patient is not nervous/anxious.           Objective:     Vitals:    08/08/22 1428   BP: 117/80   Pulse: 101   Temp: 98.2 °F (36.8 °C)         Physical Exam  Constitutional:       Appearance: Normal appearance.   HENT:      Head: Normocephalic and atraumatic.      Nose: Nose normal.      Mouth/Throat:      Mouth: Mucous membranes are moist.   Eyes:      Extraocular Movements: Extraocular movements intact.      Conjunctiva/sclera: Conjunctivae normal.      Pupils: Pupils are equal, round, and reactive to light.   Cardiovascular:      Rate and Rhythm: Normal rate and regular rhythm.      Pulses: Normal pulses.   Pulmonary:      Effort: Pulmonary effort is normal.      Breath sounds: Normal breath sounds.   Abdominal:      General: Bowel sounds are normal.      Palpations: Abdomen is soft.   Musculoskeletal:      Cervical back: Normal range of motion and neck supple.   Neurological:      General: No focal deficit present.      Mental Status: She is alert and oriented to person, place, and time. Mental status is at baseline.   Psychiatric:         Mood and Affect: Mood normal.         Behavior: Behavior normal.           Assessment/Plan:     Problem List Items Addressed This Visit        Oncology    Malignant neoplasm of upper-outer quadrant of right breast in female, estrogen receptor positive    Malignant neoplasm of upper-outer quadrant of left breast in female, estrogen receptor negative    Current Assessment & Plan     Plan Taxol x 12 followed by dose dense AC, followed by Femara (for contralateral side)   Week 2 8/10/22  Repeat ER [place] breast panel on surgical specimen  Echo results from cardiology show an LVEF of >55%  No steroids with weekly taxol    She will eventually need follow-up of the abnormal CT of the abdomen pelvis with question of pancreatic cysts.  Will eventually get her to Dr. Jeromy Queen for follow-up.  Think this patient would be best served by adjuvant chemotherapy with Adriamycin Cytoxan, and weekly Taxol.  I still think she would  benefit from an adjuvant aromatase inhibitor from the contralateral side as it was ER positive.  After she completes therapy.          Plan repeat CT abd/pelvis after Taxol                       Follow up in about 2 weeks (around 8/22/2022) for Treatment Visit and lab.    Orders, CBC every 2 weeks, CMP every 3 weeks

## 2022-08-05 NOTE — ASSESSMENT & PLAN NOTE
Plan Taxol x 12 followed by dose dense AC, followed by Femara (for contralateral side)   Week 2 8/10/22  Repeat ER [place] breast panel on surgical specimen  Echo results from cardiology show an LVEF of >55%  No steroids with weekly taxol    She will eventually need follow-up of the abnormal CT of the abdomen pelvis with question of pancreatic cysts.  Will eventually get her to Dr. Jeromy Queen for follow-up.  Think this patient would be best served by adjuvant chemotherapy with Adriamycin Cytoxan, and weekly Taxol.  I still think she would benefit from an adjuvant aromatase inhibitor from the contralateral side as it was ER positive.  After she completes therapy.          Plan repeat CT abd/pelvis after Taxol

## 2022-08-08 ENCOUNTER — OFFICE VISIT (OUTPATIENT)
Dept: HEMATOLOGY/ONCOLOGY | Facility: CLINIC | Age: 62
End: 2022-08-08
Payer: COMMERCIAL

## 2022-08-08 VITALS
SYSTOLIC BLOOD PRESSURE: 117 MMHG | OXYGEN SATURATION: 98 % | WEIGHT: 203.5 LBS | TEMPERATURE: 98 F | BODY MASS INDEX: 32.71 KG/M2 | HEART RATE: 101 BPM | DIASTOLIC BLOOD PRESSURE: 80 MMHG | HEIGHT: 66 IN

## 2022-08-08 DIAGNOSIS — Z17.1 MALIGNANT NEOPLASM OF UPPER-OUTER QUADRANT OF LEFT BREAST IN FEMALE, ESTROGEN RECEPTOR NEGATIVE: ICD-10-CM

## 2022-08-08 DIAGNOSIS — Z17.0 MALIGNANT NEOPLASM OF UPPER-OUTER QUADRANT OF RIGHT BREAST IN FEMALE, ESTROGEN RECEPTOR POSITIVE: ICD-10-CM

## 2022-08-08 DIAGNOSIS — C50.412 MALIGNANT NEOPLASM OF UPPER-OUTER QUADRANT OF LEFT BREAST IN FEMALE, ESTROGEN RECEPTOR NEGATIVE: ICD-10-CM

## 2022-08-08 DIAGNOSIS — C50.411 MALIGNANT NEOPLASM OF UPPER-OUTER QUADRANT OF RIGHT BREAST IN FEMALE, ESTROGEN RECEPTOR POSITIVE: ICD-10-CM

## 2022-08-08 PROCEDURE — 3074F PR MOST RECENT SYSTOLIC BLOOD PRESSURE < 130 MM HG: ICD-10-PCS | Mod: CPTII,S$GLB,, | Performed by: NURSE PRACTITIONER

## 2022-08-08 PROCEDURE — 3074F SYST BP LT 130 MM HG: CPT | Mod: CPTII,S$GLB,, | Performed by: NURSE PRACTITIONER

## 2022-08-08 PROCEDURE — 3008F PR BODY MASS INDEX (BMI) DOCUMENTED: ICD-10-PCS | Mod: CPTII,S$GLB,, | Performed by: NURSE PRACTITIONER

## 2022-08-08 PROCEDURE — 1159F MED LIST DOCD IN RCRD: CPT | Mod: CPTII,S$GLB,, | Performed by: NURSE PRACTITIONER

## 2022-08-08 PROCEDURE — 3079F DIAST BP 80-89 MM HG: CPT | Mod: CPTII,S$GLB,, | Performed by: NURSE PRACTITIONER

## 2022-08-08 PROCEDURE — 99999 PR PBB SHADOW E&M-EST. PATIENT-LVL III: ICD-10-PCS | Mod: PBBFAC,,, | Performed by: NURSE PRACTITIONER

## 2022-08-08 PROCEDURE — 99214 OFFICE O/P EST MOD 30 MIN: CPT | Mod: S$GLB,,, | Performed by: NURSE PRACTITIONER

## 2022-08-08 PROCEDURE — 3079F PR MOST RECENT DIASTOLIC BLOOD PRESSURE 80-89 MM HG: ICD-10-PCS | Mod: CPTII,S$GLB,, | Performed by: NURSE PRACTITIONER

## 2022-08-08 PROCEDURE — 3008F BODY MASS INDEX DOCD: CPT | Mod: CPTII,S$GLB,, | Performed by: NURSE PRACTITIONER

## 2022-08-08 PROCEDURE — 99214 PR OFFICE/OUTPT VISIT, EST, LEVL IV, 30-39 MIN: ICD-10-PCS | Mod: S$GLB,,, | Performed by: NURSE PRACTITIONER

## 2022-08-08 PROCEDURE — 1159F PR MEDICATION LIST DOCUMENTED IN MEDICAL RECORD: ICD-10-PCS | Mod: CPTII,S$GLB,, | Performed by: NURSE PRACTITIONER

## 2022-08-08 PROCEDURE — 99999 PR PBB SHADOW E&M-EST. PATIENT-LVL III: CPT | Mod: PBBFAC,,, | Performed by: NURSE PRACTITIONER

## 2022-08-08 RX ORDER — SODIUM CHLORIDE 0.9 % (FLUSH) 0.9 %
10 SYRINGE (ML) INJECTION
Status: CANCELLED | OUTPATIENT
Start: 2022-08-10

## 2022-08-08 RX ORDER — FAMOTIDINE 10 MG/ML
20 INJECTION INTRAVENOUS
Status: CANCELLED | OUTPATIENT
Start: 2022-08-10

## 2022-08-08 RX ORDER — HEPARIN 100 UNIT/ML
500 SYRINGE INTRAVENOUS
Status: CANCELLED | OUTPATIENT
Start: 2022-08-10

## 2022-08-08 RX ORDER — EPINEPHRINE 0.3 MG/.3ML
0.3 INJECTION SUBCUTANEOUS ONCE AS NEEDED
Status: CANCELLED | OUTPATIENT
Start: 2022-08-10

## 2022-08-08 RX ORDER — DIPHENHYDRAMINE HYDROCHLORIDE 50 MG/ML
50 INJECTION INTRAMUSCULAR; INTRAVENOUS ONCE AS NEEDED
Status: CANCELLED | OUTPATIENT
Start: 2022-08-10

## 2022-08-08 RX ORDER — DIPHENHYDRAMINE HYDROCHLORIDE 50 MG/ML
25 INJECTION INTRAMUSCULAR; INTRAVENOUS
Status: CANCELLED
Start: 2022-08-10

## 2022-08-10 ENCOUNTER — INFUSION (OUTPATIENT)
Dept: INFUSION THERAPY | Facility: HOSPITAL | Age: 62
End: 2022-08-10
Attending: INTERNAL MEDICINE
Payer: COMMERCIAL

## 2022-08-10 VITALS
RESPIRATION RATE: 18 BRPM | HEART RATE: 84 BPM | DIASTOLIC BLOOD PRESSURE: 89 MMHG | SYSTOLIC BLOOD PRESSURE: 134 MMHG | TEMPERATURE: 98 F

## 2022-08-10 DIAGNOSIS — C50.412 MALIGNANT NEOPLASM OF UPPER-OUTER QUADRANT OF LEFT BREAST IN FEMALE, ESTROGEN RECEPTOR NEGATIVE: Primary | ICD-10-CM

## 2022-08-10 DIAGNOSIS — Z17.1 MALIGNANT NEOPLASM OF UPPER-OUTER QUADRANT OF LEFT BREAST IN FEMALE, ESTROGEN RECEPTOR NEGATIVE: Primary | ICD-10-CM

## 2022-08-10 PROCEDURE — 96375 TX/PRO/DX INJ NEW DRUG ADDON: CPT

## 2022-08-10 PROCEDURE — 25000003 PHARM REV CODE 250: Performed by: NURSE PRACTITIONER

## 2022-08-10 PROCEDURE — 96413 CHEMO IV INFUSION 1 HR: CPT

## 2022-08-10 PROCEDURE — 63600175 PHARM REV CODE 636 W HCPCS: Performed by: NURSE PRACTITIONER

## 2022-08-10 PROCEDURE — 63600175 PHARM REV CODE 636 W HCPCS: Performed by: INTERNAL MEDICINE

## 2022-08-10 RX ORDER — FAMOTIDINE 10 MG/ML
20 INJECTION INTRAVENOUS
Status: COMPLETED | OUTPATIENT
Start: 2022-08-10 | End: 2022-08-10

## 2022-08-10 RX ORDER — EPINEPHRINE 0.3 MG/.3ML
0.3 INJECTION SUBCUTANEOUS ONCE AS NEEDED
Status: DISCONTINUED | OUTPATIENT
Start: 2022-08-10 | End: 2022-08-10 | Stop reason: HOSPADM

## 2022-08-10 RX ORDER — DIPHENHYDRAMINE HYDROCHLORIDE 50 MG/ML
50 INJECTION INTRAMUSCULAR; INTRAVENOUS ONCE AS NEEDED
Status: DISCONTINUED | OUTPATIENT
Start: 2022-08-10 | End: 2022-08-10 | Stop reason: HOSPADM

## 2022-08-10 RX ORDER — SODIUM CHLORIDE 0.9 % (FLUSH) 0.9 %
10 SYRINGE (ML) INJECTION
Status: DISCONTINUED | OUTPATIENT
Start: 2022-08-10 | End: 2022-08-10 | Stop reason: HOSPADM

## 2022-08-10 RX ORDER — DIPHENHYDRAMINE HYDROCHLORIDE 50 MG/ML
25 INJECTION INTRAMUSCULAR; INTRAVENOUS
Status: COMPLETED | OUTPATIENT
Start: 2022-08-10 | End: 2022-08-10

## 2022-08-10 RX ORDER — HEPARIN 100 UNIT/ML
500 SYRINGE INTRAVENOUS
Status: DISCONTINUED | OUTPATIENT
Start: 2022-08-10 | End: 2022-08-10 | Stop reason: HOSPADM

## 2022-08-10 RX ORDER — ONDANSETRON 2 MG/ML
8 INJECTION INTRAMUSCULAR; INTRAVENOUS
Status: COMPLETED | OUTPATIENT
Start: 2022-08-10 | End: 2022-08-10

## 2022-08-10 RX ADMIN — FAMOTIDINE 20 MG: 10 INJECTION INTRAVENOUS at 11:08

## 2022-08-10 RX ADMIN — PACLITAXEL 168 MG: 6 INJECTION, SOLUTION INTRAVENOUS at 12:08

## 2022-08-10 RX ADMIN — DEXAMETHASONE SODIUM PHOSPHATE 6 MG: 4 INJECTION INTRA-ARTICULAR; INTRALESIONAL; INTRAMUSCULAR; INTRAVENOUS; SOFT TISSUE at 12:08

## 2022-08-10 RX ADMIN — ONDANSETRON 8 MG: 2 INJECTION INTRAMUSCULAR; INTRAVENOUS at 11:08

## 2022-08-10 RX ADMIN — DIPHENHYDRAMINE HYDROCHLORIDE 25 MG: 50 INJECTION, SOLUTION INTRAMUSCULAR; INTRAVENOUS at 11:08

## 2022-08-17 ENCOUNTER — LAB VISIT (OUTPATIENT)
Dept: LAB | Facility: HOSPITAL | Age: 62
End: 2022-08-17
Attending: INTERNAL MEDICINE
Payer: COMMERCIAL

## 2022-08-17 ENCOUNTER — INFUSION (OUTPATIENT)
Dept: INFUSION THERAPY | Facility: HOSPITAL | Age: 62
End: 2022-08-17
Attending: INTERNAL MEDICINE
Payer: COMMERCIAL

## 2022-08-17 VITALS
BODY MASS INDEX: 32.71 KG/M2 | WEIGHT: 203.5 LBS | DIASTOLIC BLOOD PRESSURE: 81 MMHG | RESPIRATION RATE: 18 BRPM | HEIGHT: 66 IN | SYSTOLIC BLOOD PRESSURE: 127 MMHG | OXYGEN SATURATION: 99 % | TEMPERATURE: 98 F | HEART RATE: 85 BPM

## 2022-08-17 DIAGNOSIS — C50.412 MALIGNANT NEOPLASM OF UPPER-OUTER QUADRANT OF LEFT BREAST IN FEMALE, ESTROGEN RECEPTOR NEGATIVE: Primary | ICD-10-CM

## 2022-08-17 DIAGNOSIS — Z17.0 MALIGNANT NEOPLASM OF UPPER-OUTER QUADRANT OF RIGHT BREAST IN FEMALE, ESTROGEN RECEPTOR POSITIVE: ICD-10-CM

## 2022-08-17 DIAGNOSIS — Z17.1 MALIGNANT NEOPLASM OF UPPER-OUTER QUADRANT OF LEFT BREAST IN FEMALE, ESTROGEN RECEPTOR NEGATIVE: Primary | ICD-10-CM

## 2022-08-17 DIAGNOSIS — C50.411 MALIGNANT NEOPLASM OF UPPER-OUTER QUADRANT OF RIGHT BREAST IN FEMALE, ESTROGEN RECEPTOR POSITIVE: ICD-10-CM

## 2022-08-17 LAB
ALBUMIN SERPL-MCNC: 3.8 GM/DL (ref 3.4–4.8)
ALBUMIN/GLOB SERPL: 1.2 RATIO (ref 1.1–2)
ALP SERPL-CCNC: 134 UNIT/L (ref 40–150)
ALT SERPL-CCNC: 114 UNIT/L (ref 0–55)
AST SERPL-CCNC: 49 UNIT/L (ref 5–34)
BASOPHILS # BLD AUTO: 0.03 X10(3)/MCL (ref 0–0.2)
BASOPHILS NFR BLD AUTO: 0.4 %
BILIRUBIN DIRECT+TOT PNL SERPL-MCNC: 0.5 MG/DL
BUN SERPL-MCNC: 12.3 MG/DL (ref 9.8–20.1)
CALCIUM SERPL-MCNC: 10 MG/DL (ref 8.4–10.2)
CHLORIDE SERPL-SCNC: 102 MMOL/L (ref 98–107)
CO2 SERPL-SCNC: 26 MMOL/L (ref 23–31)
CREAT SERPL-MCNC: 0.91 MG/DL (ref 0.55–1.02)
EOSINOPHIL # BLD AUTO: 0.03 X10(3)/MCL (ref 0–0.9)
EOSINOPHIL NFR BLD AUTO: 0.4 %
ERYTHROCYTE [DISTWIDTH] IN BLOOD BY AUTOMATED COUNT: 12.6 % (ref 11.5–17)
GFR SERPLBLD CREATININE-BSD FMLA CKD-EPI: >60 MLS/MIN/1.73/M2
GLOBULIN SER-MCNC: 3.1 GM/DL (ref 2.4–3.5)
GLUCOSE SERPL-MCNC: 216 MG/DL (ref 82–115)
HCT VFR BLD AUTO: 36 % (ref 37–47)
HGB BLD-MCNC: 11.8 GM/DL (ref 12–16)
IMM GRANULOCYTES # BLD AUTO: 0.06 X10(3)/MCL (ref 0–0.04)
IMM GRANULOCYTES NFR BLD AUTO: 0.8 %
LYMPHOCYTES # BLD AUTO: 1.96 X10(3)/MCL (ref 0.6–4.6)
LYMPHOCYTES NFR BLD AUTO: 26.1 %
MCH RBC QN AUTO: 29.8 PG (ref 27–31)
MCHC RBC AUTO-ENTMCNC: 32.8 MG/DL (ref 33–36)
MCV RBC AUTO: 90.9 FL (ref 80–94)
MONOCYTES # BLD AUTO: 0.45 X10(3)/MCL (ref 0.1–1.3)
MONOCYTES NFR BLD AUTO: 6 %
NEUTROPHILS # BLD AUTO: 5 X10(3)/MCL (ref 2.1–9.2)
NEUTROPHILS NFR BLD AUTO: 66.3 %
PLATELET # BLD AUTO: 387 X10(3)/MCL (ref 130–400)
PMV BLD AUTO: 9.7 FL (ref 7.4–10.4)
POTASSIUM SERPL-SCNC: 4 MMOL/L (ref 3.5–5.1)
PROT SERPL-MCNC: 6.9 GM/DL (ref 5.8–7.6)
RBC # BLD AUTO: 3.96 X10(6)/MCL (ref 4.2–5.4)
SODIUM SERPL-SCNC: 139 MMOL/L (ref 136–145)
WBC # SPEC AUTO: 7.5 X10(3)/MCL (ref 4.5–11.5)

## 2022-08-17 PROCEDURE — 96375 TX/PRO/DX INJ NEW DRUG ADDON: CPT

## 2022-08-17 PROCEDURE — 85025 COMPLETE CBC W/AUTO DIFF WBC: CPT

## 2022-08-17 PROCEDURE — 25000003 PHARM REV CODE 250: Performed by: INTERNAL MEDICINE

## 2022-08-17 PROCEDURE — 80053 COMPREHEN METABOLIC PANEL: CPT

## 2022-08-17 PROCEDURE — 63600175 PHARM REV CODE 636 W HCPCS: Performed by: INTERNAL MEDICINE

## 2022-08-17 PROCEDURE — 96413 CHEMO IV INFUSION 1 HR: CPT

## 2022-08-17 PROCEDURE — 36415 COLL VENOUS BLD VENIPUNCTURE: CPT

## 2022-08-17 RX ORDER — SODIUM CHLORIDE 0.9 % (FLUSH) 0.9 %
10 SYRINGE (ML) INJECTION
Status: DISCONTINUED | OUTPATIENT
Start: 2022-08-17 | End: 2022-08-17 | Stop reason: HOSPADM

## 2022-08-17 RX ORDER — DIPHENHYDRAMINE HYDROCHLORIDE 50 MG/ML
25 INJECTION INTRAMUSCULAR; INTRAVENOUS
Status: CANCELLED
Start: 2022-08-17

## 2022-08-17 RX ORDER — SODIUM CHLORIDE 0.9 % (FLUSH) 0.9 %
10 SYRINGE (ML) INJECTION
Status: CANCELLED | OUTPATIENT
Start: 2022-08-17

## 2022-08-17 RX ORDER — DIPHENHYDRAMINE HYDROCHLORIDE 50 MG/ML
50 INJECTION INTRAMUSCULAR; INTRAVENOUS ONCE AS NEEDED
Status: CANCELLED | OUTPATIENT
Start: 2022-08-17

## 2022-08-17 RX ORDER — EPINEPHRINE 0.3 MG/.3ML
0.3 INJECTION SUBCUTANEOUS ONCE AS NEEDED
Status: CANCELLED | OUTPATIENT
Start: 2022-08-17

## 2022-08-17 RX ORDER — FAMOTIDINE 10 MG/ML
20 INJECTION INTRAVENOUS
Status: CANCELLED | OUTPATIENT
Start: 2022-08-17

## 2022-08-17 RX ORDER — FAMOTIDINE 10 MG/ML
20 INJECTION INTRAVENOUS
Status: COMPLETED | OUTPATIENT
Start: 2022-08-17 | End: 2022-08-17

## 2022-08-17 RX ORDER — HEPARIN 100 UNIT/ML
500 SYRINGE INTRAVENOUS
Status: CANCELLED | OUTPATIENT
Start: 2022-08-17

## 2022-08-17 RX ORDER — HEPARIN 100 UNIT/ML
500 SYRINGE INTRAVENOUS
Status: DISCONTINUED | OUTPATIENT
Start: 2022-08-17 | End: 2022-08-17 | Stop reason: HOSPADM

## 2022-08-17 RX ORDER — DIPHENHYDRAMINE HYDROCHLORIDE 50 MG/ML
25 INJECTION INTRAMUSCULAR; INTRAVENOUS
Status: COMPLETED | OUTPATIENT
Start: 2022-08-17 | End: 2022-08-17

## 2022-08-17 RX ORDER — DIPHENHYDRAMINE HYDROCHLORIDE 50 MG/ML
50 INJECTION INTRAMUSCULAR; INTRAVENOUS ONCE AS NEEDED
Status: DISCONTINUED | OUTPATIENT
Start: 2022-08-17 | End: 2022-08-17 | Stop reason: HOSPADM

## 2022-08-17 RX ORDER — ONDANSETRON 2 MG/ML
8 INJECTION INTRAMUSCULAR; INTRAVENOUS
Status: COMPLETED | OUTPATIENT
Start: 2022-08-17 | End: 2022-08-17

## 2022-08-17 RX ORDER — EPINEPHRINE 0.3 MG/.3ML
0.3 INJECTION SUBCUTANEOUS ONCE AS NEEDED
Status: DISCONTINUED | OUTPATIENT
Start: 2022-08-17 | End: 2022-08-17 | Stop reason: HOSPADM

## 2022-08-17 RX ADMIN — ONDANSETRON 8 MG: 2 INJECTION INTRAMUSCULAR; INTRAVENOUS at 12:08

## 2022-08-17 RX ADMIN — PACLITAXEL 168 MG: 6 INJECTION, SOLUTION INTRAVENOUS at 12:08

## 2022-08-17 RX ADMIN — HEPARIN 500 UNITS: 100 SYRINGE at 02:08

## 2022-08-17 RX ADMIN — DEXAMETHASONE SODIUM PHOSPHATE 4 MG: 4 INJECTION INTRA-ARTICULAR; INTRALESIONAL; INTRAMUSCULAR; INTRAVENOUS; SOFT TISSUE at 11:08

## 2022-08-17 RX ADMIN — DIPHENHYDRAMINE HYDROCHLORIDE 25 MG: 50 INJECTION, SOLUTION INTRAMUSCULAR; INTRAVENOUS at 12:08

## 2022-08-17 RX ADMIN — FAMOTIDINE 20 MG: 10 INJECTION, SOLUTION INTRAVENOUS at 12:08

## 2022-08-22 ENCOUNTER — OFFICE VISIT (OUTPATIENT)
Dept: HEMATOLOGY/ONCOLOGY | Facility: CLINIC | Age: 62
End: 2022-08-22
Payer: COMMERCIAL

## 2022-08-22 VITALS
HEART RATE: 93 BPM | HEIGHT: 66 IN | DIASTOLIC BLOOD PRESSURE: 81 MMHG | SYSTOLIC BLOOD PRESSURE: 123 MMHG | OXYGEN SATURATION: 100 % | WEIGHT: 211.19 LBS | BODY MASS INDEX: 33.94 KG/M2 | TEMPERATURE: 98 F

## 2022-08-22 DIAGNOSIS — Z17.0 MALIGNANT NEOPLASM OF UPPER-OUTER QUADRANT OF RIGHT BREAST IN FEMALE, ESTROGEN RECEPTOR POSITIVE: ICD-10-CM

## 2022-08-22 DIAGNOSIS — C50.411 MALIGNANT NEOPLASM OF UPPER-OUTER QUADRANT OF RIGHT BREAST IN FEMALE, ESTROGEN RECEPTOR POSITIVE: ICD-10-CM

## 2022-08-22 PROCEDURE — 1159F PR MEDICATION LIST DOCUMENTED IN MEDICAL RECORD: ICD-10-PCS | Mod: CPTII,S$GLB,, | Performed by: NURSE PRACTITIONER

## 2022-08-22 PROCEDURE — 99999 PR PBB SHADOW E&M-EST. PATIENT-LVL III: ICD-10-PCS | Mod: PBBFAC,,, | Performed by: NURSE PRACTITIONER

## 2022-08-22 PROCEDURE — 3008F PR BODY MASS INDEX (BMI) DOCUMENTED: ICD-10-PCS | Mod: CPTII,S$GLB,, | Performed by: NURSE PRACTITIONER

## 2022-08-22 PROCEDURE — 3074F SYST BP LT 130 MM HG: CPT | Mod: CPTII,S$GLB,, | Performed by: NURSE PRACTITIONER

## 2022-08-22 PROCEDURE — 3008F BODY MASS INDEX DOCD: CPT | Mod: CPTII,S$GLB,, | Performed by: NURSE PRACTITIONER

## 2022-08-22 PROCEDURE — 1159F MED LIST DOCD IN RCRD: CPT | Mod: CPTII,S$GLB,, | Performed by: NURSE PRACTITIONER

## 2022-08-22 PROCEDURE — 99214 PR OFFICE/OUTPT VISIT, EST, LEVL IV, 30-39 MIN: ICD-10-PCS | Mod: S$GLB,,, | Performed by: NURSE PRACTITIONER

## 2022-08-22 PROCEDURE — 99999 PR PBB SHADOW E&M-EST. PATIENT-LVL III: CPT | Mod: PBBFAC,,, | Performed by: NURSE PRACTITIONER

## 2022-08-22 PROCEDURE — 99214 OFFICE O/P EST MOD 30 MIN: CPT | Mod: S$GLB,,, | Performed by: NURSE PRACTITIONER

## 2022-08-22 PROCEDURE — 1160F RVW MEDS BY RX/DR IN RCRD: CPT | Mod: CPTII,S$GLB,, | Performed by: NURSE PRACTITIONER

## 2022-08-22 PROCEDURE — 3074F PR MOST RECENT SYSTOLIC BLOOD PRESSURE < 130 MM HG: ICD-10-PCS | Mod: CPTII,S$GLB,, | Performed by: NURSE PRACTITIONER

## 2022-08-22 PROCEDURE — 1160F PR REVIEW ALL MEDS BY PRESCRIBER/CLIN PHARMACIST DOCUMENTED: ICD-10-PCS | Mod: CPTII,S$GLB,, | Performed by: NURSE PRACTITIONER

## 2022-08-22 PROCEDURE — 3079F PR MOST RECENT DIASTOLIC BLOOD PRESSURE 80-89 MM HG: ICD-10-PCS | Mod: CPTII,S$GLB,, | Performed by: NURSE PRACTITIONER

## 2022-08-22 PROCEDURE — 3079F DIAST BP 80-89 MM HG: CPT | Mod: CPTII,S$GLB,, | Performed by: NURSE PRACTITIONER

## 2022-08-22 RX ORDER — HEPARIN 100 UNIT/ML
500 SYRINGE INTRAVENOUS
Status: CANCELLED | OUTPATIENT
Start: 2022-08-24

## 2022-08-22 RX ORDER — DIPHENHYDRAMINE HYDROCHLORIDE 50 MG/ML
25 INJECTION INTRAMUSCULAR; INTRAVENOUS
Status: CANCELLED
Start: 2022-08-24

## 2022-08-22 RX ORDER — ONDANSETRON 2 MG/ML
8 INJECTION INTRAMUSCULAR; INTRAVENOUS
Status: CANCELLED
Start: 2022-08-24

## 2022-08-22 RX ORDER — EPINEPHRINE 0.3 MG/.3ML
0.3 INJECTION SUBCUTANEOUS ONCE AS NEEDED
Status: CANCELLED | OUTPATIENT
Start: 2022-08-24

## 2022-08-22 RX ORDER — SODIUM CHLORIDE 0.9 % (FLUSH) 0.9 %
10 SYRINGE (ML) INJECTION
Status: CANCELLED | OUTPATIENT
Start: 2022-08-24

## 2022-08-22 RX ORDER — DIPHENHYDRAMINE HYDROCHLORIDE 50 MG/ML
50 INJECTION INTRAMUSCULAR; INTRAVENOUS ONCE AS NEEDED
Status: CANCELLED | OUTPATIENT
Start: 2022-08-24

## 2022-08-22 RX ORDER — FAMOTIDINE 10 MG/ML
20 INJECTION INTRAVENOUS
Status: CANCELLED | OUTPATIENT
Start: 2022-08-24

## 2022-08-22 NOTE — PROGRESS NOTES
Subjective:        PATIENT: Elina Ham  MRN: [id number]  DATE: 8/22/2022  Chief Complaint: Follow-up (2 week f/u. States her sugar levels has been higher lately, 203)    Current Treatment: OP BREAST PACLITAXEL WEEKLY FOLLOWED BY DOSE DENSE DOXORUBICIN CYCLOPHOSPHAMIDE Q2W         Oncology History Overview Note   Oncology History   Malignant neoplasm of upper-outer quadrant of right breast in female, estrogen receptor positive   3/16/2022 Cancer Staged    1. Ultrasound-guided Core Needle Biopsy Right Breast 9 o'clock 5 cm FN Mass with Distortion - Invasive ductal carcinoma, grade 1, with associated focus of ductal carcinoma in situ, grade 1, and focal perineural invasion is present  ER 81%[place] 3%HER2 Negative  Ki67 4%    2.Stereotactic guided Core Needle Biopsy Right Breast Mass 10 o'clock Posterior Depth Focal Asymmetry, Architectural Distortion with Calcifications - Invasive ductal carcinoma, grade 1 and focal lobular carcinoma in situ  ER 43%[place] 94%HER2 Negative  Ki67 4%    3. Ultrasound-guided Core Needle Biopsy Left Breast 1 o'clock 7 cm FN Mass - High grade carcinoma with anaplastic/malignant giant cell component (suggestive for sarcomatoid/metaplastic change)  ER 0%[place] 0%HER2 Negative  Ki67 High    4.Ultrasound-guided Core Needle Biopsy Left Axillary Node - Portion of reactive lymph node; no evidence of metastatic carcinoma     6/3/2022 Surgery    Bilateral simple mastectomy and Bilateral SLNB 6/3/2022  - Left mastectomy and SLNB - a single focus of invasive ductal carcinoma grade 3 measuring 25 mm. Margins clear. Six sentinel lymph nodes were examined with one positive for metastatic carcinoma, 5 mm.   G3, ER-, [place]-, HER2    - Right mastectomy and SLNB - two foci of invasive ductal carcinoma grade 1 measuring 10 mm and 4 mm. Low grade DCIS and LCIS were also present. All margins clear. Four sentinel lymph ndoes are negative for metastatic carcinoma.  G1, ER+, [place]+, HER2-)                     Malignant neoplasm of  upper-outer quadrant of left breast in female, estrogen receptor negative   8/3/2022 -  Chemotherapy    OP BREAST PACLITAXEL WEEKLY FOLLOWED BY DOSE DENSE DOXORUBICIN CYCLOPHOSPHAMIDE Q2W     2022   Patient presents for treatment visit. She had some diarrhea. This is not a new thing for her. She takes imodium with resolution of symptoms. She has no blood in her stool. No abdominal pain. No fever or chills. Mild intermittent neuropathy.   Recently added glipizide to her diabetic regimen.     Past Medical History:   Diagnosis Date    Cancer 3/21/2022    Diabetes mellitus     Hypercholesterolemia     Hypertension       .  Past Surgical History:   Procedure Laterality Date    BILATERAL MASTECTOMY Bilateral 6/3/2022    Procedure: MASTECTOMY, BILATERAL;  Surgeon: Charlene Contreras MD;  Location: [facility] OR;  Service: General;  Laterality: Bilateral;     SECTION  92 and 3/15/95    COLONOSCOPY      INSERTION OF BREAST TISSUE EXPANDER Bilateral 6/3/2022    Procedure: INSERTION, TISSUE EXPANDER, BREAST (Bilateral breast reconstruction with tissue expanders and acellular dermal matrix and use of intraoperative fluoroscein angiogragry);  Surgeon: Nick Gibbs MD;  Location: [facility] OR;  Service: Plastics;  Laterality: Bilateral;    MEDIPORT INSERTION, SINGLE      SENTINEL LYMPH NODE BIOPSY Bilateral 6/3/2022    Procedure: BIOPSY, LYMPH NODE, SENTINEL;  Surgeon: Charlene Contreras MD;  Location: [facility] OR;  Service: General;  Laterality: Bilateral;  MAGTRACE Injection in office  Need SentiMag Probe      .  Family History   Problem Relation Age of Onset    Breast cancer Maternal Grandmother             Breast cancer Sister     Breast cancer Sister         remission    Breast cancer Maternal Aunt             Breast cancer Maternal Aunt             Breast cancer Maternal Aunt             Breast cancer Maternal Aunt     Breast cancer Maternal Uncle             Stroke  Brother             Pancreatic cancer Brother       Social History     Socioeconomic History    Marital status:    Tobacco Use    Smoking status: Never Smoker    Smokeless tobacco: Never Used   Substance and Sexual Activity    Alcohol use: Not Currently     Alcohol/week: 1.0 standard drink     Types: 1 Glasses of wine per week     Comment: drink socially but haven't had a glass of wine since     Drug use: Never    Sexual activity: Yes     Partners: Male     Birth control/protection: None     Comment: We take STD test for the first 2 years we started dating  (5      .Review of patient's allergies indicates:  No Known Allergies     Current Outpatient Medications:     amLODIPine (NORVASC) 10 MG tablet, Take 10 mg by mouth once daily., Disp: , Rfl:     aspirin 81 MG Chew, Take 81 mg by mouth 2 (two) times a day. Stopped using until after sx, Disp: , Rfl:     atorvastatin (LIPITOR) 20 MG tablet, Take 20 mg by mouth every evening., Disp: , Rfl:     losartan-hydrochlorothiazide 100-25 mg (HYZAAR) 100-25 mg per tablet, Take 1 tablet by mouth once daily., Disp: , Rfl:     metFORMIN (GLUCOPHAGE) 1000 MG tablet, Take 1,000 mg by mouth daily with breakfast., Disp: , Rfl:     mupirocin (BACTROBAN) 2 % ointment, APPLY TO DRAIN SITES TWICE DAILY, Disp: , Rfl:     ondansetron (ZOFRAN-ODT) 8 MG TbDL, Take 1 tablet (8 mg total) by mouth every 8 (eight) hours as needed (nausea/vomiting). (Patient not taking: No sig reported), Disp: 60 tablet, Rfl: 5   Review of Systems   Constitutional: Negative for appetite change and unexpected weight change.   HENT: Negative for mouth sores.    Eyes: Negative for visual disturbance.   Respiratory: Negative for cough and shortness of breath.    Cardiovascular: Negative for chest pain.   Gastrointestinal: Negative for abdominal pain and diarrhea.   Genitourinary: Negative for frequency.   Musculoskeletal: Negative for back pain.   Integumentary:  Negative for rash.    Neurological: Negative for headaches.   Hematological: Negative for adenopathy.   Psychiatric/Behavioral: The patient is not nervous/anxious.          Objective:     Vitals:    08/22/22 1319   BP: 123/81   Pulse: 93   Temp: 98.2 °F (36.8 °C)         Physical Exam  Constitutional:       Appearance: Normal appearance.   HENT:      Head: Normocephalic and atraumatic.      Nose: Nose normal.      Mouth/Throat:      Mouth: Mucous membranes are moist.   Eyes:      Extraocular Movements: Extraocular movements intact.      Conjunctiva/sclera: Conjunctivae normal.      Pupils: Pupils are equal, round, and reactive to light.   Cardiovascular:      Rate and Rhythm: Normal rate and regular rhythm.      Pulses: Normal pulses.   Pulmonary:      Effort: Pulmonary effort is normal.      Breath sounds: Normal breath sounds.   Abdominal:      General: Bowel sounds are normal.      Palpations: Abdomen is soft.   Musculoskeletal:      Cervical back: Normal range of motion and neck supple.   Neurological:      General: No focal deficit present.      Mental Status: She is alert and oriented to person, place, and time. Mental status is at baseline.   Psychiatric:         Mood and Affect: Mood normal.         Behavior: Behavior normal.           Assessment/Plan:     Problem List Items Addressed This Visit        Oncology    Malignant neoplasm of upper-outer quadrant of right breast in female, estrogen receptor positive    Current Assessment & Plan     Plan Taxol x 12 followed by dose dense AC, followed by Femara (for contralateral side)   Week 4 8/24/22  Repeat [place] breast panel on surgical specimen  Echo results from cardiology show an LVEF of >55%  No steroids with weekly taxol    She will eventually need follow-up of the abnormal CT of the abdomen pelvis with question of pancreatic cysts.  Will eventually get her to Dr. Jeromy Queen for follow-up.  Think this patient would be best served by adjuvant chemotherapy with Adriamycin  Cytoxan, and weekly Taxol.  I still think she would benefit from an adjuvant aromatase inhibitor from the contralateral side as it was ER positive.  After she completes therapy.          Plan repeat CT abd/pelvis after Taxol                       Follow up in about 3 weeks (around 9/12/2022) for lab and visit with Dr. Briones.

## 2022-08-22 NOTE — ASSESSMENT & PLAN NOTE
Plan Taxol x 12 followed by dose dense AC, followed by Femara (for contralateral side)   Week 4 8/24/22  Repeat ER [place] breast panel on surgical specimen  Echo results from cardiology show an LVEF of >55%  No steroids with weekly taxol    She will eventually need follow-up of the abnormal CT of the abdomen pelvis with question of pancreatic cysts.  Will eventually get her to Dr. Jeromy Queen for follow-up.  Think this patient would be best served by adjuvant chemotherapy with Adriamycin Cytoxan, and weekly Taxol.  I still think she would benefit from an adjuvant aromatase inhibitor from the contralateral side as it was ER positive.  After she completes therapy.          Plan repeat CT abd/pelvis after Taxol

## 2022-08-24 ENCOUNTER — INFUSION (OUTPATIENT)
Dept: INFUSION THERAPY | Facility: HOSPITAL | Age: 62
End: 2022-08-24
Attending: INTERNAL MEDICINE
Payer: COMMERCIAL

## 2022-08-24 VITALS
OXYGEN SATURATION: 100 % | SYSTOLIC BLOOD PRESSURE: 139 MMHG | WEIGHT: 211.19 LBS | TEMPERATURE: 98 F | HEART RATE: 80 BPM | DIASTOLIC BLOOD PRESSURE: 81 MMHG | BODY MASS INDEX: 34.09 KG/M2 | RESPIRATION RATE: 18 BRPM

## 2022-08-24 DIAGNOSIS — C50.412 MALIGNANT NEOPLASM OF UPPER-OUTER QUADRANT OF LEFT BREAST IN FEMALE, ESTROGEN RECEPTOR NEGATIVE: Primary | ICD-10-CM

## 2022-08-24 DIAGNOSIS — Z17.1 MALIGNANT NEOPLASM OF UPPER-OUTER QUADRANT OF LEFT BREAST IN FEMALE, ESTROGEN RECEPTOR NEGATIVE: Primary | ICD-10-CM

## 2022-08-24 PROCEDURE — 96374 THER/PROPH/DIAG INJ IV PUSH: CPT

## 2022-08-24 PROCEDURE — 63600175 PHARM REV CODE 636 W HCPCS: Performed by: NURSE PRACTITIONER

## 2022-08-24 PROCEDURE — 96415 CHEMO IV INFUSION ADDL HR: CPT

## 2022-08-24 PROCEDURE — 96413 CHEMO IV INFUSION 1 HR: CPT

## 2022-08-24 PROCEDURE — 25000003 PHARM REV CODE 250: Performed by: NURSE PRACTITIONER

## 2022-08-24 PROCEDURE — A4216 STERILE WATER/SALINE, 10 ML: HCPCS | Performed by: NURSE PRACTITIONER

## 2022-08-24 PROCEDURE — 96375 TX/PRO/DX INJ NEW DRUG ADDON: CPT

## 2022-08-24 RX ORDER — FAMOTIDINE 10 MG/ML
20 INJECTION INTRAVENOUS
Status: COMPLETED | OUTPATIENT
Start: 2022-08-24 | End: 2022-08-24

## 2022-08-24 RX ORDER — SODIUM CHLORIDE 0.9 % (FLUSH) 0.9 %
10 SYRINGE (ML) INJECTION
Status: DISCONTINUED | OUTPATIENT
Start: 2022-08-24 | End: 2022-08-24 | Stop reason: HOSPADM

## 2022-08-24 RX ORDER — EPINEPHRINE 0.3 MG/.3ML
0.3 INJECTION SUBCUTANEOUS ONCE AS NEEDED
Status: DISCONTINUED | OUTPATIENT
Start: 2022-08-24 | End: 2022-08-24 | Stop reason: HOSPADM

## 2022-08-24 RX ORDER — DIPHENHYDRAMINE HYDROCHLORIDE 50 MG/ML
25 INJECTION INTRAMUSCULAR; INTRAVENOUS
Status: COMPLETED | OUTPATIENT
Start: 2022-08-24 | End: 2022-08-24

## 2022-08-24 RX ORDER — HEPARIN 100 UNIT/ML
500 SYRINGE INTRAVENOUS
Status: DISCONTINUED | OUTPATIENT
Start: 2022-08-24 | End: 2022-08-24 | Stop reason: HOSPADM

## 2022-08-24 RX ORDER — DIPHENHYDRAMINE HYDROCHLORIDE 50 MG/ML
50 INJECTION INTRAMUSCULAR; INTRAVENOUS ONCE AS NEEDED
Status: DISCONTINUED | OUTPATIENT
Start: 2022-08-24 | End: 2022-08-24 | Stop reason: HOSPADM

## 2022-08-24 RX ORDER — ONDANSETRON 2 MG/ML
8 INJECTION INTRAMUSCULAR; INTRAVENOUS
Status: COMPLETED | OUTPATIENT
Start: 2022-08-24 | End: 2022-08-24

## 2022-08-24 RX ADMIN — PACLITAXEL 168 MG: 6 INJECTION, SOLUTION INTRAVENOUS at 12:08

## 2022-08-24 RX ADMIN — DIPHENHYDRAMINE HYDROCHLORIDE 25 MG: 50 INJECTION, SOLUTION INTRAMUSCULAR; INTRAVENOUS at 12:08

## 2022-08-24 RX ADMIN — Medication 10 ML: at 01:08

## 2022-08-24 RX ADMIN — ONDANSETRON 8 MG: 2 INJECTION INTRAMUSCULAR; INTRAVENOUS at 11:08

## 2022-08-24 RX ADMIN — FAMOTIDINE 20 MG: 10 INJECTION, SOLUTION INTRAVENOUS at 12:08

## 2022-08-24 RX ADMIN — HEPARIN 500 UNITS: 100 SYRINGE at 01:08

## 2022-08-31 ENCOUNTER — INFUSION (OUTPATIENT)
Dept: INFUSION THERAPY | Facility: HOSPITAL | Age: 62
End: 2022-08-31
Attending: INTERNAL MEDICINE
Payer: COMMERCIAL

## 2022-08-31 ENCOUNTER — LAB VISIT (OUTPATIENT)
Dept: LAB | Facility: HOSPITAL | Age: 62
End: 2022-08-31
Attending: INTERNAL MEDICINE
Payer: COMMERCIAL

## 2022-08-31 VITALS
HEIGHT: 66 IN | DIASTOLIC BLOOD PRESSURE: 73 MMHG | BODY MASS INDEX: 32.99 KG/M2 | WEIGHT: 205.25 LBS | HEART RATE: 80 BPM | RESPIRATION RATE: 20 BRPM | SYSTOLIC BLOOD PRESSURE: 125 MMHG

## 2022-08-31 DIAGNOSIS — Z17.1 MALIGNANT NEOPLASM OF UPPER-OUTER QUADRANT OF LEFT BREAST IN FEMALE, ESTROGEN RECEPTOR NEGATIVE: Primary | ICD-10-CM

## 2022-08-31 DIAGNOSIS — Z17.0 MALIGNANT NEOPLASM OF UPPER-OUTER QUADRANT OF RIGHT BREAST IN FEMALE, ESTROGEN RECEPTOR POSITIVE: ICD-10-CM

## 2022-08-31 DIAGNOSIS — C50.411 MALIGNANT NEOPLASM OF UPPER-OUTER QUADRANT OF RIGHT BREAST IN FEMALE, ESTROGEN RECEPTOR POSITIVE: ICD-10-CM

## 2022-08-31 DIAGNOSIS — C50.412 MALIGNANT NEOPLASM OF UPPER-OUTER QUADRANT OF LEFT BREAST IN FEMALE, ESTROGEN RECEPTOR NEGATIVE: Primary | ICD-10-CM

## 2022-08-31 LAB
ALBUMIN SERPL-MCNC: 4.1 GM/DL (ref 3.4–4.8)
ALBUMIN/GLOB SERPL: 1.4 RATIO (ref 1.1–2)
ALP SERPL-CCNC: 99 UNIT/L (ref 40–150)
ALT SERPL-CCNC: 38 UNIT/L (ref 0–55)
AST SERPL-CCNC: 17 UNIT/L (ref 5–34)
BASOPHILS # BLD AUTO: 0.02 X10(3)/MCL (ref 0–0.2)
BASOPHILS NFR BLD AUTO: 0.4 %
BILIRUBIN DIRECT+TOT PNL SERPL-MCNC: 0.9 MG/DL
BUN SERPL-MCNC: 16.7 MG/DL (ref 9.8–20.1)
CALCIUM SERPL-MCNC: 10.3 MG/DL (ref 8.4–10.2)
CHLORIDE SERPL-SCNC: 101 MMOL/L (ref 98–107)
CO2 SERPL-SCNC: 31 MMOL/L (ref 23–31)
CREAT SERPL-MCNC: 1.08 MG/DL (ref 0.55–1.02)
EOSINOPHIL # BLD AUTO: 0.02 X10(3)/MCL (ref 0–0.9)
EOSINOPHIL NFR BLD AUTO: 0.4 %
ERYTHROCYTE [DISTWIDTH] IN BLOOD BY AUTOMATED COUNT: 12.6 % (ref 11.5–17)
GFR SERPLBLD CREATININE-BSD FMLA CKD-EPI: 59 MLS/MIN/1.73/M2
GLOBULIN SER-MCNC: 2.9 GM/DL (ref 2.4–3.5)
GLUCOSE SERPL-MCNC: 162 MG/DL (ref 82–115)
HCT VFR BLD AUTO: 36.6 % (ref 37–47)
HGB BLD-MCNC: 11.9 GM/DL (ref 12–16)
IMM GRANULOCYTES # BLD AUTO: 0.02 X10(3)/MCL (ref 0–0.04)
IMM GRANULOCYTES NFR BLD AUTO: 0.4 %
LYMPHOCYTES # BLD AUTO: 2.42 X10(3)/MCL (ref 0.6–4.6)
LYMPHOCYTES NFR BLD AUTO: 43.8 %
MCH RBC QN AUTO: 29.6 PG (ref 27–31)
MCHC RBC AUTO-ENTMCNC: 32.5 MG/DL (ref 33–36)
MCV RBC AUTO: 91 FL (ref 80–94)
MONOCYTES # BLD AUTO: 0.46 X10(3)/MCL (ref 0.1–1.3)
MONOCYTES NFR BLD AUTO: 8.3 %
NEUTROPHILS # BLD AUTO: 2.6 X10(3)/MCL (ref 2.1–9.2)
NEUTROPHILS NFR BLD AUTO: 46.7 %
PLATELET # BLD AUTO: 400 X10(3)/MCL (ref 130–400)
PMV BLD AUTO: 9.3 FL (ref 7.4–10.4)
POTASSIUM SERPL-SCNC: 3.6 MMOL/L (ref 3.5–5.1)
PROT SERPL-MCNC: 7 GM/DL (ref 5.8–7.6)
RBC # BLD AUTO: 4.02 X10(6)/MCL (ref 4.2–5.4)
SODIUM SERPL-SCNC: 140 MMOL/L (ref 136–145)
WBC # SPEC AUTO: 5.5 X10(3)/MCL (ref 4.5–11.5)

## 2022-08-31 PROCEDURE — 80053 COMPREHEN METABOLIC PANEL: CPT

## 2022-08-31 PROCEDURE — 63600175 PHARM REV CODE 636 W HCPCS: Performed by: NURSE PRACTITIONER

## 2022-08-31 PROCEDURE — 36415 COLL VENOUS BLD VENIPUNCTURE: CPT

## 2022-08-31 PROCEDURE — 25000003 PHARM REV CODE 250: Performed by: NURSE PRACTITIONER

## 2022-08-31 PROCEDURE — 85025 COMPLETE CBC W/AUTO DIFF WBC: CPT

## 2022-08-31 RX ORDER — SODIUM CHLORIDE 0.9 % (FLUSH) 0.9 %
10 SYRINGE (ML) INJECTION
Status: DISCONTINUED | OUTPATIENT
Start: 2022-08-31 | End: 2022-08-31 | Stop reason: HOSPADM

## 2022-08-31 RX ORDER — ONDANSETRON 2 MG/ML
8 INJECTION INTRAMUSCULAR; INTRAVENOUS
Status: COMPLETED | OUTPATIENT
Start: 2022-08-31 | End: 2022-08-31

## 2022-08-31 RX ORDER — FAMOTIDINE 10 MG/ML
20 INJECTION INTRAVENOUS
Status: CANCELLED | OUTPATIENT
Start: 2022-08-31

## 2022-08-31 RX ORDER — HEPARIN 100 UNIT/ML
500 SYRINGE INTRAVENOUS
Status: CANCELLED | OUTPATIENT
Start: 2022-08-31

## 2022-08-31 RX ORDER — FAMOTIDINE 10 MG/ML
20 INJECTION INTRAVENOUS
Status: COMPLETED | OUTPATIENT
Start: 2022-08-31 | End: 2022-08-31

## 2022-08-31 RX ORDER — DIPHENHYDRAMINE HYDROCHLORIDE 50 MG/ML
50 INJECTION INTRAMUSCULAR; INTRAVENOUS ONCE AS NEEDED
Status: DISCONTINUED | OUTPATIENT
Start: 2022-08-31 | End: 2022-08-31 | Stop reason: HOSPADM

## 2022-08-31 RX ORDER — ONDANSETRON 2 MG/ML
8 INJECTION INTRAMUSCULAR; INTRAVENOUS
Status: CANCELLED
Start: 2022-08-31

## 2022-08-31 RX ORDER — EPINEPHRINE 0.3 MG/.3ML
0.3 INJECTION SUBCUTANEOUS ONCE AS NEEDED
Status: DISCONTINUED | OUTPATIENT
Start: 2022-08-31 | End: 2022-08-31 | Stop reason: HOSPADM

## 2022-08-31 RX ORDER — DIPHENHYDRAMINE HYDROCHLORIDE 50 MG/ML
50 INJECTION INTRAMUSCULAR; INTRAVENOUS ONCE AS NEEDED
Status: CANCELLED | OUTPATIENT
Start: 2022-08-31

## 2022-08-31 RX ORDER — HEPARIN 100 UNIT/ML
500 SYRINGE INTRAVENOUS
Status: DISCONTINUED | OUTPATIENT
Start: 2022-08-31 | End: 2022-08-31 | Stop reason: HOSPADM

## 2022-08-31 RX ORDER — DIPHENHYDRAMINE HYDROCHLORIDE 50 MG/ML
25 INJECTION INTRAMUSCULAR; INTRAVENOUS
Status: COMPLETED | OUTPATIENT
Start: 2022-08-31 | End: 2022-08-31

## 2022-08-31 RX ORDER — SODIUM CHLORIDE 0.9 % (FLUSH) 0.9 %
10 SYRINGE (ML) INJECTION
Status: CANCELLED | OUTPATIENT
Start: 2022-08-31

## 2022-08-31 RX ORDER — EPINEPHRINE 0.3 MG/.3ML
0.3 INJECTION SUBCUTANEOUS ONCE AS NEEDED
Status: CANCELLED | OUTPATIENT
Start: 2022-08-31

## 2022-08-31 RX ORDER — DIPHENHYDRAMINE HYDROCHLORIDE 50 MG/ML
25 INJECTION INTRAMUSCULAR; INTRAVENOUS
Status: CANCELLED
Start: 2022-08-31

## 2022-08-31 RX ADMIN — PACLITAXEL 168 MG: 6 INJECTION, SOLUTION INTRAVENOUS at 11:08

## 2022-08-31 RX ADMIN — DIPHENHYDRAMINE HYDROCHLORIDE 25 MG: 50 INJECTION, SOLUTION INTRAMUSCULAR; INTRAVENOUS at 11:08

## 2022-08-31 RX ADMIN — HEPARIN 500 UNITS: 100 SYRINGE at 01:08

## 2022-08-31 RX ADMIN — ONDANSETRON 8 MG: 2 INJECTION INTRAMUSCULAR; INTRAVENOUS at 11:08

## 2022-08-31 RX ADMIN — FAMOTIDINE 20 MG: 10 INJECTION, SOLUTION INTRAVENOUS at 11:08

## 2022-09-06 ENCOUNTER — LAB VISIT (OUTPATIENT)
Dept: LAB | Facility: HOSPITAL | Age: 62
End: 2022-09-06
Attending: INTERNAL MEDICINE
Payer: COMMERCIAL

## 2022-09-06 DIAGNOSIS — Z17.0 MALIGNANT NEOPLASM OF UPPER-OUTER QUADRANT OF RIGHT BREAST IN FEMALE, ESTROGEN RECEPTOR POSITIVE: ICD-10-CM

## 2022-09-06 DIAGNOSIS — C50.411 MALIGNANT NEOPLASM OF UPPER-OUTER QUADRANT OF RIGHT BREAST IN FEMALE, ESTROGEN RECEPTOR POSITIVE: ICD-10-CM

## 2022-09-06 LAB
ALBUMIN SERPL-MCNC: 4.2 GM/DL (ref 3.4–4.8)
ALBUMIN/GLOB SERPL: 1.4 RATIO (ref 1.1–2)
ALP SERPL-CCNC: 101 UNIT/L (ref 40–150)
ALT SERPL-CCNC: 41 UNIT/L (ref 0–55)
AST SERPL-CCNC: 30 UNIT/L (ref 5–34)
BASOPHILS # BLD AUTO: 0.04 X10(3)/MCL (ref 0–0.2)
BASOPHILS NFR BLD AUTO: 0.8 %
BILIRUBIN DIRECT+TOT PNL SERPL-MCNC: 0.6 MG/DL
BUN SERPL-MCNC: 7.2 MG/DL (ref 9.8–20.1)
CALCIUM SERPL-MCNC: 9.7 MG/DL (ref 8.4–10.2)
CHLORIDE SERPL-SCNC: 100 MMOL/L (ref 98–107)
CO2 SERPL-SCNC: 26 MMOL/L (ref 23–31)
CREAT SERPL-MCNC: 0.89 MG/DL (ref 0.55–1.02)
EOSINOPHIL # BLD AUTO: 0.01 X10(3)/MCL (ref 0–0.9)
EOSINOPHIL NFR BLD AUTO: 0.2 %
ERYTHROCYTE [DISTWIDTH] IN BLOOD BY AUTOMATED COUNT: 12.5 % (ref 11.5–17)
GFR SERPLBLD CREATININE-BSD FMLA CKD-EPI: >60 MLS/MIN/1.73/M2
GLOBULIN SER-MCNC: 3 GM/DL (ref 2.4–3.5)
GLUCOSE SERPL-MCNC: 122 MG/DL (ref 82–115)
HCT VFR BLD AUTO: 35.6 % (ref 37–47)
HGB BLD-MCNC: 11.8 GM/DL (ref 12–16)
IMM GRANULOCYTES # BLD AUTO: 0.03 X10(3)/MCL (ref 0–0.04)
IMM GRANULOCYTES NFR BLD AUTO: 0.6 %
LYMPHOCYTES # BLD AUTO: 1.87 X10(3)/MCL (ref 0.6–4.6)
LYMPHOCYTES NFR BLD AUTO: 39 %
MCH RBC QN AUTO: 29.8 PG (ref 27–31)
MCHC RBC AUTO-ENTMCNC: 33.1 MG/DL (ref 33–36)
MCV RBC AUTO: 89.9 FL (ref 80–94)
MONOCYTES # BLD AUTO: 0.31 X10(3)/MCL (ref 0.1–1.3)
MONOCYTES NFR BLD AUTO: 6.5 %
NEUTROPHILS # BLD AUTO: 2.5 X10(3)/MCL (ref 2.1–9.2)
NEUTROPHILS NFR BLD AUTO: 52.9 %
PLATELET # BLD AUTO: 494 X10(3)/MCL (ref 130–400)
PMV BLD AUTO: 9.8 FL (ref 7.4–10.4)
POTASSIUM SERPL-SCNC: 3.2 MMOL/L (ref 3.5–5.1)
PROT SERPL-MCNC: 7.2 GM/DL (ref 5.8–7.6)
RBC # BLD AUTO: 3.96 X10(6)/MCL (ref 4.2–5.4)
SODIUM SERPL-SCNC: 139 MMOL/L (ref 136–145)
WBC # SPEC AUTO: 4.8 X10(3)/MCL (ref 4.5–11.5)

## 2022-09-06 PROCEDURE — 85025 COMPLETE CBC W/AUTO DIFF WBC: CPT

## 2022-09-06 PROCEDURE — 80053 COMPREHEN METABOLIC PANEL: CPT

## 2022-09-06 PROCEDURE — 36415 COLL VENOUS BLD VENIPUNCTURE: CPT

## 2022-09-06 RX ORDER — EPINEPHRINE 0.3 MG/.3ML
0.3 INJECTION SUBCUTANEOUS ONCE AS NEEDED
Status: CANCELLED | OUTPATIENT
Start: 2022-09-07

## 2022-09-06 RX ORDER — ONDANSETRON 2 MG/ML
8 INJECTION INTRAMUSCULAR; INTRAVENOUS
Status: CANCELLED
Start: 2022-09-07

## 2022-09-06 RX ORDER — FAMOTIDINE 10 MG/ML
20 INJECTION INTRAVENOUS
Status: CANCELLED | OUTPATIENT
Start: 2022-09-07

## 2022-09-06 RX ORDER — DIPHENHYDRAMINE HYDROCHLORIDE 50 MG/ML
25 INJECTION INTRAMUSCULAR; INTRAVENOUS
Status: CANCELLED
Start: 2022-09-07

## 2022-09-06 RX ORDER — DIPHENHYDRAMINE HYDROCHLORIDE 50 MG/ML
50 INJECTION INTRAMUSCULAR; INTRAVENOUS ONCE AS NEEDED
Status: CANCELLED | OUTPATIENT
Start: 2022-09-07

## 2022-09-06 RX ORDER — SODIUM CHLORIDE 0.9 % (FLUSH) 0.9 %
10 SYRINGE (ML) INJECTION
Status: CANCELLED | OUTPATIENT
Start: 2022-09-07

## 2022-09-06 RX ORDER — HEPARIN 100 UNIT/ML
500 SYRINGE INTRAVENOUS
Status: CANCELLED | OUTPATIENT
Start: 2022-09-07

## 2022-09-07 ENCOUNTER — INFUSION (OUTPATIENT)
Dept: INFUSION THERAPY | Facility: HOSPITAL | Age: 62
End: 2022-09-07
Attending: INTERNAL MEDICINE
Payer: COMMERCIAL

## 2022-09-07 VITALS
HEART RATE: 80 BPM | RESPIRATION RATE: 20 BRPM | TEMPERATURE: 98 F | BODY MASS INDEX: 32.99 KG/M2 | HEIGHT: 66 IN | DIASTOLIC BLOOD PRESSURE: 82 MMHG | SYSTOLIC BLOOD PRESSURE: 137 MMHG | WEIGHT: 205.25 LBS

## 2022-09-07 DIAGNOSIS — C50.412 MALIGNANT NEOPLASM OF UPPER-OUTER QUADRANT OF LEFT BREAST IN FEMALE, ESTROGEN RECEPTOR NEGATIVE: Primary | ICD-10-CM

## 2022-09-07 DIAGNOSIS — Z17.1 MALIGNANT NEOPLASM OF UPPER-OUTER QUADRANT OF LEFT BREAST IN FEMALE, ESTROGEN RECEPTOR NEGATIVE: Primary | ICD-10-CM

## 2022-09-07 PROCEDURE — 63600175 PHARM REV CODE 636 W HCPCS: Performed by: INTERNAL MEDICINE

## 2022-09-07 PROCEDURE — 96365 THER/PROPH/DIAG IV INF INIT: CPT

## 2022-09-07 PROCEDURE — 25000003 PHARM REV CODE 250: Performed by: INTERNAL MEDICINE

## 2022-09-07 PROCEDURE — 96375 TX/PRO/DX INJ NEW DRUG ADDON: CPT

## 2022-09-07 PROCEDURE — 96413 CHEMO IV INFUSION 1 HR: CPT

## 2022-09-07 RX ORDER — ONDANSETRON 2 MG/ML
8 INJECTION INTRAMUSCULAR; INTRAVENOUS
Status: COMPLETED | OUTPATIENT
Start: 2022-09-07 | End: 2022-09-07

## 2022-09-07 RX ORDER — FAMOTIDINE 10 MG/ML
20 INJECTION INTRAVENOUS
Status: COMPLETED | OUTPATIENT
Start: 2022-09-07 | End: 2022-09-07

## 2022-09-07 RX ORDER — HEPARIN 100 UNIT/ML
500 SYRINGE INTRAVENOUS
Status: DISCONTINUED | OUTPATIENT
Start: 2022-09-07 | End: 2022-09-07 | Stop reason: HOSPADM

## 2022-09-07 RX ORDER — DIPHENHYDRAMINE HYDROCHLORIDE 50 MG/ML
50 INJECTION INTRAMUSCULAR; INTRAVENOUS ONCE AS NEEDED
Status: DISCONTINUED | OUTPATIENT
Start: 2022-09-07 | End: 2022-09-07 | Stop reason: HOSPADM

## 2022-09-07 RX ORDER — SODIUM CHLORIDE 0.9 % (FLUSH) 0.9 %
10 SYRINGE (ML) INJECTION
Status: DISCONTINUED | OUTPATIENT
Start: 2022-09-07 | End: 2022-09-07 | Stop reason: HOSPADM

## 2022-09-07 RX ORDER — DIPHENHYDRAMINE HYDROCHLORIDE 50 MG/ML
25 INJECTION INTRAMUSCULAR; INTRAVENOUS
Status: COMPLETED | OUTPATIENT
Start: 2022-09-07 | End: 2022-09-07

## 2022-09-07 RX ORDER — EPINEPHRINE 0.3 MG/.3ML
0.3 INJECTION SUBCUTANEOUS ONCE AS NEEDED
Status: DISCONTINUED | OUTPATIENT
Start: 2022-09-07 | End: 2022-09-07 | Stop reason: HOSPADM

## 2022-09-07 RX ADMIN — DIPHENHYDRAMINE HYDROCHLORIDE 25 MG: 50 INJECTION, SOLUTION INTRAMUSCULAR; INTRAVENOUS at 12:09

## 2022-09-07 RX ADMIN — FAMOTIDINE 20 MG: 10 INJECTION, SOLUTION INTRAVENOUS at 11:09

## 2022-09-07 RX ADMIN — ONDANSETRON 8 MG: 2 INJECTION INTRAMUSCULAR; INTRAVENOUS at 12:09

## 2022-09-07 RX ADMIN — HEPARIN 500 UNITS: 100 SYRINGE at 02:09

## 2022-09-07 RX ADMIN — PACLITAXEL 168 MG: 6 INJECTION, SOLUTION INTRAVENOUS at 12:09

## 2022-09-11 NOTE — ASSESSMENT & PLAN NOTE
Overall plan Taxol x 12 followed by dose dense AC, followed by Femara (for contralateral side)   No steroids with weekly taxol  She will eventually need follow-up of the abnormal CT of the abdomen pelvis with question of pancreatic cysts.  Will eventually get her to Dr. Jeromy Queen for follow-up.  Think this patient would be best served by adjuvant chemotherapy with Adriamycin Cytoxan, and weekly Taxol.  I still think she would benefit from an adjuvant aromatase inhibitor from the contralateral side as it was ER positive.  After she completes therapy.     discuss the addition of pembrolizumab, 200 mg every 3 weeks x 9 as well,   Chemotherapy toxicity: Mild alopecia, grade 1 neuropathy, grade 1-2 diarrhea, grade 1-2 nausea,  Mild visual change    Plan  Hold Lipitor  Follow-up with ophthalmology  Follow-up primary care disease to consider decreasing metformin with diarrhea  Imodium scheduled before chemotherapy   Zofran 8 mg daily or b.i.d. for 2-3 days after treatment  Proceed with chemotherapy tomorrow   Return to clinic in 2 weeks for TD visit and check on symptoms  Check her sugars on a regular basis   Okay to hold her aspirin      repeat CT abd/pelvis after Taxol

## 2022-09-11 NOTE — PROGRESS NOTES
Subjective:        PATIENT: Elina Ham  MRN: [id number]  DATE: 9/13/2022  Chief Complaint: Follow-up (3 week f/u. Has some questions today)    Current Treatment: OP BREAST PACLITAXEL WEEKLY FOLLOWED BY DOSE DENSE DOXORUBICIN CYCLOPHOSPHAMIDE Q2W         Oncology History Overview Note   Oncology History   Malignant neoplasm of upper-outer quadrant of right breast in female, estrogen receptor positive   3/16/2022 Cancer Staged    1. Ultrasound-guided Core Needle Biopsy Right Breast 9 o'clock 5 cm FN Mass with Distortion - Invasive ductal carcinoma, grade 1, with associated focus of ductal carcinoma in situ, grade 1, and focal perineural invasion is present  ER 81%MT 3%HER2 Negative  Ki67 4%    2.Stereotactic guided Core Needle Biopsy Right Breast Mass 10 o'clock Posterior Depth Focal Asymmetry, Architectural Distortion with Calcifications - Invasive ductal carcinoma, grade 1 and focal lobular carcinoma in situ  ER 43%MT 94%HER2 Negative  Ki67 4%    3. Ultrasound-guided Core Needle Biopsy Left Breast 1 o'clock 7 cm FN Mass - High grade carcinoma with anaplastic/malignant giant cell component (suggestive for sarcomatoid/metaplastic change)  ER 0%MT 0%HER2 Negative  Ki67 High    4.Ultrasound-guided Core Needle Biopsy Left Axillary Node - Portion of reactive lymph node; no evidence of metastatic carcinoma     6/3/2022 Surgery    Bilateral simple mastectomy and Bilateral SLNB 6/3/2022  - Left mastectomy and SLNB - a single focus of invasive ductal carcinoma grade 3 measuring 25 mm. Margins clear. Six sentinel lymph nodes were examined with one positive for metastatic carcinoma, 5 mm.   G3, ER-, MT-, HER2    - Right mastectomy and SLNB - two foci of invasive ductal carcinoma grade 1 measuring 10 mm and 4 mm. Low grade DCIS and LCIS were also present. All margins clear. Four sentinel lymph ndoes are negative for metastatic carcinoma.  G1, ER+, MT+, HER2-)                     Malignant neoplasm of upper-outer quadrant of  left breast in female, estrogen receptor negative   8/3/2022 -  Chemotherapy    OP BREAST PACLITAXEL WEEKLY FOLLOWED BY DOSE DENSE DOXORUBICIN CYCLOPHOSPHAMIDE Q2W     2022   Patient presents for treatment visit.  She has bilateral breast cancer  Patient complains of diarrhea after treatment  She has also complained of some dry eye and some mild visual changes.    She has had some mild neuropathy.  She has queasiness with some nausea.  Her biggest complaint is diarrhea where she does not know when it is going to come.  She is not taking anything prophylactically.  When she gets her nausea she will take some Tums to stop it.  She feels queasy and nauseated for several days after chemotherapy.          Past Medical History:   Diagnosis Date    Cancer 3/21/2022    Diabetes mellitus     Hypercholesterolemia     Hypertension       .  Past Surgical History:   Procedure Laterality Date    BILATERAL MASTECTOMY Bilateral 6/3/2022    Procedure: MASTECTOMY, BILATERAL;  Surgeon: Charlene Contreras MD;  Location: [facility] OR;  Service: General;  Laterality: Bilateral;     SECTION  92 and 3/15/95    COLONOSCOPY      INSERTION OF BREAST TISSUE EXPANDER Bilateral 6/3/2022    Procedure: INSERTION, TISSUE EXPANDER, BREAST (Bilateral breast reconstruction with tissue expanders and acellular dermal matrix and use of intraoperative fluoroscein angiogragry);  Surgeon: Nick Gibbs MD;  Location: [facility] OR;  Service: Plastics;  Laterality: Bilateral;    MEDIPORT INSERTION, SINGLE      SENTINEL LYMPH NODE BIOPSY Bilateral 6/3/2022    Procedure: BIOPSY, LYMPH NODE, SENTINEL;  Surgeon: Charlene Contreras MD;  Location: [facility] OR;  Service: General;  Laterality: Bilateral;  MAGTRACE Injection in office  Need SentiMag Probe      .  Family History   Problem Relation Age of Onset    Breast cancer Maternal Grandmother             Breast cancer Sister     Breast cancer Sister         remission    Breast cancer Maternal Aunt              Breast cancer Maternal Aunt             Breast cancer Maternal Aunt             Breast cancer Maternal Aunt     Breast cancer Maternal Uncle             Stroke Brother             Pancreatic cancer Brother       Social History     Socioeconomic History    Marital status:    Tobacco Use    Smoking status: Never    Smokeless tobacco: Never   Substance and Sexual Activity    Alcohol use: Not Currently     Alcohol/week: 1.0 standard drink     Types: 1 Glasses of wine per week     Comment: drink socially but haven't had a glass of wine since     Drug use: Never    Sexual activity: Yes     Partners: Male     Birth control/protection: None     Comment: We take STD test for the first 2 years we started dating  (7      .Review of patient's allergies indicates:  No Known Allergies     Current Outpatient Medications:     amLODIPine (NORVASC) 10 MG tablet, Take 10 mg by mouth once daily., Disp: , Rfl:     aspirin 81 MG Chew, Take 81 mg by mouth 2 (two) times a day. Stopped using until after sx, Disp: , Rfl:     atorvastatin (LIPITOR) 20 MG tablet, Take 20 mg by mouth every evening., Disp: , Rfl:     glipiZIDE (GLUCOTROL) 5 MG tablet, Take 5 mg by mouth 2 (two) times daily., Disp: , Rfl:     losartan-hydrochlorothiazide 100-25 mg (HYZAAR) 100-25 mg per tablet, Take 1 tablet by mouth once daily., Disp: , Rfl:     metFORMIN (GLUCOPHAGE) 1000 MG tablet, Take 1,000 mg by mouth daily with breakfast., Disp: , Rfl:     ondansetron (ZOFRAN-ODT) 8 MG TbDL, Take 1 tablet (8 mg total) by mouth every 8 (eight) hours as needed (nausea/vomiting). (Patient not taking: No sig reported), Disp: 60 tablet, Rfl: 5   Review of Systems   Constitutional:  Negative for appetite change and unexpected weight change.   HENT:  Negative for mouth sores.    Eyes:  Negative for visual disturbance.   Respiratory:  Negative for cough and shortness of breath.    Cardiovascular:  Negative for chest pain.    Gastrointestinal:  Negative for abdominal pain and diarrhea.   Genitourinary:  Negative for frequency.   Musculoskeletal:  Negative for back pain.   Integumentary:  Negative for rash.   Neurological:  Negative for headaches.   Hematological:  Negative for adenopathy.   Psychiatric/Behavioral:  The patient is not nervous/anxious.        Objective:     Vitals:    09/13/22 0931   BP: 128/81   Pulse: 71   Temp: 98 °F (36.7 °C)           Physical Exam  Constitutional:       Appearance: Normal appearance.   HENT:      Head: Normocephalic and atraumatic.      Nose: Nose normal.      Mouth/Throat:      Mouth: Mucous membranes are moist.   Eyes:      Extraocular Movements: Extraocular movements intact.      Conjunctiva/sclera: Conjunctivae normal.      Pupils: Pupils are equal, round, and reactive to light.   Cardiovascular:      Rate and Rhythm: Normal rate and regular rhythm.      Pulses: Normal pulses.   Pulmonary:      Effort: Pulmonary effort is normal.      Breath sounds: Normal breath sounds.   Abdominal:      General: Bowel sounds are normal.      Palpations: Abdomen is soft.   Musculoskeletal:      Cervical back: Normal range of motion and neck supple.   Neurological:      General: No focal deficit present.      Mental Status: She is alert and oriented to person, place, and time. Mental status is at baseline.   Psychiatric:         Mood and Affect: Mood normal.         Behavior: Behavior normal.         Lab Visit on 09/13/2022   Component Date Value Ref Range Status    WBC 09/13/2022 6.0  4.5 - 11.5 x10(3)/mcL Final    RBC 09/13/2022 3.90 (L)  4.20 - 5.40 x10(6)/mcL Final    Hgb 09/13/2022 11.7 (L)  12.0 - 16.0 gm/dL Final    Hct 09/13/2022 35.6 (L)  37.0 - 47.0 % Final    MCV 09/13/2022 91.3  80.0 - 94.0 fL Final    MCH 09/13/2022 30.0  27.0 - 31.0 pg Final    MCHC 09/13/2022 32.9 (L)  33.0 - 36.0 mg/dL Final    RDW 09/13/2022 12.8  11.5 - 17.0 % Final    Platelet 09/13/2022 445 (H)  130 - 400 x10(3)/mcL Final     MPV 09/13/2022 9.3  7.4 - 10.4 fL Final    Neut % 09/13/2022 49.8  % Final    Lymph % 09/13/2022 42.6  % Final    Mono % 09/13/2022 6.2  % Final    Eos % 09/13/2022 0.2  % Final    Basophil % 09/13/2022 0.7  % Final    Lymph # 09/13/2022 2.55  0.6 - 4.6 x10(3)/mcL Final    Neut # 09/13/2022 3.0  2.1 - 9.2 x10(3)/mcL Final    Mono # 09/13/2022 0.37  0.1 - 1.3 x10(3)/mcL Final    Eos # 09/13/2022 0.01  0 - 0.9 x10(3)/mcL Final    Baso # 09/13/2022 0.04  0 - 0.2 x10(3)/mcL Final    IG# 09/13/2022 0.03  0 - 0.04 x10(3)/mcL Final    IG% 09/13/2022 0.5  % Final         Assessment/Plan:     Problem List Items Addressed This Visit          Oncology    Malignant neoplasm of upper-outer quadrant of right breast in female, estrogen receptor positive - Primary    Malignant neoplasm of upper-outer quadrant of left breast in female, estrogen receptor negative    Current Assessment & Plan     Overall plan Taxol x 12 followed by dose dense AC, followed by Femara (for contralateral side)   No steroids with weekly taxol  She will eventually need follow-up of the abnormal CT of the abdomen pelvis with question of pancreatic cysts.  Will eventually get her to Dr. Jeromy Queen for follow-up.  Think this patient would be best served by adjuvant chemotherapy with Adriamycin Cytoxan, and weekly Taxol.  I still think she would benefit from an adjuvant aromatase inhibitor from the contralateral side as it was ER positive.  After she completes therapy.     discuss the addition of pembrolizumab, 200 mg every 3 weeks x 9 as well,   Chemotherapy toxicity: Mild alopecia, grade 1 neuropathy, grade 1-2 diarrhea, grade 1-2 nausea,  Mild visual change    Plan  Hold Lipitor  Follow-up with ophthalmology  Follow-up primary care disease to consider decreasing metformin with diarrhea  Imodium scheduled before chemotherapy   Zofran 8 mg daily or b.i.d. for 2-3 days after treatment  Proceed with chemotherapy tomorrow   Return to clinic in 2 weeks for TD  visit and check on symptoms  Check her sugars on a regular basis   Okay to hold her aspirin      repeat CT abd/pelvis after Taxol                  Follow up in about 2 weeks (around 9/27/2022) for Treatment visit.

## 2022-09-13 ENCOUNTER — TELEPHONE (OUTPATIENT)
Dept: HEMATOLOGY/ONCOLOGY | Facility: CLINIC | Age: 62
End: 2022-09-13

## 2022-09-13 ENCOUNTER — OFFICE VISIT (OUTPATIENT)
Dept: HEMATOLOGY/ONCOLOGY | Facility: CLINIC | Age: 62
End: 2022-09-13
Payer: COMMERCIAL

## 2022-09-13 VITALS
HEIGHT: 66 IN | WEIGHT: 207.25 LBS | OXYGEN SATURATION: 100 % | SYSTOLIC BLOOD PRESSURE: 128 MMHG | DIASTOLIC BLOOD PRESSURE: 81 MMHG | HEART RATE: 71 BPM | TEMPERATURE: 98 F | BODY MASS INDEX: 33.31 KG/M2

## 2022-09-13 DIAGNOSIS — C50.412 MALIGNANT NEOPLASM OF UPPER-OUTER QUADRANT OF LEFT BREAST IN FEMALE, ESTROGEN RECEPTOR NEGATIVE: ICD-10-CM

## 2022-09-13 DIAGNOSIS — C50.411 MALIGNANT NEOPLASM OF UPPER-OUTER QUADRANT OF RIGHT BREAST IN FEMALE, ESTROGEN RECEPTOR POSITIVE: Primary | ICD-10-CM

## 2022-09-13 DIAGNOSIS — Z17.1 MALIGNANT NEOPLASM OF UPPER-OUTER QUADRANT OF LEFT BREAST IN FEMALE, ESTROGEN RECEPTOR NEGATIVE: ICD-10-CM

## 2022-09-13 DIAGNOSIS — Z17.0 MALIGNANT NEOPLASM OF UPPER-OUTER QUADRANT OF RIGHT BREAST IN FEMALE, ESTROGEN RECEPTOR POSITIVE: Primary | ICD-10-CM

## 2022-09-13 PROCEDURE — 3079F PR MOST RECENT DIASTOLIC BLOOD PRESSURE 80-89 MM HG: ICD-10-PCS | Mod: CPTII,S$GLB,, | Performed by: INTERNAL MEDICINE

## 2022-09-13 PROCEDURE — 1160F RVW MEDS BY RX/DR IN RCRD: CPT | Mod: CPTII,S$GLB,, | Performed by: INTERNAL MEDICINE

## 2022-09-13 PROCEDURE — 3074F PR MOST RECENT SYSTOLIC BLOOD PRESSURE < 130 MM HG: ICD-10-PCS | Mod: CPTII,S$GLB,, | Performed by: INTERNAL MEDICINE

## 2022-09-13 PROCEDURE — 3008F BODY MASS INDEX DOCD: CPT | Mod: CPTII,S$GLB,, | Performed by: INTERNAL MEDICINE

## 2022-09-13 PROCEDURE — 1160F PR REVIEW ALL MEDS BY PRESCRIBER/CLIN PHARMACIST DOCUMENTED: ICD-10-PCS | Mod: CPTII,S$GLB,, | Performed by: INTERNAL MEDICINE

## 2022-09-13 PROCEDURE — 1159F MED LIST DOCD IN RCRD: CPT | Mod: CPTII,S$GLB,, | Performed by: INTERNAL MEDICINE

## 2022-09-13 PROCEDURE — 99999 PR PBB SHADOW E&M-EST. PATIENT-LVL III: CPT | Mod: PBBFAC,,, | Performed by: INTERNAL MEDICINE

## 2022-09-13 PROCEDURE — 1159F PR MEDICATION LIST DOCUMENTED IN MEDICAL RECORD: ICD-10-PCS | Mod: CPTII,S$GLB,, | Performed by: INTERNAL MEDICINE

## 2022-09-13 PROCEDURE — 3074F SYST BP LT 130 MM HG: CPT | Mod: CPTII,S$GLB,, | Performed by: INTERNAL MEDICINE

## 2022-09-13 PROCEDURE — 3079F DIAST BP 80-89 MM HG: CPT | Mod: CPTII,S$GLB,, | Performed by: INTERNAL MEDICINE

## 2022-09-13 PROCEDURE — 99214 OFFICE O/P EST MOD 30 MIN: CPT | Mod: S$GLB,,, | Performed by: INTERNAL MEDICINE

## 2022-09-13 PROCEDURE — 3008F PR BODY MASS INDEX (BMI) DOCUMENTED: ICD-10-PCS | Mod: CPTII,S$GLB,, | Performed by: INTERNAL MEDICINE

## 2022-09-13 PROCEDURE — 99999 PR PBB SHADOW E&M-EST. PATIENT-LVL III: ICD-10-PCS | Mod: PBBFAC,,, | Performed by: INTERNAL MEDICINE

## 2022-09-13 PROCEDURE — 99214 PR OFFICE/OUTPT VISIT, EST, LEVL IV, 30-39 MIN: ICD-10-PCS | Mod: S$GLB,,, | Performed by: INTERNAL MEDICINE

## 2022-09-13 RX ORDER — SODIUM CHLORIDE 0.9 % (FLUSH) 0.9 %
10 SYRINGE (ML) INJECTION
Status: CANCELLED | OUTPATIENT
Start: 2022-09-21

## 2022-09-13 RX ORDER — DIPHENHYDRAMINE HYDROCHLORIDE 50 MG/ML
25 INJECTION INTRAMUSCULAR; INTRAVENOUS
Status: CANCELLED
Start: 2022-09-21

## 2022-09-13 RX ORDER — HEPARIN 100 UNIT/ML
500 SYRINGE INTRAVENOUS
Status: CANCELLED | OUTPATIENT
Start: 2022-09-21

## 2022-09-13 RX ORDER — DIPHENHYDRAMINE HYDROCHLORIDE 50 MG/ML
50 INJECTION INTRAMUSCULAR; INTRAVENOUS ONCE AS NEEDED
Status: CANCELLED | OUTPATIENT
Start: 2022-09-21

## 2022-09-13 RX ORDER — GLIPIZIDE 5 MG/1
10 TABLET ORAL 2 TIMES DAILY
COMMUNITY
Start: 2022-08-22 | End: 2023-03-07

## 2022-09-13 RX ORDER — DIPHENHYDRAMINE HYDROCHLORIDE 50 MG/ML
25 INJECTION INTRAMUSCULAR; INTRAVENOUS
Status: CANCELLED
Start: 2022-09-14

## 2022-09-13 RX ORDER — FAMOTIDINE 10 MG/ML
20 INJECTION INTRAVENOUS
Status: CANCELLED | OUTPATIENT
Start: 2022-09-21

## 2022-09-13 RX ORDER — ONDANSETRON 2 MG/ML
8 INJECTION INTRAMUSCULAR; INTRAVENOUS
Status: CANCELLED
Start: 2022-09-14

## 2022-09-13 RX ORDER — EPINEPHRINE 0.3 MG/.3ML
0.3 INJECTION SUBCUTANEOUS ONCE AS NEEDED
Status: CANCELLED | OUTPATIENT
Start: 2022-09-14

## 2022-09-13 RX ORDER — ONDANSETRON 2 MG/ML
8 INJECTION INTRAMUSCULAR; INTRAVENOUS
Status: CANCELLED
Start: 2022-09-21

## 2022-09-13 RX ORDER — EPINEPHRINE 0.3 MG/.3ML
0.3 INJECTION SUBCUTANEOUS ONCE AS NEEDED
Status: CANCELLED | OUTPATIENT
Start: 2022-09-21

## 2022-09-13 RX ORDER — DIPHENHYDRAMINE HYDROCHLORIDE 50 MG/ML
50 INJECTION INTRAMUSCULAR; INTRAVENOUS ONCE AS NEEDED
Status: CANCELLED | OUTPATIENT
Start: 2022-09-14

## 2022-09-13 RX ORDER — FAMOTIDINE 10 MG/ML
20 INJECTION INTRAVENOUS
Status: CANCELLED | OUTPATIENT
Start: 2022-09-14

## 2022-09-13 RX ORDER — HEPARIN 100 UNIT/ML
500 SYRINGE INTRAVENOUS
Status: CANCELLED | OUTPATIENT
Start: 2022-09-14

## 2022-09-13 RX ORDER — SODIUM CHLORIDE 0.9 % (FLUSH) 0.9 %
10 SYRINGE (ML) INJECTION
Status: CANCELLED | OUTPATIENT
Start: 2022-09-14

## 2022-09-13 NOTE — TELEPHONE ENCOUNTER
Pt called requesting supplement order at [facility]. She has been dealing with significant dysgeusia. Blood glucose levels have been elevated so will send for Glucerna, 1 case 2 months. Wt has been stable. Order faxed to [facility], pt notified via phone.

## 2022-09-14 ENCOUNTER — INFUSION (OUTPATIENT)
Dept: INFUSION THERAPY | Facility: HOSPITAL | Age: 62
End: 2022-09-14
Attending: INTERNAL MEDICINE
Payer: COMMERCIAL

## 2022-09-14 VITALS
WEIGHT: 207.25 LBS | RESPIRATION RATE: 16 BRPM | HEIGHT: 66 IN | DIASTOLIC BLOOD PRESSURE: 73 MMHG | TEMPERATURE: 98 F | SYSTOLIC BLOOD PRESSURE: 123 MMHG | OXYGEN SATURATION: 99 % | HEART RATE: 83 BPM | BODY MASS INDEX: 33.31 KG/M2

## 2022-09-14 DIAGNOSIS — C50.412 MALIGNANT NEOPLASM OF UPPER-OUTER QUADRANT OF LEFT BREAST IN FEMALE, ESTROGEN RECEPTOR NEGATIVE: Primary | ICD-10-CM

## 2022-09-14 DIAGNOSIS — Z17.1 MALIGNANT NEOPLASM OF UPPER-OUTER QUADRANT OF LEFT BREAST IN FEMALE, ESTROGEN RECEPTOR NEGATIVE: Primary | ICD-10-CM

## 2022-09-14 PROCEDURE — 25000003 PHARM REV CODE 250: Performed by: INTERNAL MEDICINE

## 2022-09-14 PROCEDURE — 96413 CHEMO IV INFUSION 1 HR: CPT

## 2022-09-14 PROCEDURE — A4216 STERILE WATER/SALINE, 10 ML: HCPCS | Performed by: INTERNAL MEDICINE

## 2022-09-14 PROCEDURE — 63600175 PHARM REV CODE 636 W HCPCS: Performed by: INTERNAL MEDICINE

## 2022-09-14 PROCEDURE — 96375 TX/PRO/DX INJ NEW DRUG ADDON: CPT

## 2022-09-14 RX ORDER — DIPHENHYDRAMINE HYDROCHLORIDE 50 MG/ML
25 INJECTION INTRAMUSCULAR; INTRAVENOUS
Status: COMPLETED | OUTPATIENT
Start: 2022-09-14 | End: 2022-09-14

## 2022-09-14 RX ORDER — FAMOTIDINE 10 MG/ML
20 INJECTION INTRAVENOUS
Status: COMPLETED | OUTPATIENT
Start: 2022-09-14 | End: 2022-09-14

## 2022-09-14 RX ORDER — HEPARIN 100 UNIT/ML
500 SYRINGE INTRAVENOUS
Status: DISCONTINUED | OUTPATIENT
Start: 2022-09-14 | End: 2022-09-14 | Stop reason: HOSPADM

## 2022-09-14 RX ORDER — ONDANSETRON 2 MG/ML
8 INJECTION INTRAMUSCULAR; INTRAVENOUS
Status: COMPLETED | OUTPATIENT
Start: 2022-09-14 | End: 2022-09-14

## 2022-09-14 RX ORDER — EPINEPHRINE 0.3 MG/.3ML
0.3 INJECTION SUBCUTANEOUS ONCE AS NEEDED
Status: DISCONTINUED | OUTPATIENT
Start: 2022-09-14 | End: 2022-09-14 | Stop reason: HOSPADM

## 2022-09-14 RX ORDER — DIPHENHYDRAMINE HYDROCHLORIDE 50 MG/ML
50 INJECTION INTRAMUSCULAR; INTRAVENOUS ONCE AS NEEDED
Status: DISCONTINUED | OUTPATIENT
Start: 2022-09-14 | End: 2022-09-14 | Stop reason: HOSPADM

## 2022-09-14 RX ORDER — SODIUM CHLORIDE 0.9 % (FLUSH) 0.9 %
10 SYRINGE (ML) INJECTION
Status: DISCONTINUED | OUTPATIENT
Start: 2022-09-14 | End: 2022-09-14 | Stop reason: HOSPADM

## 2022-09-14 RX ADMIN — PACLITAXEL 168 MG: 6 INJECTION, SOLUTION INTRAVENOUS at 12:09

## 2022-09-14 RX ADMIN — SODIUM CHLORIDE, PRESERVATIVE FREE 10 ML: 5 INJECTION INTRAVENOUS at 01:09

## 2022-09-14 RX ADMIN — DIPHENHYDRAMINE HYDROCHLORIDE 25 MG: 50 INJECTION, SOLUTION INTRAMUSCULAR; INTRAVENOUS at 11:09

## 2022-09-14 RX ADMIN — ONDANSETRON 8 MG: 2 INJECTION INTRAMUSCULAR; INTRAVENOUS at 11:09

## 2022-09-14 RX ADMIN — FAMOTIDINE 20 MG: 10 INJECTION, SOLUTION INTRAVENOUS at 11:09

## 2022-09-14 RX ADMIN — HEPARIN 500 UNITS: 100 SYRINGE at 01:09

## 2022-09-20 ENCOUNTER — LAB VISIT (OUTPATIENT)
Dept: LAB | Facility: HOSPITAL | Age: 62
End: 2022-09-20
Attending: INTERNAL MEDICINE
Payer: COMMERCIAL

## 2022-09-20 DIAGNOSIS — Z17.0 MALIGNANT NEOPLASM OF UPPER-OUTER QUADRANT OF RIGHT BREAST IN FEMALE, ESTROGEN RECEPTOR POSITIVE: ICD-10-CM

## 2022-09-20 DIAGNOSIS — C50.411 MALIGNANT NEOPLASM OF UPPER-OUTER QUADRANT OF RIGHT BREAST IN FEMALE, ESTROGEN RECEPTOR POSITIVE: ICD-10-CM

## 2022-09-20 LAB
ALBUMIN SERPL-MCNC: 4.2 GM/DL (ref 3.4–4.8)
ALBUMIN/GLOB SERPL: 1.4 RATIO (ref 1.1–2)
ALP SERPL-CCNC: 100 UNIT/L (ref 40–150)
ALT SERPL-CCNC: 31 UNIT/L (ref 0–55)
AST SERPL-CCNC: 17 UNIT/L (ref 5–34)
BASOPHILS # BLD AUTO: 0.02 X10(3)/MCL (ref 0–0.2)
BASOPHILS NFR BLD AUTO: 0.3 %
BILIRUBIN DIRECT+TOT PNL SERPL-MCNC: 0.6 MG/DL
BUN SERPL-MCNC: 8 MG/DL (ref 9.8–20.1)
CALCIUM SERPL-MCNC: 9.6 MG/DL (ref 8.4–10.2)
CHLORIDE SERPL-SCNC: 102 MMOL/L (ref 98–107)
CO2 SERPL-SCNC: 28 MMOL/L (ref 23–31)
CREAT SERPL-MCNC: 0.78 MG/DL (ref 0.55–1.02)
EOSINOPHIL # BLD AUTO: 0.02 X10(3)/MCL (ref 0–0.9)
EOSINOPHIL NFR BLD AUTO: 0.3 %
ERYTHROCYTE [DISTWIDTH] IN BLOOD BY AUTOMATED COUNT: 13.3 % (ref 11.5–17)
GFR SERPLBLD CREATININE-BSD FMLA CKD-EPI: >60 MLS/MIN/1.73/M2
GLOBULIN SER-MCNC: 3 GM/DL (ref 2.4–3.5)
GLUCOSE SERPL-MCNC: 143 MG/DL (ref 82–115)
HCT VFR BLD AUTO: 36.6 % (ref 37–47)
HGB BLD-MCNC: 11.7 GM/DL (ref 12–16)
IMM GRANULOCYTES # BLD AUTO: 0.02 X10(3)/MCL (ref 0–0.04)
IMM GRANULOCYTES NFR BLD AUTO: 0.3 %
LYMPHOCYTES # BLD AUTO: 1.99 X10(3)/MCL (ref 0.6–4.6)
LYMPHOCYTES NFR BLD AUTO: 30.5 %
MCH RBC QN AUTO: 29.8 PG (ref 27–31)
MCHC RBC AUTO-ENTMCNC: 32 MG/DL (ref 33–36)
MCV RBC AUTO: 93.4 FL (ref 80–94)
MONOCYTES # BLD AUTO: 0.32 X10(3)/MCL (ref 0.1–1.3)
MONOCYTES NFR BLD AUTO: 4.9 %
NEUTROPHILS # BLD AUTO: 4.2 X10(3)/MCL (ref 2.1–9.2)
NEUTROPHILS NFR BLD AUTO: 63.7 %
PLATELET # BLD AUTO: 456 X10(3)/MCL (ref 130–400)
PMV BLD AUTO: 9 FL (ref 7.4–10.4)
POTASSIUM SERPL-SCNC: 4 MMOL/L (ref 3.5–5.1)
PROT SERPL-MCNC: 7.2 GM/DL (ref 5.8–7.6)
RBC # BLD AUTO: 3.92 X10(6)/MCL (ref 4.2–5.4)
SODIUM SERPL-SCNC: 138 MMOL/L (ref 136–145)
WBC # SPEC AUTO: 6.5 X10(3)/MCL (ref 4.5–11.5)

## 2022-09-20 PROCEDURE — 85025 COMPLETE CBC W/AUTO DIFF WBC: CPT

## 2022-09-20 PROCEDURE — 36415 COLL VENOUS BLD VENIPUNCTURE: CPT

## 2022-09-20 PROCEDURE — 80053 COMPREHEN METABOLIC PANEL: CPT

## 2022-09-21 ENCOUNTER — INFUSION (OUTPATIENT)
Dept: INFUSION THERAPY | Facility: HOSPITAL | Age: 62
End: 2022-09-21
Attending: INTERNAL MEDICINE
Payer: COMMERCIAL

## 2022-09-21 VITALS
WEIGHT: 209.44 LBS | DIASTOLIC BLOOD PRESSURE: 82 MMHG | OXYGEN SATURATION: 100 % | TEMPERATURE: 99 F | SYSTOLIC BLOOD PRESSURE: 140 MMHG | HEART RATE: 90 BPM | RESPIRATION RATE: 16 BRPM | BODY MASS INDEX: 33.82 KG/M2

## 2022-09-21 DIAGNOSIS — C50.412 MALIGNANT NEOPLASM OF UPPER-OUTER QUADRANT OF LEFT BREAST IN FEMALE, ESTROGEN RECEPTOR NEGATIVE: Primary | ICD-10-CM

## 2022-09-21 DIAGNOSIS — Z17.1 MALIGNANT NEOPLASM OF UPPER-OUTER QUADRANT OF LEFT BREAST IN FEMALE, ESTROGEN RECEPTOR NEGATIVE: Primary | ICD-10-CM

## 2022-09-21 PROCEDURE — 25000003 PHARM REV CODE 250: Performed by: INTERNAL MEDICINE

## 2022-09-21 PROCEDURE — 96375 TX/PRO/DX INJ NEW DRUG ADDON: CPT

## 2022-09-21 PROCEDURE — 96413 CHEMO IV INFUSION 1 HR: CPT

## 2022-09-21 PROCEDURE — 63600175 PHARM REV CODE 636 W HCPCS: Performed by: INTERNAL MEDICINE

## 2022-09-21 RX ORDER — SODIUM CHLORIDE 0.9 % (FLUSH) 0.9 %
10 SYRINGE (ML) INJECTION
Status: DISCONTINUED | OUTPATIENT
Start: 2022-09-21 | End: 2022-09-21 | Stop reason: HOSPADM

## 2022-09-21 RX ORDER — FAMOTIDINE 10 MG/ML
20 INJECTION INTRAVENOUS
Status: COMPLETED | OUTPATIENT
Start: 2022-09-21 | End: 2022-09-21

## 2022-09-21 RX ORDER — HEPARIN 100 UNIT/ML
500 SYRINGE INTRAVENOUS
Status: DISCONTINUED | OUTPATIENT
Start: 2022-09-21 | End: 2022-09-21 | Stop reason: HOSPADM

## 2022-09-21 RX ORDER — EPINEPHRINE 0.3 MG/.3ML
0.3 INJECTION SUBCUTANEOUS ONCE AS NEEDED
Status: DISCONTINUED | OUTPATIENT
Start: 2022-09-21 | End: 2022-09-21 | Stop reason: HOSPADM

## 2022-09-21 RX ORDER — DIPHENHYDRAMINE HYDROCHLORIDE 50 MG/ML
25 INJECTION INTRAMUSCULAR; INTRAVENOUS
Status: COMPLETED | OUTPATIENT
Start: 2022-09-21 | End: 2022-09-21

## 2022-09-21 RX ORDER — DIPHENHYDRAMINE HYDROCHLORIDE 50 MG/ML
50 INJECTION INTRAMUSCULAR; INTRAVENOUS ONCE AS NEEDED
Status: DISCONTINUED | OUTPATIENT
Start: 2022-09-21 | End: 2022-09-21 | Stop reason: HOSPADM

## 2022-09-21 RX ORDER — ONDANSETRON 2 MG/ML
8 INJECTION INTRAMUSCULAR; INTRAVENOUS
Status: COMPLETED | OUTPATIENT
Start: 2022-09-21 | End: 2022-09-21

## 2022-09-21 RX ADMIN — FAMOTIDINE 20 MG: 10 INJECTION, SOLUTION INTRAVENOUS at 02:09

## 2022-09-21 RX ADMIN — PACLITAXEL 168 MG: 6 INJECTION, SOLUTION INTRAVENOUS at 02:09

## 2022-09-21 RX ADMIN — ONDANSETRON 8 MG: 2 INJECTION INTRAMUSCULAR; INTRAVENOUS at 02:09

## 2022-09-21 RX ADMIN — DIPHENHYDRAMINE HYDROCHLORIDE 25 MG: 50 INJECTION, SOLUTION INTRAMUSCULAR; INTRAVENOUS at 02:09

## 2022-09-21 RX ADMIN — HEPARIN 500 UNITS: 100 SYRINGE at 04:09

## 2022-09-27 ENCOUNTER — OFFICE VISIT (OUTPATIENT)
Dept: HEMATOLOGY/ONCOLOGY | Facility: CLINIC | Age: 62
End: 2022-09-27
Payer: COMMERCIAL

## 2022-09-27 VITALS
BODY MASS INDEX: 33.73 KG/M2 | SYSTOLIC BLOOD PRESSURE: 123 MMHG | WEIGHT: 209.88 LBS | HEIGHT: 66 IN | OXYGEN SATURATION: 100 % | TEMPERATURE: 98 F | HEART RATE: 72 BPM | DIASTOLIC BLOOD PRESSURE: 82 MMHG

## 2022-09-27 DIAGNOSIS — Z17.1 MALIGNANT NEOPLASM OF UPPER-OUTER QUADRANT OF LEFT BREAST IN FEMALE, ESTROGEN RECEPTOR NEGATIVE: Primary | ICD-10-CM

## 2022-09-27 DIAGNOSIS — C50.411 MALIGNANT NEOPLASM OF UPPER-OUTER QUADRANT OF RIGHT BREAST IN FEMALE, ESTROGEN RECEPTOR POSITIVE: ICD-10-CM

## 2022-09-27 DIAGNOSIS — Z17.0 MALIGNANT NEOPLASM OF UPPER-OUTER QUADRANT OF RIGHT BREAST IN FEMALE, ESTROGEN RECEPTOR POSITIVE: ICD-10-CM

## 2022-09-27 DIAGNOSIS — C50.412 MALIGNANT NEOPLASM OF UPPER-OUTER QUADRANT OF LEFT BREAST IN FEMALE, ESTROGEN RECEPTOR NEGATIVE: Primary | ICD-10-CM

## 2022-09-27 PROCEDURE — 3074F PR MOST RECENT SYSTOLIC BLOOD PRESSURE < 130 MM HG: ICD-10-PCS | Mod: CPTII,S$GLB,, | Performed by: NURSE PRACTITIONER

## 2022-09-27 PROCEDURE — 99999 PR PBB SHADOW E&M-EST. PATIENT-LVL III: CPT | Mod: PBBFAC,,, | Performed by: NURSE PRACTITIONER

## 2022-09-27 PROCEDURE — 3008F BODY MASS INDEX DOCD: CPT | Mod: CPTII,S$GLB,, | Performed by: NURSE PRACTITIONER

## 2022-09-27 PROCEDURE — 3079F PR MOST RECENT DIASTOLIC BLOOD PRESSURE 80-89 MM HG: ICD-10-PCS | Mod: CPTII,S$GLB,, | Performed by: NURSE PRACTITIONER

## 2022-09-27 PROCEDURE — 1159F PR MEDICATION LIST DOCUMENTED IN MEDICAL RECORD: ICD-10-PCS | Mod: CPTII,S$GLB,, | Performed by: NURSE PRACTITIONER

## 2022-09-27 PROCEDURE — 1159F MED LIST DOCD IN RCRD: CPT | Mod: CPTII,S$GLB,, | Performed by: NURSE PRACTITIONER

## 2022-09-27 PROCEDURE — 99214 PR OFFICE/OUTPT VISIT, EST, LEVL IV, 30-39 MIN: ICD-10-PCS | Mod: S$GLB,,, | Performed by: NURSE PRACTITIONER

## 2022-09-27 PROCEDURE — 3008F PR BODY MASS INDEX (BMI) DOCUMENTED: ICD-10-PCS | Mod: CPTII,S$GLB,, | Performed by: NURSE PRACTITIONER

## 2022-09-27 PROCEDURE — 3074F SYST BP LT 130 MM HG: CPT | Mod: CPTII,S$GLB,, | Performed by: NURSE PRACTITIONER

## 2022-09-27 PROCEDURE — 99214 OFFICE O/P EST MOD 30 MIN: CPT | Mod: S$GLB,,, | Performed by: NURSE PRACTITIONER

## 2022-09-27 PROCEDURE — 3079F DIAST BP 80-89 MM HG: CPT | Mod: CPTII,S$GLB,, | Performed by: NURSE PRACTITIONER

## 2022-09-27 PROCEDURE — 99999 PR PBB SHADOW E&M-EST. PATIENT-LVL III: ICD-10-PCS | Mod: PBBFAC,,, | Performed by: NURSE PRACTITIONER

## 2022-09-27 RX ORDER — DIPHENHYDRAMINE HYDROCHLORIDE 50 MG/ML
25 INJECTION INTRAMUSCULAR; INTRAVENOUS
Status: CANCELLED
Start: 2022-09-28

## 2022-09-27 RX ORDER — DIPHENHYDRAMINE HYDROCHLORIDE 50 MG/ML
50 INJECTION INTRAMUSCULAR; INTRAVENOUS ONCE AS NEEDED
Status: CANCELLED | OUTPATIENT
Start: 2022-09-28

## 2022-09-27 RX ORDER — FAMOTIDINE 10 MG/ML
20 INJECTION INTRAVENOUS
Status: CANCELLED | OUTPATIENT
Start: 2022-09-28

## 2022-09-27 RX ORDER — EPINEPHRINE 0.3 MG/.3ML
0.3 INJECTION SUBCUTANEOUS ONCE AS NEEDED
Status: CANCELLED | OUTPATIENT
Start: 2022-09-28

## 2022-09-27 RX ORDER — HEPARIN 100 UNIT/ML
500 SYRINGE INTRAVENOUS
Status: CANCELLED | OUTPATIENT
Start: 2022-09-28

## 2022-09-27 RX ORDER — SODIUM CHLORIDE 0.9 % (FLUSH) 0.9 %
10 SYRINGE (ML) INJECTION
Status: CANCELLED | OUTPATIENT
Start: 2022-09-28

## 2022-09-27 RX ORDER — ONDANSETRON 2 MG/ML
8 INJECTION INTRAMUSCULAR; INTRAVENOUS
Status: CANCELLED
Start: 2022-09-28

## 2022-09-27 NOTE — PROGRESS NOTES
Subjective:        PATIENT: Elina Ham  MRN: [id number]  DATE: 9/27/2022  Chief Complaint: Follow-up (2 week f/u. Questions about a compression sleeve)    Current Treatment: OP BREAST PACLITAXEL WEEKLY FOLLOWED BY DOSE DENSE DOXORUBICIN CYCLOPHOSPHAMIDE Q2W         Oncology History Overview Note   Oncology History   Malignant neoplasm of upper-outer quadrant of right breast in female, estrogen receptor positive   3/16/2022 Cancer Staged    1. Ultrasound-guided Core Needle Biopsy Right Breast 9 o'clock 5 cm FN Mass with Distortion - Invasive ductal carcinoma, grade 1, with associated focus of ductal carcinoma in situ, grade 1, and focal perineural invasion is present  ER 81%MO 3%HER2 Negative  Ki67 4%    2.Stereotactic guided Core Needle Biopsy Right Breast Mass 10 o'clock Posterior Depth Focal Asymmetry, Architectural Distortion with Calcifications - Invasive ductal carcinoma, grade 1 and focal lobular carcinoma in situ  ER 43%MO 94%HER2 Negative  Ki67 4%    3. Ultrasound-guided Core Needle Biopsy Left Breast 1 o'clock 7 cm FN Mass - High grade carcinoma with anaplastic/malignant giant cell component (suggestive for sarcomatoid/metaplastic change)  ER 0%MO 0%HER2 Negative  Ki67 High    4.Ultrasound-guided Core Needle Biopsy Left Axillary Node - Portion of reactive lymph node; no evidence of metastatic carcinoma     6/3/2022 Surgery    Bilateral simple mastectomy and Bilateral SLNB 6/3/2022  - Left mastectomy and SLNB - a single focus of invasive ductal carcinoma grade 3 measuring 25 mm. Margins clear. Six sentinel lymph nodes were examined with one positive for metastatic carcinoma, 5 mm.   G3, ER-, MO-, HER2    - Right mastectomy and SLNB - two foci of invasive ductal carcinoma grade 1 measuring 10 mm and 4 mm. Low grade DCIS and LCIS were also present. All margins clear. Four sentinel lymph ndoes are negative for metastatic carcinoma.  G1, ER+, MO+, HER2-)                     Malignant neoplasm of upper-outer  quadrant of left breast in female, estrogen receptor negative   8/3/2022 -  Chemotherapy    OP BREAST PACLITAXEL WEEKLY FOLLOWED BY DOSE DENSE DOXORUBICIN CYCLOPHOSPHAMIDE Q2W     2022   Patient presents for treatment visit. She had some diarrhea. This is not a new thing for her. She takes imodium with resolution of symptoms. She has no blood in her stool. No abdominal pain. No fever or chills. Mild intermittent neuropathy.   Recently added glipizide to her diabetic regimen.     Past Medical History:   Diagnosis Date    Cancer 3/21/2022    Diabetes mellitus     Hypercholesterolemia     Hypertension       .  Past Surgical History:   Procedure Laterality Date    BILATERAL MASTECTOMY Bilateral 6/3/2022    Procedure: MASTECTOMY, BILATERAL;  Surgeon: Charlene Contreras MD;  Location: [facility] OR;  Service: General;  Laterality: Bilateral;     SECTION  92 and 3/15/95    COLONOSCOPY      INSERTION OF BREAST TISSUE EXPANDER Bilateral 6/3/2022    Procedure: INSERTION, TISSUE EXPANDER, BREAST (Bilateral breast reconstruction with tissue expanders and acellular dermal matrix and use of intraoperative fluoroscein angiogragry);  Surgeon: Nick Gibbs MD;  Location: [facility] OR;  Service: Plastics;  Laterality: Bilateral;    MEDIPORT INSERTION, SINGLE      SENTINEL LYMPH NODE BIOPSY Bilateral 6/3/2022    Procedure: BIOPSY, LYMPH NODE, SENTINEL;  Surgeon: Charlene Contreras MD;  Location: [facility] OR;  Service: General;  Laterality: Bilateral;  MAGTRACE Injection in office  Need SentiMag Probe      .  Family History   Problem Relation Age of Onset    Breast cancer Maternal Grandmother             Breast cancer Sister     Breast cancer Sister         remission    Breast cancer Maternal Aunt             Breast cancer Maternal Aunt             Breast cancer Maternal Aunt             Breast cancer Maternal Aunt     Breast cancer Maternal Uncle             Stroke Brother              Pancreatic cancer Brother       Social History     Socioeconomic History    Marital status:    Tobacco Use    Smoking status: Never    Smokeless tobacco: Never   Substance and Sexual Activity    Alcohol use: Not Currently     Alcohol/week: 1.0 standard drink     Types: 1 Glasses of wine per week     Comment: drink socially but haven't had a glass of wine since 2/13    Drug use: Never    Sexual activity: Yes     Partners: Male     Birth control/protection: None     Comment: We take STD test for the first 2 years we started dating  (5      .Review of patient's allergies indicates:  No Known Allergies     Current Outpatient Medications:     amLODIPine (NORVASC) 10 MG tablet, Take 10 mg by mouth once daily., Disp: , Rfl:     aspirin 81 MG Chew, Take 81 mg by mouth 2 (two) times a day. Stopped using until after sx, Disp: , Rfl:     atorvastatin (LIPITOR) 20 MG tablet, Take 20 mg by mouth every evening., Disp: , Rfl:     glipiZIDE (GLUCOTROL) 5 MG tablet, Take 5 mg by mouth 2 (two) times daily., Disp: , Rfl:     losartan-hydrochlorothiazide 100-25 mg (HYZAAR) 100-25 mg per tablet, Take 1 tablet by mouth once daily., Disp: , Rfl:     metFORMIN (GLUCOPHAGE) 1000 MG tablet, Take 1,000 mg by mouth daily with breakfast., Disp: , Rfl:     ondansetron (ZOFRAN-ODT) 8 MG TbDL, Take 1 tablet (8 mg total) by mouth every 8 (eight) hours as needed (nausea/vomiting). (Patient not taking: No sig reported), Disp: 60 tablet, Rfl: 5   Review of Systems   Constitutional:  Negative for appetite change and unexpected weight change.   HENT:  Negative for mouth sores.    Eyes:  Negative for visual disturbance.   Respiratory:  Negative for cough and shortness of breath.    Cardiovascular:  Negative for chest pain.   Gastrointestinal:  Negative for abdominal pain and diarrhea.   Genitourinary:  Negative for frequency.   Musculoskeletal:  Negative for back pain.   Integumentary:  Negative for rash.   Neurological:  Negative for headaches.    Hematological:  Negative for adenopathy.   Psychiatric/Behavioral:  The patient is not nervous/anxious.        Objective:     Vitals:    09/27/22 0926   BP: 123/82   Pulse: 72   Temp: 98.1 °F (36.7 °C)         Physical Exam  Constitutional:       Appearance: Normal appearance.   HENT:      Head: Normocephalic and atraumatic.      Nose: Nose normal.      Mouth/Throat:      Mouth: Mucous membranes are moist.   Eyes:      Extraocular Movements: Extraocular movements intact.      Conjunctiva/sclera: Conjunctivae normal.      Pupils: Pupils are equal, round, and reactive to light.   Cardiovascular:      Rate and Rhythm: Normal rate and regular rhythm.      Pulses: Normal pulses.   Pulmonary:      Effort: Pulmonary effort is normal.      Breath sounds: Normal breath sounds.   Abdominal:      General: Bowel sounds are normal.      Palpations: Abdomen is soft.   Musculoskeletal:      Cervical back: Normal range of motion and neck supple.   Neurological:      General: No focal deficit present.      Mental Status: She is alert and oriented to person, place, and time. Mental status is at baseline.   Psychiatric:         Mood and Affect: Mood normal.         Behavior: Behavior normal.         Assessment/Plan:     Problem List Items Addressed This Visit          Oncology    Malignant neoplasm of upper-outer quadrant of right breast in female, estrogen receptor positive    Current Assessment & Plan                Malignant neoplasm of upper-outer quadrant of left breast in female, estrogen receptor negative - Primary    Current Assessment & Plan     Taxol x 12 followed by dose dense AC, followed by Femara (for contralateral side)   No steroids with weekly taxol  She will eventually need follow-up of the abnormal CT of the abdomen pelvis with question of pancreatic cysts.  Will eventually get her to Dr. Jeromy Queen for follow-up.  Discuss the addition of pembrolizumab, 200 mg every 3 weeks x 9 as well  Hold Lipitor  Follow-up  with ophthalmology  Follow-up primary care disease to consider decreasing metformin with diarrhea  Imodium scheduled before chemotherapy   Zofran 8 mg daily or b.i.d. for 2-3 days after treatment  Return to clinic in 2 weeks for TD visit and check on symptoms  Check her sugars on a regular basis   Okay to hold her aspirin   Repeat CT abd/pelvis after Taxol                    Follow up in about 3 weeks (around 10/18/2022) for lab and visit with Dr. Briones.

## 2022-09-27 NOTE — ASSESSMENT & PLAN NOTE
· Taxol x 12 followed by dose dense AC, followed by Femara (for contralateral side)   · No steroids with weekly taxol  · She will eventually need follow-up of the abnormal CT of the abdomen pelvis with question of pancreatic cysts.  · Will eventually get her to Dr. Jeromy Queen for follow-up.  · Discuss the addition of pembrolizumab, 200 mg every 3 weeks x 9 as well  · Hold Lipitor  · Follow-up with ophthalmology  · Follow-up primary care disease to consider decreasing metformin with diarrhea  · Imodium scheduled before chemotherapy   · Zofran 8 mg daily or b.i.d. for 2-3 days after treatment  · Return to clinic in 2 weeks for TD visit and check on symptoms  · Check her sugars on a regular basis   · Okay to hold her aspirin   · Repeat CT abd/pelvis after Taxol

## 2022-09-28 ENCOUNTER — INFUSION (OUTPATIENT)
Dept: INFUSION THERAPY | Facility: HOSPITAL | Age: 62
End: 2022-09-28
Attending: INTERNAL MEDICINE
Payer: COMMERCIAL

## 2022-09-28 VITALS
RESPIRATION RATE: 16 BRPM | SYSTOLIC BLOOD PRESSURE: 131 MMHG | DIASTOLIC BLOOD PRESSURE: 79 MMHG | OXYGEN SATURATION: 98 % | HEART RATE: 77 BPM | TEMPERATURE: 98 F

## 2022-09-28 DIAGNOSIS — C50.412 MALIGNANT NEOPLASM OF UPPER-OUTER QUADRANT OF LEFT BREAST IN FEMALE, ESTROGEN RECEPTOR NEGATIVE: Primary | ICD-10-CM

## 2022-09-28 DIAGNOSIS — Z17.1 MALIGNANT NEOPLASM OF UPPER-OUTER QUADRANT OF LEFT BREAST IN FEMALE, ESTROGEN RECEPTOR NEGATIVE: Primary | ICD-10-CM

## 2022-09-28 PROCEDURE — 63600175 PHARM REV CODE 636 W HCPCS: Performed by: NURSE PRACTITIONER

## 2022-09-28 PROCEDURE — 96375 TX/PRO/DX INJ NEW DRUG ADDON: CPT

## 2022-09-28 PROCEDURE — 25000003 PHARM REV CODE 250: Performed by: NURSE PRACTITIONER

## 2022-09-28 PROCEDURE — 96413 CHEMO IV INFUSION 1 HR: CPT

## 2022-09-28 RX ORDER — DIPHENHYDRAMINE HYDROCHLORIDE 50 MG/ML
50 INJECTION INTRAMUSCULAR; INTRAVENOUS ONCE AS NEEDED
Status: DISCONTINUED | OUTPATIENT
Start: 2022-09-28 | End: 2022-09-28 | Stop reason: HOSPADM

## 2022-09-28 RX ORDER — DIPHENHYDRAMINE HYDROCHLORIDE 50 MG/ML
25 INJECTION INTRAMUSCULAR; INTRAVENOUS
Status: COMPLETED | OUTPATIENT
Start: 2022-09-28 | End: 2022-09-28

## 2022-09-28 RX ORDER — SODIUM CHLORIDE 0.9 % (FLUSH) 0.9 %
10 SYRINGE (ML) INJECTION
Status: DISCONTINUED | OUTPATIENT
Start: 2022-09-28 | End: 2022-09-28 | Stop reason: HOSPADM

## 2022-09-28 RX ORDER — HEPARIN 100 UNIT/ML
500 SYRINGE INTRAVENOUS
Status: DISCONTINUED | OUTPATIENT
Start: 2022-09-28 | End: 2022-09-28 | Stop reason: HOSPADM

## 2022-09-28 RX ORDER — EPINEPHRINE 0.3 MG/.3ML
0.3 INJECTION SUBCUTANEOUS ONCE AS NEEDED
Status: DISCONTINUED | OUTPATIENT
Start: 2022-09-28 | End: 2022-09-28 | Stop reason: HOSPADM

## 2022-09-28 RX ORDER — ONDANSETRON 2 MG/ML
8 INJECTION INTRAMUSCULAR; INTRAVENOUS
Status: COMPLETED | OUTPATIENT
Start: 2022-09-28 | End: 2022-09-28

## 2022-09-28 RX ORDER — FAMOTIDINE 10 MG/ML
20 INJECTION INTRAVENOUS
Status: COMPLETED | OUTPATIENT
Start: 2022-09-28 | End: 2022-09-28

## 2022-09-28 RX ADMIN — HEPARIN 500 UNITS: 100 SYRINGE at 01:09

## 2022-09-28 RX ADMIN — PACLITAXEL 168 MG: 6 INJECTION, SOLUTION INTRAVENOUS at 12:09

## 2022-09-28 RX ADMIN — FAMOTIDINE 20 MG: 10 INJECTION, SOLUTION INTRAVENOUS at 12:09

## 2022-09-28 RX ADMIN — DIPHENHYDRAMINE HYDROCHLORIDE 25 MG: 50 INJECTION, SOLUTION INTRAMUSCULAR; INTRAVENOUS at 12:09

## 2022-09-28 RX ADMIN — ONDANSETRON 8 MG: 2 INJECTION INTRAMUSCULAR; INTRAVENOUS at 12:09

## 2022-10-04 ENCOUNTER — LAB VISIT (OUTPATIENT)
Dept: LAB | Facility: HOSPITAL | Age: 62
End: 2022-10-04
Attending: INTERNAL MEDICINE
Payer: COMMERCIAL

## 2022-10-04 DIAGNOSIS — C50.412 MALIGNANT NEOPLASM OF UPPER-OUTER QUADRANT OF LEFT BREAST IN FEMALE, ESTROGEN RECEPTOR NEGATIVE: ICD-10-CM

## 2022-10-04 DIAGNOSIS — Z17.1 MALIGNANT NEOPLASM OF UPPER-OUTER QUADRANT OF LEFT BREAST IN FEMALE, ESTROGEN RECEPTOR NEGATIVE: ICD-10-CM

## 2022-10-04 DIAGNOSIS — Z17.0 MALIGNANT NEOPLASM OF UPPER-OUTER QUADRANT OF RIGHT BREAST IN FEMALE, ESTROGEN RECEPTOR POSITIVE: ICD-10-CM

## 2022-10-04 DIAGNOSIS — C50.411 MALIGNANT NEOPLASM OF UPPER-OUTER QUADRANT OF RIGHT BREAST IN FEMALE, ESTROGEN RECEPTOR POSITIVE: ICD-10-CM

## 2022-10-04 LAB
ALBUMIN SERPL-MCNC: 4 GM/DL (ref 3.4–4.8)
ALBUMIN/GLOB SERPL: 1.3 RATIO (ref 1.1–2)
ALP SERPL-CCNC: 122 UNIT/L (ref 40–150)
ALT SERPL-CCNC: 41 UNIT/L (ref 0–55)
AST SERPL-CCNC: 25 UNIT/L (ref 5–34)
BASOPHILS # BLD AUTO: 0.04 X10(3)/MCL (ref 0–0.2)
BASOPHILS NFR BLD AUTO: 0.6 %
BILIRUBIN DIRECT+TOT PNL SERPL-MCNC: 0.6 MG/DL
BUN SERPL-MCNC: 10 MG/DL (ref 9.8–20.1)
CALCIUM SERPL-MCNC: 9.9 MG/DL (ref 8.4–10.2)
CHLORIDE SERPL-SCNC: 104 MMOL/L (ref 98–107)
CO2 SERPL-SCNC: 29 MMOL/L (ref 23–31)
CREAT SERPL-MCNC: 0.87 MG/DL (ref 0.55–1.02)
EOSINOPHIL # BLD AUTO: 0.02 X10(3)/MCL (ref 0–0.9)
EOSINOPHIL NFR BLD AUTO: 0.3 %
ERYTHROCYTE [DISTWIDTH] IN BLOOD BY AUTOMATED COUNT: 14 % (ref 11.5–17)
GFR SERPLBLD CREATININE-BSD FMLA CKD-EPI: >60 MLS/MIN/1.73/M2
GLOBULIN SER-MCNC: 3 GM/DL (ref 2.4–3.5)
GLUCOSE SERPL-MCNC: 167 MG/DL (ref 82–115)
HCT VFR BLD AUTO: 35.1 % (ref 37–47)
HGB BLD-MCNC: 11.3 GM/DL (ref 12–16)
IMM GRANULOCYTES # BLD AUTO: 0.01 X10(3)/MCL (ref 0–0.04)
IMM GRANULOCYTES NFR BLD AUTO: 0.2 %
LYMPHOCYTES # BLD AUTO: 2.24 X10(3)/MCL (ref 0.6–4.6)
LYMPHOCYTES NFR BLD AUTO: 35.6 %
MCH RBC QN AUTO: 30.2 PG (ref 27–31)
MCHC RBC AUTO-ENTMCNC: 32.2 MG/DL (ref 33–36)
MCV RBC AUTO: 93.9 FL (ref 80–94)
MONOCYTES # BLD AUTO: 0.4 X10(3)/MCL (ref 0.1–1.3)
MONOCYTES NFR BLD AUTO: 6.4 %
NEUTROPHILS # BLD AUTO: 3.6 X10(3)/MCL (ref 2.1–9.2)
NEUTROPHILS NFR BLD AUTO: 56.9 %
PLATELET # BLD AUTO: 457 X10(3)/MCL (ref 130–400)
PMV BLD AUTO: 9.3 FL (ref 7.4–10.4)
POTASSIUM SERPL-SCNC: 3.4 MMOL/L (ref 3.5–5.1)
PROT SERPL-MCNC: 7 GM/DL (ref 5.8–7.6)
RBC # BLD AUTO: 3.74 X10(6)/MCL (ref 4.2–5.4)
SODIUM SERPL-SCNC: 141 MMOL/L (ref 136–145)
WBC # SPEC AUTO: 6.3 X10(3)/MCL (ref 4.5–11.5)

## 2022-10-04 PROCEDURE — 85025 COMPLETE CBC W/AUTO DIFF WBC: CPT

## 2022-10-04 PROCEDURE — 36415 COLL VENOUS BLD VENIPUNCTURE: CPT

## 2022-10-04 PROCEDURE — 80053 COMPREHEN METABOLIC PANEL: CPT

## 2022-10-05 ENCOUNTER — INFUSION (OUTPATIENT)
Dept: INFUSION THERAPY | Facility: HOSPITAL | Age: 62
End: 2022-10-05
Attending: INTERNAL MEDICINE
Payer: COMMERCIAL

## 2022-10-05 VITALS
BODY MASS INDEX: 35.43 KG/M2 | RESPIRATION RATE: 18 BRPM | OXYGEN SATURATION: 99 % | TEMPERATURE: 98 F | HEIGHT: 65 IN | SYSTOLIC BLOOD PRESSURE: 133 MMHG | HEART RATE: 80 BPM | DIASTOLIC BLOOD PRESSURE: 82 MMHG | WEIGHT: 212.69 LBS

## 2022-10-05 DIAGNOSIS — C50.412 MALIGNANT NEOPLASM OF UPPER-OUTER QUADRANT OF LEFT BREAST IN FEMALE, ESTROGEN RECEPTOR NEGATIVE: Primary | ICD-10-CM

## 2022-10-05 DIAGNOSIS — Z17.1 MALIGNANT NEOPLASM OF UPPER-OUTER QUADRANT OF LEFT BREAST IN FEMALE, ESTROGEN RECEPTOR NEGATIVE: Primary | ICD-10-CM

## 2022-10-05 PROCEDURE — 96375 TX/PRO/DX INJ NEW DRUG ADDON: CPT

## 2022-10-05 PROCEDURE — 25000003 PHARM REV CODE 250: Performed by: INTERNAL MEDICINE

## 2022-10-05 PROCEDURE — 96413 CHEMO IV INFUSION 1 HR: CPT

## 2022-10-05 PROCEDURE — 63600175 PHARM REV CODE 636 W HCPCS: Performed by: INTERNAL MEDICINE

## 2022-10-05 RX ORDER — DIPHENHYDRAMINE HYDROCHLORIDE 50 MG/ML
50 INJECTION INTRAMUSCULAR; INTRAVENOUS ONCE AS NEEDED
Status: CANCELLED | OUTPATIENT
Start: 2022-10-05

## 2022-10-05 RX ORDER — HEPARIN 100 UNIT/ML
500 SYRINGE INTRAVENOUS
Status: CANCELLED | OUTPATIENT
Start: 2022-10-05

## 2022-10-05 RX ORDER — HEPARIN 100 UNIT/ML
500 SYRINGE INTRAVENOUS
Status: DISCONTINUED | OUTPATIENT
Start: 2022-10-05 | End: 2022-10-05 | Stop reason: HOSPADM

## 2022-10-05 RX ORDER — ONDANSETRON 2 MG/ML
8 INJECTION INTRAMUSCULAR; INTRAVENOUS
Status: COMPLETED | OUTPATIENT
Start: 2022-10-05 | End: 2022-10-05

## 2022-10-05 RX ORDER — SODIUM CHLORIDE 0.9 % (FLUSH) 0.9 %
10 SYRINGE (ML) INJECTION
Status: DISCONTINUED | OUTPATIENT
Start: 2022-10-05 | End: 2022-10-05 | Stop reason: HOSPADM

## 2022-10-05 RX ORDER — EPINEPHRINE 0.3 MG/.3ML
0.3 INJECTION SUBCUTANEOUS ONCE AS NEEDED
Status: CANCELLED | OUTPATIENT
Start: 2022-10-05

## 2022-10-05 RX ORDER — EPINEPHRINE 0.3 MG/.3ML
0.3 INJECTION SUBCUTANEOUS ONCE AS NEEDED
Status: DISCONTINUED | OUTPATIENT
Start: 2022-10-05 | End: 2022-10-05 | Stop reason: HOSPADM

## 2022-10-05 RX ORDER — DIPHENHYDRAMINE HYDROCHLORIDE 50 MG/ML
25 INJECTION INTRAMUSCULAR; INTRAVENOUS
Status: COMPLETED | OUTPATIENT
Start: 2022-10-05 | End: 2022-10-05

## 2022-10-05 RX ORDER — ONDANSETRON 2 MG/ML
8 INJECTION INTRAMUSCULAR; INTRAVENOUS
Status: CANCELLED
Start: 2022-10-05

## 2022-10-05 RX ORDER — FAMOTIDINE 10 MG/ML
20 INJECTION INTRAVENOUS
Status: CANCELLED | OUTPATIENT
Start: 2022-10-05

## 2022-10-05 RX ORDER — DIPHENHYDRAMINE HYDROCHLORIDE 50 MG/ML
25 INJECTION INTRAMUSCULAR; INTRAVENOUS
Status: CANCELLED
Start: 2022-10-05

## 2022-10-05 RX ORDER — SODIUM CHLORIDE 0.9 % (FLUSH) 0.9 %
10 SYRINGE (ML) INJECTION
Status: CANCELLED | OUTPATIENT
Start: 2022-10-05

## 2022-10-05 RX ORDER — FAMOTIDINE 10 MG/ML
20 INJECTION INTRAVENOUS
Status: COMPLETED | OUTPATIENT
Start: 2022-10-05 | End: 2022-10-05

## 2022-10-05 RX ORDER — DIPHENHYDRAMINE HYDROCHLORIDE 50 MG/ML
50 INJECTION INTRAMUSCULAR; INTRAVENOUS ONCE AS NEEDED
Status: DISCONTINUED | OUTPATIENT
Start: 2022-10-05 | End: 2022-10-05 | Stop reason: HOSPADM

## 2022-10-05 RX ADMIN — SODIUM CHLORIDE: 9 INJECTION, SOLUTION INTRAVENOUS at 11:10

## 2022-10-05 RX ADMIN — ONDANSETRON 8 MG: 2 INJECTION INTRAMUSCULAR; INTRAVENOUS at 11:10

## 2022-10-05 RX ADMIN — DIPHENHYDRAMINE HYDROCHLORIDE 25 MG: 50 INJECTION, SOLUTION INTRAMUSCULAR; INTRAVENOUS at 11:10

## 2022-10-05 RX ADMIN — FAMOTIDINE 20 MG: 10 INJECTION, SOLUTION INTRAVENOUS at 11:10

## 2022-10-05 RX ADMIN — HEPARIN 500 UNITS: 100 SYRINGE at 01:10

## 2022-10-05 RX ADMIN — PACLITAXEL 168 MG: 6 INJECTION, SOLUTION INTRAVENOUS at 12:10

## 2022-10-11 ENCOUNTER — LAB VISIT (OUTPATIENT)
Dept: LAB | Facility: HOSPITAL | Age: 62
End: 2022-10-11
Attending: NURSE PRACTITIONER
Payer: COMMERCIAL

## 2022-10-11 DIAGNOSIS — Z17.1 MALIGNANT NEOPLASM OF UPPER-OUTER QUADRANT OF LEFT BREAST IN FEMALE, ESTROGEN RECEPTOR NEGATIVE: ICD-10-CM

## 2022-10-11 DIAGNOSIS — Z17.0 MALIGNANT NEOPLASM OF UPPER-OUTER QUADRANT OF RIGHT BREAST IN FEMALE, ESTROGEN RECEPTOR POSITIVE: ICD-10-CM

## 2022-10-11 DIAGNOSIS — C50.411 MALIGNANT NEOPLASM OF UPPER-OUTER QUADRANT OF RIGHT BREAST IN FEMALE, ESTROGEN RECEPTOR POSITIVE: ICD-10-CM

## 2022-10-11 DIAGNOSIS — C50.412 MALIGNANT NEOPLASM OF UPPER-OUTER QUADRANT OF LEFT BREAST IN FEMALE, ESTROGEN RECEPTOR NEGATIVE: ICD-10-CM

## 2022-10-11 LAB
ALBUMIN SERPL-MCNC: 4.1 GM/DL (ref 3.4–4.8)
ALBUMIN/GLOB SERPL: 1.4 RATIO (ref 1.1–2)
ALP SERPL-CCNC: 107 UNIT/L (ref 40–150)
ALT SERPL-CCNC: 41 UNIT/L (ref 0–55)
AST SERPL-CCNC: 20 UNIT/L (ref 5–34)
BASOPHILS # BLD AUTO: 0.03 X10(3)/MCL (ref 0–0.2)
BASOPHILS NFR BLD AUTO: 0.5 %
BILIRUBIN DIRECT+TOT PNL SERPL-MCNC: 0.9 MG/DL
BUN SERPL-MCNC: 10.9 MG/DL (ref 9.8–20.1)
CALCIUM SERPL-MCNC: 9.8 MG/DL (ref 8.4–10.2)
CHLORIDE SERPL-SCNC: 102 MMOL/L (ref 98–107)
CO2 SERPL-SCNC: 30 MMOL/L (ref 23–31)
CREAT SERPL-MCNC: 0.79 MG/DL (ref 0.55–1.02)
EOSINOPHIL # BLD AUTO: 0.02 X10(3)/MCL (ref 0–0.9)
EOSINOPHIL NFR BLD AUTO: 0.3 %
ERYTHROCYTE [DISTWIDTH] IN BLOOD BY AUTOMATED COUNT: 14.3 % (ref 11.5–17)
GFR SERPLBLD CREATININE-BSD FMLA CKD-EPI: >60 MLS/MIN/1.73/M2
GLOBULIN SER-MCNC: 2.9 GM/DL (ref 2.4–3.5)
GLUCOSE SERPL-MCNC: 157 MG/DL (ref 82–115)
HCT VFR BLD AUTO: 36.5 % (ref 37–47)
HGB BLD-MCNC: 11.9 GM/DL (ref 12–16)
IMM GRANULOCYTES # BLD AUTO: 0.02 X10(3)/MCL (ref 0–0.04)
IMM GRANULOCYTES NFR BLD AUTO: 0.3 %
LYMPHOCYTES # BLD AUTO: 2.05 X10(3)/MCL (ref 0.6–4.6)
LYMPHOCYTES NFR BLD AUTO: 35.3 %
MCH RBC QN AUTO: 30.3 PG (ref 27–31)
MCHC RBC AUTO-ENTMCNC: 32.6 MG/DL (ref 33–36)
MCV RBC AUTO: 92.9 FL (ref 80–94)
MONOCYTES # BLD AUTO: 0.37 X10(3)/MCL (ref 0.1–1.3)
MONOCYTES NFR BLD AUTO: 6.4 %
NEUTROPHILS # BLD AUTO: 3.3 X10(3)/MCL (ref 2.1–9.2)
NEUTROPHILS NFR BLD AUTO: 57.2 %
PLATELET # BLD AUTO: 430 X10(3)/MCL (ref 130–400)
PMV BLD AUTO: 9.9 FL (ref 7.4–10.4)
POTASSIUM SERPL-SCNC: 3.9 MMOL/L (ref 3.5–5.1)
PROT SERPL-MCNC: 7 GM/DL (ref 5.8–7.6)
RBC # BLD AUTO: 3.93 X10(6)/MCL (ref 4.2–5.4)
SODIUM SERPL-SCNC: 140 MMOL/L (ref 136–145)
WBC # SPEC AUTO: 5.8 X10(3)/MCL (ref 4.5–11.5)

## 2022-10-11 PROCEDURE — 85025 COMPLETE CBC W/AUTO DIFF WBC: CPT

## 2022-10-11 PROCEDURE — 36415 COLL VENOUS BLD VENIPUNCTURE: CPT

## 2022-10-11 PROCEDURE — 80053 COMPREHEN METABOLIC PANEL: CPT

## 2022-10-12 ENCOUNTER — PATIENT MESSAGE (OUTPATIENT)
Dept: INFUSION THERAPY | Facility: HOSPITAL | Age: 62
End: 2022-10-12

## 2022-10-12 ENCOUNTER — INFUSION (OUTPATIENT)
Dept: INFUSION THERAPY | Facility: HOSPITAL | Age: 62
End: 2022-10-12
Attending: INTERNAL MEDICINE
Payer: COMMERCIAL

## 2022-10-12 VITALS
TEMPERATURE: 98 F | HEIGHT: 65 IN | DIASTOLIC BLOOD PRESSURE: 83 MMHG | BODY MASS INDEX: 34.99 KG/M2 | OXYGEN SATURATION: 98 % | RESPIRATION RATE: 18 BRPM | HEART RATE: 92 BPM | SYSTOLIC BLOOD PRESSURE: 140 MMHG | WEIGHT: 210 LBS

## 2022-10-12 DIAGNOSIS — Z17.1 MALIGNANT NEOPLASM OF UPPER-OUTER QUADRANT OF LEFT BREAST IN FEMALE, ESTROGEN RECEPTOR NEGATIVE: Primary | ICD-10-CM

## 2022-10-12 DIAGNOSIS — C50.412 MALIGNANT NEOPLASM OF UPPER-OUTER QUADRANT OF LEFT BREAST IN FEMALE, ESTROGEN RECEPTOR NEGATIVE: Primary | ICD-10-CM

## 2022-10-12 PROCEDURE — 25000003 PHARM REV CODE 250: Performed by: INTERNAL MEDICINE

## 2022-10-12 PROCEDURE — 63600175 PHARM REV CODE 636 W HCPCS: Performed by: INTERNAL MEDICINE

## 2022-10-12 PROCEDURE — 96375 TX/PRO/DX INJ NEW DRUG ADDON: CPT

## 2022-10-12 PROCEDURE — 96413 CHEMO IV INFUSION 1 HR: CPT

## 2022-10-12 RX ORDER — ONDANSETRON 2 MG/ML
8 INJECTION INTRAMUSCULAR; INTRAVENOUS
Status: CANCELLED
Start: 2022-10-12

## 2022-10-12 RX ORDER — DIPHENHYDRAMINE HYDROCHLORIDE 50 MG/ML
50 INJECTION INTRAMUSCULAR; INTRAVENOUS ONCE AS NEEDED
Status: DISCONTINUED | OUTPATIENT
Start: 2022-10-12 | End: 2022-10-12 | Stop reason: HOSPADM

## 2022-10-12 RX ORDER — DIPHENHYDRAMINE HYDROCHLORIDE 50 MG/ML
50 INJECTION INTRAMUSCULAR; INTRAVENOUS ONCE AS NEEDED
Status: CANCELLED | OUTPATIENT
Start: 2022-10-12

## 2022-10-12 RX ORDER — HEPARIN 100 UNIT/ML
500 SYRINGE INTRAVENOUS
Status: CANCELLED | OUTPATIENT
Start: 2022-10-12

## 2022-10-12 RX ORDER — HEPARIN 100 UNIT/ML
500 SYRINGE INTRAVENOUS
Status: CANCELLED | OUTPATIENT
Start: 2022-10-19

## 2022-10-12 RX ORDER — FAMOTIDINE 10 MG/ML
20 INJECTION INTRAVENOUS
Status: COMPLETED | OUTPATIENT
Start: 2022-10-12 | End: 2022-10-12

## 2022-10-12 RX ORDER — SODIUM CHLORIDE 0.9 % (FLUSH) 0.9 %
10 SYRINGE (ML) INJECTION
Status: DISCONTINUED | OUTPATIENT
Start: 2022-10-12 | End: 2022-10-12 | Stop reason: HOSPADM

## 2022-10-12 RX ORDER — ONDANSETRON 2 MG/ML
8 INJECTION INTRAMUSCULAR; INTRAVENOUS
Status: CANCELLED
Start: 2022-10-19

## 2022-10-12 RX ORDER — EPINEPHRINE 0.3 MG/.3ML
0.3 INJECTION SUBCUTANEOUS ONCE AS NEEDED
Status: DISCONTINUED | OUTPATIENT
Start: 2022-10-12 | End: 2022-10-12 | Stop reason: HOSPADM

## 2022-10-12 RX ORDER — DIPHENHYDRAMINE HYDROCHLORIDE 50 MG/ML
25 INJECTION INTRAMUSCULAR; INTRAVENOUS
Status: COMPLETED | OUTPATIENT
Start: 2022-10-12 | End: 2022-10-12

## 2022-10-12 RX ORDER — FAMOTIDINE 10 MG/ML
20 INJECTION INTRAVENOUS
Status: CANCELLED | OUTPATIENT
Start: 2022-10-12

## 2022-10-12 RX ORDER — SODIUM CHLORIDE 0.9 % (FLUSH) 0.9 %
10 SYRINGE (ML) INJECTION
Status: CANCELLED | OUTPATIENT
Start: 2022-10-19

## 2022-10-12 RX ORDER — EPINEPHRINE 0.3 MG/.3ML
0.3 INJECTION SUBCUTANEOUS ONCE AS NEEDED
Status: CANCELLED | OUTPATIENT
Start: 2022-10-12

## 2022-10-12 RX ORDER — DIPHENHYDRAMINE HYDROCHLORIDE 50 MG/ML
50 INJECTION INTRAMUSCULAR; INTRAVENOUS ONCE AS NEEDED
Status: CANCELLED | OUTPATIENT
Start: 2022-10-19

## 2022-10-12 RX ORDER — FAMOTIDINE 10 MG/ML
20 INJECTION INTRAVENOUS
Status: CANCELLED | OUTPATIENT
Start: 2022-10-19

## 2022-10-12 RX ORDER — DIPHENHYDRAMINE HYDROCHLORIDE 50 MG/ML
25 INJECTION INTRAMUSCULAR; INTRAVENOUS
Status: CANCELLED
Start: 2022-10-12

## 2022-10-12 RX ORDER — SODIUM CHLORIDE 0.9 % (FLUSH) 0.9 %
10 SYRINGE (ML) INJECTION
Status: CANCELLED | OUTPATIENT
Start: 2022-10-12

## 2022-10-12 RX ORDER — ONDANSETRON 2 MG/ML
8 INJECTION INTRAMUSCULAR; INTRAVENOUS
Status: COMPLETED | OUTPATIENT
Start: 2022-10-12 | End: 2022-10-12

## 2022-10-12 RX ORDER — EPINEPHRINE 0.3 MG/.3ML
0.3 INJECTION SUBCUTANEOUS ONCE AS NEEDED
Status: CANCELLED | OUTPATIENT
Start: 2022-10-19

## 2022-10-12 RX ORDER — DIPHENHYDRAMINE HYDROCHLORIDE 50 MG/ML
25 INJECTION INTRAMUSCULAR; INTRAVENOUS
Status: CANCELLED
Start: 2022-10-19

## 2022-10-12 RX ORDER — HEPARIN 100 UNIT/ML
500 SYRINGE INTRAVENOUS
Status: DISCONTINUED | OUTPATIENT
Start: 2022-10-12 | End: 2022-10-12 | Stop reason: HOSPADM

## 2022-10-12 RX ADMIN — ONDANSETRON 8 MG: 2 INJECTION INTRAMUSCULAR; INTRAVENOUS at 11:10

## 2022-10-12 RX ADMIN — FAMOTIDINE 20 MG: 10 INJECTION, SOLUTION INTRAVENOUS at 11:10

## 2022-10-12 RX ADMIN — DIPHENHYDRAMINE HYDROCHLORIDE 25 MG: 50 INJECTION, SOLUTION INTRAMUSCULAR; INTRAVENOUS at 11:10

## 2022-10-12 RX ADMIN — SODIUM CHLORIDE: 9 INJECTION, SOLUTION INTRAVENOUS at 11:10

## 2022-10-12 RX ADMIN — HEPARIN 500 UNITS: 100 SYRINGE at 01:10

## 2022-10-12 RX ADMIN — PACLITAXEL 168 MG: 6 INJECTION, SOLUTION INTRAVENOUS at 12:10

## 2022-10-18 ENCOUNTER — OFFICE VISIT (OUTPATIENT)
Dept: HEMATOLOGY/ONCOLOGY | Facility: CLINIC | Age: 62
End: 2022-10-18
Payer: COMMERCIAL

## 2022-10-18 VITALS
HEART RATE: 71 BPM | HEIGHT: 65 IN | OXYGEN SATURATION: 100 % | WEIGHT: 210.31 LBS | BODY MASS INDEX: 35.04 KG/M2 | SYSTOLIC BLOOD PRESSURE: 130 MMHG | DIASTOLIC BLOOD PRESSURE: 81 MMHG | TEMPERATURE: 98 F

## 2022-10-18 DIAGNOSIS — C50.411 MALIGNANT NEOPLASM OF UPPER-OUTER QUADRANT OF RIGHT BREAST IN FEMALE, ESTROGEN RECEPTOR POSITIVE: Primary | ICD-10-CM

## 2022-10-18 DIAGNOSIS — C50.412 MALIGNANT NEOPLASM OF UPPER-OUTER QUADRANT OF LEFT BREAST IN FEMALE, ESTROGEN RECEPTOR NEGATIVE: ICD-10-CM

## 2022-10-18 DIAGNOSIS — R93.5 ABNORMAL CT OF THE ABDOMEN: ICD-10-CM

## 2022-10-18 DIAGNOSIS — Z17.1 MALIGNANT NEOPLASM OF UPPER-OUTER QUADRANT OF LEFT BREAST IN FEMALE, ESTROGEN RECEPTOR NEGATIVE: ICD-10-CM

## 2022-10-18 DIAGNOSIS — Z17.0 MALIGNANT NEOPLASM OF UPPER-OUTER QUADRANT OF RIGHT BREAST IN FEMALE, ESTROGEN RECEPTOR POSITIVE: Primary | ICD-10-CM

## 2022-10-18 DIAGNOSIS — R93.5 ABNORMAL CT SCAN, PELVIS: ICD-10-CM

## 2022-10-18 PROCEDURE — 99999 PR PBB SHADOW E&M-EST. PATIENT-LVL III: CPT | Mod: PBBFAC,,, | Performed by: INTERNAL MEDICINE

## 2022-10-18 PROCEDURE — 99214 OFFICE O/P EST MOD 30 MIN: CPT | Mod: S$GLB,,, | Performed by: INTERNAL MEDICINE

## 2022-10-18 PROCEDURE — 3079F PR MOST RECENT DIASTOLIC BLOOD PRESSURE 80-89 MM HG: ICD-10-PCS | Mod: CPTII,S$GLB,, | Performed by: INTERNAL MEDICINE

## 2022-10-18 PROCEDURE — 3075F PR MOST RECENT SYSTOLIC BLOOD PRESS GE 130-139MM HG: ICD-10-PCS | Mod: CPTII,S$GLB,, | Performed by: INTERNAL MEDICINE

## 2022-10-18 PROCEDURE — 99214 PR OFFICE/OUTPT VISIT, EST, LEVL IV, 30-39 MIN: ICD-10-PCS | Mod: S$GLB,,, | Performed by: INTERNAL MEDICINE

## 2022-10-18 PROCEDURE — 1160F RVW MEDS BY RX/DR IN RCRD: CPT | Mod: CPTII,S$GLB,, | Performed by: INTERNAL MEDICINE

## 2022-10-18 PROCEDURE — 1159F MED LIST DOCD IN RCRD: CPT | Mod: CPTII,S$GLB,, | Performed by: INTERNAL MEDICINE

## 2022-10-18 PROCEDURE — 1160F PR REVIEW ALL MEDS BY PRESCRIBER/CLIN PHARMACIST DOCUMENTED: ICD-10-PCS | Mod: CPTII,S$GLB,, | Performed by: INTERNAL MEDICINE

## 2022-10-18 PROCEDURE — 3075F SYST BP GE 130 - 139MM HG: CPT | Mod: CPTII,S$GLB,, | Performed by: INTERNAL MEDICINE

## 2022-10-18 PROCEDURE — 3079F DIAST BP 80-89 MM HG: CPT | Mod: CPTII,S$GLB,, | Performed by: INTERNAL MEDICINE

## 2022-10-18 PROCEDURE — 99999 PR PBB SHADOW E&M-EST. PATIENT-LVL III: ICD-10-PCS | Mod: PBBFAC,,, | Performed by: INTERNAL MEDICINE

## 2022-10-18 PROCEDURE — 1159F PR MEDICATION LIST DOCUMENTED IN MEDICAL RECORD: ICD-10-PCS | Mod: CPTII,S$GLB,, | Performed by: INTERNAL MEDICINE

## 2022-10-18 NOTE — PROGRESS NOTES
Heme/Onc Progress Note    PATIENT: Elina Ham  MRN: [id number]  DATE: 10/18/2022  Chief Complaint: Follow-up (3 week f/u)    Current Treatment: OP BREAST PACLITAXEL WEEKLY FOLLOWED BY DOSE DENSE DOXORUBICIN CYCLOPHOSPHAMIDE Q2W     Oncology History Overview Note   Oncology History   Malignant neoplasm of upper-outer quadrant of right breast in female, estrogen receptor positive   3/16/2022 Cancer Staged    1. Ultrasound-guided Core Needle Biopsy Right Breast 9 o'clock 5 cm FN Mass with Distortion - Invasive ductal carcinoma, grade 1, with associated focus of ductal carcinoma in situ, grade 1, and focal perineural invasion is present  ER 81%KS 3%HER2 Negative  Ki67 4%    2.Stereotactic guided Core Needle Biopsy Right Breast Mass 10 o'clock Posterior Depth Focal Asymmetry, Architectural Distortion with Calcifications - Invasive ductal carcinoma, grade 1 and focal lobular carcinoma in situ  ER 43%KS 94%HER2 Negative  Ki67 4%    3. Ultrasound-guided Core Needle Biopsy Left Breast 1 o'clock 7 cm FN Mass - High grade carcinoma with anaplastic/malignant giant cell component (suggestive for sarcomatoid/metaplastic change)  ER 0%KS 0%HER2 Negative  Ki67 High    4.Ultrasound-guided Core Needle Biopsy Left Axillary Node - Portion of reactive lymph node; no evidence of metastatic carcinoma     6/3/2022 Surgery    Bilateral simple mastectomy and Bilateral SLNB 6/3/2022  - Left mastectomy and SLNB - a single focus of invasive ductal carcinoma grade 3 measuring 25 mm. Margins clear. Six sentinel lymph nodes were examined with one positive for metastatic carcinoma, 5 mm.   G3, ER-, KS-, HER2    - Right mastectomy and SLNB - two foci of invasive ductal carcinoma grade 1 measuring 10 mm and 4 mm. Low grade DCIS and LCIS were also present. All margins clear. Four sentinel lymph ndoes are negative for metastatic carcinoma.  G1, ER+, KS+, HER2-)      Malignant neoplasm of upper-outer quadrant of left breast in female,  estrogen receptor negative   6/3/2022 Surgery    Bilateral simple mastectomy and Bilateral SLNB 6/3/2022  - Left mastectomy and SLNB - a single focus of invasive ductal carcinoma grade 3 measuring 25 mm. Margins clear. Six sentinel lymph nodes were examined with one positive for metastatic carcinoma, 5 mm.   G3, ER-, CT-, HER2    - Right mastectomy and SLNB - two foci of invasive ductal carcinoma grade 1 measuring 10 mm and 4 mm. Low grade DCIS and LCIS were also present. All margins clear. Four sentinel lymph ndoes are negative for metastatic carcinoma.  G1, ER+, CT+, HER2-)      This is a 62-year-old female initially had abnormal mammograms in March of 2022. She was noted to have bilateral breast cancer, stage I A right breast carcinoma clinically and a T2 left breast carcinoma.  She underwent CT scan of chest abdomen pelvis on 04/11/2022 which showed a small cystic pancreatic tail that needs to be followed.  There was no convincing evidence of metastatic disease.  But she did have some suspected left axillary adenopathy.  She underwent high risk genetic testing because of her family history and bilateral breast cancer.  She was found to have a variant of undetermined significance.    Genetic testing was appropriate for this patient because of her personal and family history. Analysis consisted of sequencing and large rearrangement analyses of the following genes: APC, LIZZY, BARD1, BMPR1A, BRCA1, BRCA2, BRIP1, CDH1, CDK4, CDKN2A (p14ARF and f11ayz3d), CHEK2, EPCAM (large rearrangement only), GREM1, MLH1, MSH2, MSH6, MUTYH, NBN, PALB2, PMS2, POLE, POLD1, PTEN, RAD51C, RAD51D, SMAD4, STK11, TP53. . This comprehensive genetic analysis indicated that there was no deleterious mutation found in any of these genes. However, there was a variant of uncertain significance (VUS):AXIN2 c.1573C>G.    The ultrasound-guided biopsy of left axillary node showed no evidence of active disease.  As well the patient was then taken to  the operating room, on 06/03/2022 and underwent, bilateral mastectomy sentinel lymph node biopsy.  In addition she underwent bilateral breast tissue expanders potential reconstructive surgery.      07/25/2022:Patient's nuclear medicine bone scan shows no evidence of metastatic disease.  she did not get a repeat CT scan of the chest abdomen pelvis.  She did have a port placed.  And there is no recent echocardiogram.       Malignant neoplasm of upper-outer quadrant of right breast in female, estrogen receptor positive   Malignant neoplasm of upper-outer quadrant of left breast in female, estrogen receptor negative   8/3/2022 -  Chemotherapy    OP BREAST PACLITAXEL WEEKLY FOLLOWED BY DOSE DENSE DOXORUBICIN CYCLOPHOSPHAMIDE Q2W      Genetic Testing    Genetic testing negative      Pathology Significant Finding    Left breast:  Repeat marker:  ER 6%, NE 0.1%, HER2/rahul negative, Ki-67 68%         10/18/2022  Patient reports her vision is improved.  She still has a mild transient numbness mostly in her feet.  Her diarrhea is much better.  Although she still has it.  She denies any fever chills night sweats weight loss.  She does have cold intolerance.  No cardiovascular symptoms.  No respiratory symptoms.    Past Medical History:   Diagnosis Date    Cancer 3/21/2022    Diabetes mellitus     Hypercholesterolemia     Hypertension         Current Outpatient Medications:     amLODIPine (NORVASC) 10 MG tablet, Take 10 mg by mouth once daily., Disp: , Rfl:     glipiZIDE (GLUCOTROL) 5 MG tablet, Take 5 mg by mouth 2 (two) times daily., Disp: , Rfl:     losartan-hydrochlorothiazide 100-25 mg (HYZAAR) 100-25 mg per tablet, Take 1 tablet by mouth once daily., Disp: , Rfl:     metFORMIN (GLUCOPHAGE) 1000 MG tablet, Take 1,000 mg by mouth daily with breakfast., Disp: , Rfl:     aspirin 81 MG Chew, Take 81 mg by mouth 2 (two) times a day. Stopped using until after sx, Disp: , Rfl:     atorvastatin (LIPITOR) 20 MG tablet, Take 20 mg by  mouth every evening., Disp: , Rfl:     ondansetron (ZOFRAN-ODT) 8 MG TbDL, Take 1 tablet (8 mg total) by mouth every 8 (eight) hours as needed (nausea/vomiting). (Patient not taking: No sig reported), Disp: 60 tablet, Rfl: 5     Review of Systems:   Pertinent positives and negatives included in the HPI. Otherwise a complete review of systems is negative.      Objective:     Vitals:    10/18/22 0931   BP: 130/81   Pulse: 71   Temp: 98 °F (36.7 °C)         Physical Exam    Normal Physical exam:  VITAL SIGNS:  Reviewed.  [  ]  GENERAL: [Well-developed well-nourished in no apparent cardiorespiratory distress]    HEAD: Normocephalic, atraumatic  EYES:  Pupils are equal.   reactive to light.  Extraocular motions intact.  No scleral icterus.  No pallor.  EARS:  Hearing grossly intact.  MOUTH:  Oropharynx is clear without exudates or erythema.   NECK: Supple no adenopathy, no JVD.  Trachea midline  CHEST:  Chest with clear breath sounds bilaterally.  No wheezes, rales, or rhonchi.    CARDIAC:  Regular rate and rhythm.  S1 and S2, without murmurs, gallops, or rubs.  VASCULAR:  No Edema.  Peripheral pulses normal and equal in all extremities.  ABDOMEN:  Soft, without detectable tenderness.  No sign of distention.  No   rebound or guarding, and no masses palpated.   Bowel Sounds normal.  MUSCULOSKELETAL:  Good range of motion of all major joints. Extremities without clubbing, cyanosis or edema.    NEUROLOGIC EXAM:  Alert and oriented x 3.  No focal sensory or strength deficits.   Speech normal.  Follows commands.  PSYCHIATRIC:  Mood normal.  Lymph nodes:  No palpable axillary inguinal adenopathy   Gait normal   ECOG 0  Lab Visit on 10/18/2022   Component Date Value Ref Range Status    WBC 10/18/2022 5.3  4.5 - 11.5 x10(3)/mcL Final    RBC 10/18/2022 3.82 (L)  4.20 - 5.40 x10(6)/mcL Final    Hgb 10/18/2022 11.5 (L)  12.0 - 16.0 gm/dL Final    Hct 10/18/2022 35.7 (L)  37.0 - 47.0 % Final    MCV 10/18/2022 93.5  80.0 - 94.0 fL  Final    MCH 10/18/2022 30.1  27.0 - 31.0 pg Final    MCHC 10/18/2022 32.2 (L)  33.0 - 36.0 mg/dL Final    RDW 10/18/2022 14.1  11.5 - 17.0 % Final    Platelet 10/18/2022 439 (H)  130 - 400 x10(3)/mcL Final    MPV 10/18/2022 9.5  7.4 - 10.4 fL Final    Neut % 10/18/2022 57.3  % Final    Lymph % 10/18/2022 34.7  % Final    Mono % 10/18/2022 6.6  % Final    Eos % 10/18/2022 0.4  % Final    Basophil % 10/18/2022 0.6  % Final    Lymph # 10/18/2022 1.84  0.6 - 4.6 x10(3)/mcL Final    Neut # 10/18/2022 3.1  2.1 - 9.2 x10(3)/mcL Final    Mono # 10/18/2022 0.35  0.1 - 1.3 x10(3)/mcL Final    Eos # 10/18/2022 0.02  0 - 0.9 x10(3)/mcL Final    Baso # 10/18/2022 0.03  0 - 0.2 x10(3)/mcL Final    IG# 10/18/2022 0.02  0 - 0.04 x10(3)/mcL Final    IG% 10/18/2022 0.4  % Final            Assessment and Plan     Problem List Items Addressed This Visit          Oncology    Malignant neoplasm of upper-outer quadrant of right breast in female, estrogen receptor positive - Primary    Malignant neoplasm of upper-outer quadrant of left breast in female, estrogen receptor negative    Current Assessment & Plan     Taxol x 12 followed by dose dense AC, followed by Femara (for contralateral side)   No steroids with weekly taxol  Will eventually get her to Dr. Jeromy Queen for follow-up.  She will eventually need follow-up of the abnormal CT of the abdomen pelvis with question of pancreatic cysts.  Discuss the addition of pembrolizumab, 200 mg every 3 weeks x 9 as well with  q 3 week AC  Hold Lipitor  Follow-up with ophthalmology  Follow-up primary care disease to consider decreasing metformin with diarrhea  Imodium scheduled before chemotherapy   Zofran 8 mg daily or b.i.d. for 2-3 days after treatment  Return to clinic in 3  weeks for  result of CT  Will discuss AC vs AC pembro   Check her sugars on a regular basis   Okay to hold her aspirin                         Follow up in about 3 weeks (around 11/8/2022) for EOV wtih cbc/cmp/CT  results.    Gato Briones MD

## 2022-10-18 NOTE — ASSESSMENT & PLAN NOTE
· Taxol x 12 followed by dose dense AC, followed by Femara (for contralateral side)   · No steroids with weekly taxol  · Will eventually get her to Dr. Jeromy Queen for follow-up.  She will eventually need follow-up of the abnormal CT of the abdomen pelvis with question of pancreatic cysts.  · Discuss the addition of pembrolizumab, 200 mg every 3 weeks x 9 as well with  q 3 week AC  · Hold Lipitor  · Follow-up with ophthalmology  · Follow-up primary care disease to consider decreasing metformin with diarrhea  · Imodium scheduled before chemotherapy   · Zofran 8 mg daily or b.i.d. for 2-3 days after treatment  · Return to clinic in 3  weeks for  result of CT  · Will discuss AC vs AC pembro   · Check her sugars on a regular basis   · Okay to hold her aspirin   ·   ·

## 2022-10-19 ENCOUNTER — INFUSION (OUTPATIENT)
Dept: INFUSION THERAPY | Facility: HOSPITAL | Age: 62
End: 2022-10-19
Attending: INTERNAL MEDICINE
Payer: COMMERCIAL

## 2022-10-19 VITALS
TEMPERATURE: 98 F | WEIGHT: 210.31 LBS | HEIGHT: 65 IN | BODY MASS INDEX: 35.04 KG/M2 | OXYGEN SATURATION: 99 % | DIASTOLIC BLOOD PRESSURE: 78 MMHG | HEART RATE: 92 BPM | SYSTOLIC BLOOD PRESSURE: 124 MMHG | RESPIRATION RATE: 18 BRPM

## 2022-10-19 DIAGNOSIS — C50.412 MALIGNANT NEOPLASM OF UPPER-OUTER QUADRANT OF LEFT BREAST IN FEMALE, ESTROGEN RECEPTOR NEGATIVE: Primary | ICD-10-CM

## 2022-10-19 DIAGNOSIS — Z17.1 MALIGNANT NEOPLASM OF UPPER-OUTER QUADRANT OF LEFT BREAST IN FEMALE, ESTROGEN RECEPTOR NEGATIVE: Primary | ICD-10-CM

## 2022-10-19 PROCEDURE — 25000003 PHARM REV CODE 250: Performed by: INTERNAL MEDICINE

## 2022-10-19 PROCEDURE — 96413 CHEMO IV INFUSION 1 HR: CPT

## 2022-10-19 PROCEDURE — 96375 TX/PRO/DX INJ NEW DRUG ADDON: CPT

## 2022-10-19 PROCEDURE — 63600175 PHARM REV CODE 636 W HCPCS: Performed by: INTERNAL MEDICINE

## 2022-10-19 PROCEDURE — A4216 STERILE WATER/SALINE, 10 ML: HCPCS | Performed by: INTERNAL MEDICINE

## 2022-10-19 RX ORDER — ONDANSETRON 2 MG/ML
8 INJECTION INTRAMUSCULAR; INTRAVENOUS
Status: COMPLETED | OUTPATIENT
Start: 2022-10-19 | End: 2022-10-19

## 2022-10-19 RX ORDER — SODIUM CHLORIDE 0.9 % (FLUSH) 0.9 %
10 SYRINGE (ML) INJECTION
Status: DISCONTINUED | OUTPATIENT
Start: 2022-10-19 | End: 2022-10-19 | Stop reason: HOSPADM

## 2022-10-19 RX ORDER — DIPHENHYDRAMINE HYDROCHLORIDE 50 MG/ML
50 INJECTION INTRAMUSCULAR; INTRAVENOUS ONCE AS NEEDED
Status: DISCONTINUED | OUTPATIENT
Start: 2022-10-19 | End: 2022-10-19 | Stop reason: HOSPADM

## 2022-10-19 RX ORDER — EPINEPHRINE 0.3 MG/.3ML
0.3 INJECTION SUBCUTANEOUS ONCE AS NEEDED
Status: DISCONTINUED | OUTPATIENT
Start: 2022-10-19 | End: 2022-10-19 | Stop reason: HOSPADM

## 2022-10-19 RX ORDER — DIPHENHYDRAMINE HYDROCHLORIDE 50 MG/ML
25 INJECTION INTRAMUSCULAR; INTRAVENOUS
Status: COMPLETED | OUTPATIENT
Start: 2022-10-19 | End: 2022-10-19

## 2022-10-19 RX ORDER — HEPARIN 100 UNIT/ML
500 SYRINGE INTRAVENOUS
Status: DISCONTINUED | OUTPATIENT
Start: 2022-10-19 | End: 2022-10-19 | Stop reason: HOSPADM

## 2022-10-19 RX ORDER — FAMOTIDINE 10 MG/ML
20 INJECTION INTRAVENOUS
Status: COMPLETED | OUTPATIENT
Start: 2022-10-19 | End: 2022-10-19

## 2022-10-19 RX ADMIN — FAMOTIDINE 20 MG: 10 INJECTION INTRAVENOUS at 11:10

## 2022-10-19 RX ADMIN — ONDANSETRON 8 MG: 2 INJECTION INTRAMUSCULAR; INTRAVENOUS at 11:10

## 2022-10-19 RX ADMIN — PACLITAXEL 168 MG: 6 INJECTION, SOLUTION INTRAVENOUS at 12:10

## 2022-10-19 RX ADMIN — Medication 10 ML: at 01:10

## 2022-10-19 RX ADMIN — HEPARIN 500 UNITS: 100 SYRINGE at 01:10

## 2022-10-19 RX ADMIN — DIPHENHYDRAMINE HYDROCHLORIDE 25 MG: 50 INJECTION, SOLUTION INTRAMUSCULAR; INTRAVENOUS at 11:10

## 2022-10-25 ENCOUNTER — HOSPITAL ENCOUNTER (OUTPATIENT)
Dept: RADIOLOGY | Facility: HOSPITAL | Age: 62
Discharge: HOME | End: 2022-10-25
Attending: PHYSICIAN ASSISTANT
Payer: COMMERCIAL

## 2022-10-25 ENCOUNTER — OFFICE VISIT (OUTPATIENT)
Dept: SURGERY | Facility: CLINIC | Age: 62
End: 2022-10-25
Payer: COMMERCIAL

## 2022-10-25 VITALS
SYSTOLIC BLOOD PRESSURE: 128 MMHG | TEMPERATURE: 98 F | DIASTOLIC BLOOD PRESSURE: 84 MMHG | WEIGHT: 211.38 LBS | HEIGHT: 65 IN | RESPIRATION RATE: 18 BRPM | BODY MASS INDEX: 35.22 KG/M2 | OXYGEN SATURATION: 98 % | HEART RATE: 77 BPM

## 2022-10-25 DIAGNOSIS — Z90.13 S/P BILATERAL MASTECTOMY: ICD-10-CM

## 2022-10-25 DIAGNOSIS — Z17.0 MALIGNANT NEOPLASM OF UPPER-OUTER QUADRANT OF RIGHT BREAST IN FEMALE, ESTROGEN RECEPTOR POSITIVE: ICD-10-CM

## 2022-10-25 DIAGNOSIS — N63.21 BREAST LUMP ON LEFT SIDE AT 2 O'CLOCK POSITION: ICD-10-CM

## 2022-10-25 DIAGNOSIS — D05.01 LOBULAR CARCINOMA IN SITU (LCIS) OF RIGHT BREAST: ICD-10-CM

## 2022-10-25 DIAGNOSIS — D05.11 DUCTAL CARCINOMA IN SITU (DCIS) OF RIGHT BREAST: ICD-10-CM

## 2022-10-25 DIAGNOSIS — C50.412 MALIGNANT NEOPLASM OF UPPER-OUTER QUADRANT OF LEFT BREAST IN FEMALE, ESTROGEN RECEPTOR NEGATIVE: ICD-10-CM

## 2022-10-25 DIAGNOSIS — C50.411 MALIGNANT NEOPLASM OF UPPER-OUTER QUADRANT OF RIGHT BREAST IN FEMALE, ESTROGEN RECEPTOR POSITIVE: ICD-10-CM

## 2022-10-25 DIAGNOSIS — N63.21 BREAST LUMP ON LEFT SIDE AT 2 O'CLOCK POSITION: Primary | ICD-10-CM

## 2022-10-25 DIAGNOSIS — Z17.1 MALIGNANT NEOPLASM OF UPPER-OUTER QUADRANT OF LEFT BREAST IN FEMALE, ESTROGEN RECEPTOR NEGATIVE: ICD-10-CM

## 2022-10-25 PROCEDURE — 99999 PR PBB SHADOW E&M-EST. PATIENT-LVL V: CPT | Mod: PBBFAC,,, | Performed by: PHYSICIAN ASSISTANT

## 2022-10-25 PROCEDURE — 99214 OFFICE O/P EST MOD 30 MIN: CPT | Mod: S$GLB,,, | Performed by: PHYSICIAN ASSISTANT

## 2022-10-25 PROCEDURE — 3079F PR MOST RECENT DIASTOLIC BLOOD PRESSURE 80-89 MM HG: ICD-10-PCS | Mod: CPTII,S$GLB,, | Performed by: PHYSICIAN ASSISTANT

## 2022-10-25 PROCEDURE — 99999 PR PBB SHADOW E&M-EST. PATIENT-LVL V: ICD-10-PCS | Mod: PBBFAC,,, | Performed by: PHYSICIAN ASSISTANT

## 2022-10-25 PROCEDURE — 76642 ULTRASOUND BREAST LIMITED: CPT | Mod: TC,LT

## 2022-10-25 PROCEDURE — 76642 US BREAST LEFT LIMITED: ICD-10-PCS | Mod: 26,LT,, | Performed by: RADIOLOGY

## 2022-10-25 PROCEDURE — 3074F SYST BP LT 130 MM HG: CPT | Mod: CPTII,S$GLB,, | Performed by: PHYSICIAN ASSISTANT

## 2022-10-25 PROCEDURE — 3074F PR MOST RECENT SYSTOLIC BLOOD PRESSURE < 130 MM HG: ICD-10-PCS | Mod: CPTII,S$GLB,, | Performed by: PHYSICIAN ASSISTANT

## 2022-10-25 PROCEDURE — 99214 PR OFFICE/OUTPT VISIT, EST, LEVL IV, 30-39 MIN: ICD-10-PCS | Mod: S$GLB,,, | Performed by: PHYSICIAN ASSISTANT

## 2022-10-25 PROCEDURE — 3079F DIAST BP 80-89 MM HG: CPT | Mod: CPTII,S$GLB,, | Performed by: PHYSICIAN ASSISTANT

## 2022-10-25 PROCEDURE — 1159F PR MEDICATION LIST DOCUMENTED IN MEDICAL RECORD: ICD-10-PCS | Mod: CPTII,S$GLB,, | Performed by: PHYSICIAN ASSISTANT

## 2022-10-25 PROCEDURE — 76642 ULTRASOUND BREAST LIMITED: CPT | Mod: 26,LT,, | Performed by: RADIOLOGY

## 2022-10-25 PROCEDURE — 1159F MED LIST DOCD IN RCRD: CPT | Mod: CPTII,S$GLB,, | Performed by: PHYSICIAN ASSISTANT

## 2022-10-25 NOTE — PROGRESS NOTES
[facility] Breast Surg  Breast Surgical Oncology  Follow-Up Patient Office Visit       Referring Provider: No ref. provider found  PCP:  Sharri Bass NP  Medical Oncology: Gato Briones MD    Radiation Oncology: Oncologics    Chief Complaint:   Chief Complaint   Patient presents with    Follow-up     Patient presents today for a 3mth follow up visit. Patient c/o possible lump on left breast near axilla area. She does not have pain but possible muscle spasms, according to Dr. Gibbs.     Subjective:   Treatment History:  1.Genetic Testing - negative, there was a variant of uncertain significance (VUS):AXIN2 c.1573C>G.  2. S/p bilateral simple mastectomy, bilateral sentinel lymph node biopsy, and TE placement - 6/3/2022   3. Mediport placement 7/15/2022'  4. Taxol weekly x's 12, followed by dose dense AC q2w    Interval History:  10/25/2022 - Elina Ham is here for 3 month follow up. She finished Taxol and has an upcoming CT scan Friday. She is experiencing bilateral intermittent numbness in her feet and hands. She states that she feels a lump in the upper outer quadrant of the left breast.    HPI:  Elina Ham initially presents on 03/29/22 at age 62 Years with bilateral breast cancer (1) AJCC 8th ed clinical anatomic stage IA / prognostic stage IA (cT1b cN0 M0) Right breast 10 o'clock 7 cm FN invasive ductal carcinoma, grade 1, with associated LCIS, ER 43%, NC 94%, HER2 0 - negative on IHC, Ki674% and 9 o'clock 5 cm FN Right breast invasive ductal carcinoma, grade 1, with associated DCIS, ER 81%, NC 3%, HER2 1+ - negative on IHC and Ki67 4%. (2) AJCC 8th ed clinical anatomic stage IIA / prognostic stage IIB (cT2 cN0 M0) Left breast1 o'clock 7 cm FN carcinoma with anaplastic malignant giant cell formation, suggestive of sarcomatoid/metaplastic change, grade 3, ER 0%, NC 0%, HER2 0 - negative on IHC and Ki67 92%.     A detailed patient history was obtained and  reviewed.    Additional Breast History:  Patient did not breast feed. Patient denies nipple discharge. Patient denies to previous breast biopsy. Patient denies to a personal history of breast cancer.    Imagin. 3/4/2022 BL SC MG at [facility]- which revealed a 2.7 cm mass in the left breast upper outer quadrant posterior depth. There is possible architectural distortion in the right breast at 9 o'clock anterior depth. There is a focal asymmetry with possible architectural distortion in the right breast at 9 o'clock posterior depth. BIRADS-0; additional imaging needed.    2. 3/8/2022 BL DG MG/ BL US BREAST LIMITED at [facility]- which revealed on R MG at 9 o'clock anterior depth, there is a persistent area of architectural distortion spanning 0.8 cm. At 10 o'clock right breast, posterior depth, there is a persistent 1.0 cm focal asymmetry with associated architectural distortion and amorphous calcifications. At 1 o'clock left breast, middle depth, there is a 2.7 cm oval mass with irregular margins. These findings correspond to the areas recalled from screening examination dated 3/4/2022. In the left axilla there is an enlarged lymph node. No other suspicious masses, calcifications, or other signs of malignancy are identified. On R US, at 9 o'clock, 5 cm FN, there is an area of architectural distortion with posterior shadowing spanning 0.8 cm which corresponds to the mammographic finding in this region. at 10 o'clock right breast, 7 cm FN, there is a 1.0 x 0.4 x 0.9 cm oval, hypoechoic mass with circumscribed margins. This mass is stable mammographically dating back to the  exam and is thus considered benign. On L US at 1 o'clock 7 cm FN there is a 2.7 x 2.0 x 2.1 mm oval, hypoechoic mass with irregular margins which corresponds to the mammographic finding in this region. In the left axilla, there is a single level 1 lymph node which demonstrates eccentric cortical thickening. This lymph node measures 1.3 x 1.0 x 1.0  cm and corresponds to the mammographic finding in this region. Additional benign appearing lymph nodes are noted in the bilateral axillae. BIRADS-5 highly suspicious; biopsy recommended.    3. 2022 NM Bone Scan Whole Body - No scintigraphic evidence of metastatic osseous disease.    Pathology:  1. 3/16/2022 Ultrasound-guided Core Needle Biopsy Right Breast 9 o'clock 5 cm FN Mass with Distortion - Invasive ductal carcinoma, grade 1, with associated focus of ductal carcinoma in situ, grade 1, and focal perineural invasion is present  ER 81%  GA 3%  HER2 Negative  Ki67 4%    2. 3/16/2022 Stereotactic guided Core Needle Biopsy Right Breast Mass 10 o'clock Posterior Depth Focal Asymmetry, Architectural Distortion with Calcifications - Invasive ductal carcinoma, grade 1 and focal lobular carcinoma in situ  ER 43%  GA 94%  HER2 Negative  Ki67 4%    3. 3/16/2022 Ultrasound-guided Core Needle Biopsy Left Breast 1 o'clock 7 cm FN Mass - High grade carcinoma with anaplastic/malignant giant cell component (suggestive for sarcomatoid/metaplastic change)  ER 0%  GA 0%  HER2 Negative  Ki67 High    4. 3/16/2022 Ultrasound-guided Core Needle Biopsy Left Axillary Node - Portion of reactive lymph node; no evidence of metastatic carcinoma    5. Bilateral simple mastectomy and Bilateral SLNB 6/3/2022  - Left mastectomy and SLNB - a single focus of invasive ductal carcinoma grade 3 measuring 25 mm. Margins clear. Six sentinel lymph nodes were examined with one positive for metastatic carcinoma, 5 mm.   - Right mastectomy and SLNB - two foci of invasive ductal carcinoma grade 1 measuring 10 mm and 4 mm. Low grade DCIS and LCIS were also present. All margins clear. Four sentinel lymph ndoes are negative for metastatic carcinoma    OB/GYN History:  Age at Menarche Onset: 14  Menopausal Status: postmenopausal at age 52  Pregnancy History:   Age at first live birth: 31  Hormone Replacement Therapy: None    Other Relevant  History:  Prior thoracic RT: none  Genetic testing: Yes, VUS  Ashkenazi Gnosticist descent: No    Family History:  Family History   Problem Relation Age of Onset    Breast cancer Maternal Grandmother             Breast cancer Sister     Breast cancer Sister         remission    Breast cancer Maternal Aunt             Breast cancer Maternal Aunt             Breast cancer Maternal Aunt             Breast cancer Maternal Aunt     Breast cancer Maternal Uncle             Stroke Brother             Pancreatic cancer Brother         Past History:  Past Medical History:   Diagnosis Date    Cancer 3/21/2022    Diabetes mellitus     Hypercholesterolemia     Hypertension         Past Surgical History:   Procedure Laterality Date    BILATERAL MASTECTOMY Bilateral 6/3/2022    Procedure: MASTECTOMY, BILATERAL;  Surgeon: Charlene Contreras MD;  Location: [facility] OR;  Service: General;  Laterality: Bilateral;     SECTION  92 and 3/15/95    COLONOSCOPY      INSERTION OF BREAST TISSUE EXPANDER Bilateral 6/3/2022    Procedure: INSERTION, TISSUE EXPANDER, BREAST (Bilateral breast reconstruction with tissue expanders and acellular dermal matrix and use of intraoperative fluoroscein angiogragry);  Surgeon: Nick Gibbs MD;  Location: [facility] OR;  Service: Plastics;  Laterality: Bilateral;    MEDIPORT INSERTION, SINGLE      SENTINEL LYMPH NODE BIOPSY Bilateral 6/3/2022    Procedure: BIOPSY, LYMPH NODE, SENTINEL;  Surgeon: Charlene Contreras MD;  Location: [facility] OR;  Service: General;  Laterality: Bilateral;  MAGTRACE Injection in office  Need SentiMag Probe        Social History     Socioeconomic History    Marital status:    Tobacco Use    Smoking status: Never    Smokeless tobacco: Never   Substance and Sexual Activity    Alcohol use: Not Currently     Alcohol/week: 1.0 standard drink     Types: 1 Glasses of wine per week     Comment: drink socially but haven't had a glass of wine  "since 2/13    Drug use: Never    Sexual activity: Yes     Partners: Male     Birth control/protection: None     Comment: We take STD test for the first 2 years we started dating  (5        Body mass index is 35.18 kg/m².     Allergy/Medications:   Review of patient's allergies indicates:  No Known Allergies       Current Outpatient Medications:     amLODIPine (NORVASC) 10 MG tablet, Take 10 mg by mouth once daily., Disp: , Rfl:     atorvastatin (LIPITOR) 20 MG tablet, Take 20 mg by mouth every evening., Disp: , Rfl:     glipiZIDE (GLUCOTROL) 5 MG tablet, Take 5 mg by mouth 2 (two) times daily., Disp: , Rfl:     losartan-hydrochlorothiazide 100-25 mg (HYZAAR) 100-25 mg per tablet, Take 1 tablet by mouth once daily., Disp: , Rfl:     metFORMIN (GLUCOPHAGE) 1000 MG tablet, Take 1,000 mg by mouth daily with breakfast., Disp: , Rfl:     aspirin 81 MG Chew, Take 81 mg by mouth 2 (two) times a day. Stopped using until after sx, Disp: , Rfl:     ondansetron (ZOFRAN-ODT) 8 MG TbDL, Take 1 tablet (8 mg total) by mouth every 8 (eight) hours as needed (nausea/vomiting). (Patient not taking: No sig reported), Disp: 60 tablet, Rfl: 5       Review of Systems:  Pertinent items are noted in HPI.      Objective:     Vitals:  Blood pressure 128/84, pulse 77, temperature 98 °F (36.7 °C), temperature source Oral, resp. rate 18, height 5' 5" (1.651 m), weight 95.9 kg (211 lb 6.4 oz), SpO2 98 %.      Physical Exam:  General: The patient is awake, alert and oriented times three. The patient is well nourished and in no acute distress.  Neck: There is no evidence of palpable cervical, supraclavicular or axillary adenopathy. The neck is supple. The thyroid is not enlarged.  Musculoskeletal: The patient has a normal range of motion of her bilateral upper extremities.  Chest: Examination of the chest wall fails to reveal any obvious abnormalities. Nonlabored breathing, symmetric expansion.  Breast:  Right: Examination of right reconstructed " breast with tissue expander reveals a 1 x 2 cm round oval freely mobile lump consistent with patient's palpable concern. The nipple is surgically absent. Well healed incisions. There is no skin dimpling with movement of the pectoralis. There are no significant skin changes overlying the breast.   Left: Examination of the left reconstructed breast with tissue expander fails to reveal any dominant masses or areas of significant focal nodularity. The nipple is surgically absent. The mastectomy incision is well healed. There is no skin dimpling with movement of the pectoralis. There are no significant skin changes overlying the breast.  Abdomen: The abdomen is soft, flat, nontender and nondistended.  Integumentary: no rashes or skin lesions present  Neurologic: cranial nerves intact, no signs of peripheral neurological deficit, motor/sensory function intact      Assessment and Plan:     Encounter Diagnoses   Name Primary?    Breast lump on left side at 2 o'clock position Yes    Malignant neoplasm of upper-outer quadrant of right breast in female, estrogen receptor positive     Malignant neoplasm of upper-outer quadrant of left breast in female, estrogen receptor negative     S/P bilateral mastectomy     Ductal carcinoma in situ (DCIS) of right breast     Lobular carcinoma in situ (LCIS) of right breast         Plan:       Left Breast US performed today to evaluate lump. It is consistent with tissue expander. There is a seroma noted in the left axilla too. No follow up imaging indicated.    RTC in 6 months for CBE.    Continue adjuvant treatments and follow up with medical oncology.    Continue follow up with plastics.          All of her questions were answered. She was advised to call if she develops any questions or concerns.    Viola Troncoso PA-C            Total time on the date of the visit ranged from 30-39 mins ([id number]). Total time includes both face-to-face and non-face-to-face time personally spent by  myself on the day of the visit.    Non-face-to-face time included:  _X_ preparing to see the patient such as reviewing the patient record  __ obtaining and reviewing separately obtained history  _X_ independently interpreting results  _X_ documenting clinical information in electronic health record.    Face-to-face time included:  _X_ performing an appropriate history and examination  _X_ communicating results to the patient  _X_ counseling and educating the patient  __ ordering appropriate medications  _x_ ordering appropriate tests  _X_ ordering appropriate procedures (including follow-up)  _X_ answering any questions the patient had    Total Time spent on date of visit: 35 minutes

## 2022-10-28 ENCOUNTER — HOSPITAL ENCOUNTER (OUTPATIENT)
Dept: RADIOLOGY | Facility: HOSPITAL | Age: 62
Discharge: HOME | End: 2022-10-28
Attending: INTERNAL MEDICINE
Payer: COMMERCIAL

## 2022-10-28 DIAGNOSIS — C50.412 MALIGNANT NEOPLASM OF UPPER-OUTER QUADRANT OF LEFT BREAST IN FEMALE, ESTROGEN RECEPTOR NEGATIVE: ICD-10-CM

## 2022-10-28 DIAGNOSIS — R93.5 ABNORMAL CT OF THE ABDOMEN: ICD-10-CM

## 2022-10-28 DIAGNOSIS — C50.411 MALIGNANT NEOPLASM OF UPPER-OUTER QUADRANT OF RIGHT BREAST IN FEMALE, ESTROGEN RECEPTOR POSITIVE: ICD-10-CM

## 2022-10-28 DIAGNOSIS — Z17.1 MALIGNANT NEOPLASM OF UPPER-OUTER QUADRANT OF LEFT BREAST IN FEMALE, ESTROGEN RECEPTOR NEGATIVE: ICD-10-CM

## 2022-10-28 DIAGNOSIS — R93.5 ABNORMAL CT SCAN, PELVIS: ICD-10-CM

## 2022-10-28 DIAGNOSIS — Z17.0 MALIGNANT NEOPLASM OF UPPER-OUTER QUADRANT OF RIGHT BREAST IN FEMALE, ESTROGEN RECEPTOR POSITIVE: ICD-10-CM

## 2022-10-28 PROCEDURE — 74177 CT ABD & PELVIS W/CONTRAST: CPT | Mod: TC

## 2022-10-28 PROCEDURE — 71260 CT THORAX DX C+: CPT | Mod: TC

## 2022-10-28 PROCEDURE — 25500020 PHARM REV CODE 255: Performed by: INTERNAL MEDICINE

## 2022-10-28 RX ADMIN — DIATRIZOATE MEGLUMINE AND DIATRIZOATE SODIUM 30 ML: 660; 100 LIQUID ORAL; RECTAL at 03:10

## 2022-10-28 RX ADMIN — IOPAMIDOL 100 ML: 755 INJECTION, SOLUTION INTRAVENOUS at 04:10

## 2022-11-07 NOTE — ASSESSMENT & PLAN NOTE
· Taxol x 12 followed by dose dense AC, followed by Femara (for contralateral side)   · Proceed with AC in am  · Will eventually get her to Dr. Jeromy Queen for follow-up.  · Hold Lipitor  · Follow-up with ophthalmology  · Follow-up primary care disease to consider decreasing metformin with diarrhea  · Imodium scheduled before chemotherapy   · Zofran 8 mg b.i.d. for 2-3 days after treatment  · Claritin daily for 5 days  · Repeat scan after AC x 4    · Check her sugars on a regular basis   · Okay to hold her aspirin   ·  Return to clinic in 2 weeks for cycle 2 with CBC CMP TD visit  ·

## 2022-11-07 NOTE — PROGRESS NOTES
Heme/Onc Progress Note    PATIENT: Elina Ham  MRN: [id number]  DATE: 11/8/2022  Chief Complaint: Follow-up (3 week f/u. Wants a compression sleeve for her L arm)    Current Treatment: OP BREAST PACLITAXEL WEEKLY FOLLOWED BY DOSE DENSE DOXORUBICIN CYCLOPHOSPHAMIDE Q2W   [No matching plan found]     Oncology History Overview Note   Oncology History   Malignant neoplasm of upper-outer quadrant of right breast in female, estrogen receptor positive   3/16/2022 Cancer Staged    1. Ultrasound-guided Core Needle Biopsy Right Breast 9 o'clock 5 cm FN Mass with Distortion - Invasive ductal carcinoma, grade 1, with associated focus of ductal carcinoma in situ, grade 1, and focal perineural invasion is present  ER 81%HI 3%HER2 Negative  Ki67 4%    2.Stereotactic guided Core Needle Biopsy Right Breast Mass 10 o'clock Posterior Depth Focal Asymmetry, Architectural Distortion with Calcifications - Invasive ductal carcinoma, grade 1 and focal lobular carcinoma in situ  ER 43%HI 94%HER2 Negative  Ki67 4%    3. Ultrasound-guided Core Needle Biopsy Left Breast 1 o'clock 7 cm FN Mass - High grade carcinoma with anaplastic/malignant giant cell component (suggestive for sarcomatoid/metaplastic change)  ER 0%HI 0%HER2 Negative  Ki67 High    4.Ultrasound-guided Core Needle Biopsy Left Axillary Node - Portion of reactive lymph node; no evidence of metastatic carcinoma     6/3/2022 Surgery    Bilateral simple mastectomy and Bilateral SLNB 6/3/2022  - Left mastectomy and SLNB - a single focus of invasive ductal carcinoma grade 3 measuring 25 mm. Margins clear. Six sentinel lymph nodes were examined with one positive for metastatic carcinoma, 5 mm.   G3, ER-, HI-, HER2    - Right mastectomy and SLNB - two foci of invasive ductal carcinoma grade 1 measuring 10 mm and 4 mm. Low grade DCIS and LCIS were also present. All margins clear. Four sentinel lymph ndoes are negative for metastatic carcinoma.  G1, ER+, HI+, HER2-)       Malignant neoplasm of upper-outer quadrant of left breast in female, estrogen receptor negative   6/3/2022 Surgery    Bilateral simple mastectomy and Bilateral SLNB 6/3/2022  - Left mastectomy and SLNB - a single focus of invasive ductal carcinoma grade 3 measuring 25 mm. Margins clear. Six sentinel lymph nodes were examined with one positive for metastatic carcinoma, 5 mm.   G3, ER-, SC-, HER2    - Right mastectomy and SLNB - two foci of invasive ductal carcinoma grade 1 measuring 10 mm and 4 mm. Low grade DCIS and LCIS were also present. All margins clear. Four sentinel lymph ndoes are negative for metastatic carcinoma.  G1, ER+, SC+, HER2-)      This is a 62-year-old female initially had abnormal mammograms in March of 2022. She was noted to have bilateral breast cancer, stage I A right breast carcinoma clinically and a T2 left breast carcinoma.  She underwent CT scan of chest abdomen pelvis on 04/11/2022 which showed a small cystic pancreatic tail that needs to be followed.  There was no convincing evidence of metastatic disease.  But she did have some suspected left axillary adenopathy.  She underwent high risk genetic testing because of her family history and bilateral breast cancer.  She was found to have a variant of undetermined significance.    Genetic testing was appropriate for this patient because of her personal and family history. Analysis consisted of sequencing and large rearrangement analyses of the following genes: APC, LIZZY, BARD1, BMPR1A, BRCA1, BRCA2, BRIP1, CDH1, CDK4, CDKN2A (p14ARF and d86qsn2s), CHEK2, EPCAM (large rearrangement only), GREM1, MLH1, MSH2, MSH6, MUTYH, NBN, PALB2, PMS2, POLE, POLD1, PTEN, RAD51C, RAD51D, SMAD4, STK11, TP53. . This comprehensive genetic analysis indicated that there was no deleterious mutation found in any of these genes. However, there was a variant of uncertain significance (VUS):AXIN2 [id number].    The ultrasound-guided biopsy of left axillary node showed  no evidence of active disease.  As well the patient was then taken to the operating room, on 06/03/2022 and underwent, bilateral mastectomy sentinel lymph node biopsy.  In addition she underwent bilateral breast tissue expanders potential reconstructive surgery.      07/25/2022:Patient's nuclear medicine bone scan shows no evidence of metastatic disease.  she did not get a repeat CT scan of the chest abdomen pelvis.  She did have a port placed.  And there is no recent echocardiogram.       Malignant neoplasm of upper-outer quadrant of right breast in female, estrogen receptor positive   Malignant neoplasm of upper-outer quadrant of left breast in female, estrogen receptor negative   8/3/2022 -  Chemotherapy    OP BREAST PACLITAXEL WEEKLY FOLLOWED BY DOSE DENSE DOXORUBICIN CYCLOPHOSPHAMIDE Q2W      Genetic Testing    Genetic testing negative      Pathology Significant Finding    Left breast:  Repeat marker:  ER 6%, IA 0.1%, HER2/rahul negative, Ki-67 68%     10/28/2022 Imaging Significant Findings    10/28/2022: CT cap:  Postsurgical changes, left axilla 1.9 x 1.6 nodule, right axilla 1.7 x 1.1 hypodense nodule, small bilateral axillary nodes, liver subcentimeter hypodensity in right lobe too small to characterize, 1.2 cm cyst in the tail the pancreas stable   Impression:  Stable, bilateral axillary some postoperative seromas indolent subcentimeter cyst unchanged dating back to 2016, pancreatic cyst continue to follow           11/08/2022  Patient is here today for follow-up.  She still has mild neuropathy, she did have more darkening of her nails.  She denies any cardiac symptoms.        Past Medical History:   Diagnosis Date    Cancer 3/21/2022    Diabetes mellitus     Hypercholesterolemia     Hypertension         Current Outpatient Medications:     amLODIPine (NORVASC) 10 MG tablet, Take 10 mg by mouth once daily., Disp: , Rfl:     glipiZIDE (GLUCOTROL) 5 MG tablet, Take 5 mg by mouth 2 (two) times daily., Disp: ,  Rfl:     losartan-hydrochlorothiazide 100-25 mg (HYZAAR) 100-25 mg per tablet, Take 1 tablet by mouth once daily., Disp: , Rfl:     metFORMIN (GLUCOPHAGE) 1000 MG tablet, Take 1,000 mg by mouth daily with breakfast., Disp: , Rfl:     ondansetron (ZOFRAN-ODT) 8 MG TbDL, Take 1 tablet (8 mg total) by mouth every 8 (eight) hours as needed (nausea/vomiting). (Patient not taking: Reported on 8/8/2022), Disp: 60 tablet, Rfl: 5    pyridoxine, vitamin B6, (B-6) 50 MG Tab, Take 1 tablet (50 mg total) by mouth once daily., Disp: 30 tablet, Rfl: 0     Review of Systems:   Pertinent positives and negatives included in the HPI. Otherwise a complete review of   systems is negative.  ROS       Objective:     Vitals:    11/08/22 1503   BP: (!) 142/89   Pulse: 86   Temp: 98.5 °F (36.9 °C)         Physical Exam    Normal Physical exam:  VITAL SIGNS:  Reviewed.  [  ]  GENERAL: [Well-developed well-nourished in no apparent cardiorespiratory distress]    HEAD: Normocephalic, atraumatic  EYES:  Pupils are equal.   reactive to light.  Extraocular motions intact.  No scleral icterus.  No pallor.  EARS:  Hearing grossly intact.  MOUTH:  Oropharynx is clear without exudates or erythema.   NECK: Supple no adenopathy, no JVD.  Trachea midline  CHEST:  Chest with clear breath sounds bilaterally.  No wheezes, rales, or rhonchi.    CARDIAC:  Regular rate and rhythm.  S1 and S2, without murmurs, gallops, or rubs.  VASCULAR:  No Edema.  Peripheral pulses normal and equal in all extremities.  ABDOMEN:  Soft, without detectable tenderness.  No sign of distention.  No   rebound or guarding, and no masses palpated.   Bowel Sounds normal.  MUSCULOSKELETAL:  Good range of motion of all major joints. Extremities without clubbing, cyanosis or edema.    NEUROLOGIC EXAM:  Alert and oriented x 3.  No focal sensory or strength deficits.   Speech normal.  Follows commands.  PSYCHIATRIC:  Mood normal.  Lymph nodes:  No palpable axillary inguinal adenopathy    Skin darkening of the nail beds  ECOG SCORE    0 - Fully active-able to carry on all pre-disease performance without restriction        Lab Review:  CBC:   Recent Labs     11/08/22  1458 10/18/22  0913 10/11/22  0951   WBC 7.9 5.3 5.8   RBC 4.02* 3.82* 3.93*   HGB 12.4 11.5* 11.9*   HCT 37.4 35.7* 36.5*   * 439* 430*        Assessment and Plan     Problem List Items Addressed This Visit          Oncology    Malignant neoplasm of upper-outer quadrant of right breast in female, estrogen receptor positive - Primary    Relevant Orders    CBC Auto Differential    Comprehensive Metabolic Panel    Malignant neoplasm of upper-outer quadrant of left breast in female, estrogen receptor negative    Current Assessment & Plan     Taxol x 12 followed by dose dense AC, followed by Femara (for contralateral side)   Proceed with AC in am  Will eventually get her to Dr. Jeromy Queen for follow-up.  Hold Lipitor  Follow-up with ophthalmology  Follow-up primary care disease to consider decreasing metformin with diarrhea  Imodium scheduled before chemotherapy   Zofran 8 mg b.i.d. for 2-3 days after treatment  Claritin daily for 5 days  Repeat scan after AC x 4    Check her sugars on a regular basis   Okay to hold her aspirin    Return to clinic in 2 weeks for cycle 2 with CBC CMP TD visit                 Relevant Orders    CBC Auto Differential    Comprehensive Metabolic Panel     Other Visit Diagnoses       Neuropathy due to chemotherapeutic drug        Relevant Medications    pyridoxine, vitamin B6, (B-6) 50 MG Tab              Orders Placed This Encounter   Procedures    CBC Auto Differential     Standing Status:   Future     Standing Expiration Date:   1/7/2024    Comprehensive Metabolic Panel     Standing Status:   Future     Standing Expiration Date:   1/7/2024           Follow up in about 2 weeks (around 11/22/2022) for Infusion visit tomorrow, Treatment visit in two weeks, Treatment visit.    Gato Briones MD

## 2022-11-08 ENCOUNTER — OFFICE VISIT (OUTPATIENT)
Dept: HEMATOLOGY/ONCOLOGY | Facility: CLINIC | Age: 62
End: 2022-11-08
Payer: COMMERCIAL

## 2022-11-08 VITALS
HEART RATE: 86 BPM | TEMPERATURE: 99 F | HEIGHT: 65 IN | SYSTOLIC BLOOD PRESSURE: 142 MMHG | OXYGEN SATURATION: 99 % | DIASTOLIC BLOOD PRESSURE: 89 MMHG | WEIGHT: 210.13 LBS | BODY MASS INDEX: 35.01 KG/M2

## 2022-11-08 DIAGNOSIS — T45.1X5A NEUROPATHY DUE TO CHEMOTHERAPEUTIC DRUG: ICD-10-CM

## 2022-11-08 DIAGNOSIS — Z17.1 MALIGNANT NEOPLASM OF UPPER-OUTER QUADRANT OF LEFT BREAST IN FEMALE, ESTROGEN RECEPTOR NEGATIVE: ICD-10-CM

## 2022-11-08 DIAGNOSIS — C50.411 MALIGNANT NEOPLASM OF UPPER-OUTER QUADRANT OF RIGHT BREAST IN FEMALE, ESTROGEN RECEPTOR POSITIVE: Primary | ICD-10-CM

## 2022-11-08 DIAGNOSIS — Z17.0 MALIGNANT NEOPLASM OF UPPER-OUTER QUADRANT OF RIGHT BREAST IN FEMALE, ESTROGEN RECEPTOR POSITIVE: Primary | ICD-10-CM

## 2022-11-08 DIAGNOSIS — G62.0 NEUROPATHY DUE TO CHEMOTHERAPEUTIC DRUG: ICD-10-CM

## 2022-11-08 DIAGNOSIS — C50.412 MALIGNANT NEOPLASM OF UPPER-OUTER QUADRANT OF LEFT BREAST IN FEMALE, ESTROGEN RECEPTOR NEGATIVE: ICD-10-CM

## 2022-11-08 PROCEDURE — 99214 PR OFFICE/OUTPT VISIT, EST, LEVL IV, 30-39 MIN: ICD-10-PCS | Mod: S$GLB,,, | Performed by: INTERNAL MEDICINE

## 2022-11-08 PROCEDURE — 3079F PR MOST RECENT DIASTOLIC BLOOD PRESSURE 80-89 MM HG: ICD-10-PCS | Mod: CPTII,S$GLB,, | Performed by: INTERNAL MEDICINE

## 2022-11-08 PROCEDURE — 99214 OFFICE O/P EST MOD 30 MIN: CPT | Mod: S$GLB,,, | Performed by: INTERNAL MEDICINE

## 2022-11-08 PROCEDURE — 1159F MED LIST DOCD IN RCRD: CPT | Mod: CPTII,S$GLB,, | Performed by: INTERNAL MEDICINE

## 2022-11-08 PROCEDURE — 3008F PR BODY MASS INDEX (BMI) DOCUMENTED: ICD-10-PCS | Mod: CPTII,S$GLB,, | Performed by: INTERNAL MEDICINE

## 2022-11-08 PROCEDURE — 3077F SYST BP >= 140 MM HG: CPT | Mod: CPTII,S$GLB,, | Performed by: INTERNAL MEDICINE

## 2022-11-08 PROCEDURE — 1160F RVW MEDS BY RX/DR IN RCRD: CPT | Mod: CPTII,S$GLB,, | Performed by: INTERNAL MEDICINE

## 2022-11-08 PROCEDURE — 1159F PR MEDICATION LIST DOCUMENTED IN MEDICAL RECORD: ICD-10-PCS | Mod: CPTII,S$GLB,, | Performed by: INTERNAL MEDICINE

## 2022-11-08 PROCEDURE — 99999 PR PBB SHADOW E&M-EST. PATIENT-LVL III: CPT | Mod: PBBFAC,,, | Performed by: INTERNAL MEDICINE

## 2022-11-08 PROCEDURE — 3008F BODY MASS INDEX DOCD: CPT | Mod: CPTII,S$GLB,, | Performed by: INTERNAL MEDICINE

## 2022-11-08 PROCEDURE — 3077F PR MOST RECENT SYSTOLIC BLOOD PRESSURE >= 140 MM HG: ICD-10-PCS | Mod: CPTII,S$GLB,, | Performed by: INTERNAL MEDICINE

## 2022-11-08 PROCEDURE — 3079F DIAST BP 80-89 MM HG: CPT | Mod: CPTII,S$GLB,, | Performed by: INTERNAL MEDICINE

## 2022-11-08 PROCEDURE — 99999 PR PBB SHADOW E&M-EST. PATIENT-LVL III: ICD-10-PCS | Mod: PBBFAC,,, | Performed by: INTERNAL MEDICINE

## 2022-11-08 PROCEDURE — 1160F PR REVIEW ALL MEDS BY PRESCRIBER/CLIN PHARMACIST DOCUMENTED: ICD-10-PCS | Mod: CPTII,S$GLB,, | Performed by: INTERNAL MEDICINE

## 2022-11-08 RX ORDER — SODIUM CHLORIDE 0.9 % (FLUSH) 0.9 %
10 SYRINGE (ML) INJECTION
Status: CANCELLED | OUTPATIENT
Start: 2022-11-09

## 2022-11-08 RX ORDER — HEPARIN 100 UNIT/ML
500 SYRINGE INTRAVENOUS
Status: CANCELLED | OUTPATIENT
Start: 2022-11-09

## 2022-11-08 RX ORDER — DOXORUBICIN HYDROCHLORIDE 2 MG/ML
60 INJECTION, SOLUTION INTRAVENOUS
Status: CANCELLED | OUTPATIENT
Start: 2022-11-09

## 2022-11-08 RX ORDER — LANOLIN ALCOHOL/MO/W.PET/CERES
50 CREAM (GRAM) TOPICAL DAILY
Qty: 30 TABLET | Refills: 0 | Status: SHIPPED | OUTPATIENT
Start: 2022-11-08 | End: 2023-03-07

## 2022-11-09 ENCOUNTER — INFUSION (OUTPATIENT)
Dept: INFUSION THERAPY | Facility: HOSPITAL | Age: 62
End: 2022-11-09
Attending: INTERNAL MEDICINE
Payer: COMMERCIAL

## 2022-11-09 ENCOUNTER — TELEPHONE (OUTPATIENT)
Dept: SURGERY | Facility: CLINIC | Age: 62
End: 2022-11-09
Payer: COMMERCIAL

## 2022-11-09 VITALS
DIASTOLIC BLOOD PRESSURE: 83 MMHG | RESPIRATION RATE: 18 BRPM | TEMPERATURE: 98 F | SYSTOLIC BLOOD PRESSURE: 119 MMHG | WEIGHT: 210.13 LBS | BODY MASS INDEX: 35.01 KG/M2 | HEIGHT: 65 IN | HEART RATE: 94 BPM

## 2022-11-09 DIAGNOSIS — C50.412 MALIGNANT NEOPLASM OF UPPER-OUTER QUADRANT OF LEFT BREAST IN FEMALE, ESTROGEN RECEPTOR NEGATIVE: Primary | ICD-10-CM

## 2022-11-09 DIAGNOSIS — Z17.1 MALIGNANT NEOPLASM OF UPPER-OUTER QUADRANT OF LEFT BREAST IN FEMALE, ESTROGEN RECEPTOR NEGATIVE: Primary | ICD-10-CM

## 2022-11-09 PROCEDURE — A4216 STERILE WATER/SALINE, 10 ML: HCPCS | Performed by: INTERNAL MEDICINE

## 2022-11-09 PROCEDURE — 96375 TX/PRO/DX INJ NEW DRUG ADDON: CPT

## 2022-11-09 PROCEDURE — 96413 CHEMO IV INFUSION 1 HR: CPT

## 2022-11-09 PROCEDURE — 63600175 PHARM REV CODE 636 W HCPCS: Performed by: INTERNAL MEDICINE

## 2022-11-09 PROCEDURE — 96411 CHEMO IV PUSH ADDL DRUG: CPT

## 2022-11-09 PROCEDURE — 25000003 PHARM REV CODE 250: Performed by: INTERNAL MEDICINE

## 2022-11-09 RX ORDER — HEPARIN 100 UNIT/ML
500 SYRINGE INTRAVENOUS
Status: DISCONTINUED | OUTPATIENT
Start: 2022-11-09 | End: 2022-11-09 | Stop reason: HOSPADM

## 2022-11-09 RX ORDER — SODIUM CHLORIDE 0.9 % (FLUSH) 0.9 %
10 SYRINGE (ML) INJECTION
Status: DISCONTINUED | OUTPATIENT
Start: 2022-11-09 | End: 2022-11-09 | Stop reason: HOSPADM

## 2022-11-09 RX ORDER — DOXORUBICIN HYDROCHLORIDE 2 MG/ML
60 INJECTION, SOLUTION INTRAVENOUS
Status: COMPLETED | OUTPATIENT
Start: 2022-11-09 | End: 2022-11-09

## 2022-11-09 RX ADMIN — PALONOSETRON 0.25 MG: 0.25 INJECTION, SOLUTION INTRAVENOUS at 10:11

## 2022-11-09 RX ADMIN — CYCLOPHOSPHAMIDE 1240 MG: 200 INJECTION, SOLUTION INTRAVENOUS at 11:11

## 2022-11-09 RX ADMIN — DOXORUBICIN HYDROCHLORIDE 124 MG: 2 INJECTION, SOLUTION INTRAVENOUS at 11:11

## 2022-11-09 RX ADMIN — SODIUM CHLORIDE, PRESERVATIVE FREE 10 ML: 5 INJECTION INTRAVENOUS at 12:11

## 2022-11-09 RX ADMIN — SODIUM CHLORIDE: 9 INJECTION, SOLUTION INTRAVENOUS at 11:11

## 2022-11-09 RX ADMIN — HEPARIN 500 UNITS: 100 SYRINGE at 12:11

## 2022-11-09 RX ADMIN — APREPITANT 130 MG: 130 INJECTION, EMULSION INTRAVENOUS at 10:11

## 2022-11-09 NOTE — PLAN OF CARE
Day 1 Cycle 13 Doxorubicin and Cyclophosphamid. Tolerated treatment without adverse events. Discharged in stable condition.

## 2022-11-09 NOTE — TELEPHONE ENCOUNTER
Patient called with complaints of Left arm swelling for approximately 3 weeks.  She states her whole arm is swollen.  Patient instructed to abram Dr. Gibbs's office regarding swelling just to let them know.  We will send referral to Jesus Smith's office for lymphedema evaluation.  VETO Patel aware of patient's complaint.

## 2022-11-10 ENCOUNTER — INFUSION (OUTPATIENT)
Dept: INFUSION THERAPY | Facility: HOSPITAL | Age: 62
End: 2022-11-10
Attending: INTERNAL MEDICINE
Payer: COMMERCIAL

## 2022-11-10 VITALS
SYSTOLIC BLOOD PRESSURE: 151 MMHG | RESPIRATION RATE: 20 BRPM | OXYGEN SATURATION: 98 % | DIASTOLIC BLOOD PRESSURE: 100 MMHG

## 2022-11-10 DIAGNOSIS — C50.412 MALIGNANT NEOPLASM OF UPPER-OUTER QUADRANT OF LEFT BREAST IN FEMALE, ESTROGEN RECEPTOR NEGATIVE: Primary | ICD-10-CM

## 2022-11-10 DIAGNOSIS — Z17.1 MALIGNANT NEOPLASM OF UPPER-OUTER QUADRANT OF LEFT BREAST IN FEMALE, ESTROGEN RECEPTOR NEGATIVE: Primary | ICD-10-CM

## 2022-11-10 PROCEDURE — 63600175 PHARM REV CODE 636 W HCPCS: Mod: TB | Performed by: INTERNAL MEDICINE

## 2022-11-10 PROCEDURE — 96372 THER/PROPH/DIAG INJ SC/IM: CPT

## 2022-11-10 RX ADMIN — PEGFILGRASTIM-CBQV 6 MG: 6 INJECTION, SOLUTION SUBCUTANEOUS at 11:11

## 2022-11-17 NOTE — PROGRESS NOTES
Heme/Onc Progress Note    PATIENT: Elina Ham  MRN: [id number]  DATE: 11/22/2022  Chief Complaint: Follow-up (2 week f/u)    Current Treatment: OP BREAST PACLITAXEL WEEKLY FOLLOWED BY DOSE DENSE DOXORUBICIN CYCLOPHOSPHAMIDE Q2W       Oncology History   Malignant neoplasm of upper-outer quadrant of right breast in female, estrogen receptor positive   6/3/2022 Surgery    S/p bilateral simple mastectomy, bilateral sentinel lymph node biopsy, and TE placement - 6/3/2022      Malignant neoplasm of upper-outer quadrant of left breast in female, estrogen receptor negative   6/3/2022 Surgery    S/p bilateral simple mastectomy, bilateral sentinel lymph node biopsy, and TE placement - 6/3/2022      8/3/2022 -  Chemotherapy    OP BREAST PACLITAXEL WEEKLY FOLLOWED BY DOSE DENSE DOXORUBICIN CYCLOPHOSPHAMIDE Q2W      Genetic Testing    Genetic testing negative      Pathology Significant Finding    Left breast:  Repeat marker:  ER 6%, CO 0.1%, HER2/rahul negative, Ki-67 68%     10/28/2022 Imaging Significant Findings    10/28/2022: CT cap:  Postsurgical changes, left axilla 1.9 x 1.6 nodule, right axilla 1.7 x 1.1 hypodense nodule, small bilateral axillary nodes, liver subcentimeter hypodensity in right lobe too small to characterize, 1.2 cm cyst in the tail the pancreas stable   Impression:  Stable, bilateral axillary some postoperative seromas indolent subcentimeter cyst unchanged dating back to 2016, pancreatic cyst continue to follow           11/22/2022  Patient presents for cycle 2 of DD AC. She has some mild nausea but no vomiting.   Her CBC is normal. CMP pending.     Past Medical History:   Diagnosis Date    Cancer 3/21/2022    Diabetes mellitus     Hypercholesterolemia     Hypertension         Current Outpatient Medications:     amLODIPine (NORVASC) 10 MG tablet, Take 10 mg by mouth once daily., Disp: , Rfl:     glipiZIDE (GLUCOTROL) 5 MG tablet, Take 10 mg by mouth 2 (two) times daily., Disp: , Rfl:      loratadine (CLARITIN) 10 mg tablet, Take 10 mg by mouth once daily., Disp: , Rfl:     losartan-hydrochlorothiazide 100-25 mg (HYZAAR) 100-25 mg per tablet, Take 1 tablet by mouth once daily., Disp: , Rfl:     metFORMIN (GLUCOPHAGE) 1000 MG tablet, Take 1,000 mg by mouth daily with breakfast., Disp: , Rfl:     pyridoxine, vitamin B6, (B-6) 50 MG Tab, Take 1 tablet (50 mg total) by mouth once daily., Disp: 30 tablet, Rfl: 0    ondansetron (ZOFRAN-ODT) 8 MG TbDL, Take 1 tablet (8 mg total) by mouth every 8 (eight) hours as needed (nausea/vomiting). (Patient not taking: Reported on 8/8/2022), Disp: 60 tablet, Rfl: 5     Review of Systems:   Pertinent positives and negatives included in the HPI. Otherwise a complete review of   systems is negative.  Review of Systems       Objective:     Vitals:    11/21/22 1103   BP: 137/69   Pulse: 92   Temp: 98 °F (36.7 °C)         Physical Exam  Constitutional:       Appearance: Normal appearance.   HENT:      Head: Normocephalic and atraumatic.      Nose: Nose normal.      Mouth/Throat:      Mouth: Mucous membranes are moist.   Eyes:      Extraocular Movements: Extraocular movements intact.      Conjunctiva/sclera: Conjunctivae normal.      Pupils: Pupils are equal, round, and reactive to light.   Cardiovascular:      Rate and Rhythm: Normal rate and regular rhythm.      Pulses: Normal pulses.   Pulmonary:      Effort: Pulmonary effort is normal.      Breath sounds: Normal breath sounds.   Abdominal:      General: Bowel sounds are normal.      Palpations: Abdomen is soft.   Musculoskeletal:      Cervical back: Normal range of motion and neck supple.   Neurological:      General: No focal deficit present.      Mental Status: She is alert and oriented to person, place, and time. Mental status is at baseline.   Psychiatric:         Mood and Affect: Mood normal.         Behavior: Behavior normal.       ECOG SCORE            Assessment and Plan     Problem List Items Addressed This Visit           Oncology    Malignant neoplasm of upper-outer quadrant of right breast in female, estrogen receptor positive    Malignant neoplasm of upper-outer quadrant of left breast in female, estrogen receptor negative - Primary    Current Assessment & Plan     Taxol x 12 followed by dose dense AC, followed by Femara (for contralateral side)   C2 DD AC on 11/23/22  Will eventually get her to Dr. Jeromy Queen for follow-up.  Hold Lipitor  Follow-up with ophthalmology  Follow-up primary care disease to consider decreasing metformin with diarrhea  Imodium scheduled before chemotherapy   Zofran 8 mg b.i.d. for 2-3 days after treatment  Claritin daily for 5 days  Repeat scan after AC x 4                         Follow up in about 2 weeks (around 12/5/2022) for CBC, CMP and visit, next day infusion.

## 2022-11-17 NOTE — ASSESSMENT & PLAN NOTE
· Taxol x 12 followed by dose dense AC, followed by Femara (for contralateral side)   · C2 DD AC on 11/23/22  · Will eventually get her to Dr. Jeromy Queen for follow-up.  · Hold Lipitor  · Follow-up with ophthalmology  · Follow-up primary care disease to consider decreasing metformin with diarrhea  · Imodium scheduled before chemotherapy   · Zofran 8 mg b.i.d. for 2-3 days after treatment  · Claritin daily for 5 days  · Repeat scan after AC x 4

## 2022-11-21 ENCOUNTER — OFFICE VISIT (OUTPATIENT)
Dept: HEMATOLOGY/ONCOLOGY | Facility: CLINIC | Age: 62
End: 2022-11-21
Payer: COMMERCIAL

## 2022-11-21 VITALS
HEART RATE: 92 BPM | WEIGHT: 213.88 LBS | OXYGEN SATURATION: 98 % | HEIGHT: 65 IN | DIASTOLIC BLOOD PRESSURE: 69 MMHG | SYSTOLIC BLOOD PRESSURE: 137 MMHG | TEMPERATURE: 98 F | BODY MASS INDEX: 35.63 KG/M2

## 2022-11-21 DIAGNOSIS — Z17.1 MALIGNANT NEOPLASM OF UPPER-OUTER QUADRANT OF LEFT BREAST IN FEMALE, ESTROGEN RECEPTOR NEGATIVE: Primary | ICD-10-CM

## 2022-11-21 DIAGNOSIS — C50.411 MALIGNANT NEOPLASM OF UPPER-OUTER QUADRANT OF RIGHT BREAST IN FEMALE, ESTROGEN RECEPTOR POSITIVE: ICD-10-CM

## 2022-11-21 DIAGNOSIS — C50.412 MALIGNANT NEOPLASM OF UPPER-OUTER QUADRANT OF LEFT BREAST IN FEMALE, ESTROGEN RECEPTOR NEGATIVE: Primary | ICD-10-CM

## 2022-11-21 DIAGNOSIS — Z17.0 MALIGNANT NEOPLASM OF UPPER-OUTER QUADRANT OF RIGHT BREAST IN FEMALE, ESTROGEN RECEPTOR POSITIVE: ICD-10-CM

## 2022-11-21 PROCEDURE — 3008F PR BODY MASS INDEX (BMI) DOCUMENTED: ICD-10-PCS | Mod: CPTII,S$GLB,, | Performed by: NURSE PRACTITIONER

## 2022-11-21 PROCEDURE — 3075F PR MOST RECENT SYSTOLIC BLOOD PRESS GE 130-139MM HG: ICD-10-PCS | Mod: CPTII,S$GLB,, | Performed by: NURSE PRACTITIONER

## 2022-11-21 PROCEDURE — 1159F PR MEDICATION LIST DOCUMENTED IN MEDICAL RECORD: ICD-10-PCS | Mod: CPTII,S$GLB,, | Performed by: NURSE PRACTITIONER

## 2022-11-21 PROCEDURE — 99214 PR OFFICE/OUTPT VISIT, EST, LEVL IV, 30-39 MIN: ICD-10-PCS | Mod: S$GLB,,, | Performed by: NURSE PRACTITIONER

## 2022-11-21 PROCEDURE — 3075F SYST BP GE 130 - 139MM HG: CPT | Mod: CPTII,S$GLB,, | Performed by: NURSE PRACTITIONER

## 2022-11-21 PROCEDURE — 3078F PR MOST RECENT DIASTOLIC BLOOD PRESSURE < 80 MM HG: ICD-10-PCS | Mod: CPTII,S$GLB,, | Performed by: NURSE PRACTITIONER

## 2022-11-21 PROCEDURE — 99999 PR PBB SHADOW E&M-EST. PATIENT-LVL III: ICD-10-PCS | Mod: PBBFAC,,, | Performed by: NURSE PRACTITIONER

## 2022-11-21 PROCEDURE — 3078F DIAST BP <80 MM HG: CPT | Mod: CPTII,S$GLB,, | Performed by: NURSE PRACTITIONER

## 2022-11-21 PROCEDURE — 1159F MED LIST DOCD IN RCRD: CPT | Mod: CPTII,S$GLB,, | Performed by: NURSE PRACTITIONER

## 2022-11-21 PROCEDURE — 3008F BODY MASS INDEX DOCD: CPT | Mod: CPTII,S$GLB,, | Performed by: NURSE PRACTITIONER

## 2022-11-21 PROCEDURE — 99999 PR PBB SHADOW E&M-EST. PATIENT-LVL III: CPT | Mod: PBBFAC,,, | Performed by: NURSE PRACTITIONER

## 2022-11-21 PROCEDURE — 99214 OFFICE O/P EST MOD 30 MIN: CPT | Mod: S$GLB,,, | Performed by: NURSE PRACTITIONER

## 2022-11-21 RX ORDER — SODIUM CHLORIDE 0.9 % (FLUSH) 0.9 %
10 SYRINGE (ML) INJECTION
Status: CANCELLED | OUTPATIENT
Start: 2022-11-23

## 2022-11-21 RX ORDER — LORATADINE 10 MG/1
10 TABLET ORAL DAILY
COMMUNITY

## 2022-11-21 RX ORDER — HEPARIN 100 UNIT/ML
500 SYRINGE INTRAVENOUS
Status: CANCELLED | OUTPATIENT
Start: 2022-11-23

## 2022-11-21 RX ORDER — DOXORUBICIN HYDROCHLORIDE 2 MG/ML
60 INJECTION, SOLUTION INTRAVENOUS
Status: CANCELLED | OUTPATIENT
Start: 2022-11-23

## 2022-11-23 ENCOUNTER — INFUSION (OUTPATIENT)
Dept: INFUSION THERAPY | Facility: HOSPITAL | Age: 62
End: 2022-11-23
Attending: INTERNAL MEDICINE
Payer: COMMERCIAL

## 2022-11-23 VITALS
OXYGEN SATURATION: 99 % | RESPIRATION RATE: 20 BRPM | HEART RATE: 75 BPM | DIASTOLIC BLOOD PRESSURE: 84 MMHG | TEMPERATURE: 99 F | SYSTOLIC BLOOD PRESSURE: 135 MMHG

## 2022-11-23 DIAGNOSIS — Z17.1 MALIGNANT NEOPLASM OF UPPER-OUTER QUADRANT OF LEFT BREAST IN FEMALE, ESTROGEN RECEPTOR NEGATIVE: Primary | ICD-10-CM

## 2022-11-23 DIAGNOSIS — C50.412 MALIGNANT NEOPLASM OF UPPER-OUTER QUADRANT OF LEFT BREAST IN FEMALE, ESTROGEN RECEPTOR NEGATIVE: Primary | ICD-10-CM

## 2022-11-23 PROCEDURE — 63600175 PHARM REV CODE 636 W HCPCS: Mod: JG | Performed by: NURSE PRACTITIONER

## 2022-11-23 PROCEDURE — 96413 CHEMO IV INFUSION 1 HR: CPT

## 2022-11-23 PROCEDURE — 96375 TX/PRO/DX INJ NEW DRUG ADDON: CPT

## 2022-11-23 PROCEDURE — 25000003 PHARM REV CODE 250: Performed by: NURSE PRACTITIONER

## 2022-11-23 PROCEDURE — 96411 CHEMO IV PUSH ADDL DRUG: CPT

## 2022-11-23 RX ORDER — HEPARIN 100 UNIT/ML
500 SYRINGE INTRAVENOUS
Status: DISCONTINUED | OUTPATIENT
Start: 2022-11-23 | End: 2022-11-23 | Stop reason: HOSPADM

## 2022-11-23 RX ORDER — SODIUM CHLORIDE 0.9 % (FLUSH) 0.9 %
10 SYRINGE (ML) INJECTION
Status: DISCONTINUED | OUTPATIENT
Start: 2022-11-23 | End: 2022-11-23 | Stop reason: HOSPADM

## 2022-11-23 RX ORDER — DOXORUBICIN HYDROCHLORIDE 2 MG/ML
60 INJECTION, SOLUTION INTRAVENOUS
Status: COMPLETED | OUTPATIENT
Start: 2022-11-23 | End: 2022-11-23

## 2022-11-23 RX ADMIN — SODIUM CHLORIDE: 9 INJECTION, SOLUTION INTRAVENOUS at 11:11

## 2022-11-23 RX ADMIN — CYCLOPHOSPHAMIDE 1240 MG: 200 INJECTION, SOLUTION INTRAVENOUS at 10:11

## 2022-11-23 RX ADMIN — APREPITANT 130 MG: 130 INJECTION, EMULSION INTRAVENOUS at 09:11

## 2022-11-23 RX ADMIN — DOXORUBICIN HYDROCHLORIDE 124 MG: 2 INJECTION, SOLUTION INTRAVENOUS at 09:11

## 2022-11-23 RX ADMIN — PALONOSETRON 0.25 MG: 0.25 INJECTION, SOLUTION INTRAVENOUS at 09:11

## 2022-11-23 RX ADMIN — HEPARIN 500 UNITS: 100 SYRINGE at 11:11

## 2022-11-25 ENCOUNTER — INFUSION (OUTPATIENT)
Dept: INFUSION THERAPY | Facility: HOSPITAL | Age: 62
End: 2022-11-25
Attending: INTERNAL MEDICINE
Payer: COMMERCIAL

## 2022-11-25 VITALS
HEART RATE: 92 BPM | RESPIRATION RATE: 18 BRPM | DIASTOLIC BLOOD PRESSURE: 77 MMHG | OXYGEN SATURATION: 100 % | SYSTOLIC BLOOD PRESSURE: 137 MMHG

## 2022-11-25 DIAGNOSIS — Z17.1 MALIGNANT NEOPLASM OF UPPER-OUTER QUADRANT OF LEFT BREAST IN FEMALE, ESTROGEN RECEPTOR NEGATIVE: Primary | ICD-10-CM

## 2022-11-25 DIAGNOSIS — C50.412 MALIGNANT NEOPLASM OF UPPER-OUTER QUADRANT OF LEFT BREAST IN FEMALE, ESTROGEN RECEPTOR NEGATIVE: Primary | ICD-10-CM

## 2022-11-25 PROCEDURE — 63600175 PHARM REV CODE 636 W HCPCS: Mod: TB | Performed by: NURSE PRACTITIONER

## 2022-11-25 PROCEDURE — 96372 THER/PROPH/DIAG INJ SC/IM: CPT

## 2022-11-25 RX ADMIN — PEGFILGRASTIM-CBQV 6 MG: 6 INJECTION, SOLUTION SUBCUTANEOUS at 11:11

## 2022-11-25 NOTE — PLAN OF CARE
Problem: Adult Inpatient Plan of Care  Goal: Plan of Care Review  Outcome: Met  Flowsheets (Taken 11/25/2022 1135)  Plan of Care Reviewed With: patient  Goal: Absence of Hospital-Acquired Illness or Injury  Outcome: Met  Intervention: Identify and Manage Fall Risk  Flowsheets (Taken 11/25/2022 1135)  Safety Promotion/Fall Prevention:   assistive device/personal item within reach   Fall Risk reviewed with patient/family   in recliner, wheels locked     Pt tolerated Udenyca injection well. Next appt reviewed; pt denied questions or further needs at the time of discharge.

## 2022-11-30 NOTE — ASSESSMENT & PLAN NOTE
· Taxol x 12 followed by dose dense AC, followed by Femara (for contralateral side)   · C3  DD AC on 12/7/22  · Will eventually get her to Dr. Jeromy Queen for follow-up.  · Hold Lipitor  · Follow-up with ophthalmology--stable exam  · Follow-up primary care disease to consider decreasing metformin with diarrhea if worse  · Imodium scheduled before chemotherapy   · Zofran 8 mg b.i.d. for 2-3 days after treatment  · Claritin daily for 5 days  · Repeat scan after AC x 4   approximately 2-3 weeks after and follow-up (CT scan chest abdomen pelvis  · RTC in two weeks with CBC CMP   · Decrease dexamethasone and premeds with high sugar  · Nutrition dietary consultation   ·

## 2022-11-30 NOTE — PROGRESS NOTES
Heme/Onc Progress Note    PATIENT: Elina Ham  MRN: [id number]  DATE: 12/5/2022  Chief Complaint: Follow-up (2 week f/u)    Current Treatment: OP BREAST PACLITAXEL WEEKLY FOLLOWED BY DOSE DENSE DOXORUBICIN CYCLOPHOSPHAMIDE Q2W       Oncology History   Malignant neoplasm of upper-outer quadrant of right breast in female, estrogen receptor positive   6/3/2022 Surgery    S/p bilateral simple mastectomy, bilateral sentinel lymph node biopsy, and TE placement - 6/3/2022      Malignant neoplasm of upper-outer quadrant of left breast in female, estrogen receptor negative   6/3/2022 Surgery    S/p bilateral simple mastectomy, bilateral sentinel lymph node biopsy, and TE placement - 6/3/2022      8/3/2022 -  Chemotherapy    OP BREAST PACLITAXEL WEEKLY FOLLOWED BY DOSE DENSE DOXORUBICIN CYCLOPHOSPHAMIDE Q2W      Genetic Testing    Genetic testing negative      Pathology Significant Finding    Left breast:  Repeat marker:  ER 6%, ID 0.1%, HER2/rahul negative, Ki-67 68%     10/28/2022 Imaging Significant Findings    10/28/2022: CT cap:  Postsurgical changes, left axilla 1.9 x 1.6 nodule, right axilla 1.7 x 1.1 hypodense nodule, small bilateral axillary nodes, liver subcentimeter hypodensity in right lobe too small to characterize, 1.2 cm cyst in the tail the pancreas stable   Impression:  Stable, bilateral axillary some postoperative seromas indolent subcentimeter cyst unchanged dating back to 2016, pancreatic cyst continue to follow           12/05/2022  Patient presents for cycle 3 of DD AC.   Patient reports 1 episode of dizziness and visual change.  It resolved, on her sugar was in the 250s, once her sugar came down she is been better.  Her diarrhea has been better controlled.  She does complain of fatigue, she is due for cycle 3 of dose dense AC Wednesday.    She is minimal neuropathy that comes and goes, she has no significant nausea or vomiting will need to chemo,  Her CBC is normal. CMP pending.     Past  Medical History:   Diagnosis Date    Cancer 3/21/2022    Diabetes mellitus     Hypercholesterolemia     Hypertension         Current Outpatient Medications:     amLODIPine (NORVASC) 10 MG tablet, Take 10 mg by mouth once daily., Disp: , Rfl:     glipiZIDE (GLUCOTROL) 5 MG tablet, Take 10 mg by mouth 2 (two) times daily., Disp: , Rfl:     losartan-hydrochlorothiazide 100-25 mg (HYZAAR) 100-25 mg per tablet, Take 1 tablet by mouth once daily., Disp: , Rfl:     metFORMIN (GLUCOPHAGE) 1000 MG tablet, Take 1,000 mg by mouth daily with breakfast., Disp: , Rfl:     pyridoxine, vitamin B6, (B-6) 50 MG Tab, Take 1 tablet (50 mg total) by mouth once daily., Disp: 30 tablet, Rfl: 0    loratadine (CLARITIN) 10 mg tablet, Take 10 mg by mouth once daily., Disp: , Rfl:     ondansetron (ZOFRAN-ODT) 8 MG TbDL, Take 1 tablet (8 mg total) by mouth every 8 (eight) hours as needed (nausea/vomiting). (Patient not taking: Reported on 8/8/2022), Disp: 60 tablet, Rfl: 5     Review of Systems:   Pertinent positives and negatives included in the HPI. Otherwise a complete review of   systems is negative.  Review of Systems       Objective:     Vitals:    12/05/22 1015   BP: 133/87   Pulse: 85   Temp: 98.1 °F (36.7 °C)           Physical Exam  Constitutional:       Appearance: Normal appearance.   HENT:      Head: Normocephalic and atraumatic.      Nose: Nose normal.      Mouth/Throat:      Mouth: Mucous membranes are moist.   Eyes:      Extraocular Movements: Extraocular movements intact.      Conjunctiva/sclera: Conjunctivae normal.      Pupils: Pupils are equal, round, and reactive to light.   Cardiovascular:      Rate and Rhythm: Normal rate and regular rhythm.      Pulses: Normal pulses.   Pulmonary:      Effort: Pulmonary effort is normal.      Breath sounds: Normal breath sounds.   Abdominal:      General: Bowel sounds are normal.      Palpations: Abdomen is soft.   Musculoskeletal:      Cervical back: Normal range of motion  and neck supple.   Neurological:      General: No focal deficit present.      Mental Status: She is alert and oriented to person, place, and time. Mental status is at baseline.   Psychiatric:         Mood and Affect: Mood normal.         Behavior: Behavior normal.       ECOG SCORE    0 - Fully active-able to carry on all pre-disease performance without restriction        Lab Review:  Results for orders placed or performed in visit on 12/05/22   CBC with Differential   Result Value Ref Range    WBC 13.8 (H) 4.5 - 11.5 x10(3)/mcL    RBC 3.35 (L) 4.20 - 5.40 x10(6)/mcL    Hgb 10.6 (L) 12.0 - 16.0 gm/dL    Hct 32.5 (L) 37.0 - 47.0 %    MCV 97.0 (H) 80.0 - 94.0 fL    MCH 31.6 (H) 27.0 - 31.0 pg    MCHC 32.6 (L) 33.0 - 36.0 mg/dL    RDW 14.4 11.5 - 17.0 %    Platelet 343 130 - 400 x10(3)/mcL    MPV 8.9 7.4 - 10.4 fL    Neut % 67.8 %    Lymph % 11.4 %    Mono % 9.7 %    Eos % 0.0 %    Basophil % 0.2 %    Lymph # 1.57 0.6 - 4.6 x10(3)/mcL    Neut # 9.3 (H) 2.1 - 9.2 x10(3)/mcL    Mono # 1.34 (H) 0.1 - 1.3 x10(3)/mcL    Eos # 0.00 0 - 0.9 x10(3)/mcL    Baso # 0.03 0 - 0.2 x10(3)/mcL    IG# 1.50 (H) 0 - 0.04 x10(3)/mcL    IG% 10.9 %        Assessment and Plan     Problem List Items Addressed This Visit          Oncology    Malignant neoplasm of upper-outer quadrant of right breast in female, estrogen receptor positive - Primary    Relevant Orders    Ambulatory referral/consult to Hematology/Oncology/Nutrition    Malignant neoplasm of upper-outer quadrant of left breast in female, estrogen receptor negative    Current Assessment & Plan     Taxol x 12 followed by dose dense AC, followed by Femara (for contralateral side)   C3  DD AC on 12/7/22  Will eventually get her to Dr. Jeromy Queen for follow-up.  Hold Lipitor  Follow-up with ophthalmology--stable exam  Follow-up primary care disease to consider decreasing metformin with diarrhea if worse  Imodium scheduled before chemotherapy   Zofran 8 mg b.i.d. for 2-3 days after  treatment  Claritin daily for 5 days  Repeat scan after AC x 4   approximately 2-3 weeks after and follow-up (CT scan chest abdomen pelvis  RTC in two weeks with CBC CMP   Decrease dexamethasone and premeds with high sugar  Nutrition dietary consultation                    Relevant Orders    Ambulatory referral/consult to Hematology/Oncology/Nutrition         Follow up in about 2 weeks (around 12/19/2022) for Treatment visit.    Gato Briones MD

## 2022-12-05 ENCOUNTER — TELEPHONE (OUTPATIENT)
Dept: HEMATOLOGY/ONCOLOGY | Facility: CLINIC | Age: 62
End: 2022-12-05

## 2022-12-05 ENCOUNTER — OFFICE VISIT (OUTPATIENT)
Dept: HEMATOLOGY/ONCOLOGY | Facility: CLINIC | Age: 62
End: 2022-12-05
Payer: COMMERCIAL

## 2022-12-05 VITALS
HEIGHT: 65 IN | OXYGEN SATURATION: 99 % | BODY MASS INDEX: 34.34 KG/M2 | SYSTOLIC BLOOD PRESSURE: 133 MMHG | TEMPERATURE: 98 F | DIASTOLIC BLOOD PRESSURE: 87 MMHG | WEIGHT: 206.13 LBS | HEART RATE: 85 BPM

## 2022-12-05 DIAGNOSIS — C50.412 MALIGNANT NEOPLASM OF UPPER-OUTER QUADRANT OF LEFT BREAST IN FEMALE, ESTROGEN RECEPTOR NEGATIVE: ICD-10-CM

## 2022-12-05 DIAGNOSIS — C50.411 MALIGNANT NEOPLASM OF UPPER-OUTER QUADRANT OF RIGHT BREAST IN FEMALE, ESTROGEN RECEPTOR POSITIVE: Primary | ICD-10-CM

## 2022-12-05 DIAGNOSIS — Z17.1 MALIGNANT NEOPLASM OF UPPER-OUTER QUADRANT OF LEFT BREAST IN FEMALE, ESTROGEN RECEPTOR NEGATIVE: ICD-10-CM

## 2022-12-05 DIAGNOSIS — Z17.0 MALIGNANT NEOPLASM OF UPPER-OUTER QUADRANT OF RIGHT BREAST IN FEMALE, ESTROGEN RECEPTOR POSITIVE: Primary | ICD-10-CM

## 2022-12-05 PROCEDURE — 1159F PR MEDICATION LIST DOCUMENTED IN MEDICAL RECORD: ICD-10-PCS | Mod: CPTII,S$GLB,, | Performed by: INTERNAL MEDICINE

## 2022-12-05 PROCEDURE — 3008F BODY MASS INDEX DOCD: CPT | Mod: CPTII,S$GLB,, | Performed by: INTERNAL MEDICINE

## 2022-12-05 PROCEDURE — 3079F DIAST BP 80-89 MM HG: CPT | Mod: CPTII,S$GLB,, | Performed by: INTERNAL MEDICINE

## 2022-12-05 PROCEDURE — 99214 OFFICE O/P EST MOD 30 MIN: CPT | Mod: S$GLB,,, | Performed by: INTERNAL MEDICINE

## 2022-12-05 PROCEDURE — 3079F PR MOST RECENT DIASTOLIC BLOOD PRESSURE 80-89 MM HG: ICD-10-PCS | Mod: CPTII,S$GLB,, | Performed by: INTERNAL MEDICINE

## 2022-12-05 PROCEDURE — 1159F MED LIST DOCD IN RCRD: CPT | Mod: CPTII,S$GLB,, | Performed by: INTERNAL MEDICINE

## 2022-12-05 PROCEDURE — 99214 PR OFFICE/OUTPT VISIT, EST, LEVL IV, 30-39 MIN: ICD-10-PCS | Mod: S$GLB,,, | Performed by: INTERNAL MEDICINE

## 2022-12-05 PROCEDURE — 3075F SYST BP GE 130 - 139MM HG: CPT | Mod: CPTII,S$GLB,, | Performed by: INTERNAL MEDICINE

## 2022-12-05 PROCEDURE — 1160F RVW MEDS BY RX/DR IN RCRD: CPT | Mod: CPTII,S$GLB,, | Performed by: INTERNAL MEDICINE

## 2022-12-05 PROCEDURE — 99999 PR PBB SHADOW E&M-EST. PATIENT-LVL IV: CPT | Mod: PBBFAC,,, | Performed by: INTERNAL MEDICINE

## 2022-12-05 PROCEDURE — 1160F PR REVIEW ALL MEDS BY PRESCRIBER/CLIN PHARMACIST DOCUMENTED: ICD-10-PCS | Mod: CPTII,S$GLB,, | Performed by: INTERNAL MEDICINE

## 2022-12-05 PROCEDURE — 99999 PR PBB SHADOW E&M-EST. PATIENT-LVL IV: ICD-10-PCS | Mod: PBBFAC,,, | Performed by: INTERNAL MEDICINE

## 2022-12-05 PROCEDURE — 3008F PR BODY MASS INDEX (BMI) DOCUMENTED: ICD-10-PCS | Mod: CPTII,S$GLB,, | Performed by: INTERNAL MEDICINE

## 2022-12-05 PROCEDURE — 3075F PR MOST RECENT SYSTOLIC BLOOD PRESS GE 130-139MM HG: ICD-10-PCS | Mod: CPTII,S$GLB,, | Performed by: INTERNAL MEDICINE

## 2022-12-05 RX ORDER — SODIUM CHLORIDE 0.9 % (FLUSH) 0.9 %
10 SYRINGE (ML) INJECTION
Status: CANCELLED | OUTPATIENT
Start: 2022-12-07

## 2022-12-05 RX ORDER — DOXORUBICIN HYDROCHLORIDE 2 MG/ML
60 INJECTION, SOLUTION INTRAVENOUS
Status: CANCELLED | OUTPATIENT
Start: 2022-12-07

## 2022-12-05 RX ORDER — HEPARIN 100 UNIT/ML
500 SYRINGE INTRAVENOUS
Status: CANCELLED | OUTPATIENT
Start: 2022-12-07

## 2022-12-07 ENCOUNTER — INFUSION (OUTPATIENT)
Dept: INFUSION THERAPY | Facility: HOSPITAL | Age: 62
End: 2022-12-07
Attending: INTERNAL MEDICINE
Payer: COMMERCIAL

## 2022-12-07 VITALS
TEMPERATURE: 98 F | RESPIRATION RATE: 18 BRPM | WEIGHT: 206 LBS | HEART RATE: 95 BPM | DIASTOLIC BLOOD PRESSURE: 87 MMHG | SYSTOLIC BLOOD PRESSURE: 144 MMHG | OXYGEN SATURATION: 100 % | HEIGHT: 65 IN | BODY MASS INDEX: 34.32 KG/M2

## 2022-12-07 DIAGNOSIS — Z17.1 MALIGNANT NEOPLASM OF UPPER-OUTER QUADRANT OF LEFT BREAST IN FEMALE, ESTROGEN RECEPTOR NEGATIVE: Primary | ICD-10-CM

## 2022-12-07 DIAGNOSIS — C50.412 MALIGNANT NEOPLASM OF UPPER-OUTER QUADRANT OF LEFT BREAST IN FEMALE, ESTROGEN RECEPTOR NEGATIVE: Primary | ICD-10-CM

## 2022-12-07 PROCEDURE — 63600175 PHARM REV CODE 636 W HCPCS: Performed by: INTERNAL MEDICINE

## 2022-12-07 PROCEDURE — 96413 CHEMO IV INFUSION 1 HR: CPT

## 2022-12-07 PROCEDURE — 96375 TX/PRO/DX INJ NEW DRUG ADDON: CPT

## 2022-12-07 PROCEDURE — 96411 CHEMO IV PUSH ADDL DRUG: CPT

## 2022-12-07 PROCEDURE — 25000003 PHARM REV CODE 250: Performed by: INTERNAL MEDICINE

## 2022-12-07 PROCEDURE — A4216 STERILE WATER/SALINE, 10 ML: HCPCS | Performed by: INTERNAL MEDICINE

## 2022-12-07 RX ORDER — PALONOSETRON 0.05 MG/ML
0.25 INJECTION, SOLUTION INTRAVENOUS ONCE
Status: COMPLETED | OUTPATIENT
Start: 2022-12-07 | End: 2022-12-07

## 2022-12-07 RX ORDER — HEPARIN 100 UNIT/ML
500 SYRINGE INTRAVENOUS
Status: DISCONTINUED | OUTPATIENT
Start: 2022-12-07 | End: 2022-12-07 | Stop reason: HOSPADM

## 2022-12-07 RX ORDER — DEXAMETHASONE SODIUM PHOSPHATE 4 MG/ML
4 INJECTION, SOLUTION INTRA-ARTICULAR; INTRALESIONAL; INTRAMUSCULAR; INTRAVENOUS; SOFT TISSUE
Status: COMPLETED | OUTPATIENT
Start: 2022-12-07 | End: 2022-12-07

## 2022-12-07 RX ORDER — DOXORUBICIN HYDROCHLORIDE 2 MG/ML
60 INJECTION, SOLUTION INTRAVENOUS
Status: COMPLETED | OUTPATIENT
Start: 2022-12-07 | End: 2022-12-07

## 2022-12-07 RX ORDER — SODIUM CHLORIDE 0.9 % (FLUSH) 0.9 %
10 SYRINGE (ML) INJECTION
Status: DISCONTINUED | OUTPATIENT
Start: 2022-12-07 | End: 2022-12-07 | Stop reason: HOSPADM

## 2022-12-07 RX ADMIN — APREPITANT 130 MG: 130 INJECTION, EMULSION INTRAVENOUS at 10:12

## 2022-12-07 RX ADMIN — CYCLOPHOSPHAMIDE 1240 MG: 200 INJECTION, SOLUTION INTRAVENOUS at 11:12

## 2022-12-07 RX ADMIN — Medication 10 ML: at 12:12

## 2022-12-07 RX ADMIN — PALONOSETRON 0.25 MG: 0.25 INJECTION, SOLUTION INTRAVENOUS at 10:12

## 2022-12-07 RX ADMIN — HEPARIN 500 UNITS: 100 SYRINGE at 12:12

## 2022-12-07 RX ADMIN — DEXAMETHASONE SODIUM PHOSPHATE 4 MG: 4 INJECTION INTRA-ARTICULAR; INTRALESIONAL; INTRAMUSCULAR; INTRAVENOUS; SOFT TISSUE at 10:12

## 2022-12-07 RX ADMIN — DOXORUBICIN HYDROCHLORIDE 124 MG: 2 INJECTION, SOLUTION INTRAVENOUS at 11:12

## 2022-12-08 ENCOUNTER — CLINICAL SUPPORT (OUTPATIENT)
Dept: HEMATOLOGY/ONCOLOGY | Facility: CLINIC | Age: 62
End: 2022-12-08
Payer: COMMERCIAL

## 2022-12-08 ENCOUNTER — INFUSION (OUTPATIENT)
Dept: INFUSION THERAPY | Facility: HOSPITAL | Age: 62
End: 2022-12-08
Attending: INTERNAL MEDICINE
Payer: COMMERCIAL

## 2022-12-08 VITALS
RESPIRATION RATE: 18 BRPM | TEMPERATURE: 98 F | SYSTOLIC BLOOD PRESSURE: 135 MMHG | HEART RATE: 76 BPM | DIASTOLIC BLOOD PRESSURE: 82 MMHG

## 2022-12-08 DIAGNOSIS — C50.412 MALIGNANT NEOPLASM OF UPPER-OUTER QUADRANT OF LEFT BREAST IN FEMALE, ESTROGEN RECEPTOR NEGATIVE: Primary | ICD-10-CM

## 2022-12-08 DIAGNOSIS — Z17.0 MALIGNANT NEOPLASM OF UPPER-OUTER QUADRANT OF RIGHT BREAST IN FEMALE, ESTROGEN RECEPTOR POSITIVE: ICD-10-CM

## 2022-12-08 DIAGNOSIS — C50.411 MALIGNANT NEOPLASM OF UPPER-OUTER QUADRANT OF RIGHT BREAST IN FEMALE, ESTROGEN RECEPTOR POSITIVE: ICD-10-CM

## 2022-12-08 DIAGNOSIS — Z17.1 MALIGNANT NEOPLASM OF UPPER-OUTER QUADRANT OF LEFT BREAST IN FEMALE, ESTROGEN RECEPTOR NEGATIVE: Primary | ICD-10-CM

## 2022-12-08 DIAGNOSIS — Z17.1 MALIGNANT NEOPLASM OF UPPER-OUTER QUADRANT OF LEFT BREAST IN FEMALE, ESTROGEN RECEPTOR NEGATIVE: ICD-10-CM

## 2022-12-08 DIAGNOSIS — C50.412 MALIGNANT NEOPLASM OF UPPER-OUTER QUADRANT OF LEFT BREAST IN FEMALE, ESTROGEN RECEPTOR NEGATIVE: ICD-10-CM

## 2022-12-08 PROCEDURE — 99999 PR PBB SHADOW E&M-EST. PATIENT-LVL II: ICD-10-PCS | Mod: PBBFAC,,,

## 2022-12-08 PROCEDURE — 63600175 PHARM REV CODE 636 W HCPCS: Mod: TB | Performed by: INTERNAL MEDICINE

## 2022-12-08 PROCEDURE — 96372 THER/PROPH/DIAG INJ SC/IM: CPT

## 2022-12-08 PROCEDURE — 99999 PR PBB SHADOW E&M-EST. PATIENT-LVL II: CPT | Mod: PBBFAC,,,

## 2022-12-08 RX ADMIN — PEGFILGRASTIM-CBQV 6 MG: 6 INJECTION, SOLUTION SUBCUTANEOUS at 12:12

## 2022-12-08 NOTE — PROGRESS NOTES
"Nutrition Education    Education Provided: diabetic diet  Teaching Method: explanation and printed materials  Comprehension: verbalizes understanding  Barriers to Learning: none evident  Expected Compliance: good  Comments: All questions were answered and dietitian's contact information was provided.      Pt presented today with questions about diabetes. She states she has not been "formally diagnosed" however she does have glipizide and metformin on med list. She admits that she was supposed to be taking both of these medications twice daily per PCP but she was only taking each one once daily. She recently had hyperglycemic episode at the [facility] where she had blurry vision, sweating, weakness. EMS was called and blood glucose was measured in 250s (in the evening). Pt states she has been eating a lot of fruit (alone, not in conjunction with other foods). She drinks orange juice and some sodas like ginger ale often. She was monitoring glucose levels at home last year on a daily basis and recently started tracking again. Her fasting numbers are around 130-160 mg/dL. Furthermore, she eats 1-2 meals a day since she is not working.     We discussed carbohydrate containing foods, serving sizes of carb containing foods, and balancing carbs with protein and fat throughout the day. Explained the importance of a consistent meal plan with predictable carb intake throughout the day. Advised no longer than 10-12 hours between last meal of the day and breakfast the next morning. Recommended discussing meds and dosing with PCP at future appt (seeing later this month) as well as A1C level. Provided 5 day 1500 kcal menu plan as well as carbohydrate serving size handbook. Explained and provided information on how to read a nutrition label. Pt verbalized understanding of all. She has my contact info should she have further questions/concerns.  "

## 2022-12-10 ENCOUNTER — HOSPITAL ENCOUNTER (EMERGENCY)
Facility: HOSPITAL | Age: 62
Discharge: HOME | End: 2022-12-10
Attending: EMERGENCY MEDICINE
Payer: COMMERCIAL

## 2022-12-10 VITALS
OXYGEN SATURATION: 100 % | DIASTOLIC BLOOD PRESSURE: 75 MMHG | BODY MASS INDEX: 34.28 KG/M2 | SYSTOLIC BLOOD PRESSURE: 124 MMHG | TEMPERATURE: 98 F | HEART RATE: 76 BPM | WEIGHT: 206 LBS | RESPIRATION RATE: 18 BRPM

## 2022-12-10 DIAGNOSIS — C50.912 BILATERAL MALIGNANT NEOPLASM OF BREAST IN FEMALE, UNSPECIFIED ESTROGEN RECEPTOR STATUS, UNSPECIFIED SITE OF BREAST: ICD-10-CM

## 2022-12-10 DIAGNOSIS — R42 DIZZINESS: ICD-10-CM

## 2022-12-10 DIAGNOSIS — E86.0 DEHYDRATION: Primary | ICD-10-CM

## 2022-12-10 DIAGNOSIS — C50.911 BILATERAL MALIGNANT NEOPLASM OF BREAST IN FEMALE, UNSPECIFIED ESTROGEN RECEPTOR STATUS, UNSPECIFIED SITE OF BREAST: ICD-10-CM

## 2022-12-10 DIAGNOSIS — R11.0 NAUSEA: ICD-10-CM

## 2022-12-10 LAB
ABS NEUT (OLG): 53.9 X10(3)/MCL (ref 2.1–9.2)
ALBUMIN SERPL-MCNC: 4 GM/DL (ref 3.4–4.8)
ALBUMIN/GLOB SERPL: 1.4 RATIO (ref 1.1–2)
ALP SERPL-CCNC: 171 UNIT/L (ref 40–150)
ALT SERPL-CCNC: 22 UNIT/L (ref 0–55)
ANISOCYTOSIS BLD QL SMEAR: ABNORMAL
APPEARANCE UR: CLEAR
AST SERPL-CCNC: 18 UNIT/L (ref 5–34)
BACTERIA #/AREA URNS AUTO: NORMAL /HPF
BILIRUB UR QL STRIP.AUTO: NEGATIVE MG/DL
BILIRUBIN DIRECT+TOT PNL SERPL-MCNC: 0.4 MG/DL
BUN SERPL-MCNC: 15.4 MG/DL (ref 9.8–20.1)
CALCIUM SERPL-MCNC: 10 MG/DL (ref 8.4–10.2)
CHLORIDE SERPL-SCNC: 100 MMOL/L (ref 98–107)
CO2 SERPL-SCNC: 26 MMOL/L (ref 23–31)
COLOR UR AUTO: YELLOW
CREAT SERPL-MCNC: 0.92 MG/DL (ref 0.55–1.02)
ERYTHROCYTE [DISTWIDTH] IN BLOOD BY AUTOMATED COUNT: 15.1 % (ref 11.5–17)
GFR SERPLBLD CREATININE-BSD FMLA CKD-EPI: >60 MLS/MIN/1.73/M2
GLOBULIN SER-MCNC: 2.8 GM/DL (ref 2.4–3.5)
GLUCOSE SERPL-MCNC: 141 MG/DL (ref 82–115)
GLUCOSE UR QL STRIP.AUTO: NEGATIVE MG/DL
HCT VFR BLD AUTO: 30.5 % (ref 37–47)
HGB BLD-MCNC: 10.3 GM/DL (ref 12–16)
IMM GRANULOCYTES # BLD AUTO: 6.53 X10(3)/MCL (ref 0–0.04)
IMM GRANULOCYTES NFR BLD AUTO: 11.9 %
INSTRUMENT WBC (OLG): 55 X10(3)/MCL
KETONES UR QL STRIP.AUTO: NEGATIVE MG/DL
LEUKOCYTE ESTERASE UR QL STRIP.AUTO: NEGATIVE UNIT/L
LYMPHOCYTES NFR BLD MANUAL: 0.55 X10(3)/MCL
LYMPHOCYTES NFR BLD MANUAL: 1 %
MACROCYTES BLD QL SMEAR: ABNORMAL
MCH RBC QN AUTO: 32.2 PG (ref 27–31)
MCHC RBC AUTO-ENTMCNC: 33.8 MG/DL (ref 33–36)
MCV RBC AUTO: 95.3 FL (ref 80–94)
MONOCYTES NFR BLD MANUAL: 0.55 X10(3)/MCL (ref 0.1–1.3)
MONOCYTES NFR BLD MANUAL: 1 %
NEUTROPHILS NFR BLD MANUAL: 98 %
NITRITE UR QL STRIP.AUTO: NEGATIVE
NRBC BLD AUTO-RTO: 0 %
PH UR STRIP.AUTO: 5.5 [PH]
PLATELET # BLD AUTO: 333 X10(3)/MCL (ref 130–400)
PLATELET # BLD EST: NORMAL 10*3/UL
PMV BLD AUTO: 10.3 FL (ref 7.4–10.4)
POCT GLUCOSE: 135 MG/DL (ref 70–110)
POTASSIUM SERPL-SCNC: 3.2 MMOL/L (ref 3.5–5.1)
PROT SERPL-MCNC: 6.8 GM/DL (ref 5.8–7.6)
PROT UR QL STRIP.AUTO: NEGATIVE MG/DL
RBC # BLD AUTO: 3.2 X10(6)/MCL (ref 4.2–5.4)
RBC #/AREA URNS AUTO: <5 /HPF
RBC MORPH BLD: ABNORMAL
RBC UR QL AUTO: NEGATIVE UNIT/L
SODIUM SERPL-SCNC: 139 MMOL/L (ref 136–145)
SP GR UR STRIP.AUTO: 1.01 (ref 1–1.03)
SQUAMOUS #/AREA URNS AUTO: <5 /HPF
TROPONIN I SERPL-MCNC: 0.02 NG/ML (ref 0–0.04)
UROBILINOGEN UR STRIP-ACNC: 0.2 MG/DL
WBC # SPEC AUTO: 55 X10(3)/MCL (ref 4.5–11.5)
WBC #/AREA URNS AUTO: <5 /HPF

## 2022-12-10 PROCEDURE — 96374 THER/PROPH/DIAG INJ IV PUSH: CPT

## 2022-12-10 PROCEDURE — 80053 COMPREHEN METABOLIC PANEL: CPT | Performed by: NURSE PRACTITIONER

## 2022-12-10 PROCEDURE — 93010 ELECTROCARDIOGRAM REPORT: CPT | Mod: ,,, | Performed by: INTERNAL MEDICINE

## 2022-12-10 PROCEDURE — 93005 ELECTROCARDIOGRAM TRACING: CPT

## 2022-12-10 PROCEDURE — 85060 BLOOD SMEAR INTERPRETATION: CPT | Performed by: NURSE PRACTITIONER

## 2022-12-10 PROCEDURE — 63600175 PHARM REV CODE 636 W HCPCS: Performed by: EMERGENCY MEDICINE

## 2022-12-10 PROCEDURE — 93010 EKG 12-LEAD: ICD-10-PCS | Mod: ,,, | Performed by: INTERNAL MEDICINE

## 2022-12-10 PROCEDURE — 63600175 PHARM REV CODE 636 W HCPCS: Performed by: NURSE PRACTITIONER

## 2022-12-10 PROCEDURE — 25000003 PHARM REV CODE 250: Performed by: NURSE PRACTITIONER

## 2022-12-10 PROCEDURE — 85007 BL SMEAR W/DIFF WBC COUNT: CPT | Performed by: NURSE PRACTITIONER

## 2022-12-10 PROCEDURE — 82962 GLUCOSE BLOOD TEST: CPT

## 2022-12-10 PROCEDURE — 99284 EMERGENCY DEPT VISIT MOD MDM: CPT | Mod: 25

## 2022-12-10 PROCEDURE — 84484 ASSAY OF TROPONIN QUANT: CPT | Performed by: NURSE PRACTITIONER

## 2022-12-10 PROCEDURE — 96361 HYDRATE IV INFUSION ADD-ON: CPT

## 2022-12-10 PROCEDURE — 81001 URINALYSIS AUTO W/SCOPE: CPT | Performed by: NURSE PRACTITIONER

## 2022-12-10 PROCEDURE — 85025 COMPLETE CBC W/AUTO DIFF WBC: CPT | Performed by: NURSE PRACTITIONER

## 2022-12-10 RX ORDER — ONDANSETRON 2 MG/ML
4 INJECTION INTRAMUSCULAR; INTRAVENOUS
Status: COMPLETED | OUTPATIENT
Start: 2022-12-10 | End: 2022-12-10

## 2022-12-10 RX ADMIN — ONDANSETRON 4 MG: 2 INJECTION INTRAMUSCULAR; INTRAVENOUS at 05:12

## 2022-12-10 RX ADMIN — SODIUM CHLORIDE, POTASSIUM CHLORIDE, SODIUM LACTATE AND CALCIUM CHLORIDE 1000 ML: 600; 310; 30; 20 INJECTION, SOLUTION INTRAVENOUS at 06:12

## 2022-12-10 RX ADMIN — SODIUM CHLORIDE 1000 ML: 9 INJECTION, SOLUTION INTRAVENOUS at 05:12

## 2022-12-10 NOTE — ED PROVIDER NOTES
Encounter Date: 12/10/2022    SCRIBE #1 NOTE: I, Mirta Haq, am scribing for, and in the presence of,  Masoud Dale MD. I have scribed the following portions of the note - the EKG reading. Other sections scribed: HPI, ROS, Physical exam.     History     Chief Complaint   Patient presents with    Nausea    Dizziness     Pt presents c/o nausea and lightheadedness.  Onset wed after chemo for breast CA.  States glucose levels unstable.         This is a 62 y.o. female, with a history of DM, HTN, HLD, menopause, A2, and breast cancer, who presents with complaint of nausea and lightheadedness with onset 4 days ago after chemotherapy. Patient reports that she was taking Zofran 2 times a day, but it did not relieve her symptoms. Patient denies vomiting, fever, and any urinary symptoms. Patient states that her blood sugar has been unstable recently. She adds that on  her sugar was 256, yesterday morning it was 60, and today it was 111. She notes that she had chemotherapy on  and . After chemotherapy, on , she received a shot to increase her white blood cell count. Patient reports that this is her first episode of breast cancer, and she was diagnosed in March of this year. Patient adds that she had 1 positive lymph node, which was removed. Patient's oncologist is Gato Briones MD.    Medical history: DM, HTN, HLD, A2, menopause, and breast cancer  Surgical history: Bilateral mastectomy, 2 C-sections, Mediport placement, and a planned reconstructive surgery  Social history:  and on medical leave; does not smoke, drink, or use drugs  Family history: Mother passed away of natural causes and father passed away from emphysema  Pneumonia: not up to date, Tetanus: does not know if received, COVID: 2 doses, Flu: not up to date      The history is provided by the patient.   Nausea  This is a new problem. The current episode started more than 2 days ago. The problem has not changed  since onset.Nothing aggravates the symptoms. Nothing relieves the symptoms.   Review of patient's allergies indicates:  No Known Allergies  Past Medical History:   Diagnosis Date    Cancer 3/21/2022    Diabetes mellitus     Hypercholesterolemia     Hypertension      Past Surgical History:   Procedure Laterality Date    BILATERAL MASTECTOMY Bilateral 6/3/2022    Procedure: MASTECTOMY, BILATERAL;  Surgeon: Charlene Contreras MD;  Location: [facility] OR;  Service: General;  Laterality: Bilateral;     SECTION  92 and 3/15/95    COLONOSCOPY      INSERTION OF BREAST TISSUE EXPANDER Bilateral 6/3/2022    Procedure: INSERTION, TISSUE EXPANDER, BREAST (Bilateral breast reconstruction with tissue expanders and acellular dermal matrix and use of intraoperative fluoroscein angiogragry);  Surgeon: Nick Gibbs MD;  Location: [facility] OR;  Service: Plastics;  Laterality: Bilateral;    MEDIPORT INSERTION, SINGLE      SENTINEL LYMPH NODE BIOPSY Bilateral 6/3/2022    Procedure: BIOPSY, LYMPH NODE, SENTINEL;  Surgeon: Charlene Contreras MD;  Location: [facility] OR;  Service: General;  Laterality: Bilateral;  MAGTRACE Injection in office  Need SentiMag Probe     Family History   Problem Relation Age of Onset    Breast cancer Maternal Grandmother             Breast cancer Sister     Breast cancer Sister         remission    Breast cancer Maternal Aunt             Breast cancer Maternal Aunt             Breast cancer Maternal Aunt             Breast cancer Maternal Aunt     Breast cancer Maternal Uncle             Stroke Brother             Pancreatic cancer Brother      Social History     Tobacco Use    Smoking status: Never    Smokeless tobacco: Never   Substance Use Topics    Alcohol use: Not Currently     Alcohol/week: 1.0 standard drink     Types: 1 Glasses of wine per week     Comment: drink socially but haven't had a glass of wine since     Drug use: Never     Review of Systems    Constitutional:  Negative for appetite change, fever and unexpected weight change.   HENT:  Negative for dental problem, hearing loss and sinus pain.    Eyes:  Negative for discharge and visual disturbance.   Respiratory:  Negative for cough and chest tightness.    Cardiovascular:  Negative for palpitations and leg swelling.   Gastrointestinal:  Positive for nausea. Negative for anal bleeding, blood in stool, rectal pain and vomiting.   Endocrine: Negative for polydipsia, polyphagia and polyuria.   Genitourinary:  Negative for difficulty urinating, dysuria and hematuria.   Musculoskeletal:  Negative for back pain, myalgias and neck pain.   Skin:  Negative for color change, rash and wound.   Allergic/Immunologic: Negative for environmental allergies, food allergies and immunocompromised state.   Hematological: Negative.  Negative for adenopathy. Does not bruise/bleed easily.   Psychiatric/Behavioral:  Negative for behavioral problems and suicidal ideas. The patient is not hyperactive.    All other systems reviewed and are negative.    Physical Exam     Initial Vitals [12/10/22 1629]   BP Pulse Resp Temp SpO2   (!) 147/86 107 20 99.1 °F (37.3 °C) 98 %      MAP       --         Physical Exam    Nursing note and vitals reviewed.  Constitutional: She appears well-developed and well-nourished. She is not diaphoretic. No distress.   Patient is awake, oriented, and talking. No distress.   HENT:   Head: Normocephalic and atraumatic.   Right Ear: External ear normal.   Left Ear: External ear normal.   Nose: Nose normal.   Mouth/Throat: Oropharynx is clear and moist.   Slightly dry mucous membranes.   Eyes: Conjunctivae and EOM are normal. Pupils are equal, round, and reactive to light. Right eye exhibits no discharge. Left eye exhibits no discharge. No scleral icterus.   Neck: Neck supple. No tracheal deviation present.   Normal range of motion.  Cardiovascular:  Normal rate, regular rhythm, normal heart sounds and intact  distal pulses.     Exam reveals no gallop and no friction rub.       No murmur heard.  Pulmonary/Chest: Breath sounds normal. No stridor. No respiratory distress. She has no wheezes. She has no rhonchi. She has no rales. She exhibits no tenderness.   Abdominal: Abdomen is soft. Bowel sounds are normal. She exhibits no distension and no mass. There is no abdominal tenderness. There is no guarding.   Musculoskeletal:         General: No tenderness or edema. Normal range of motion.      Cervical back: Normal range of motion and neck supple.     Neurological: She is alert and oriented to person, place, and time. No cranial nerve deficit or sensory deficit.   Skin: Skin is warm and dry. Capillary refill takes less than 2 seconds. No rash noted. No erythema. No pallor.   Psychiatric: She has a normal mood and affect. Her behavior is normal. Judgment and thought content normal.       ED Course   Procedures  Labs Reviewed   COMPREHENSIVE METABOLIC PANEL - Abnormal; Notable for the following components:       Result Value    Potassium Level 3.2 (*)     Glucose Level 141 (*)     Alkaline Phosphatase 171 (*)     All other components within normal limits   CBC WITH DIFFERENTIAL - Abnormal; Notable for the following components:    WBC 55.0 (*)     RBC 3.20 (*)     Hgb 10.3 (*)     Hct 30.5 (*)     MCV 95.3 (*)     MCH 32.2 (*)     IG# 6.53 (*)     All other components within normal limits   MANUAL DIFFERENTIAL - Abnormal; Notable for the following components:    Abs Lymp 0.55 (*)     Abs Neut 53.9 (*)     RBC Morph Abnormal (*)     Anisocyte 1+ (*)     Macrocyte 1+ (*)     All other components within normal limits   POCT GLUCOSE - Abnormal; Notable for the following components:    POCT Glucose 135 (*)     All other components within normal limits   TROPONIN I - Normal   URINALYSIS, REFLEX TO URINE CULTURE - Normal   URINALYSIS, MICROSCOPIC - Normal   CBC W/ AUTO DIFFERENTIAL    Narrative:     The following orders were created  for panel order CBC auto differential.  Procedure                               Abnormality         Status                     ---------                               -----------         ------                     CBC with Differential[[id number]]        Abnormal            Final result               Manual Differential[[id number]]          Abnormal            Final result                 Please view results for these tests on the individual orders.   PATH REVIEW OF BLOOD SMEAR     EKG Readings: (Independently Interpreted)   Rhythm: Normal Sinus Rhythm. Ectopy: No Ectopy. Conduction: Normal. ST Segments: Normal ST Segments. T Waves: Normal. Clinical Impression: Normal Sinus Rhythm   Preformed at 16:58. Rate of 91 BPM.   ECG Results              EKG 12-lead (Final result)  Result time 12/10/22 19:55:24      Final result by Interface, Lab In [facility] (12/10/22 19:55:24)                   Narrative:    Test Reason : R42,    Vent. Rate : 091 BPM     Atrial Rate : 091 BPM     P-R Int : 156 ms          QRS Dur : 084 ms      QT Int : 368 ms       P-R-T Axes : 073 072 015 degrees     QTc Int : 452 ms    Normal sinus rhythm  Normal ECG  When compared with ECG of 12-JUL-2022 10:41,  No significant change was found  Confirmed by Roberth Baker MD ([id number]) on 12/10/2022 7:55:17 PM    Referred By: [email or web address]   SELF           Confirmed By:Roberth Baker MD                                  Imaging Results    None          Medications   ondansetron injection 4 mg (4 mg Intravenous Given 12/10/22 1713)   sodium chloride 0.9% bolus 1,000 mL (0 mLs Intravenous Stopped 12/10/22 1800)   lactated ringers bolus 1,000 mL (0 mLs Intravenous Stopped 12/10/22 1900)     Medical Decision Making:   History:   Old Medical Records: I decided to obtain old medical records.  Independently Interpreted Test(s):   I have ordered and independently interpreted EKG Reading(s) - see prior notes  Clinical Tests:   Lab Tests: Ordered and Reviewed  Medical Tests:  Ordered and Reviewed        Scribe Attestation:   Scribe #1: I performed the above scribed service and the documentation accurately describes the services I performed. I attest to the accuracy of the note.    Attending Attestation:           Physician Attestation for Scribe:  Physician Attestation Statement for Scribe #1: I, Masoud Dale MD, reviewed documentation, as scribed by Mirta Haq in my presence, and it is both accurate and complete.           ED Course as of 12/11/22 1325   Sat Dec 10, 2022   1731 Lymph Man: 1  RN tells me patient is feeling better 2nd bag of fluids infusing were trying to collect urine specimen [DM]   1938 The urinalysis is the only test pending the patient tells me she does feel better.  I explained to the patient I think she is slightly behind in her fluids we talked about taking Zofran 2 of her 4 mg tablets for 8 mg every 6 hours if needed for nausea and vomiting.   I told the patient that I have a consultation pending with oncology.   [DM]   1947 I have placed a call for consultation with Oncology.  Urine has been collected and sent to the lab it is not resulted yet.  It appears that patient will be turned over to my relief at 8:00 p.m..  With consultation pending as well as urinalysis. [DM]   1948 I did have a chance to talk to Oncology I spoke to Tracy Chandra said if minimum UTI treated and let her go home.  Patient has no urinary symptoms urinalysis is pending [DM]   2037 Patient's urinalysis unremarkable.  I have spoken with the patient.  She reports that she feels better.  Mild headache but she believes this is from being hungry.  She would like to go home that she may eat.  We will discharge her home at this time. [MM]   Sun Dec 11, 2022   1324 The urine returned clear and the patient was discharged home I had spoken to the oncologist and she was fine with this plan patient was feeling better and was released [DM]      ED Course User Index  [DM] Masoud Dale,  MD  [MM] Gildardo Dale MD                   Clinical Impression:   Final diagnoses:  [R42] Dizziness  [E86.0] Dehydration (Primary)  [R11.0] Nausea  [C50.911, C50.912] Bilateral malignant neoplasm of breast in female, unspecified estrogen receptor status, unspecified site of breast          ED Disposition Condition    Discharge Stable          ED Prescriptions    None       Follow-up Information       Follow up With Specialties Details Why Contact Info    Sharri Bass NP Nurse Practitioner   [address] [place] [id number]  [phone]               Masoud Dale MD  12/11/22 3570

## 2022-12-10 NOTE — FIRST PROVIDER EVALUATION
Medical screening examination initiated.  I have conducted a focused provider triage encounter, findings are as follows:    Brief history of present illness:  62-year-old breast cancer patient who had chemo last Wednesday complaining of dizziness and nausea since last treatment.  Reports glucose levels are unstable as well since chemo treatment.    Vitals:    12/10/22 1629   BP: (!) 147/86   BP Location: Left arm   Patient Position: Sitting   Pulse: 107   Resp: 20   Temp: 99.1 °F (37.3 °C)   TempSrc: Oral   SpO2: 98%   Weight: 93.4 kg (206 lb)       Pertinent physical exam:  AAOX3    Brief workup plan:  labs and EKG    Preliminary workup initiated; this workup will be continued and followed by the physician or advanced practice provider that is assigned to the patient when roomed.

## 2022-12-11 LAB — HEMATOLOGIST REVIEW: NORMAL

## 2022-12-11 NOTE — DISCHARGE INSTRUCTIONS
Thanks for letting us take care of you today!  It is our goal to give you courteous care and to keep you comfortable and informed, if you have any questions before you leave I will be happy to try and answer them.    Here is some advice after your visit:    Your visit in the emergency department is NOT definitive care - please follow-up with your primary care doctor and/or specialist within 1-2 days. Please return to the emergency department if you develop worsening symptoms including: fever, chills, chest pain, shortness of breath, weakness, numbness, tingling, nausea, vomiting, inability to eat, drink, or take your medication. Please return if you have any worsening in your condition or if you have any other concerns.    If you had radiology exams like an XRAY or CT in the emergency Department the interpreation on them may be preliminary - there may be less time sensitive findings on the reports please obtain these reports within 24 hours from the hospital or by using your out on your mobile phone to access records.  Bring these to your primary care doctor and/or specialist for further review of incidental findings.    Please review any LAB WORK from your visit today with your primary care physician.    Please follow-up with your primary care physician, and your oncologist.  Please dehydrated.  You are welcome to return to the emergency department at any time with worsening symptoms or concerns.

## 2022-12-16 NOTE — ASSESSMENT & PLAN NOTE
· Taxol x 12 followed by dose dense AC, followed by Femara (for contralateral side)   · C4 DD AC on 12/21/22  · Will eventually get her to Dr. Jeromy Queen for follow-up.  · Hold Lipitor  · Follow-up with ophthalmology--stable exam  · Follow-up primary care disease to consider decreasing metformin with diarrhea if worse  · Imodium scheduled before chemotherapy   · Zofran 8 mg b.i.d. for 2-3 days after treatment  · Claritin daily for 5 days  · Repeat CT CAP after AC x 4   · Decrease dexamethasone and premeds with high sugar  · Nutrition dietary consultation   · BL mastectomies  · She has appt with Dr. Spears on 1/5/23

## 2022-12-19 ENCOUNTER — OFFICE VISIT (OUTPATIENT)
Dept: HEMATOLOGY/ONCOLOGY | Facility: CLINIC | Age: 62
End: 2022-12-19
Payer: COMMERCIAL

## 2022-12-19 VITALS
HEART RATE: 91 BPM | BODY MASS INDEX: 34.08 KG/M2 | HEIGHT: 65 IN | OXYGEN SATURATION: 100 % | TEMPERATURE: 98 F | SYSTOLIC BLOOD PRESSURE: 104 MMHG | WEIGHT: 204.56 LBS | DIASTOLIC BLOOD PRESSURE: 72 MMHG

## 2022-12-19 DIAGNOSIS — C50.412 MALIGNANT NEOPLASM OF UPPER-OUTER QUADRANT OF LEFT BREAST IN FEMALE, ESTROGEN RECEPTOR NEGATIVE: Primary | ICD-10-CM

## 2022-12-19 DIAGNOSIS — R93.5 ABNORMAL CT OF THE ABDOMEN: ICD-10-CM

## 2022-12-19 DIAGNOSIS — Z17.1 MALIGNANT NEOPLASM OF UPPER-OUTER QUADRANT OF LEFT BREAST IN FEMALE, ESTROGEN RECEPTOR NEGATIVE: Primary | ICD-10-CM

## 2022-12-19 PROCEDURE — 3078F PR MOST RECENT DIASTOLIC BLOOD PRESSURE < 80 MM HG: ICD-10-PCS | Mod: CPTII,S$GLB,, | Performed by: NURSE PRACTITIONER

## 2022-12-19 PROCEDURE — 3074F SYST BP LT 130 MM HG: CPT | Mod: CPTII,S$GLB,, | Performed by: NURSE PRACTITIONER

## 2022-12-19 PROCEDURE — 3008F BODY MASS INDEX DOCD: CPT | Mod: CPTII,S$GLB,, | Performed by: NURSE PRACTITIONER

## 2022-12-19 PROCEDURE — 99999 PR PBB SHADOW E&M-EST. PATIENT-LVL IV: ICD-10-PCS | Mod: PBBFAC,,, | Performed by: NURSE PRACTITIONER

## 2022-12-19 PROCEDURE — 99214 PR OFFICE/OUTPT VISIT, EST, LEVL IV, 30-39 MIN: ICD-10-PCS | Mod: S$GLB,,, | Performed by: NURSE PRACTITIONER

## 2022-12-19 PROCEDURE — 1159F PR MEDICATION LIST DOCUMENTED IN MEDICAL RECORD: ICD-10-PCS | Mod: CPTII,S$GLB,, | Performed by: NURSE PRACTITIONER

## 2022-12-19 PROCEDURE — 1159F MED LIST DOCD IN RCRD: CPT | Mod: CPTII,S$GLB,, | Performed by: NURSE PRACTITIONER

## 2022-12-19 PROCEDURE — 3008F PR BODY MASS INDEX (BMI) DOCUMENTED: ICD-10-PCS | Mod: CPTII,S$GLB,, | Performed by: NURSE PRACTITIONER

## 2022-12-19 PROCEDURE — 99214 OFFICE O/P EST MOD 30 MIN: CPT | Mod: S$GLB,,, | Performed by: NURSE PRACTITIONER

## 2022-12-19 PROCEDURE — 99999 PR PBB SHADOW E&M-EST. PATIENT-LVL IV: CPT | Mod: PBBFAC,,, | Performed by: NURSE PRACTITIONER

## 2022-12-19 PROCEDURE — 3074F PR MOST RECENT SYSTOLIC BLOOD PRESSURE < 130 MM HG: ICD-10-PCS | Mod: CPTII,S$GLB,, | Performed by: NURSE PRACTITIONER

## 2022-12-19 PROCEDURE — 3078F DIAST BP <80 MM HG: CPT | Mod: CPTII,S$GLB,, | Performed by: NURSE PRACTITIONER

## 2022-12-19 RX ORDER — DOXORUBICIN HYDROCHLORIDE 2 MG/ML
60 INJECTION, SOLUTION INTRAVENOUS
Status: CANCELLED | OUTPATIENT
Start: 2022-12-21

## 2022-12-19 RX ORDER — DEXAMETHASONE SODIUM PHOSPHATE 4 MG/ML
4 INJECTION, SOLUTION INTRA-ARTICULAR; INTRALESIONAL; INTRAMUSCULAR; INTRAVENOUS; SOFT TISSUE
Status: CANCELLED
Start: 2022-12-21

## 2022-12-19 RX ORDER — PALONOSETRON 0.05 MG/ML
0.25 INJECTION, SOLUTION INTRAVENOUS ONCE
Status: CANCELLED
Start: 2022-12-21 | End: 2022-12-21

## 2022-12-19 RX ORDER — SODIUM CHLORIDE 0.9 % (FLUSH) 0.9 %
10 SYRINGE (ML) INJECTION
Status: CANCELLED | OUTPATIENT
Start: 2022-12-21

## 2022-12-19 RX ORDER — HEPARIN 100 UNIT/ML
500 SYRINGE INTRAVENOUS
Status: CANCELLED | OUTPATIENT
Start: 2022-12-21

## 2022-12-19 NOTE — PROGRESS NOTES
Heme/Onc Progress Note    PATIENT: Elina Ham  MRN: [id number]  DATE: 12/19/2022  Chief Complaint: Follow-up (2 week f/u. Was admitted to the hospital for dehydration on 12/10)    Current Treatment: OP BREAST PACLITAXEL WEEKLY FOLLOWED BY DOSE DENSE DOXORUBICIN CYCLOPHOSPHAMIDE Q2W     Oncology History   Malignant neoplasm of upper-outer quadrant of right breast in female, estrogen receptor positive   6/3/2022 Surgery    S/p bilateral simple mastectomy, bilateral sentinel lymph node biopsy, and TE placement - 6/3/2022      Malignant neoplasm of upper-outer quadrant of left breast in female, estrogen receptor negative   6/3/2022 Surgery    S/p bilateral simple mastectomy, bilateral sentinel lymph node biopsy, and TE placement - 6/3/2022      8/3/2022 -  Chemotherapy    OP BREAST PACLITAXEL WEEKLY FOLLOWED BY DOSE DENSE DOXORUBICIN CYCLOPHOSPHAMIDE Q2W      Genetic Testing    Genetic testing negative      Pathology Significant Finding    Left breast:  Repeat marker:  ER 6%, MN 0.1%, HER2/rahul negative, Ki-67 68%     10/28/2022 Imaging Significant Findings    10/28/2022: CT cap:  Postsurgical changes, left axilla 1.9 x 1.6 nodule, right axilla 1.7 x 1.1 hypodense nodule, small bilateral axillary nodes, liver subcentimeter hypodensity in right lobe too small to characterize, 1.2 cm cyst in the tail the pancreas stable   Impression:  Stable, bilateral axillary some postoperative seromas indolent subcentimeter cyst unchanged dating back to 2016, pancreatic cyst continue to follow           12/19/2022  Patient presents for cycle 4 of DD AC. After her last treatment, she had some dizziness and nausea. She went to the ED, her sugars have been elevated. She received IV hydration and felt much better.   She is minimal neuropathy that comes and goes, she has no significant nausea or vomiting.       Past Medical History:   Diagnosis Date    Cancer 3/21/2022    Diabetes mellitus     Hypercholesterolemia     Hypertension          Current Outpatient Medications:     amLODIPine (NORVASC) 10 MG tablet, Take 10 mg by mouth once daily., Disp: , Rfl:     glipiZIDE (GLUCOTROL) 5 MG tablet, Take 10 mg by mouth 2 (two) times daily., Disp: , Rfl:     loratadine (CLARITIN) 10 mg tablet, Take 10 mg by mouth once daily., Disp: , Rfl:     losartan-hydrochlorothiazide 100-25 mg (HYZAAR) 100-25 mg per tablet, Take 1 tablet by mouth once daily., Disp: , Rfl:     metFORMIN (GLUCOPHAGE) 1000 MG tablet, Take 1,000 mg by mouth daily with breakfast., Disp: , Rfl:     pyridoxine, vitamin B6, (B-6) 50 MG Tab, Take 1 tablet (50 mg total) by mouth once daily., Disp: 30 tablet, Rfl: 0    ondansetron (ZOFRAN-ODT) 8 MG TbDL, Take 1 tablet (8 mg total) by mouth every 8 (eight) hours as needed (nausea/vomiting). (Patient not taking: Reported on 8/8/2022), Disp: 60 tablet, Rfl: 5     Review of Systems:   Pertinent positives and negatives included in the HPI. Otherwise a complete review of   systems is negative.  Review of Systems       Objective:     Vitals:    12/19/22 1025   BP: 104/72   Pulse: 91   Temp: 98.3 °F (36.8 °C)        Physical Exam  Constitutional:       Appearance: Normal appearance.   HENT:      Head: Normocephalic and atraumatic.      Nose: Nose normal.      Mouth/Throat:      Mouth: Mucous membranes are moist.   Eyes:      Extraocular Movements: Extraocular movements intact.      Conjunctiva/sclera: Conjunctivae normal.      Pupils: Pupils are equal, round, and reactive to light.   Cardiovascular:      Rate and Rhythm: Normal rate and regular rhythm.      Pulses: Normal pulses.   Pulmonary:      Effort: Pulmonary effort is normal.      Breath sounds: Normal breath sounds.   Abdominal:      General: Bowel sounds are normal.      Palpations: Abdomen is soft.   Musculoskeletal:      Cervical back: Normal range of motion and neck supple.   Neurological:      General: No focal deficit present.      Mental Status: She is alert and oriented to person,  place, and time. Mental status is at baseline.   Psychiatric:         Mood and Affect: Mood normal.         Behavior: Behavior normal.       ECOG SCORE            Assessment and Plan     Problem List Items Addressed This Visit          Oncology    Malignant neoplasm of upper-outer quadrant of left breast in female, estrogen receptor negative - Primary    Current Assessment & Plan     Taxol x 12 followed by dose dense AC, followed by Femara (for contralateral side)   C4 DD AC on 12/21/22  Will eventually get her to Dr. Jeromy Queen for follow-up.  Hold Lipitor  Follow-up with ophthalmology--stable exam  Follow-up primary care disease to consider decreasing metformin with diarrhea if worse  Imodium scheduled before chemotherapy   Zofran 8 mg b.i.d. for 2-3 days after treatment  Claritin daily for 5 days  Repeat CT CAP after AC x 4   Decrease dexamethasone and premeds with high sugar  Nutrition dietary consultation   BL mastectomies  She has appt with Dr. Spears on 1/5/23                 Relevant Orders    CT Chest Abdomen Pelvis With Contrast    CBC Auto Differential    Comprehensive Metabolic Panel     Other Visit Diagnoses       Abnormal CT of the abdomen        Relevant Orders    CT Chest Abdomen Pelvis With Contrast    CBC Auto Differential    Comprehensive Metabolic Panel              Follow up in about 2 weeks (around 1/2/2023) for labs and visit with Dr. Briones to review imaging.

## 2022-12-21 ENCOUNTER — INFUSION (OUTPATIENT)
Dept: INFUSION THERAPY | Facility: HOSPITAL | Age: 62
End: 2022-12-21
Attending: INTERNAL MEDICINE
Payer: COMMERCIAL

## 2022-12-21 VITALS
SYSTOLIC BLOOD PRESSURE: 130 MMHG | TEMPERATURE: 98 F | BODY MASS INDEX: 34.08 KG/M2 | HEART RATE: 87 BPM | HEIGHT: 65 IN | WEIGHT: 204.56 LBS | RESPIRATION RATE: 18 BRPM | DIASTOLIC BLOOD PRESSURE: 80 MMHG

## 2022-12-21 DIAGNOSIS — Z17.1 MALIGNANT NEOPLASM OF UPPER-OUTER QUADRANT OF LEFT BREAST IN FEMALE, ESTROGEN RECEPTOR NEGATIVE: Primary | ICD-10-CM

## 2022-12-21 DIAGNOSIS — C50.412 MALIGNANT NEOPLASM OF UPPER-OUTER QUADRANT OF LEFT BREAST IN FEMALE, ESTROGEN RECEPTOR NEGATIVE: Primary | ICD-10-CM

## 2022-12-21 PROCEDURE — 25000003 PHARM REV CODE 250: Performed by: NURSE PRACTITIONER

## 2022-12-21 PROCEDURE — 96413 CHEMO IV INFUSION 1 HR: CPT

## 2022-12-21 PROCEDURE — 63600175 PHARM REV CODE 636 W HCPCS: Performed by: NURSE PRACTITIONER

## 2022-12-21 PROCEDURE — 96411 CHEMO IV PUSH ADDL DRUG: CPT

## 2022-12-21 PROCEDURE — 96375 TX/PRO/DX INJ NEW DRUG ADDON: CPT

## 2022-12-21 RX ORDER — HEPARIN 100 UNIT/ML
500 SYRINGE INTRAVENOUS
Status: DISCONTINUED | OUTPATIENT
Start: 2022-12-21 | End: 2022-12-21 | Stop reason: HOSPADM

## 2022-12-21 RX ORDER — DOXORUBICIN HYDROCHLORIDE 2 MG/ML
60 INJECTION, SOLUTION INTRAVENOUS
Status: COMPLETED | OUTPATIENT
Start: 2022-12-21 | End: 2022-12-21

## 2022-12-21 RX ORDER — DEXAMETHASONE SODIUM PHOSPHATE 4 MG/ML
4 INJECTION, SOLUTION INTRA-ARTICULAR; INTRALESIONAL; INTRAMUSCULAR; INTRAVENOUS; SOFT TISSUE
Status: COMPLETED | OUTPATIENT
Start: 2022-12-21 | End: 2022-12-21

## 2022-12-21 RX ORDER — PALONOSETRON 0.05 MG/ML
0.25 INJECTION, SOLUTION INTRAVENOUS ONCE
Status: COMPLETED | OUTPATIENT
Start: 2022-12-21 | End: 2022-12-21

## 2022-12-21 RX ORDER — SODIUM CHLORIDE 0.9 % (FLUSH) 0.9 %
10 SYRINGE (ML) INJECTION
Status: DISCONTINUED | OUTPATIENT
Start: 2022-12-21 | End: 2022-12-21 | Stop reason: HOSPADM

## 2022-12-21 RX ADMIN — DOXORUBICIN HYDROCHLORIDE 124 MG: 2 INJECTION, SOLUTION INTRAVENOUS at 11:12

## 2022-12-21 RX ADMIN — PALONOSETRON 0.25 MG: 0.25 INJECTION, SOLUTION INTRAVENOUS at 10:12

## 2022-12-21 RX ADMIN — APREPITANT 130 MG: 130 INJECTION, EMULSION INTRAVENOUS at 10:12

## 2022-12-21 RX ADMIN — DEXAMETHASONE SODIUM PHOSPHATE 4 MG: 4 INJECTION INTRA-ARTICULAR; INTRALESIONAL; INTRAMUSCULAR; INTRAVENOUS; SOFT TISSUE at 10:12

## 2022-12-21 RX ADMIN — CYCLOPHOSPHAMIDE 1240 MG: 200 INJECTION, SOLUTION INTRAVENOUS at 11:12

## 2022-12-22 ENCOUNTER — INFUSION (OUTPATIENT)
Dept: INFUSION THERAPY | Facility: HOSPITAL | Age: 62
End: 2022-12-22
Attending: INTERNAL MEDICINE
Payer: COMMERCIAL

## 2022-12-22 VITALS
RESPIRATION RATE: 16 BRPM | SYSTOLIC BLOOD PRESSURE: 132 MMHG | TEMPERATURE: 98 F | HEART RATE: 85 BPM | DIASTOLIC BLOOD PRESSURE: 76 MMHG

## 2022-12-22 DIAGNOSIS — Z17.1 MALIGNANT NEOPLASM OF UPPER-OUTER QUADRANT OF LEFT BREAST IN FEMALE, ESTROGEN RECEPTOR NEGATIVE: Primary | ICD-10-CM

## 2022-12-22 DIAGNOSIS — C50.412 MALIGNANT NEOPLASM OF UPPER-OUTER QUADRANT OF LEFT BREAST IN FEMALE, ESTROGEN RECEPTOR NEGATIVE: Primary | ICD-10-CM

## 2022-12-22 PROCEDURE — 63600175 PHARM REV CODE 636 W HCPCS: Mod: TB | Performed by: NURSE PRACTITIONER

## 2022-12-22 PROCEDURE — 96372 THER/PROPH/DIAG INJ SC/IM: CPT

## 2022-12-22 RX ADMIN — PEGFILGRASTIM-CBQV 6 MG: 6 INJECTION, SOLUTION SUBCUTANEOUS at 09:12

## 2022-12-27 ENCOUNTER — HOSPITAL ENCOUNTER (OUTPATIENT)
Dept: RADIOLOGY | Facility: HOSPITAL | Age: 62
Discharge: HOME | End: 2022-12-27
Attending: NURSE PRACTITIONER
Payer: COMMERCIAL

## 2022-12-27 DIAGNOSIS — R93.5 ABNORMAL CT OF THE ABDOMEN: ICD-10-CM

## 2022-12-27 DIAGNOSIS — Z17.1 MALIGNANT NEOPLASM OF UPPER-OUTER QUADRANT OF LEFT BREAST IN FEMALE, ESTROGEN RECEPTOR NEGATIVE: ICD-10-CM

## 2022-12-27 DIAGNOSIS — C50.412 MALIGNANT NEOPLASM OF UPPER-OUTER QUADRANT OF LEFT BREAST IN FEMALE, ESTROGEN RECEPTOR NEGATIVE: ICD-10-CM

## 2022-12-27 PROCEDURE — 25500020 PHARM REV CODE 255: Performed by: NURSE PRACTITIONER

## 2022-12-27 PROCEDURE — 71260 CT THORAX DX C+: CPT | Mod: TC

## 2022-12-27 PROCEDURE — 74177 CT ABD & PELVIS W/CONTRAST: CPT | Mod: TC

## 2022-12-27 RX ADMIN — DIATRIZOATE MEGLUMINE AND DIATRIZOATE SODIUM 30 ML: 660; 100 LIQUID ORAL; RECTAL at 03:12

## 2022-12-27 RX ADMIN — IOPAMIDOL 100 ML: 755 INJECTION, SOLUTION INTRAVENOUS at 03:12

## 2022-12-27 NOTE — PROGRESS NOTES
Heme/Onc Progress Note    PATIENT: Elina Ham  MRN: [id number]  DATE: 1/3/2023  Chief Complaint: Follow-up (2 week f/u with scan results)    Current Treatment: [No matching plan found]     Oncology History   Malignant neoplasm of upper-outer quadrant of right breast in female, estrogen receptor positive   6/3/2022 Surgery    S/p bilateral simple mastectomy, bilateral sentinel lymph node biopsy, and TE placement - 6/3/2022      Malignant neoplasm of upper-outer quadrant of left breast in female, estrogen receptor negative   6/3/2022 Surgery    S/p bilateral simple mastectomy, bilateral sentinel lymph node biopsy, and TE placement - 6/3/2022      8/3/2022 - 12/21/2022 Chemotherapy    OP BREAST PACLITAXEL WEEKLY FOLLOWED BY DOSE DENSE DOXORUBICIN CYCLOPHOSPHAMIDE Q2W      Genetic Testing    Genetic testing negative      Pathology Significant Finding    Left breast:  Repeat marker:  ER 6%, NV 0.1%, HER2/rahul negative, Ki-67 68%     10/28/2022 Imaging Significant Findings    10/28/2022: CT cap:  Postsurgical changes, left axilla 1.9 x 1.6 nodule, right axilla 1.7 x 1.1 hypodense nodule, small bilateral axillary nodes, liver subcentimeter hypodensity in right lobe too small to characterize, 1.2 cm cyst in the tail the pancreas stable   Impression:  Stable, bilateral axillary some postoperative seromas indolent subcentimeter cyst unchanged dating back to 2016, pancreatic cyst continue to follow       12/27/2022 Imaging Significant Findings    Pulmonary emboli right lower lobar artery and segmental branches.     No evidence of metastatic disease to the chest abdomen or pelvis.     Cystic lesion in the pancreatic tail, stable from the prior exam, most likely a simple pancreatic cyst, pseudocyst, or IPMN.     Subcentimeter liver hypodensity likely a cyst, stable..     Suspected chronic necrotic lymph node in the left axilla, smaller, right axillary lymph nodes, smaller in the interval.     Voicemail message was left  for the ordering physician Dr. Oh at the time of the report.            01/03/2023  Patient presents for s/p cycle 4 of DD AC.     Repeat CT of the chest abdomen pelvis with contrast on 12/27/2022 showed small pulmonary emboli in the right lower lobe arterial segmental branches, no evidence metastatic disease, a cystic lesion in the pancreatic tail stable, stable liver lesion, chronic necrotic left axillary lymph node smaller      Patient was in [place].  She was started on medication.         Past Medical History:   Diagnosis Date    Cancer 3/21/2022    Diabetes mellitus     Hypercholesterolemia     Hypertension         Current Outpatient Medications:     glipiZIDE (GLUCOTROL) 5 MG tablet, Take 10 mg by mouth 2 (two) times daily., Disp: , Rfl:     loratadine (CLARITIN) 10 mg tablet, Take 10 mg by mouth once daily., Disp: , Rfl:     losartan-hydrochlorothiazide 100-25 mg (HYZAAR) 100-25 mg per tablet, Take 1 tablet by mouth once daily., Disp: , Rfl:     metFORMIN (GLUCOPHAGE) 1000 MG tablet, Take 1,000 mg by mouth daily with breakfast., Disp: , Rfl:     pyridoxine, vitamin B6, (B-6) 50 MG Tab, Take 1 tablet (50 mg total) by mouth once daily., Disp: 30 tablet, Rfl: 0    amLODIPine (NORVASC) 10 MG tablet, Take 10 mg by mouth once daily., Disp: , Rfl:     apixaban (ELIQUIS) 5 mg Tab, Take 1 tablet (5 mg total) by mouth 2 (two) times daily., Disp: 60 tablet, Rfl: 3     Review of Systems:   Pertinent positives and negatives included in the HPI. Otherwise a complete review of   systems is negative.  Review of Systems       Objective:     Vitals:    01/03/23 0926   BP: 135/84   Pulse: 82   Temp: 98.2 °F (36.8 °C)        Physical Exam  Vitals reviewed.   Constitutional:       Appearance: Normal appearance.   HENT:      Head: Normocephalic and atraumatic.      Nose: Nose normal.      Mouth/Throat:      Mouth: Mucous membranes are moist.   Eyes:      Extraocular Movements: Extraocular movements intact.       Conjunctiva/sclera: Conjunctivae normal.      Pupils: Pupils are equal, round, and reactive to light.   Cardiovascular:      Rate and Rhythm: Normal rate and regular rhythm.      Pulses: Normal pulses.   Pulmonary:      Effort: Pulmonary effort is normal.      Breath sounds: Normal breath sounds.   Abdominal:      General: Abdomen is flat. Bowel sounds are normal.      Palpations: Abdomen is soft.   Musculoskeletal:      Cervical back: Normal range of motion and neck supple.      Comments: Lymphedema left upper extremity sleeve in place   Skin:     General: Skin is warm.   Neurological:      General: No focal deficit present.      Mental Status: She is alert and oriented to person, place, and time. Mental status is at baseline.   Psychiatric:         Mood and Affect: Mood normal.         Behavior: Behavior normal.         Thought Content: Thought content normal.         Judgment: Judgment normal.       ECOG SCORE            Lab Review:  CBC:   Recent Labs     01/03/23  0919 12/19/22  1005 12/10/22  1646   WBC 13.7* 8.1 55.0*   RBC 3.00* 3.32* 3.20*   HGB 9.7* 10.6* 10.3*   HCT 29.9* 32.5* 30.5*    353 333     CMP:   Recent Labs     12/19/22  1005 12/10/22  1646 12/05/22  0958    139 143   K 3.4* 3.2* 3.2*   CO2 29 26 27   BUN 8.7* 15.4 12.3   CREATININE 0.85 0.92 1.05*   CALCIUM 9.6 10.0 9.8   ALBUMIN 4.3 4.0 4.2   BILITOT 0.3 0.4 0.4   ALKPHOS 136 171* 127   AST 17 18 17   ALT 23 22 18      We discussed her scan and PE and IPMN and cyst  We plan of care  Total time 30min        Assessment and Plan     Problem List Items Addressed This Visit          Cardiac/Vascular    Port-A-Cath in place    Current Assessment & Plan     Flush every 8 weeks, keep 1 year post treatment, discussed removal January 2024             Hematology    Pulmonary emboli    Current Assessment & Plan     Sig: For the first 7 days take two 5 mg tablets twice daily.  After 7 days take one 5 mg tablet twice daily  Will stay on until  6/2023 and re-eval  US of upper and lower ext  Echo         Relevant Medications    apixaban (ELIQUIS) 5 mg Tab    Other Relevant Orders    CBC Auto Differential    Comprehensive Metabolic Panel    Vitamin B12    Folate    Iron and TIBC    Ferritin       Oncology    Malignant neoplasm of upper-outer quadrant of right breast in female, estrogen receptor positive - Primary    Relevant Orders    CBC Auto Differential    Comprehensive Metabolic Panel    Vitamin B12    Folate    Iron and TIBC    Ferritin    Malignant neoplasm of upper-outer quadrant of left breast in female, estrogen receptor negative    Current Assessment & Plan     S/p Taxol x 12 followed by dose dense AC  12/21/22   will need  Femara (for contralateral side)   Will eventually get her to Dr. Jeromy Queen for follow-up.? IPMN  Okay to resume Lipitor  Repeat CT CAP --3 months ( or 2months after radiation)  BL mastectomies  She has appt with Dr. Spears on 1/5/23  RTC in 3 weeks with CBC, CMP, anemia studies, and then plan starting Femara after radiation                 Relevant Orders    CBC Auto Differential    Comprehensive Metabolic Panel    Vitamin B12    Folate    Iron and TIBC    Ferritin           Follow up in about 3 weeks (around 1/24/2023).    Gato Briones MD

## 2022-12-28 ENCOUNTER — TELEPHONE (OUTPATIENT)
Dept: HEMATOLOGY/ONCOLOGY | Facility: CLINIC | Age: 62
End: 2022-12-28
Payer: COMMERCIAL

## 2022-12-28 DIAGNOSIS — I26.99 ACUTE PULMONARY EMBOLISM, UNSPECIFIED PULMONARY EMBOLISM TYPE, UNSPECIFIED WHETHER ACUTE COR PULMONALE PRESENT: Primary | ICD-10-CM

## 2022-12-28 RX ORDER — APIXABAN 5 MG (74)
KIT ORAL
Qty: 74 EACH | Refills: 1 | Status: SHIPPED | OUTPATIENT
Start: 2022-12-28 | End: 2023-01-03

## 2022-12-28 NOTE — PROGRESS NOTES
Patient's routine CT to monitor treatment response incidentally found Pulmonary emboli right lower lobar artery and segmental branches.  Recommend patient to follow up today in office, however, she is in [place].   Recommend if she becomes SOB, palpitations or any other concerns to go to the ED.  Eliquis starter pack sent to Pharmacy in [place].  She knows to start this medication ASAP

## 2022-12-28 NOTE — TELEPHONE ENCOUNTER
Patient unable to come into office for visit to discuss recent scan as she is on her way to [place] at this time. Notified patient that she has blood clot in her lungs and NP Sujatha needs to call in medication for her to start taking for this. She states we can call it in at [facility] in [place] on [address]. Pharmacy updated in chart. Patient will  today and start taking once called in.

## 2023-01-03 ENCOUNTER — OFFICE VISIT (OUTPATIENT)
Dept: HEMATOLOGY/ONCOLOGY | Facility: CLINIC | Age: 63
End: 2023-01-03
Payer: COMMERCIAL

## 2023-01-03 VITALS
BODY MASS INDEX: 34.12 KG/M2 | TEMPERATURE: 98 F | SYSTOLIC BLOOD PRESSURE: 135 MMHG | WEIGHT: 204.81 LBS | OXYGEN SATURATION: 100 % | DIASTOLIC BLOOD PRESSURE: 84 MMHG | HEART RATE: 82 BPM | HEIGHT: 65 IN

## 2023-01-03 DIAGNOSIS — Z95.828 PORT-A-CATH IN PLACE: ICD-10-CM

## 2023-01-03 DIAGNOSIS — I26.99 OTHER ACUTE PULMONARY EMBOLISM WITHOUT ACUTE COR PULMONALE: ICD-10-CM

## 2023-01-03 DIAGNOSIS — I26.99 ACUTE PULMONARY EMBOLISM, UNSPECIFIED PULMONARY EMBOLISM TYPE, UNSPECIFIED WHETHER ACUTE COR PULMONALE PRESENT: ICD-10-CM

## 2023-01-03 DIAGNOSIS — Z51.81 ENCOUNTER FOR MONITORING CARDIOTOXIC DRUG THERAPY: Primary | ICD-10-CM

## 2023-01-03 DIAGNOSIS — C50.411 MALIGNANT NEOPLASM OF UPPER-OUTER QUADRANT OF RIGHT BREAST IN FEMALE, ESTROGEN RECEPTOR POSITIVE: Primary | ICD-10-CM

## 2023-01-03 DIAGNOSIS — Z09 POST CHEMO EVALUATION: ICD-10-CM

## 2023-01-03 DIAGNOSIS — Z17.0 MALIGNANT NEOPLASM OF UPPER-OUTER QUADRANT OF RIGHT BREAST IN FEMALE, ESTROGEN RECEPTOR POSITIVE: Primary | ICD-10-CM

## 2023-01-03 DIAGNOSIS — Z79.899 ENCOUNTER FOR MONITORING CARDIOTOXIC DRUG THERAPY: Primary | ICD-10-CM

## 2023-01-03 DIAGNOSIS — Z17.1 MALIGNANT NEOPLASM OF UPPER-OUTER QUADRANT OF LEFT BREAST IN FEMALE, ESTROGEN RECEPTOR NEGATIVE: ICD-10-CM

## 2023-01-03 DIAGNOSIS — C50.412 MALIGNANT NEOPLASM OF UPPER-OUTER QUADRANT OF LEFT BREAST IN FEMALE, ESTROGEN RECEPTOR NEGATIVE: ICD-10-CM

## 2023-01-03 PROCEDURE — 3008F BODY MASS INDEX DOCD: CPT | Mod: CPTII,S$GLB,, | Performed by: INTERNAL MEDICINE

## 2023-01-03 PROCEDURE — 99999 PR PBB SHADOW E&M-EST. PATIENT-LVL IV: ICD-10-PCS | Mod: PBBFAC,,, | Performed by: INTERNAL MEDICINE

## 2023-01-03 PROCEDURE — 3079F PR MOST RECENT DIASTOLIC BLOOD PRESSURE 80-89 MM HG: ICD-10-PCS | Mod: CPTII,S$GLB,, | Performed by: INTERNAL MEDICINE

## 2023-01-03 PROCEDURE — 99215 PR OFFICE/OUTPT VISIT, EST, LEVL V, 40-54 MIN: ICD-10-PCS | Mod: S$GLB,,, | Performed by: INTERNAL MEDICINE

## 2023-01-03 PROCEDURE — 1159F PR MEDICATION LIST DOCUMENTED IN MEDICAL RECORD: ICD-10-PCS | Mod: CPTII,S$GLB,, | Performed by: INTERNAL MEDICINE

## 2023-01-03 PROCEDURE — 3075F SYST BP GE 130 - 139MM HG: CPT | Mod: CPTII,S$GLB,, | Performed by: INTERNAL MEDICINE

## 2023-01-03 PROCEDURE — 99999 PR PBB SHADOW E&M-EST. PATIENT-LVL IV: CPT | Mod: PBBFAC,,, | Performed by: INTERNAL MEDICINE

## 2023-01-03 PROCEDURE — 3075F PR MOST RECENT SYSTOLIC BLOOD PRESS GE 130-139MM HG: ICD-10-PCS | Mod: CPTII,S$GLB,, | Performed by: INTERNAL MEDICINE

## 2023-01-03 PROCEDURE — 99215 OFFICE O/P EST HI 40 MIN: CPT | Mod: S$GLB,,, | Performed by: INTERNAL MEDICINE

## 2023-01-03 PROCEDURE — 1160F RVW MEDS BY RX/DR IN RCRD: CPT | Mod: CPTII,S$GLB,, | Performed by: INTERNAL MEDICINE

## 2023-01-03 PROCEDURE — 3079F DIAST BP 80-89 MM HG: CPT | Mod: CPTII,S$GLB,, | Performed by: INTERNAL MEDICINE

## 2023-01-03 PROCEDURE — 1159F MED LIST DOCD IN RCRD: CPT | Mod: CPTII,S$GLB,, | Performed by: INTERNAL MEDICINE

## 2023-01-03 PROCEDURE — 1160F PR REVIEW ALL MEDS BY PRESCRIBER/CLIN PHARMACIST DOCUMENTED: ICD-10-PCS | Mod: CPTII,S$GLB,, | Performed by: INTERNAL MEDICINE

## 2023-01-03 PROCEDURE — 3008F PR BODY MASS INDEX (BMI) DOCUMENTED: ICD-10-PCS | Mod: CPTII,S$GLB,, | Performed by: INTERNAL MEDICINE

## 2023-01-03 NOTE — ASSESSMENT & PLAN NOTE
· S/p Taxol x 12 followed by dose dense AC  12/21/22  ·  will need  Femara (for contralateral side)   · Will eventually get her to Dr. Jeromy Queen for follow-up.? IPMN  · Okay to resume Lipitor  · Repeat CT CAP --3 months ( or 2months after radiation)  · BL mastectomies  · She has appt with Dr. Spears on 1/5/23  · RTC in 3 weeks with CBC, CMP, anemia studies, and then plan starting Femara after radiation

## 2023-01-03 NOTE — ASSESSMENT & PLAN NOTE
Sig: For the first 7 days take two 5 mg tablets twice daily.  After 7 days take one 5 mg tablet twice daily  Will stay on until 6/2023 and re-eval  US of upper and lower ext  Echo

## 2023-01-04 ENCOUNTER — APPOINTMENT (OUTPATIENT)
Dept: RADIATION THERAPY | Facility: HOSPITAL | Age: 63
End: 2023-01-04
Attending: RADIOLOGY
Payer: COMMERCIAL

## 2023-01-04 PROCEDURE — 77290 THER RAD SIMULAJ FIELD CPLX: CPT | Performed by: RADIOLOGY

## 2023-01-04 PROCEDURE — 77334 RADIATION TREATMENT AID(S): CPT | Performed by: RADIOLOGY

## 2023-01-17 PROCEDURE — 77300 RADIATION THERAPY DOSE PLAN: CPT | Performed by: RADIOLOGY

## 2023-01-17 PROCEDURE — 77334 RADIATION TREATMENT AID(S): CPT | Performed by: RADIOLOGY

## 2023-01-18 ENCOUNTER — HOSPITAL ENCOUNTER (OUTPATIENT)
Dept: CARDIOLOGY | Facility: HOSPITAL | Age: 63
Discharge: HOME | End: 2023-01-18
Attending: INTERNAL MEDICINE
Payer: COMMERCIAL

## 2023-01-18 DIAGNOSIS — I26.99 ACUTE PULMONARY EMBOLISM, UNSPECIFIED PULMONARY EMBOLISM TYPE, UNSPECIFIED WHETHER ACUTE COR PULMONALE PRESENT: ICD-10-CM

## 2023-01-18 DIAGNOSIS — Z51.81 ENCOUNTER FOR MONITORING CARDIOTOXIC DRUG THERAPY: ICD-10-CM

## 2023-01-18 DIAGNOSIS — I26.99 OTHER ACUTE PULMONARY EMBOLISM WITHOUT ACUTE COR PULMONALE: ICD-10-CM

## 2023-01-18 DIAGNOSIS — Z09 POST CHEMO EVALUATION: ICD-10-CM

## 2023-01-18 DIAGNOSIS — Z79.899 ENCOUNTER FOR MONITORING CARDIOTOXIC DRUG THERAPY: ICD-10-CM

## 2023-01-18 LAB
AV INDEX (PROSTH): 0.58
AV MEAN GRADIENT: 6 MMHG
AV PEAK GRADIENT: 11 MMHG
AV VALVE AREA: 1.83 CM2
AV VELOCITY RATIO: 0.56
CV ECHO LV RWT: 0.4 CM
DOP CALC AO PEAK VEL: 1.64 M/S
DOP CALC AO VTI: 33.7 CM
DOP CALC LVOT AREA: 3.1 CM2
DOP CALC LVOT DIAMETER: 2 CM
DOP CALC LVOT PEAK VEL: 0.92 M/S
DOP CALC LVOT STROKE VOLUME: 61.54 CM3
DOP CALC MV VTI: 33.2 CM
DOP CALCLVOT PEAK VEL VTI: 19.6 CM
E WAVE DECELERATION TIME: 190 MSEC
E/A RATIO: 0.98
E/E' RATIO: 8.76 M/S
ECHO LV POSTERIOR WALL: 0.86 CM (ref 0.6–1.1)
EJECTION FRACTION: 60 %
FRACTIONAL SHORTENING: 33 % (ref 28–44)
INTERVENTRICULAR SEPTUM: 0.83 CM (ref 0.6–1.1)
LEFT ATRIUM SIZE: 3.3 CM
LEFT INTERNAL DIMENSION IN SYSTOLE: 2.87 CM (ref 2.1–4)
LEFT VENTRICLE DIASTOLIC VOLUME: 82.6 ML
LEFT VENTRICLE SYSTOLIC VOLUME: 31.4 ML
LEFT VENTRICULAR INTERNAL DIMENSION IN DIASTOLE: 4.29 CM (ref 3.5–6)
LEFT VENTRICULAR MASS: 112.83 G
LV LATERAL E/E' RATIO: 7.08 M/S
LV SEPTAL E/E' RATIO: 11.5 M/S
LVOT MG: 2 MMHG
LVOT MV: 0.64 CM/S
MV MEAN GRADIENT: 2 MMHG
MV PEAK A VEL: 0.94 M/S
MV PEAK E VEL: 0.92 M/S
MV PEAK GRADIENT: 6 MMHG
MV STENOSIS PRESSURE HALF TIME: 90 MS
MV VALVE AREA BY CONTINUITY EQUATION: 1.85 CM2
MV VALVE AREA P 1/2 METHOD: 2.44 CM2
PISA TR MAX VEL: 2.07 M/S
RA PRESSURE: 3 MMHG
SINUS: 2.7 CM
TDI LATERAL: 0.13 M/S
TDI SEPTAL: 0.08 M/S
TDI: 0.11 M/S
TR MAX PG: 17 MMHG
TRICUSPID ANNULAR PLANE SYSTOLIC EXCURSION: 2.33 CM
TV REST PULMONARY ARTERY PRESSURE: 20 MMHG

## 2023-01-18 PROCEDURE — 93306 TTE W/DOPPLER COMPLETE: CPT | Mod: 26,,, | Performed by: INTERNAL MEDICINE

## 2023-01-18 PROCEDURE — 93306 ECHO (CUPID ONLY): ICD-10-PCS | Mod: 26,,, | Performed by: INTERNAL MEDICINE

## 2023-01-18 PROCEDURE — 93970 EXTREMITY STUDY: CPT

## 2023-01-18 PROCEDURE — 93306 TTE W/DOPPLER COMPLETE: CPT

## 2023-01-18 PROCEDURE — 93930 UPPER EXTREMITY STUDY: CPT | Mod: 50

## 2023-01-19 PROCEDURE — 77295 3-D RADIOTHERAPY PLAN: CPT | Performed by: RADIOLOGY

## 2023-01-23 NOTE — PROGRESS NOTES
Heme/Onc Progress Note    PATIENT: Elina Ham  MRN: [id number]  DATE: 1/24/2023  Chief Complaint: Follow-up, Dry skin on thighs, and Started radiation today 32 or 34 tx's she will need to have    Current Treatment: Radiation  Plan Femara    Oncology History   Malignant neoplasm of upper-outer quadrant of right breast in female, estrogen receptor positive   6/3/2022 Surgery    S/p bilateral simple mastectomy, bilateral sentinel lymph node biopsy, and TE placement - 6/3/2022      1/5/2023 -  Radiation Therapy         Malignant neoplasm of upper-outer quadrant of left breast in female, estrogen receptor negative   6/3/2022 Surgery    S/p bilateral simple mastectomy, bilateral sentinel lymph node biopsy, and TE placement - 6/3/2022      8/3/2022 - 12/21/2022 Chemotherapy    OP BREAST PACLITAXEL WEEKLY FOLLOWED BY DOSE DENSE DOXORUBICIN CYCLOPHOSPHAMIDE Q2W      Genetic Testing    Genetic testing negative      Pathology Significant Finding    Left breast:  Repeat marker:  ER 6%, WA 0.1%, HER2/rahul negative, Ki-67 68%     10/28/2022 Imaging Significant Findings    10/28/2022: CT cap:  Postsurgical changes, left axilla 1.9 x 1.6 nodule, right axilla 1.7 x 1.1 hypodense nodule, small bilateral axillary nodes, liver subcentimeter hypodensity in right lobe too small to characterize, 1.2 cm cyst in the tail the pancreas stable   Impression:  Stable, bilateral axillary some postoperative seromas indolent subcentimeter cyst unchanged dating back to 2016, pancreatic cyst continue to follow       12/27/2022 Imaging Significant Findings    Pulmonary emboli right lower lobar artery and segmental branches.     No evidence of metastatic disease to the chest abdomen or pelvis.     Cystic lesion in the pancreatic tail, stable from the prior exam, most likely a simple pancreatic cyst, pseudocyst, or IPMN.     Subcentimeter liver hypodensity likely a cyst, stable..     Suspected chronic necrotic lymph node in the left axilla,  smaller, right axillary lymph nodes, smaller in the interval.     Voicemail message was left for the ordering physician Dr. Oh at the time of the report.            01/24/2023  Patient presents for follow up. She will start radiation to her breast today. She has US of B/L uppoer and lower extremities.  Incidental finding of acute deep vein thrombosis identified in the right internal jugular vein.   The right upper extremity demonstrated mild arterial flow reduction.  The left upper extremity demonstrated mild arterial flow reduction.   She is on eliquis and is compliant.      Past Medical History:   Diagnosis Date    Cancer 3/21/2022    Diabetes mellitus     Hypercholesterolemia     Hypertension         Current Outpatient Medications:     amLODIPine (NORVASC) 10 MG tablet, Take 10 mg by mouth once daily., Disp: , Rfl:     apixaban (ELIQUIS) 5 mg Tab, Take 1 tablet (5 mg total) by mouth 2 (two) times daily., Disp: 60 tablet, Rfl: 3    glipiZIDE (GLUCOTROL) 5 MG tablet, Take 10 mg by mouth 2 (two) times daily., Disp: , Rfl:     loratadine (CLARITIN) 10 mg tablet, Take 10 mg by mouth once daily., Disp: , Rfl:     losartan-hydrochlorothiazide 100-25 mg (HYZAAR) 100-25 mg per tablet, Take 1 tablet by mouth once daily., Disp: , Rfl:     metFORMIN (GLUCOPHAGE) 1000 MG tablet, Take 1,000 mg by mouth daily with breakfast., Disp: , Rfl:     pyridoxine, vitamin B6, (B-6) 50 MG Tab, Take 1 tablet (50 mg total) by mouth once daily., Disp: 30 tablet, Rfl: 0     Review of Systems:   Pertinent positives and negatives included in the HPI. Otherwise a complete review of   systems is negative.  Review of Systems       Objective:     Vitals:    01/24/23 0909   BP: 133/85   Pulse: 74   Temp: 97.8 °F (36.6 °C)         Physical Exam  Constitutional:       Appearance: Normal appearance.   HENT:      Head: Normocephalic and atraumatic.      Nose: Nose normal.      Mouth/Throat:      Mouth: Mucous membranes are moist.   Eyes:       Extraocular Movements: Extraocular movements intact.      Conjunctiva/sclera: Conjunctivae normal.      Pupils: Pupils are equal, round, and reactive to light.   Cardiovascular:      Rate and Rhythm: Normal rate and regular rhythm.      Pulses: Normal pulses.   Pulmonary:      Effort: Pulmonary effort is normal.      Breath sounds: Normal breath sounds.   Abdominal:      General: Bowel sounds are normal.      Palpations: Abdomen is soft.   Musculoskeletal:      Cervical back: Normal range of motion and neck supple.   Neurological:      General: No focal deficit present.      Mental Status: She is alert and oriented to person, place, and time. Mental status is at baseline.   Psychiatric:         Mood and Affect: Mood normal.         Behavior: Behavior normal.       ECOG SCORE    0 - Fully active-able to carry on all pre-disease performance without restriction        Assessment and Plan     Problem List Items Addressed This Visit          Hematology    Pulmonary emboli    Overview     12/28/22--incidental finding of Pulmonary emboli right lower lobar artery and segmental branches.  January 23, 2023:  Bilateral lower extremities negative,Incidental finding of acute deep vein thrombosis identified in the right internal jugular vein.               Oncology    Malignant neoplasm of upper-outer quadrant of right breast in female, estrogen receptor positive - Primary    Current Assessment & Plan     Will need  Femara (for contralateral side)   Will eventually get her to Dr. Jeromy Queen for follow-up.? IPMN  Okay to resume Lipitor  Repeat CT CAP 2months after radiation  BL mastectomies, no mammograms                    Follow up in about 6 weeks (around 3/7/2023).

## 2023-01-23 NOTE — ASSESSMENT & PLAN NOTE
· Will need  Femara (for contralateral side)   · Will eventually get her to Dr. Jeromy Queen for follow-up.? IPMN  · Okay to resume Lipitor  · Repeat CT CAP 2months after radiation  · BL mastectomies, no mammograms

## 2023-01-24 ENCOUNTER — OFFICE VISIT (OUTPATIENT)
Dept: HEMATOLOGY/ONCOLOGY | Facility: CLINIC | Age: 63
End: 2023-01-24
Payer: COMMERCIAL

## 2023-01-24 VITALS
OXYGEN SATURATION: 100 % | BODY MASS INDEX: 34.12 KG/M2 | DIASTOLIC BLOOD PRESSURE: 85 MMHG | HEART RATE: 74 BPM | WEIGHT: 204.81 LBS | SYSTOLIC BLOOD PRESSURE: 133 MMHG | HEIGHT: 65 IN | TEMPERATURE: 98 F

## 2023-01-24 DIAGNOSIS — C50.411 MALIGNANT NEOPLASM OF UPPER-OUTER QUADRANT OF RIGHT BREAST IN FEMALE, ESTROGEN RECEPTOR POSITIVE: Primary | ICD-10-CM

## 2023-01-24 DIAGNOSIS — I26.99 OTHER ACUTE PULMONARY EMBOLISM WITHOUT ACUTE COR PULMONALE: ICD-10-CM

## 2023-01-24 DIAGNOSIS — Z17.0 MALIGNANT NEOPLASM OF UPPER-OUTER QUADRANT OF RIGHT BREAST IN FEMALE, ESTROGEN RECEPTOR POSITIVE: Primary | ICD-10-CM

## 2023-01-24 PROCEDURE — 77280 THER RAD SIMULAJ FIELD SMPL: CPT | Performed by: RADIOLOGY

## 2023-01-24 PROCEDURE — 3008F PR BODY MASS INDEX (BMI) DOCUMENTED: ICD-10-PCS | Mod: CPTII,S$GLB,, | Performed by: NURSE PRACTITIONER

## 2023-01-24 PROCEDURE — 99214 OFFICE O/P EST MOD 30 MIN: CPT | Mod: S$GLB,,, | Performed by: NURSE PRACTITIONER

## 2023-01-24 PROCEDURE — 3079F PR MOST RECENT DIASTOLIC BLOOD PRESSURE 80-89 MM HG: ICD-10-PCS | Mod: CPTII,S$GLB,, | Performed by: NURSE PRACTITIONER

## 2023-01-24 PROCEDURE — 99214 PR OFFICE/OUTPT VISIT, EST, LEVL IV, 30-39 MIN: ICD-10-PCS | Mod: S$GLB,,, | Performed by: NURSE PRACTITIONER

## 2023-01-24 PROCEDURE — 3075F PR MOST RECENT SYSTOLIC BLOOD PRESS GE 130-139MM HG: ICD-10-PCS | Mod: CPTII,S$GLB,, | Performed by: NURSE PRACTITIONER

## 2023-01-24 PROCEDURE — 77412 RADIATION TX DELIVERY LVL 3: CPT | Performed by: RADIOLOGY

## 2023-01-24 PROCEDURE — 1159F MED LIST DOCD IN RCRD: CPT | Mod: CPTII,S$GLB,, | Performed by: NURSE PRACTITIONER

## 2023-01-24 PROCEDURE — 99999 PR PBB SHADOW E&M-EST. PATIENT-LVL III: CPT | Mod: PBBFAC,,, | Performed by: NURSE PRACTITIONER

## 2023-01-24 PROCEDURE — 1159F PR MEDICATION LIST DOCUMENTED IN MEDICAL RECORD: ICD-10-PCS | Mod: CPTII,S$GLB,, | Performed by: NURSE PRACTITIONER

## 2023-01-24 PROCEDURE — 3008F BODY MASS INDEX DOCD: CPT | Mod: CPTII,S$GLB,, | Performed by: NURSE PRACTITIONER

## 2023-01-24 PROCEDURE — 99999 PR PBB SHADOW E&M-EST. PATIENT-LVL III: ICD-10-PCS | Mod: PBBFAC,,, | Performed by: NURSE PRACTITIONER

## 2023-01-24 PROCEDURE — 3079F DIAST BP 80-89 MM HG: CPT | Mod: CPTII,S$GLB,, | Performed by: NURSE PRACTITIONER

## 2023-01-24 PROCEDURE — 3075F SYST BP GE 130 - 139MM HG: CPT | Mod: CPTII,S$GLB,, | Performed by: NURSE PRACTITIONER

## 2023-01-24 RX ORDER — SODIUM CHLORIDE 0.9 % (FLUSH) 0.9 %
10 SYRINGE (ML) INJECTION
Status: CANCELLED | OUTPATIENT
Start: 2023-02-15

## 2023-01-24 RX ORDER — HEPARIN 100 UNIT/ML
500 SYRINGE INTRAVENOUS
Status: CANCELLED | OUTPATIENT
Start: 2023-02-15

## 2023-01-25 PROCEDURE — 77412 RADIATION TX DELIVERY LVL 3: CPT | Performed by: RADIOLOGY

## 2023-01-26 PROCEDURE — 77412 RADIATION TX DELIVERY LVL 3: CPT | Performed by: RADIOLOGY

## 2023-01-27 PROCEDURE — 77412 RADIATION TX DELIVERY LVL 3: CPT | Performed by: RADIOLOGY

## 2023-01-30 PROCEDURE — 77336 RADIATION PHYSICS CONSULT: CPT | Performed by: RADIOLOGY

## 2023-01-30 PROCEDURE — 77412 RADIATION TX DELIVERY LVL 3: CPT | Performed by: RADIOLOGY

## 2023-01-31 PROCEDURE — 77417 THER RADIOLOGY PORT IMAGE(S): CPT | Performed by: RADIOLOGY

## 2023-01-31 PROCEDURE — 77412 RADIATION TX DELIVERY LVL 3: CPT | Performed by: RADIOLOGY

## 2023-02-01 ENCOUNTER — APPOINTMENT (OUTPATIENT)
Dept: RADIATION THERAPY | Facility: HOSPITAL | Age: 63
End: 2023-02-01
Attending: RADIOLOGY
Payer: COMMERCIAL

## 2023-02-01 PROCEDURE — 77412 RADIATION TX DELIVERY LVL 3: CPT | Performed by: RADIOLOGY

## 2023-02-02 PROCEDURE — 77412 RADIATION TX DELIVERY LVL 3: CPT | Performed by: RADIOLOGY

## 2023-02-03 PROCEDURE — 77412 RADIATION TX DELIVERY LVL 3: CPT | Performed by: RADIOLOGY

## 2023-02-06 ENCOUNTER — OFFICE VISIT (OUTPATIENT)
Dept: URGENT CARE | Facility: CLINIC | Age: 63
End: 2023-02-06
Payer: COMMERCIAL

## 2023-02-06 VITALS
RESPIRATION RATE: 18 BRPM | OXYGEN SATURATION: 99 % | DIASTOLIC BLOOD PRESSURE: 83 MMHG | WEIGHT: 206.81 LBS | TEMPERATURE: 98 F | BODY MASS INDEX: 32.46 KG/M2 | SYSTOLIC BLOOD PRESSURE: 123 MMHG | HEART RATE: 85 BPM | HEIGHT: 67 IN

## 2023-02-06 DIAGNOSIS — L98.9 SKIN LESION: Primary | ICD-10-CM

## 2023-02-06 PROCEDURE — 77412 RADIATION TX DELIVERY LVL 3: CPT | Performed by: RADIOLOGY

## 2023-02-06 PROCEDURE — 99203 OFFICE O/P NEW LOW 30 MIN: CPT | Mod: S$PBB,,, | Performed by: FAMILY MEDICINE

## 2023-02-06 PROCEDURE — 87252 VIRUS INOCULATION TISSUE: CPT | Performed by: FAMILY MEDICINE

## 2023-02-06 PROCEDURE — 77336 RADIATION PHYSICS CONSULT: CPT | Performed by: RADIOLOGY

## 2023-02-06 PROCEDURE — 86695 HERPES SIMPLEX TYPE 1 TEST: CPT | Performed by: FAMILY MEDICINE

## 2023-02-06 PROCEDURE — 36415 COLL VENOUS BLD VENIPUNCTURE: CPT | Performed by: FAMILY MEDICINE

## 2023-02-06 PROCEDURE — 99203 PR OFFICE/OUTPT VISIT, NEW, LEVL III, 30-44 MIN: ICD-10-PCS | Mod: S$PBB,,, | Performed by: FAMILY MEDICINE

## 2023-02-06 PROCEDURE — 99214 OFFICE O/P EST MOD 30 MIN: CPT | Mod: PBBFAC,25 | Performed by: FAMILY MEDICINE

## 2023-02-06 RX ORDER — MUPIROCIN 20 MG/G
OINTMENT TOPICAL 3 TIMES DAILY
Qty: 15 G | Refills: 1 | Status: SHIPPED | OUTPATIENT
Start: 2023-02-06 | End: 2023-02-13

## 2023-02-06 NOTE — PROGRESS NOTES
"Subjective:       Patient ID: Elina Ham is a 62 y.o. female.    Vitals:  height is 5' 6.93" (1.7 m) and weight is 93.8 kg (206 lb 12.7 oz). Her temperature is 98.1 °F (36.7 °C). Her blood pressure is 123/83 and her pulse is 85. Her respiration is 18 and oxygen saturation is 99%.     Chief Complaint: Recurrent Skin Infections (Boil in vaginal area x 3days)    Several days of ulceration left perineum, mildly tender.  Unsure if it has drained.  She has had furuncles in the past.  On chemo and radiation for breast CA. no fever, no other lesions. Currently has a single partner.    ROS    Objective:      Physical Exam   Constitutional: She appears well-developed.  Non-toxic appearance. She does not appear ill. No distress.   HENT:   Head: Atraumatic.   Abdominal: Soft. There is no abdominal tenderness.   Genitourinary:    Vulva normal.           Skin: Skin is warm, dry, not diaphoretic and no rash. Capillary refill takes less than 2 seconds.       Assessment:       1. Skin lesion            Plan:         Skin lesion  -     Herpes simplex virus culture [facility]; Skin  -     mupirocin (BACTROBAN) 2 % ointment; Apply topically 3 (three) times daily. for 7 days  Dispense: 15 g; Refill: 1  -     HSV 1 & 2, IgG         Had a lengthy discussion about potential etiologies of skin lesion.  This certainly could be HSV.  Obtained swab for HSV culture, but that usually takes some time to return.  Patient has a "romantic weekend" scheduled in the next several days. Therefore, secondary to its negative predictive value, will obtained HSV antibodies and notify if positive.  Regardless, I advised her to take precautions.  She has a provider follow-up next week.  We discussed the importance of having this area re-examined to confirm resolution.  She voiced understanding            "

## 2023-02-07 PROCEDURE — 77412 RADIATION TX DELIVERY LVL 3: CPT | Performed by: RADIOLOGY

## 2023-02-07 PROCEDURE — 77417 THER RADIOLOGY PORT IMAGE(S): CPT | Performed by: RADIOLOGY

## 2023-02-08 ENCOUNTER — TELEPHONE (OUTPATIENT)
Dept: URGENT CARE | Facility: CLINIC | Age: 63
End: 2023-02-08
Payer: COMMERCIAL

## 2023-02-08 LAB
HSV1 IGG SERPL QL IA: POSITIVE
HSV2 IGG SERPL QL IA: POSITIVE

## 2023-02-08 PROCEDURE — 77412 RADIATION TX DELIVERY LVL 3: CPT | Performed by: RADIOLOGY

## 2023-02-08 RX ORDER — VALACYCLOVIR HYDROCHLORIDE 1 G/1
1000 TABLET, FILM COATED ORAL 2 TIMES DAILY
Qty: 20 TABLET | Refills: 0 | Status: SHIPPED | OUTPATIENT
Start: 2023-02-08 | End: 2023-04-18

## 2023-02-08 NOTE — TELEPHONE ENCOUNTER
----- Message from SHEKHAR Finn sent at 2/8/2023  1:51 PM CST -----  Please gently advise this patient that her Herpes Simplex Antibody tests (blood draw) are POSITIVE for both HSV Types 1 & 2.  We do NOT know the results of her culture yet which is a confirmatory test...  As a PRECAUTION... we want her to start taking Valtrex today... as directed...  We will notify her if her culture is positive...

## 2023-02-09 LAB — AR CULTURE, HSV FINAL: NORMAL

## 2023-02-09 PROCEDURE — 77412 RADIATION TX DELIVERY LVL 3: CPT | Performed by: RADIOLOGY

## 2023-02-10 ENCOUNTER — TELEPHONE (OUTPATIENT)
Dept: URGENT CARE | Facility: CLINIC | Age: 63
End: 2023-02-10
Payer: COMMERCIAL

## 2023-02-10 PROCEDURE — 77412 RADIATION TX DELIVERY LVL 3: CPT | Performed by: RADIOLOGY

## 2023-02-10 NOTE — TELEPHONE ENCOUNTER
----- Message from Dominick Moy MD sent at 2/10/2023  9:35 AM CST -----  Please notify patient that HSV culture was negative.  I would still recommend she finish antivirals as prescribed, follow safe sex precautions as discussed during the office encounter, and keep upcoming PCP follow-up appointment.  Return to urgent care if need

## 2023-02-13 PROCEDURE — 77412 RADIATION TX DELIVERY LVL 3: CPT | Performed by: RADIOLOGY

## 2023-02-13 PROCEDURE — 77336 RADIATION PHYSICS CONSULT: CPT | Performed by: RADIOLOGY

## 2023-02-14 PROCEDURE — 77412 RADIATION TX DELIVERY LVL 3: CPT | Performed by: RADIOLOGY

## 2023-02-14 PROCEDURE — 77417 THER RADIOLOGY PORT IMAGE(S): CPT | Performed by: RADIOLOGY

## 2023-02-15 ENCOUNTER — INFUSION (OUTPATIENT)
Dept: INFUSION THERAPY | Facility: HOSPITAL | Age: 63
End: 2023-02-15
Attending: INTERNAL MEDICINE
Payer: COMMERCIAL

## 2023-02-15 VITALS
OXYGEN SATURATION: 100 % | RESPIRATION RATE: 16 BRPM | TEMPERATURE: 98 F | DIASTOLIC BLOOD PRESSURE: 82 MMHG | HEART RATE: 67 BPM | SYSTOLIC BLOOD PRESSURE: 132 MMHG

## 2023-02-15 DIAGNOSIS — C50.411 MALIGNANT NEOPLASM OF UPPER-OUTER QUADRANT OF RIGHT BREAST IN FEMALE, ESTROGEN RECEPTOR POSITIVE: Primary | ICD-10-CM

## 2023-02-15 DIAGNOSIS — Z17.0 MALIGNANT NEOPLASM OF UPPER-OUTER QUADRANT OF RIGHT BREAST IN FEMALE, ESTROGEN RECEPTOR POSITIVE: Primary | ICD-10-CM

## 2023-02-15 PROCEDURE — 77290 THER RAD SIMULAJ FIELD CPLX: CPT | Performed by: RADIOLOGY

## 2023-02-15 PROCEDURE — 25000003 PHARM REV CODE 250: Performed by: NURSE PRACTITIONER

## 2023-02-15 PROCEDURE — 96523 IRRIG DRUG DELIVERY DEVICE: CPT

## 2023-02-15 PROCEDURE — 63600175 PHARM REV CODE 636 W HCPCS: Performed by: NURSE PRACTITIONER

## 2023-02-15 PROCEDURE — 77334 RADIATION TREATMENT AID(S): CPT | Performed by: RADIOLOGY

## 2023-02-15 PROCEDURE — 77412 RADIATION TX DELIVERY LVL 3: CPT | Performed by: RADIOLOGY

## 2023-02-15 PROCEDURE — A4216 STERILE WATER/SALINE, 10 ML: HCPCS | Performed by: NURSE PRACTITIONER

## 2023-02-15 RX ORDER — SODIUM CHLORIDE 0.9 % (FLUSH) 0.9 %
10 SYRINGE (ML) INJECTION
Status: DISCONTINUED | OUTPATIENT
Start: 2023-02-15 | End: 2023-02-15 | Stop reason: HOSPADM

## 2023-02-15 RX ORDER — HEPARIN 100 UNIT/ML
500 SYRINGE INTRAVENOUS
Status: DISCONTINUED | OUTPATIENT
Start: 2023-02-15 | End: 2023-02-15 | Stop reason: HOSPADM

## 2023-02-15 RX ORDER — SODIUM CHLORIDE 0.9 % (FLUSH) 0.9 %
10 SYRINGE (ML) INJECTION
Status: CANCELLED | OUTPATIENT
Start: 2023-04-12

## 2023-02-15 RX ORDER — HEPARIN 100 UNIT/ML
500 SYRINGE INTRAVENOUS
Status: CANCELLED | OUTPATIENT
Start: 2023-04-12

## 2023-02-15 RX ADMIN — HEPARIN 500 UNITS: 100 SYRINGE at 03:02

## 2023-02-15 RX ADMIN — Medication 10 ML: at 03:02

## 2023-02-16 PROCEDURE — 77412 RADIATION TX DELIVERY LVL 3: CPT | Performed by: RADIOLOGY

## 2023-02-17 PROCEDURE — 77412 RADIATION TX DELIVERY LVL 3: CPT | Performed by: RADIOLOGY

## 2023-02-20 ENCOUNTER — TELEPHONE (OUTPATIENT)
Dept: URGENT CARE | Facility: CLINIC | Age: 63
End: 2023-02-20
Payer: COMMERCIAL

## 2023-02-20 PROCEDURE — 77412 RADIATION TX DELIVERY LVL 3: CPT | Performed by: RADIOLOGY

## 2023-02-20 NOTE — TELEPHONE ENCOUNTER
----- Message from Humaira Abdi sent at 2/20/2023 10:47 AM CST -----  Regarding: Patient has questions about her test reults  Please call patient.  Saw results on the portal and has questions.  [phone]

## 2023-02-21 ENCOUNTER — TELEPHONE (OUTPATIENT)
Dept: URGENT CARE | Facility: CLINIC | Age: 63
End: 2023-02-21
Payer: COMMERCIAL

## 2023-02-21 NOTE — TELEPHONE ENCOUNTER
----- Message from Humaira Abdi sent at 2/21/2023 10:26 AM CST -----  Regarding: needs results  Patient missed a call needs to know results, please call her back.  [phone]     Thank you!

## 2023-02-22 PROCEDURE — 77412 RADIATION TX DELIVERY LVL 3: CPT | Performed by: RADIOLOGY

## 2023-02-22 PROCEDURE — 77417 THER RADIOLOGY PORT IMAGE(S): CPT | Performed by: RADIOLOGY

## 2023-02-23 PROCEDURE — 77412 RADIATION TX DELIVERY LVL 3: CPT | Performed by: RADIOLOGY

## 2023-02-24 PROCEDURE — 77412 RADIATION TX DELIVERY LVL 3: CPT | Performed by: RADIOLOGY

## 2023-02-24 PROCEDURE — 77300 RADIATION THERAPY DOSE PLAN: CPT

## 2023-02-27 PROCEDURE — 77336 RADIATION PHYSICS CONSULT: CPT | Performed by: RADIOLOGY

## 2023-02-27 PROCEDURE — 77412 RADIATION TX DELIVERY LVL 3: CPT | Performed by: RADIOLOGY

## 2023-02-28 PROCEDURE — 77412 RADIATION TX DELIVERY LVL 3: CPT | Performed by: RADIOLOGY

## 2023-03-01 ENCOUNTER — OFFICE VISIT (OUTPATIENT)
Dept: RADIATION THERAPY | Facility: HOSPITAL | Age: 63
End: 2023-03-01
Attending: RADIOLOGY
Payer: COMMERCIAL

## 2023-03-01 PROCEDURE — 77412 RADIATION TX DELIVERY LVL 3: CPT | Performed by: RADIOLOGY

## 2023-03-01 PROCEDURE — 77280 THER RAD SIMULAJ FIELD SMPL: CPT | Performed by: RADIOLOGY

## 2023-03-02 PROCEDURE — 77412 RADIATION TX DELIVERY LVL 3: CPT | Performed by: RADIOLOGY

## 2023-03-03 PROCEDURE — 77412 RADIATION TX DELIVERY LVL 3: CPT | Performed by: RADIOLOGY

## 2023-03-06 PROCEDURE — 77412 RADIATION TX DELIVERY LVL 3: CPT | Performed by: RADIOLOGY

## 2023-03-06 NOTE — PROGRESS NOTES
Heme/Onc Progress Note    PATIENT: Elina Ham  MRN: [id number]  DATE: 3/7/2023  Chief Complaint: Last radiation is today and Does she need to keep her port? last port flushed bothered     Diagnosis: Bilateral breast cancer    Current Treatment: femara (3/8/23)    Oncology History   Malignant neoplasm of upper-outer quadrant of right breast in female, estrogen receptor positive   6/3/2022 Surgery    S/p bilateral simple mastectomy, bilateral sentinel lymph node biopsy, and TE placement - 6/3/2022      1/5/2023 - 3/7/2023 Radiation Therapy         3/8/2023 -  Hormone Therapy    femara     Malignant neoplasm of upper-outer quadrant of left breast in female, estrogen receptor negative   6/3/2022 Surgery    S/p bilateral simple mastectomy, bilateral sentinel lymph node biopsy, and TE placement - 6/3/2022      8/3/2022 - 12/21/2022 Chemotherapy    OP BREAST PACLITAXEL WEEKLY FOLLOWED BY DOSE DENSE DOXORUBICIN CYCLOPHOSPHAMIDE Q2W      Genetic Testing    Genetic testing negative      Pathology Significant Finding    Left breast:  Repeat marker:  ER 6%, MO 0.1%, HER2/rahul negative, Ki-67 68%     10/28/2022 Imaging Significant Findings    10/28/2022: CT cap:  Postsurgical changes, left axilla 1.9 x 1.6 nodule, right axilla 1.7 x 1.1 hypodense nodule, small bilateral axillary nodes, liver subcentimeter hypodensity in right lobe too small to characterize, 1.2 cm cyst in the tail the pancreas stable   Impression:  Stable, bilateral axillary some postoperative seromas indolent subcentimeter cyst unchanged dating back to 2016, pancreatic cyst continue to follow       12/27/2022 Imaging Significant Findings    Pulmonary emboli right lower lobar artery and segmental branches.     No evidence of metastatic disease to the chest abdomen or pelvis.     Cystic lesion in the pancreatic tail, stable from the prior exam, most likely a simple pancreatic cyst, pseudocyst, or IPMN.     Subcentimeter liver hypodensity likely a cyst,  stable..     Suspected chronic necrotic lymph node in the left axilla, smaller, right axillary lymph nodes, smaller in the interval.     Voicemail message was left for the ordering physician Dr. Oh at the time of the report.            03/07/2023  Patient presents for 6 week follow up. She will complete radiation today and will start Femara for her contralateral breast. She will be having bone density tomorrow.     Past Medical History:   Diagnosis Date    Cancer 3/21/2022    Diabetes mellitus     Hypercholesterolemia     Hypertension         Current Outpatient Medications:     amLODIPine (NORVASC) 10 MG tablet, Take 10 mg by mouth once daily., Disp: , Rfl:     apixaban (ELIQUIS) 5 mg Tab, Take 1 tablet (5 mg total) by mouth 2 (two) times daily., Disp: 60 tablet, Rfl: 3    loratadine (CLARITIN) 10 mg tablet, Take 10 mg by mouth once daily., Disp: , Rfl:     losartan-hydrochlorothiazide 100-25 mg (HYZAAR) 100-25 mg per tablet, Take 1 tablet by mouth once daily., Disp: , Rfl:     metFORMIN (GLUCOPHAGE) 1000 MG tablet, Take 1,000 mg by mouth daily with breakfast., Disp: , Rfl:     glipiZIDE (GLUCOTROL) 10 MG tablet, Take 10 mg by mouth 2 (two) times daily., Disp: , Rfl:     letrozole (FEMARA) 2.5 mg Tab, Take 1 tablet (2.5 mg total) by mouth once daily., Disp: 30 tablet, Rfl: 2    valACYclovir (VALTREX) 1000 MG tablet, Take 1 tablet (1,000 mg total) by mouth 2 (two) times daily. for 10 days, Disp: 20 tablet, Rfl: 0     Review of Systems:   Pertinent positives and negatives included in the HPI. Otherwise a complete review of   systems is negative.  Review of Systems       Objective:     Vitals:    03/07/23 0923   BP: 136/88   Pulse: 73   Temp: 97.8 °F (36.6 °C)         Physical Exam  Constitutional:       Appearance: Normal appearance.   HENT:      Head: Normocephalic and atraumatic.      Nose: Nose normal.      Mouth/Throat:      Mouth: Mucous membranes are moist.   Eyes:      Extraocular Movements: Extraocular  movements intact.      Conjunctiva/sclera: Conjunctivae normal.      Pupils: Pupils are equal, round, and reactive to light.   Cardiovascular:      Rate and Rhythm: Normal rate and regular rhythm.      Pulses: Normal pulses.   Pulmonary:      Effort: Pulmonary effort is normal.      Breath sounds: Normal breath sounds.   Chest:      Comments: B/L mastectomies. Expanders in place.   Abdominal:      General: Bowel sounds are normal.      Palpations: Abdomen is soft.   Musculoskeletal:      Cervical back: Normal range of motion and neck supple.   Skin:     General: Skin is warm and dry.   Neurological:      General: No focal deficit present.      Mental Status: She is alert and oriented to person, place, and time. Mental status is at baseline.   Psychiatric:         Mood and Affect: Mood normal.         Behavior: Behavior normal.       Assessment and Plan   Stage IIIA (pT2, pN1a, cM0, G3) triple negative left breast cancer  Stage IA (pT1b, pN0, cM0, G1) Er+ WA+ right breast cancer  Pulmonary emboli    PLAN:  Patient completed adjuvant chemotherapy on 12/21/22 followed by radiation.  B/L mastectomies so no need for mammograms  Start femara for her right breast cancer  Start calcium + Vitamin D  She has bone density scheduled tomorrow with her PCP  Repeat CT CAP 2 months after radiation  Will eventually get her to Dr. Queen for follow up IPMN  Continue eliquis 5mg BID util 6/2023  Sees Dr. Gibbs in July for continued reconstruction.   Continue port flush q8 weeks. Next 4/12/23. Will keep port until January 2024  Discussed the side effects of endocrine therapy to include worsening bone health, muscle and joint pains, mood changes.     Follow up in about 6 weeks (around 4/18/2023) for Labs and OV with Dr. Briones for scan results.      Orders Placed This Encounter    CT Chest Abdomen Pelvis With Contrast (xpd)    Creatinine, serum    CBC Auto Differential    Comprehensive Metabolic Panel    Vitamin D    letrozole (FEMARA)  2.5 mg Tab

## 2023-03-07 ENCOUNTER — TELEPHONE (OUTPATIENT)
Dept: HEMATOLOGY/ONCOLOGY | Facility: CLINIC | Age: 63
End: 2023-03-07
Payer: COMMERCIAL

## 2023-03-07 ENCOUNTER — OFFICE VISIT (OUTPATIENT)
Dept: HEMATOLOGY/ONCOLOGY | Facility: CLINIC | Age: 63
End: 2023-03-07
Payer: COMMERCIAL

## 2023-03-07 VITALS
WEIGHT: 205.5 LBS | TEMPERATURE: 98 F | DIASTOLIC BLOOD PRESSURE: 88 MMHG | HEIGHT: 66 IN | OXYGEN SATURATION: 98 % | SYSTOLIC BLOOD PRESSURE: 136 MMHG | HEART RATE: 73 BPM | BODY MASS INDEX: 33.03 KG/M2

## 2023-03-07 DIAGNOSIS — Z17.1 MALIGNANT NEOPLASM OF UPPER-OUTER QUADRANT OF LEFT BREAST IN FEMALE, ESTROGEN RECEPTOR NEGATIVE: Primary | ICD-10-CM

## 2023-03-07 DIAGNOSIS — C50.411 MALIGNANT NEOPLASM OF UPPER-OUTER QUADRANT OF RIGHT BREAST IN FEMALE, ESTROGEN RECEPTOR POSITIVE: ICD-10-CM

## 2023-03-07 DIAGNOSIS — C50.412 MALIGNANT NEOPLASM OF UPPER-OUTER QUADRANT OF LEFT BREAST IN FEMALE, ESTROGEN RECEPTOR NEGATIVE: Primary | ICD-10-CM

## 2023-03-07 DIAGNOSIS — Z17.0 MALIGNANT NEOPLASM OF UPPER-OUTER QUADRANT OF RIGHT BREAST IN FEMALE, ESTROGEN RECEPTOR POSITIVE: ICD-10-CM

## 2023-03-07 DIAGNOSIS — I26.99 ACUTE PULMONARY EMBOLISM, UNSPECIFIED PULMONARY EMBOLISM TYPE, UNSPECIFIED WHETHER ACUTE COR PULMONALE PRESENT: ICD-10-CM

## 2023-03-07 DIAGNOSIS — R59.0 LOCALIZED ENLARGED LYMPH NODES: ICD-10-CM

## 2023-03-07 DIAGNOSIS — Z95.828 PORT-A-CATH IN PLACE: ICD-10-CM

## 2023-03-07 PROCEDURE — 99999 PR PBB SHADOW E&M-EST. PATIENT-LVL IV: CPT | Mod: PBBFAC,,, | Performed by: NURSE PRACTITIONER

## 2023-03-07 PROCEDURE — 1159F MED LIST DOCD IN RCRD: CPT | Mod: CPTII,S$GLB,, | Performed by: NURSE PRACTITIONER

## 2023-03-07 PROCEDURE — 77336 RADIATION PHYSICS CONSULT: CPT | Performed by: RADIOLOGY

## 2023-03-07 PROCEDURE — 77412 RADIATION TX DELIVERY LVL 3: CPT | Performed by: RADIOLOGY

## 2023-03-07 PROCEDURE — 99214 PR OFFICE/OUTPT VISIT, EST, LEVL IV, 30-39 MIN: ICD-10-PCS | Mod: S$GLB,,, | Performed by: NURSE PRACTITIONER

## 2023-03-07 PROCEDURE — 99214 OFFICE O/P EST MOD 30 MIN: CPT | Mod: S$GLB,,, | Performed by: NURSE PRACTITIONER

## 2023-03-07 PROCEDURE — 3075F PR MOST RECENT SYSTOLIC BLOOD PRESS GE 130-139MM HG: ICD-10-PCS | Mod: CPTII,S$GLB,, | Performed by: NURSE PRACTITIONER

## 2023-03-07 PROCEDURE — 3079F DIAST BP 80-89 MM HG: CPT | Mod: CPTII,S$GLB,, | Performed by: NURSE PRACTITIONER

## 2023-03-07 PROCEDURE — 1160F PR REVIEW ALL MEDS BY PRESCRIBER/CLIN PHARMACIST DOCUMENTED: ICD-10-PCS | Mod: CPTII,S$GLB,, | Performed by: NURSE PRACTITIONER

## 2023-03-07 PROCEDURE — 1160F RVW MEDS BY RX/DR IN RCRD: CPT | Mod: CPTII,S$GLB,, | Performed by: NURSE PRACTITIONER

## 2023-03-07 PROCEDURE — 3079F PR MOST RECENT DIASTOLIC BLOOD PRESSURE 80-89 MM HG: ICD-10-PCS | Mod: CPTII,S$GLB,, | Performed by: NURSE PRACTITIONER

## 2023-03-07 PROCEDURE — 99999 PR PBB SHADOW E&M-EST. PATIENT-LVL IV: ICD-10-PCS | Mod: PBBFAC,,, | Performed by: NURSE PRACTITIONER

## 2023-03-07 PROCEDURE — 1159F PR MEDICATION LIST DOCUMENTED IN MEDICAL RECORD: ICD-10-PCS | Mod: CPTII,S$GLB,, | Performed by: NURSE PRACTITIONER

## 2023-03-07 PROCEDURE — 3008F BODY MASS INDEX DOCD: CPT | Mod: CPTII,S$GLB,, | Performed by: NURSE PRACTITIONER

## 2023-03-07 PROCEDURE — 3075F SYST BP GE 130 - 139MM HG: CPT | Mod: CPTII,S$GLB,, | Performed by: NURSE PRACTITIONER

## 2023-03-07 PROCEDURE — 3008F PR BODY MASS INDEX (BMI) DOCUMENTED: ICD-10-PCS | Mod: CPTII,S$GLB,, | Performed by: NURSE PRACTITIONER

## 2023-03-07 RX ORDER — LETROZOLE 2.5 MG/1
2.5 TABLET, FILM COATED ORAL DAILY
Qty: 30 TABLET | Refills: 2 | Status: SHIPPED | OUTPATIENT
Start: 2023-03-07 | End: 2023-06-15

## 2023-03-07 RX ORDER — GLIPIZIDE 10 MG/1
10 TABLET ORAL 2 TIMES DAILY
COMMUNITY
Start: 2023-03-06

## 2023-03-08 ENCOUNTER — HOSPITAL ENCOUNTER (OUTPATIENT)
Dept: RADIOLOGY | Facility: HOSPITAL | Age: 63
Discharge: HOME | End: 2023-03-08
Attending: NURSE PRACTITIONER
Payer: COMMERCIAL

## 2023-03-08 DIAGNOSIS — Z78.0 POSTMENOPAUSAL: ICD-10-CM

## 2023-03-08 PROCEDURE — 77080 DXA BONE DENSITY AXIAL: CPT | Mod: 26,,, | Performed by: STUDENT IN AN ORGANIZED HEALTH CARE EDUCATION/TRAINING PROGRAM

## 2023-03-08 PROCEDURE — 77080 DXA BONE DENSITY AXIAL: CPT | Mod: TC

## 2023-03-08 PROCEDURE — 77080 DXA BONE DENSITY AXIAL SKELETON 1 OR MORE SITES: ICD-10-PCS | Mod: 26,,, | Performed by: STUDENT IN AN ORGANIZED HEALTH CARE EDUCATION/TRAINING PROGRAM

## 2023-03-22 ENCOUNTER — HOSPITAL ENCOUNTER (EMERGENCY)
Facility: HOSPITAL | Age: 63
Discharge: HOME | End: 2023-03-23
Attending: EMERGENCY MEDICINE
Payer: COMMERCIAL

## 2023-03-22 DIAGNOSIS — N95.0 POST-MENOPAUSAL BLEEDING: Primary | ICD-10-CM

## 2023-03-22 LAB
ALBUMIN SERPL-MCNC: 4.2 G/DL (ref 3.4–4.8)
ALBUMIN/GLOB SERPL: 1.3 RATIO (ref 1.1–2)
ALP SERPL-CCNC: 106 UNIT/L (ref 40–150)
ALT SERPL-CCNC: 20 UNIT/L (ref 0–55)
AST SERPL-CCNC: 18 UNIT/L (ref 5–34)
BASOPHILS # BLD AUTO: 0.04 X10(3)/MCL (ref 0–0.2)
BASOPHILS NFR BLD AUTO: 0.6 %
BILIRUBIN DIRECT+TOT PNL SERPL-MCNC: 0.4 MG/DL
BUN SERPL-MCNC: 13.7 MG/DL (ref 9.8–20.1)
CALCIUM SERPL-MCNC: 9.9 MG/DL (ref 8.4–10.2)
CHLORIDE SERPL-SCNC: 105 MMOL/L (ref 98–107)
CO2 SERPL-SCNC: 26 MMOL/L (ref 23–31)
CREAT SERPL-MCNC: 0.91 MG/DL (ref 0.55–1.02)
EOSINOPHIL # BLD AUTO: 0.04 X10(3)/MCL (ref 0–0.9)
EOSINOPHIL NFR BLD AUTO: 0.6 %
ERYTHROCYTE [DISTWIDTH] IN BLOOD BY AUTOMATED COUNT: 12.4 % (ref 11.5–17)
GFR SERPLBLD CREATININE-BSD FMLA CKD-EPI: >60 MLS/MIN/1.73/M2
GLOBULIN SER-MCNC: 3.3 GM/DL (ref 2.4–3.5)
GLUCOSE SERPL-MCNC: 145 MG/DL (ref 82–115)
HCT VFR BLD AUTO: 35.7 % (ref 37–47)
HGB BLD-MCNC: 11.9 G/DL (ref 12–16)
IMM GRANULOCYTES # BLD AUTO: 0.01 X10(3)/MCL (ref 0–0.04)
IMM GRANULOCYTES NFR BLD AUTO: 0.2 %
INR BLD: 1.02 (ref 0–1.3)
LYMPHOCYTES # BLD AUTO: 0.74 X10(3)/MCL (ref 0.6–4.6)
LYMPHOCYTES NFR BLD AUTO: 12 %
MCH RBC QN AUTO: 30.7 PG
MCHC RBC AUTO-ENTMCNC: 33.3 G/DL (ref 33–36)
MCV RBC AUTO: 92.2 FL (ref 80–94)
MONOCYTES # BLD AUTO: 0.42 X10(3)/MCL (ref 0.1–1.3)
MONOCYTES NFR BLD AUTO: 6.8 %
NEUTROPHILS # BLD AUTO: 4.93 X10(3)/MCL (ref 2.1–9.2)
NEUTROPHILS NFR BLD AUTO: 79.8 %
NRBC BLD AUTO-RTO: 0 %
PLATELET # BLD AUTO: 343 X10(3)/MCL (ref 130–400)
PMV BLD AUTO: 9.3 FL (ref 7.4–10.4)
POTASSIUM SERPL-SCNC: 3.5 MMOL/L (ref 3.5–5.1)
PROT SERPL-MCNC: 7.5 GM/DL (ref 5.8–7.6)
PROTHROMBIN TIME: 13.3 SECONDS (ref 12.5–14.5)
RBC # BLD AUTO: 3.87 X10(6)/MCL (ref 4.2–5.4)
SODIUM SERPL-SCNC: 144 MMOL/L (ref 136–145)
WBC # SPEC AUTO: 6.2 X10(3)/MCL (ref 4.5–11.5)

## 2023-03-22 PROCEDURE — 85610 PROTHROMBIN TIME: CPT | Performed by: NURSE PRACTITIONER

## 2023-03-22 PROCEDURE — 99283 EMERGENCY DEPT VISIT LOW MDM: CPT

## 2023-03-22 PROCEDURE — 80053 COMPREHEN METABOLIC PANEL: CPT | Performed by: NURSE PRACTITIONER

## 2023-03-22 PROCEDURE — 85025 COMPLETE CBC W/AUTO DIFF WBC: CPT | Performed by: NURSE PRACTITIONER

## 2023-03-23 ENCOUNTER — PATIENT MESSAGE (OUTPATIENT)
Dept: HEMATOLOGY/ONCOLOGY | Facility: CLINIC | Age: 63
End: 2023-03-23
Payer: COMMERCIAL

## 2023-03-23 VITALS
OXYGEN SATURATION: 97 % | DIASTOLIC BLOOD PRESSURE: 84 MMHG | HEART RATE: 63 BPM | BODY MASS INDEX: 34.09 KG/M2 | SYSTOLIC BLOOD PRESSURE: 181 MMHG | TEMPERATURE: 97 F | RESPIRATION RATE: 20 BRPM | WEIGHT: 211.19 LBS

## 2023-03-23 LAB
APPEARANCE UR: CLEAR
BACTERIA #/AREA URNS AUTO: ABNORMAL /HPF
BILIRUB UR QL STRIP.AUTO: NEGATIVE MG/DL
COLOR UR AUTO: YELLOW
GLUCOSE UR QL STRIP.AUTO: NEGATIVE MG/DL
KETONES UR QL STRIP.AUTO: NEGATIVE MG/DL
LEUKOCYTE ESTERASE UR QL STRIP.AUTO: ABNORMAL UNIT/L
NITRITE UR QL STRIP.AUTO: NEGATIVE
PH UR STRIP.AUTO: 7 [PH]
PROT UR QL STRIP.AUTO: NEGATIVE MG/DL
RBC #/AREA URNS AUTO: 8 /HPF
RBC UR QL AUTO: ABNORMAL UNIT/L
SP GR UR STRIP.AUTO: 1.02 (ref 1–1.03)
SQUAMOUS #/AREA URNS AUTO: <5 /HPF
UROBILINOGEN UR STRIP-ACNC: 1 MG/DL
WBC #/AREA URNS AUTO: <5 /HPF

## 2023-03-23 PROCEDURE — 81001 URINALYSIS AUTO W/SCOPE: CPT | Performed by: NURSE PRACTITIONER

## 2023-03-23 NOTE — DISCHARGE INSTRUCTIONS
Talk to your oncologist.  This may be a side effect of your new medications.  You may need to follow up with a gynecologist to consider an endometrial biopsy.  If her bleeding becomes heavier than cycle stop taking your Eliquis and return to emergency department.  If you feel lightheaded short of breath or feel worse at all please return to the emergency department

## 2023-03-23 NOTE — ED PROVIDER NOTES
Encounter Date: 3/22/2023    SCRIBE #1 NOTE: I, Panfilolizzie Puente, am scribing for, and in the presence of,  Florentino Wakefield MD. I have scribed the entire note.     History     Chief Complaint   Patient presents with    Vaginal Bleeding     Pt arrives c/o vaginal bleeding onset today. Hx bilateral breast cancer on oral chemo and radiation (most recent 3/7). On Eliquis. Pt reports spotting onset at approx 1600, stopped, now more upon wiping in bathroom. Denies abd pain, fever, dysuria. Sees Dr. Bravo and Dr. Contreras.      63 year old female with a hx of breast cancer, HTN, and DM presents to the ED for vaginal bleeding. Pt states she began spotting at around 1600 on 3/22/23. Pt's spotting stopped, but she still notices blood when she wipes. Pt recently finished chemotherapy and radiation. Pt is currently on Eliquis. Pt denies any vaginal pain or dysuria.  Recently started on letrozole    The history is provided by the patient. No  was used.   Vaginal Bleeding  This is a new problem. The current episode started yesterday. The problem occurs hourly. The problem has been gradually improving. Pertinent negatives include no chest pain, no abdominal pain, no headaches and no shortness of breath. Nothing aggravates the symptoms. Nothing relieves the symptoms. She has tried nothing for the symptoms. The treatment provided no relief.   Review of patient's allergies indicates:  No Known Allergies  Past Medical History:   Diagnosis Date    Cancer 3/21/2022    Diabetes mellitus     Hypercholesterolemia     Hypertension      Past Surgical History:   Procedure Laterality Date    BILATERAL MASTECTOMY Bilateral 6/3/2022    Procedure: MASTECTOMY, BILATERAL;  Surgeon: Charlene Contreras MD;  Location: [facility];  Service: General;  Laterality: Bilateral;     SECTION  92 and 3/15/95    COLONOSCOPY      INSERTION OF BREAST TISSUE EXPANDER Bilateral 6/3/2022    Procedure: INSERTION, TISSUE EXPANDER, BREAST  (Bilateral breast reconstruction with tissue expanders and acellular dermal matrix and use of intraoperative fluoroscein angiogragry);  Surgeon: Nick Gibbs MD;  Location: [facility] OR;  Service: Plastics;  Laterality: Bilateral;    MEDIPORT INSERTION, SINGLE      SENTINEL LYMPH NODE BIOPSY Bilateral 6/3/2022    Procedure: BIOPSY, LYMPH NODE, SENTINEL;  Surgeon: Charlene Contreras MD;  Location: [facility] OR;  Service: General;  Laterality: Bilateral;  MAGTRACE Injection in office  Need SentiMag Probe     Family History   Problem Relation Age of Onset    Breast cancer Maternal Grandmother             Breast cancer Sister     Breast cancer Sister         remission    Breast cancer Maternal Aunt             Breast cancer Maternal Aunt             Breast cancer Maternal Aunt             Breast cancer Maternal Aunt     Breast cancer Maternal Uncle             Stroke Brother             Pancreatic cancer Brother      Social History     Tobacco Use    Smoking status: Never    Smokeless tobacco: Never   Substance Use Topics    Alcohol use: Not Currently     Alcohol/week: 1.0 standard drink     Types: 1 Glasses of wine per week     Comment: drink socially but haven't had a glass of wine since     Drug use: Never     Review of Systems   Constitutional:  Negative for chills and fever.   Respiratory:  Negative for cough and shortness of breath.    Cardiovascular:  Negative for chest pain.   Gastrointestinal:  Negative for abdominal pain, nausea and vomiting.   Genitourinary:  Positive for vaginal bleeding.   Musculoskeletal:  Negative for myalgias.   Neurological:  Negative for syncope and headaches.   All other systems reviewed and are negative.    Physical Exam     Initial Vitals [23 2130]   BP Pulse Resp Temp SpO2   (!) 162/94 (!) 113 18 97 °F (36.1 °C) 97 %      MAP       --         Physical Exam    Nursing note and vitals reviewed.  Constitutional: She appears well-developed and  well-nourished. No distress.   HENT:   Head: Normocephalic and atraumatic.   Eyes: Conjunctivae are normal.   Cardiovascular:  Normal rate and intact distal pulses.           Pulmonary/Chest: No respiratory distress. She has no rhonchi.   Abdominal: Abdomen is soft. Bowel sounds are normal. There is no abdominal tenderness. There is no rebound and no guarding.   Musculoskeletal:         General: No edema.     Neurological: She is alert. She has normal strength.   Skin: Skin is warm and dry.   Psychiatric: She has a normal mood and affect.       ED Course   Procedures  Labs Reviewed   COMPREHENSIVE METABOLIC PANEL - Abnormal; Notable for the following components:       Result Value    Glucose Level 145 (*)     All other components within normal limits   URINALYSIS, REFLEX TO URINE CULTURE - Abnormal; Notable for the following components:    Blood, UA Trace (*)     Leukocyte Esterase, UA 1+ (*)     All other components within normal limits   CBC WITH DIFFERENTIAL - Abnormal; Notable for the following components:    RBC 3.87 (*)     Hgb 11.9 (*)     Hct 35.7 (*)     All other components within normal limits   URINALYSIS, MICROSCOPIC - Abnormal; Notable for the following components:    RBC, UA 8 (*)     All other components within normal limits   PROTIME-INR - Normal   CBC W/ AUTO DIFFERENTIAL    Narrative:     The following orders were created for panel order CBC auto differential.  Procedure                               Abnormality         Status                     ---------                               -----------         ------                     CBC with Differential[[id number]]        Abnormal            Final result                 Please view results for these tests on the individual orders.          Imaging Results    None          Medications - No data to display  Medical Decision Making  No evidence of UTI.  No anemia on labs.  Abdomen is soft no tenderness.  Differential includes medication side effect,  postmenopausal bleeding possibly endometrial cancer.  Discussed that I recommend her follow up with her hematology/oncology physician see if it might be a side effect of her treatment.  If bleeding develops heavier than cycle I recommend she hold the Eliquis and if she feels short of breath lightheaded chest pain or worse at all or has worsened bleeding to please return for further evaluation.  I discussed with her that it was not felt to be a side effect of her new medication then she will need follow up with a gynecologist and may need an endometrial biopsy    Amount and/or Complexity of Data Reviewed  Labs: ordered.    Risk  Decision regarding hospitalization.                 Scribe Attestation:   Scribe #1: I performed the above scribed service and the documentation accurately describes the services I performed. I attest to the accuracy of the note.    Attending Attestation:           Physician Attestation for Scribe:  Physician Attestation Statement for Scribe #1: I, Florentino Wakefield MD, reviewed documentation, as scribed by Raúl Puente in my presence, and it is both accurate and complete.                        Clinical Impression:   Final diagnoses:  [N95.0] Post-menopausal bleeding (Primary)        ED Disposition Condition    Discharge Stable          ED Prescriptions    None       Follow-up Information       Follow up With Specialties Details Why Contact Info    Sharri Bass NP Nurse Practitioner Schedule an appointment as soon as possible for a visit   [address] [facility] [address]  [phone]      Gato Briones MD Oncology, Hematology and Oncology Schedule an appointment as soon as possible for a visit   [address]  [facility] [address]  [phone]               Florentino Wakefield MD  03/23/23 0426

## 2023-03-23 NOTE — ED NOTES
Pt. Arrives in 30 ambulatory. C/O of vaginal bleeding that started today. Pt. States that she noticed small amount of blood when wiping. Pt. hx of breast cancer, currently receiving oral chemo and radiation. Pt. Denies pain or discomfort. Pt. Currently taking eliquis.Pt. placed on monitor. MD at bedside. LIBAN.

## 2023-03-28 ENCOUNTER — PATIENT MESSAGE (OUTPATIENT)
Dept: RESEARCH | Facility: HOSPITAL | Age: 63
End: 2023-03-28
Payer: COMMERCIAL

## 2023-03-28 ENCOUNTER — TELEPHONE (OUTPATIENT)
Dept: HEMATOLOGY/ONCOLOGY | Facility: CLINIC | Age: 63
End: 2023-03-28
Payer: COMMERCIAL

## 2023-04-11 ENCOUNTER — PATIENT MESSAGE (OUTPATIENT)
Dept: RESEARCH | Facility: HOSPITAL | Age: 63
End: 2023-04-11
Payer: COMMERCIAL

## 2023-04-11 NOTE — PROGRESS NOTES
Heme/Onc Progress Note    PATIENT: Elina Ham  MRN: [id number]  DATE: 4/18/2023  Chief Complaint: Follow-up (6 week f/u with lab results)    Diagnosis: Bilateral breast cancer    Current Treatment: femara (3/8/23)    Oncology History   Malignant neoplasm of upper-outer quadrant of right breast in female, estrogen receptor positive   6/3/2022 Surgery    S/p bilateral simple mastectomy, bilateral sentinel lymph node biopsy, and TE placement - 6/3/2022      1/5/2023 - 3/7/2023 Radiation Therapy         3/8/2023 -  Hormone Therapy    femara     Malignant neoplasm of upper-outer quadrant of left breast in female, estrogen receptor negative   6/3/2022 Surgery    S/p bilateral simple mastectomy, bilateral sentinel lymph node biopsy, and TE placement - 6/3/2022      8/3/2022 - 12/21/2022 Chemotherapy    OP BREAST PACLITAXEL WEEKLY FOLLOWED BY DOSE DENSE DOXORUBICIN CYCLOPHOSPHAMIDE Q2W      Genetic Testing    Genetic testing negative      Pathology Significant Finding    Left breast:  Repeat marker:  ER 6%, NH 0.1%, HER2/rahul negative, Ki-67 68%     10/28/2022 Imaging Significant Findings    10/28/2022: CT cap:  Postsurgical changes, left axilla 1.9 x 1.6 nodule, right axilla 1.7 x 1.1 hypodense nodule, small bilateral axillary nodes, liver subcentimeter hypodensity in right lobe too small to characterize, 1.2 cm cyst in the tail the pancreas stable   Impression:  Stable, bilateral axillary some postoperative seromas indolent subcentimeter cyst unchanged dating back to 2016, pancreatic cyst continue to follow       12/27/2022 Imaging Significant Findings    Pulmonary emboli right lower lobar artery and segmental branches.     No evidence of metastatic disease to the chest abdomen or pelvis.     Cystic lesion in the pancreatic tail, stable from the prior exam, most likely a simple pancreatic cyst, pseudocyst, or IPMN.     Subcentimeter liver hypodensity likely a cyst, stable..     Suspected chronic necrotic lymph  node in the left axilla, smaller, right axillary lymph nodes, smaller in the interval.     Voicemail message was left for the ordering physician Dr. Oh at the time of the report.            04/18/2023  Patient presents for 6 week follow up. She had some vaginal bleeding and was seen at ER. She was told it was normal for her post menopausal.   She will follow up with gyn provider. She reports being compliant with calcium vitamin d. Vitamin d level low.   Past Medical History:   Diagnosis Date    Cancer 3/21/2022    Diabetes mellitus     Hypercholesterolemia     Hypertension         Current Outpatient Medications:     amLODIPine (NORVASC) 10 MG tablet, Take 10 mg by mouth once daily., Disp: , Rfl:     apixaban (ELIQUIS) 5 mg Tab, Take 1 tablet (5 mg total) by mouth 2 (two) times daily., Disp: 60 tablet, Rfl: 3    aspirin (ECOTRIN) 81 MG EC tablet, Take 81 mg by mouth., Disp: , Rfl:     atorvastatin (LIPITOR) 20 MG tablet, 1 tablet Orally Once a day BEDTIME, Disp: , Rfl:     glipiZIDE (GLUCOTROL) 10 MG tablet, Take 10 mg by mouth 2 (two) times daily., Disp: , Rfl:     letrozole (FEMARA) 2.5 mg Tab, Take 1 tablet (2.5 mg total) by mouth once daily., Disp: 30 tablet, Rfl: 2    loratadine (CLARITIN) 10 mg tablet, Take 10 mg by mouth once daily., Disp: , Rfl:     losartan-hydrochlorothiazide 100-25 mg (HYZAAR) 100-25 mg per tablet, Take 1 tablet by mouth once daily., Disp: , Rfl:     metFORMIN (GLUCOPHAGE) 1000 MG tablet, Take 1,000 mg by mouth daily with breakfast., Disp: , Rfl:   No current facility-administered medications for this visit.    Facility-Administered Medications Ordered in Other Visits:     heparin, porcine (PF) 100 unit/mL injection flush 500 Units, 500 Units, Intravenous, PRN, Sujatha L Adriano, FNP    sodium chloride 0.9% flush 10 mL, 10 mL, Intravenous, PRN, Sujatha L Adriano, FNP     Review of Systems:   Pertinent positives and negatives included in the HPI. Otherwise a complete review of   systems is  negative.  Review of Systems       Objective:     Vitals:    04/18/23 1130   BP: 134/86   Pulse: 78   Temp: 98 °F (36.7 °C)           Physical Exam  Constitutional:       Appearance: Normal appearance.   HENT:      Head: Normocephalic and atraumatic.      Nose: Nose normal.      Mouth/Throat:      Mouth: Mucous membranes are moist.   Eyes:      Extraocular Movements: Extraocular movements intact.      Conjunctiva/sclera: Conjunctivae normal.      Pupils: Pupils are equal, round, and reactive to light.   Cardiovascular:      Rate and Rhythm: Normal rate and regular rhythm.      Pulses: Normal pulses.   Pulmonary:      Effort: Pulmonary effort is normal.      Breath sounds: Normal breath sounds.   Chest:      Comments: B/L mastectomies. Expanders in place.   Abdominal:      General: Bowel sounds are normal.      Palpations: Abdomen is soft.   Musculoskeletal:      Cervical back: Normal range of motion and neck supple.   Skin:     General: Skin is warm and dry.   Neurological:      General: No focal deficit present.      Mental Status: She is alert and oriented to person, place, and time. Mental status is at baseline.   Psychiatric:         Mood and Affect: Mood normal.         Behavior: Behavior normal.       Assessment and Plan   Stage IIIA (pT2, pN1a, cM0, G3) triple negative left breast cancer  Stage IA (pT1b, pN0, cM0, G1) Er+ [place]+ right breast cancer  Pulmonary emboli  Pancreatic cyst:  Initially mentioned on October 18, 2022 scan as a 1.2 cm cyst in the tail of the pancreas,                       Repeat imaging in December of 2022 showed a stable size of the cyst.                           And scan again in March of 2023 shows this 13 mm cystic lesion  I do think it was possibly present on the August 22, 2022 scan, but I can not see it on the July 2016 scan but will defer to Gastroenterology      Patient completed adjuvant chemotherapy on 12/21/22 followed by radiation.  B/L mastectomies so no need for  mammograms  PLAND   Conitnue femara for her right breast cancer, at least 5 years   calcium + Vitamin D- increase to 2 daily  Last bone density 3/8/23 with her PCP--normal repeat 3/2025  Refer to Dr. Queen for follow up IPMN vs pancreatic cyst  Continue eliquis 5mg BID util 6/2023--then discuss 2.5mg po bid after surgery  Sees Dr. Gibbs in July for continued reconstruction.   Continue port flush q8 weeks. Next 06/07/23. Will keep port until January 2024  Give instructions for eliquis for upcoming procedure with .   Labs: cbc,cmp with next visit         Follow up in about 2 months (around 6/18/2023) for with Dr. Briones with lab results.      Orders Placed This Encounter    CBC Auto Differential    Comprehensive Metabolic Panel    Ambulatory referral/consult to Gastroenterology       Gato Briones MD      05/09/2023: Addendum, patient is scheduled for an endoscopic ultrasound on 06/26/2023.  By Dr. Jeromy Queen  At that point in time I think it would be safe for the patient to hold her Eliquis for 2-3 days prior to the procedure.  She can resume her medication after there procedure.  We are scheduled to see her in June as well prior to that procedure, any change in recommendations could be made at that visit.

## 2023-04-12 ENCOUNTER — INFUSION (OUTPATIENT)
Dept: INFUSION THERAPY | Facility: HOSPITAL | Age: 63
End: 2023-04-12
Attending: INTERNAL MEDICINE
Payer: COMMERCIAL

## 2023-04-12 ENCOUNTER — LAB VISIT (OUTPATIENT)
Dept: LAB | Facility: HOSPITAL | Age: 63
End: 2023-04-12
Attending: INTERNAL MEDICINE
Payer: COMMERCIAL

## 2023-04-12 VITALS — HEART RATE: 99 BPM | TEMPERATURE: 98 F | DIASTOLIC BLOOD PRESSURE: 82 MMHG | SYSTOLIC BLOOD PRESSURE: 138 MMHG

## 2023-04-12 DIAGNOSIS — Z17.0 MALIGNANT NEOPLASM OF UPPER-OUTER QUADRANT OF RIGHT BREAST IN FEMALE, ESTROGEN RECEPTOR POSITIVE: ICD-10-CM

## 2023-04-12 DIAGNOSIS — Z17.0 MALIGNANT NEOPLASM OF UPPER-OUTER QUADRANT OF RIGHT BREAST IN FEMALE, ESTROGEN RECEPTOR POSITIVE: Primary | ICD-10-CM

## 2023-04-12 DIAGNOSIS — C50.411 MALIGNANT NEOPLASM OF UPPER-OUTER QUADRANT OF RIGHT BREAST IN FEMALE, ESTROGEN RECEPTOR POSITIVE: ICD-10-CM

## 2023-04-12 DIAGNOSIS — Z17.1 MALIGNANT NEOPLASM OF UPPER-OUTER QUADRANT OF LEFT BREAST IN FEMALE, ESTROGEN RECEPTOR NEGATIVE: Primary | ICD-10-CM

## 2023-04-12 DIAGNOSIS — C50.412 MALIGNANT NEOPLASM OF UPPER-OUTER QUADRANT OF LEFT BREAST IN FEMALE, ESTROGEN RECEPTOR NEGATIVE: Primary | ICD-10-CM

## 2023-04-12 DIAGNOSIS — C50.411 MALIGNANT NEOPLASM OF UPPER-OUTER QUADRANT OF RIGHT BREAST IN FEMALE, ESTROGEN RECEPTOR POSITIVE: Primary | ICD-10-CM

## 2023-04-12 LAB
ALBUMIN SERPL-MCNC: 4.2 G/DL (ref 3.4–4.8)
ALBUMIN/GLOB SERPL: 1.4 RATIO (ref 1.1–2)
ALP SERPL-CCNC: 89 UNIT/L (ref 40–150)
ALT SERPL-CCNC: 20 UNIT/L (ref 0–55)
AST SERPL-CCNC: 18 UNIT/L (ref 5–34)
BASOPHILS # BLD AUTO: 0.02 X10(3)/MCL (ref 0–0.2)
BASOPHILS NFR BLD AUTO: 0.3 %
BILIRUBIN DIRECT+TOT PNL SERPL-MCNC: 0.4 MG/DL
BUN SERPL-MCNC: 16.4 MG/DL (ref 9.8–20.1)
CALCIUM SERPL-MCNC: 10.1 MG/DL (ref 8.4–10.2)
CHLORIDE SERPL-SCNC: 104 MMOL/L (ref 98–107)
CO2 SERPL-SCNC: 25 MMOL/L (ref 23–31)
CREAT SERPL-MCNC: 1.16 MG/DL (ref 0.55–1.02)
DEPRECATED CALCIDIOL+CALCIFEROL SERPL-MC: 24.4 NG/ML (ref 30–80)
EOSINOPHIL # BLD AUTO: 0.05 X10(3)/MCL (ref 0–0.9)
EOSINOPHIL NFR BLD AUTO: 0.6 %
ERYTHROCYTE [DISTWIDTH] IN BLOOD BY AUTOMATED COUNT: 12.1 % (ref 11.5–17)
GFR SERPLBLD CREATININE-BSD FMLA CKD-EPI: 54 MLS/MIN/1.73/M2
GLOBULIN SER-MCNC: 3 GM/DL (ref 2.4–3.5)
GLUCOSE SERPL-MCNC: 113 MG/DL (ref 82–115)
HCT VFR BLD AUTO: 36.8 % (ref 37–47)
HGB BLD-MCNC: 12 G/DL (ref 12–16)
IMM GRANULOCYTES # BLD AUTO: 0.01 X10(3)/MCL (ref 0–0.04)
IMM GRANULOCYTES NFR BLD AUTO: 0.1 %
LYMPHOCYTES # BLD AUTO: 1.01 X10(3)/MCL (ref 0.6–4.6)
LYMPHOCYTES NFR BLD AUTO: 12.9 %
MCH RBC QN AUTO: 29.8 PG (ref 27–31)
MCHC RBC AUTO-ENTMCNC: 32.6 G/DL (ref 33–36)
MCV RBC AUTO: 91.3 FL (ref 80–94)
MONOCYTES # BLD AUTO: 0.64 X10(3)/MCL (ref 0.1–1.3)
MONOCYTES NFR BLD AUTO: 8.2 %
NEUTROPHILS # BLD AUTO: 6.07 X10(3)/MCL (ref 2.1–9.2)
NEUTROPHILS NFR BLD AUTO: 77.9 %
PLATELET # BLD AUTO: 384 X10(3)/MCL (ref 130–400)
PMV BLD AUTO: 8.9 FL (ref 7.4–10.4)
POTASSIUM SERPL-SCNC: 3.3 MMOL/L (ref 3.5–5.1)
PROT SERPL-MCNC: 7.2 GM/DL (ref 5.8–7.6)
RBC # BLD AUTO: 4.03 X10(6)/MCL (ref 4.2–5.4)
SODIUM SERPL-SCNC: 141 MMOL/L (ref 136–145)
WBC # SPEC AUTO: 7.8 X10(3)/MCL (ref 4.5–11.5)

## 2023-04-12 PROCEDURE — 36415 COLL VENOUS BLD VENIPUNCTURE: CPT

## 2023-04-12 PROCEDURE — 80053 COMPREHEN METABOLIC PANEL: CPT

## 2023-04-12 PROCEDURE — 96523 IRRIG DRUG DELIVERY DEVICE: CPT

## 2023-04-12 PROCEDURE — 25000003 PHARM REV CODE 250: Performed by: NURSE PRACTITIONER

## 2023-04-12 PROCEDURE — 85025 COMPLETE CBC W/AUTO DIFF WBC: CPT

## 2023-04-12 PROCEDURE — 63600175 PHARM REV CODE 636 W HCPCS: Performed by: NURSE PRACTITIONER

## 2023-04-12 PROCEDURE — A4216 STERILE WATER/SALINE, 10 ML: HCPCS | Performed by: NURSE PRACTITIONER

## 2023-04-12 PROCEDURE — 82306 VITAMIN D 25 HYDROXY: CPT

## 2023-04-12 RX ORDER — SODIUM CHLORIDE 0.9 % (FLUSH) 0.9 %
10 SYRINGE (ML) INJECTION
Status: DISCONTINUED | OUTPATIENT
Start: 2023-04-12 | End: 2023-04-12 | Stop reason: HOSPADM

## 2023-04-12 RX ORDER — HEPARIN 100 UNIT/ML
500 SYRINGE INTRAVENOUS
Status: CANCELLED | OUTPATIENT
Start: 2023-06-07

## 2023-04-12 RX ORDER — SODIUM CHLORIDE 0.9 % (FLUSH) 0.9 %
10 SYRINGE (ML) INJECTION
Status: DISCONTINUED | OUTPATIENT
Start: 2023-04-12 | End: 2024-03-11

## 2023-04-12 RX ORDER — HEPARIN 100 UNIT/ML
500 SYRINGE INTRAVENOUS
Status: DISCONTINUED | OUTPATIENT
Start: 2023-04-12 | End: 2024-03-11

## 2023-04-12 RX ORDER — SODIUM CHLORIDE 0.9 % (FLUSH) 0.9 %
10 SYRINGE (ML) INJECTION
Status: CANCELLED | OUTPATIENT
Start: 2023-06-07

## 2023-04-12 RX ORDER — HEPARIN 100 UNIT/ML
500 SYRINGE INTRAVENOUS
Status: DISCONTINUED | OUTPATIENT
Start: 2023-04-12 | End: 2023-04-12 | Stop reason: HOSPADM

## 2023-04-12 RX ADMIN — Medication 10 ML: at 03:04

## 2023-04-12 RX ADMIN — HEPARIN 500 UNITS: 100 SYRINGE at 03:04

## 2023-04-18 ENCOUNTER — OFFICE VISIT (OUTPATIENT)
Dept: HEMATOLOGY/ONCOLOGY | Facility: CLINIC | Age: 63
End: 2023-04-18
Payer: COMMERCIAL

## 2023-04-18 VITALS
OXYGEN SATURATION: 99 % | TEMPERATURE: 98 F | DIASTOLIC BLOOD PRESSURE: 86 MMHG | HEIGHT: 66 IN | BODY MASS INDEX: 32.95 KG/M2 | SYSTOLIC BLOOD PRESSURE: 134 MMHG | WEIGHT: 205 LBS | HEART RATE: 78 BPM

## 2023-04-18 DIAGNOSIS — Z17.0 MALIGNANT NEOPLASM OF UPPER-OUTER QUADRANT OF RIGHT BREAST IN FEMALE, ESTROGEN RECEPTOR POSITIVE: ICD-10-CM

## 2023-04-18 DIAGNOSIS — C50.411 MALIGNANT NEOPLASM OF UPPER-OUTER QUADRANT OF RIGHT BREAST IN FEMALE, ESTROGEN RECEPTOR POSITIVE: ICD-10-CM

## 2023-04-18 DIAGNOSIS — Z95.828 PORT-A-CATH IN PLACE: Primary | ICD-10-CM

## 2023-04-18 DIAGNOSIS — Z17.1 MALIGNANT NEOPLASM OF UPPER-OUTER QUADRANT OF LEFT BREAST IN FEMALE, ESTROGEN RECEPTOR NEGATIVE: ICD-10-CM

## 2023-04-18 DIAGNOSIS — K86.2 PANCREATIC CYST: ICD-10-CM

## 2023-04-18 DIAGNOSIS — C50.412 MALIGNANT NEOPLASM OF UPPER-OUTER QUADRANT OF LEFT BREAST IN FEMALE, ESTROGEN RECEPTOR NEGATIVE: ICD-10-CM

## 2023-04-18 PROCEDURE — 3079F PR MOST RECENT DIASTOLIC BLOOD PRESSURE 80-89 MM HG: ICD-10-PCS | Mod: CPTII,S$GLB,, | Performed by: INTERNAL MEDICINE

## 2023-04-18 PROCEDURE — 1159F MED LIST DOCD IN RCRD: CPT | Mod: CPTII,S$GLB,, | Performed by: INTERNAL MEDICINE

## 2023-04-18 PROCEDURE — 99999 PR PBB SHADOW E&M-EST. PATIENT-LVL IV: ICD-10-PCS | Mod: PBBFAC,,, | Performed by: INTERNAL MEDICINE

## 2023-04-18 PROCEDURE — 3008F BODY MASS INDEX DOCD: CPT | Mod: CPTII,S$GLB,, | Performed by: INTERNAL MEDICINE

## 2023-04-18 PROCEDURE — 99214 PR OFFICE/OUTPT VISIT, EST, LEVL IV, 30-39 MIN: ICD-10-PCS | Mod: S$GLB,,, | Performed by: INTERNAL MEDICINE

## 2023-04-18 PROCEDURE — 99999 PR PBB SHADOW E&M-EST. PATIENT-LVL IV: CPT | Mod: PBBFAC,,, | Performed by: INTERNAL MEDICINE

## 2023-04-18 PROCEDURE — 1159F PR MEDICATION LIST DOCUMENTED IN MEDICAL RECORD: ICD-10-PCS | Mod: CPTII,S$GLB,, | Performed by: INTERNAL MEDICINE

## 2023-04-18 PROCEDURE — 3075F SYST BP GE 130 - 139MM HG: CPT | Mod: CPTII,S$GLB,, | Performed by: INTERNAL MEDICINE

## 2023-04-18 PROCEDURE — 99214 OFFICE O/P EST MOD 30 MIN: CPT | Mod: S$GLB,,, | Performed by: INTERNAL MEDICINE

## 2023-04-18 PROCEDURE — 3075F PR MOST RECENT SYSTOLIC BLOOD PRESS GE 130-139MM HG: ICD-10-PCS | Mod: CPTII,S$GLB,, | Performed by: INTERNAL MEDICINE

## 2023-04-18 PROCEDURE — 3008F PR BODY MASS INDEX (BMI) DOCUMENTED: ICD-10-PCS | Mod: CPTII,S$GLB,, | Performed by: INTERNAL MEDICINE

## 2023-04-18 PROCEDURE — 1160F RVW MEDS BY RX/DR IN RCRD: CPT | Mod: CPTII,S$GLB,, | Performed by: INTERNAL MEDICINE

## 2023-04-18 PROCEDURE — 3079F DIAST BP 80-89 MM HG: CPT | Mod: CPTII,S$GLB,, | Performed by: INTERNAL MEDICINE

## 2023-04-18 PROCEDURE — 1160F PR REVIEW ALL MEDS BY PRESCRIBER/CLIN PHARMACIST DOCUMENTED: ICD-10-PCS | Mod: CPTII,S$GLB,, | Performed by: INTERNAL MEDICINE

## 2023-04-18 RX ORDER — ASPIRIN 81 MG/1
81 TABLET ORAL
COMMUNITY
Start: 2023-03-28

## 2023-04-18 RX ORDER — ATORVASTATIN CALCIUM 20 MG/1
TABLET, FILM COATED ORAL
COMMUNITY

## 2023-04-24 PROBLEM — I26.99 PULMONARY EMBOLI: Status: RESOLVED | Noted: 2023-01-03 | Resolved: 2023-04-24

## 2023-06-06 ENCOUNTER — LAB VISIT (OUTPATIENT)
Dept: LAB | Facility: HOSPITAL | Age: 63
End: 2023-06-06
Attending: INTERNAL MEDICINE
Payer: COMMERCIAL

## 2023-06-06 ENCOUNTER — INFUSION (OUTPATIENT)
Dept: INFUSION THERAPY | Facility: HOSPITAL | Age: 63
End: 2023-06-06
Attending: INTERNAL MEDICINE
Payer: COMMERCIAL

## 2023-06-06 VITALS
TEMPERATURE: 98 F | SYSTOLIC BLOOD PRESSURE: 143 MMHG | DIASTOLIC BLOOD PRESSURE: 86 MMHG | OXYGEN SATURATION: 98 % | HEART RATE: 72 BPM | RESPIRATION RATE: 18 BRPM

## 2023-06-06 DIAGNOSIS — C50.411 MALIGNANT NEOPLASM OF UPPER-OUTER QUADRANT OF RIGHT BREAST IN FEMALE, ESTROGEN RECEPTOR POSITIVE: ICD-10-CM

## 2023-06-06 DIAGNOSIS — Z17.0 MALIGNANT NEOPLASM OF UPPER-OUTER QUADRANT OF RIGHT BREAST IN FEMALE, ESTROGEN RECEPTOR POSITIVE: ICD-10-CM

## 2023-06-06 DIAGNOSIS — C50.411 MALIGNANT NEOPLASM OF UPPER-OUTER QUADRANT OF RIGHT BREAST IN FEMALE, ESTROGEN RECEPTOR POSITIVE: Primary | ICD-10-CM

## 2023-06-06 DIAGNOSIS — Z17.1 MALIGNANT NEOPLASM OF UPPER-OUTER QUADRANT OF LEFT BREAST IN FEMALE, ESTROGEN RECEPTOR NEGATIVE: ICD-10-CM

## 2023-06-06 DIAGNOSIS — C50.412 MALIGNANT NEOPLASM OF UPPER-OUTER QUADRANT OF LEFT BREAST IN FEMALE, ESTROGEN RECEPTOR NEGATIVE: ICD-10-CM

## 2023-06-06 DIAGNOSIS — Z17.0 MALIGNANT NEOPLASM OF UPPER-OUTER QUADRANT OF RIGHT BREAST IN FEMALE, ESTROGEN RECEPTOR POSITIVE: Primary | ICD-10-CM

## 2023-06-06 LAB
ALBUMIN SERPL-MCNC: 4 G/DL (ref 3.4–4.8)
ALBUMIN/GLOB SERPL: 1.3 RATIO (ref 1.1–2)
ALP SERPL-CCNC: 96 UNIT/L (ref 40–150)
ALT SERPL-CCNC: 29 UNIT/L (ref 0–55)
AST SERPL-CCNC: 21 UNIT/L (ref 5–34)
BASOPHILS # BLD AUTO: 0.02 X10(3)/MCL
BASOPHILS NFR BLD AUTO: 0.3 %
BILIRUBIN DIRECT+TOT PNL SERPL-MCNC: 1 MG/DL
BUN SERPL-MCNC: 12.3 MG/DL (ref 9.8–20.1)
CALCIUM SERPL-MCNC: 10.1 MG/DL (ref 8.4–10.2)
CHLORIDE SERPL-SCNC: 103 MMOL/L (ref 98–107)
CO2 SERPL-SCNC: 28 MMOL/L (ref 23–31)
CREAT SERPL-MCNC: 0.88 MG/DL (ref 0.55–1.02)
EOSINOPHIL # BLD AUTO: 0.04 X10(3)/MCL (ref 0–0.9)
EOSINOPHIL NFR BLD AUTO: 0.6 %
ERYTHROCYTE [DISTWIDTH] IN BLOOD BY AUTOMATED COUNT: 13.3 % (ref 11.5–17)
GFR SERPLBLD CREATININE-BSD FMLA CKD-EPI: >60 MLS/MIN/1.73/M2
GLOBULIN SER-MCNC: 3.2 GM/DL (ref 2.4–3.5)
GLUCOSE SERPL-MCNC: 144 MG/DL (ref 82–115)
HCT VFR BLD AUTO: 37.3 % (ref 37–47)
HGB BLD-MCNC: 11.8 G/DL (ref 12–16)
IMM GRANULOCYTES # BLD AUTO: 0.01 X10(3)/MCL (ref 0–0.04)
IMM GRANULOCYTES NFR BLD AUTO: 0.2 %
LYMPHOCYTES # BLD AUTO: 2.86 X10(3)/MCL (ref 0.6–4.6)
LYMPHOCYTES NFR BLD AUTO: 45.9 %
MCH RBC QN AUTO: 29.3 PG (ref 27–31)
MCHC RBC AUTO-ENTMCNC: 31.6 G/DL (ref 33–36)
MCV RBC AUTO: 92.6 FL (ref 80–94)
MONOCYTES # BLD AUTO: 0.49 X10(3)/MCL (ref 0.1–1.3)
MONOCYTES NFR BLD AUTO: 7.9 %
NEUTROPHILS # BLD AUTO: 2.81 X10(3)/MCL (ref 2.1–9.2)
NEUTROPHILS NFR BLD AUTO: 45.1 %
PLATELET # BLD AUTO: 296 X10(3)/MCL (ref 130–400)
PMV BLD AUTO: 8.7 FL (ref 7.4–10.4)
POTASSIUM SERPL-SCNC: 4 MMOL/L (ref 3.5–5.1)
PROT SERPL-MCNC: 7.2 GM/DL (ref 5.8–7.6)
RBC # BLD AUTO: 4.03 X10(6)/MCL (ref 4.2–5.4)
SODIUM SERPL-SCNC: 142 MMOL/L (ref 136–145)
WBC # SPEC AUTO: 6.23 X10(3)/MCL (ref 4.5–11.5)

## 2023-06-06 PROCEDURE — 63600175 PHARM REV CODE 636 W HCPCS: Performed by: NURSE PRACTITIONER

## 2023-06-06 PROCEDURE — 36415 COLL VENOUS BLD VENIPUNCTURE: CPT

## 2023-06-06 PROCEDURE — 96523 IRRIG DRUG DELIVERY DEVICE: CPT

## 2023-06-06 PROCEDURE — 80053 COMPREHEN METABOLIC PANEL: CPT

## 2023-06-06 PROCEDURE — 85025 COMPLETE CBC W/AUTO DIFF WBC: CPT

## 2023-06-06 RX ORDER — SODIUM CHLORIDE 0.9 % (FLUSH) 0.9 %
10 SYRINGE (ML) INJECTION
Status: CANCELLED | OUTPATIENT
Start: 2023-08-01

## 2023-06-06 RX ORDER — SODIUM CHLORIDE 0.9 % (FLUSH) 0.9 %
10 SYRINGE (ML) INJECTION
Status: DISCONTINUED | OUTPATIENT
Start: 2023-06-06 | End: 2023-06-06 | Stop reason: HOSPADM

## 2023-06-06 RX ORDER — HEPARIN 100 UNIT/ML
500 SYRINGE INTRAVENOUS
Status: DISCONTINUED | OUTPATIENT
Start: 2023-06-06 | End: 2023-06-06 | Stop reason: HOSPADM

## 2023-06-06 RX ORDER — HEPARIN 100 UNIT/ML
500 SYRINGE INTRAVENOUS
Status: CANCELLED | OUTPATIENT
Start: 2023-08-01

## 2023-06-06 RX ADMIN — HEPARIN 500 UNITS: 100 SYRINGE at 10:06

## 2023-06-12 NOTE — PROGRESS NOTES
Subjective:       Patient ID: Elina Ham is a 63 y.o. female.    Chief Complaint: Follow-up (2 month f/u)    Diagnosis: Bilateral breast cancer  Current Treatment: Letrozole 3/8/23     Oncology History   Malignant neoplasm of upper-outer quadrant of right breast in female, estrogen receptor positive   6/3/2022 Surgery    S/p bilateral simple mastectomy, bilateral sentinel lymph node biopsy, and TE placement - 6/3/2022      1/5/2023 - 3/7/2023 Radiation Therapy         3/8/2023 -  Hormone Therapy    femara     Malignant neoplasm of upper-outer quadrant of left breast in female, estrogen receptor negative   4/27/2022 Genetic Testing    Genetic testing negative   comprehensive genetic analysis indicated that there was no deleterious mutation found in any of these genes. However, there was a variant of uncertain significance (VUS):AXIN2 c.1573C>G. This variant is predicted to be benign by in silico analysis (PolyPhen2 score 0.429).      6/3/2022 Surgery    S/p bilateral simple mastectomy, bilateral sentinel lymph node biopsy, and TE placement - 6/3/2022      8/3/2022 - 12/21/2022 Chemotherapy    OP BREAST PACLITAXEL WEEKLY FOLLOWED BY DOSE DENSE DOXORUBICIN CYCLOPHOSPHAMIDE Q2W      Pathology Significant Finding    Left breast:  Repeat marker:  ER 6%, NV 0.1%, HER2/rahul negative, Ki-67 68%     10/28/2022 Imaging Significant Findings    10/28/2022: CT cap:  Postsurgical changes, left axilla 1.9 x 1.6 nodule, right axilla 1.7 x 1.1 hypodense nodule, small bilateral axillary nodes, liver subcentimeter hypodensity in right lobe too small to characterize, 1.2 cm cyst in the tail the pancreas stable   Impression:  Stable, bilateral axillary some postoperative seromas indolent subcentimeter cyst unchanged dating back to 2016, pancreatic cyst continue to follow       12/27/2022 Imaging Significant Findings    Pulmonary emboli right lower lobar artery and segmental branches.     No evidence of metastatic disease to the chest  abdomen or pelvis.     Cystic lesion in the pancreatic tail, stable from the prior exam, most likely a simple pancreatic cyst, pseudocyst, or IPMN.     Subcentimeter liver hypodensity likely a cyst, stable..     Suspected chronic necrotic lymph node in the left axilla, smaller, right axillary lymph nodes, smaller in the interval.     Voicemail message was left for the ordering physician Dr. Oh at the time of the report.          Interval History:   Patient presents for follow up. She is on letrozole, tolerating ok. Intermittent neuropathy type symptoms in her fingers and toes. Short lived. Mild joint pains, that is tolerable. She denies any new or worsening complaints. No headaches or weight loss, no abdominal pain.     Past Medical History:   Diagnosis Date    Cancer 3/21/2022    Diabetes mellitus     Hypercholesterolemia     Hypertension       Past Surgical History:   Procedure Laterality Date    BILATERAL MASTECTOMY Bilateral 6/3/2022    Procedure: MASTECTOMY, BILATERAL;  Surgeon: Charlene Contreras MD;  Location: [facility] OR;  Service: General;  Laterality: Bilateral;     SECTION  92 and 3/15/95    COLONOSCOPY      INSERTION OF BREAST TISSUE EXPANDER Bilateral 6/3/2022    Procedure: INSERTION, TISSUE EXPANDER, BREAST (Bilateral breast reconstruction with tissue expanders and acellular dermal matrix and use of intraoperative fluoroscein angiogragry);  Surgeon: Nick Gibbs MD;  Location: [facility] OR;  Service: Plastics;  Laterality: Bilateral;    MEDIPORT INSERTION, SINGLE      SENTINEL LYMPH NODE BIOPSY Bilateral 6/3/2022    Procedure: BIOPSY, LYMPH NODE, SENTINEL;  Surgeon: Charlene Contreras MD;  Location: [facility] OR;  Service: General;  Laterality: Bilateral;  MAGTRACE Injection in office  Need SentiMag Probe     Social History     Socioeconomic History    Marital status:    Tobacco Use    Smoking status: Never    Smokeless tobacco: Never   Substance and Sexual Activity    Alcohol use: Not  Currently     Alcohol/week: 1.0 standard drink     Types: 1 Glasses of wine per week     Comment: drink socially but haven't had a glass of wine since     Drug use: Never    Sexual activity: Yes     Partners: Male     Birth control/protection: None     Comment: We take STD test for the first 2 years we started dating  (5      Family History   Problem Relation Age of Onset    Breast cancer Maternal Grandmother             Breast cancer Sister     Breast cancer Sister         remission    Breast cancer Maternal Aunt             Breast cancer Maternal Aunt             Breast cancer Maternal Aunt             Breast cancer Maternal Aunt     Breast cancer Maternal Uncle             Stroke Brother             Pancreatic cancer Brother       Review of patient's allergies indicates:  No Known Allergies   Review of Systems   Constitutional:  Positive for appetite change. Negative for unexpected weight change.   HENT:  Negative for mouth sores.    Eyes:  Negative for visual disturbance.   Respiratory:  Negative for cough and shortness of breath.    Cardiovascular:  Negative for chest pain.   Gastrointestinal:  Positive for diarrhea. Negative for abdominal pain.   Genitourinary:  Positive for frequency.   Musculoskeletal:  Positive for back pain.   Integumentary:  Negative for rash.   Neurological:  Negative for headaches.   Hematological:  Negative for adenopathy.   Psychiatric/Behavioral:  The patient is not nervous/anxious.    A complete 12 point ROS was performed in full with pertinent positives as described in interval history. Remainder of ROS done in full and are negative.        Objective:      Physical Exam  Constitutional:       Appearance: Normal appearance.   HENT:      Head: Normocephalic and atraumatic.      Nose: Nose normal.      Mouth/Throat:      Mouth: Mucous membranes are moist.   Eyes:      Extraocular Movements: Extraocular movements intact.       Conjunctiva/sclera: Conjunctivae normal.      Pupils: Pupils are equal, round, and reactive to light.   Cardiovascular:      Rate and Rhythm: Normal rate and regular rhythm.      Pulses: Normal pulses.   Pulmonary:      Effort: Pulmonary effort is normal.      Breath sounds: Normal breath sounds.   Chest:      Comments: B/L expanders in place. No palpable abnormality. No LAD  Abdominal:      General: Bowel sounds are normal.      Palpations: Abdomen is soft.   Musculoskeletal:      Cervical back: Normal range of motion and neck supple.   Neurological:      General: No focal deficit present.      Mental Status: She is alert and oriented to person, place, and time. Mental status is at baseline.   Psychiatric:         Mood and Affect: Mood normal.         Behavior: Behavior normal.     LABS AND IMAGING REVIEWED IN EPIC      Assessment:   Stage IIIA (pT2, pN1a, cM0, G3) triple negative left breast cancer    Stage IA (pT1b, pN0, cM0, G1) Er+ [place]+ right breast cancer    Pulmonary emboli    Pancreatic cyst  Initially mentioned on October 18, 2022 scan as a 1.2 cm cyst in the tail of the pancreas.  Repeat imaging in December of 2022 showed a stable size of the cyst.     March of 2023 shows this 13 mm cystic lesion  I do think it was possibly present on the August 22, 2022 scan, but I can not see it on the July 2016 scan but will defer to Gastroenterology  Plan:   Patient completed adjuvant chemotherapy on 12/21/22 followed by radiation.  B/L mastectomies so no need for mammograms  Continue femara for her right breast cancer at least 5 years  Calcium + Vitamin D- increase to 2 daily  Last bone density 3/8/23 with her PCP--normal repeat 3/2025  Keep appt with Dr. Queen for follow up IPMN vs pancreatic cyst on 6/26/23  Continue eliquis 5mg BID util 6/2023--then discuss 2.5mg po bid after surgery  Sees Dr. Gibbs 7/10/23 for continued reconstruction.   Continue port flush q8 weeks. Next 8/1/23. Will keep port until January  2024  Give instructions for eliquis for upcoming procedure with .     Patient is scheduled for an endoscopic ultrasound on 06/26/2023  by Dr. Jeromy Queen. At that point in time I think it would be safe for the patient to hold her Eliquis for 2-3 days prior to the procedure.  She can resume her medication after the procedure.  We are scheduled to see her in June as well prior to that procedure, any change in recommendations could be made at that visit.    Labs: cbc,cmp with next visit    Follow up in about 3 months (around 9/13/2023) for OV with Sujatha, No labs.

## 2023-06-13 ENCOUNTER — OFFICE VISIT (OUTPATIENT)
Dept: HEMATOLOGY/ONCOLOGY | Facility: CLINIC | Age: 63
End: 2023-06-13
Payer: COMMERCIAL

## 2023-06-13 VITALS
BODY MASS INDEX: 32.92 KG/M2 | TEMPERATURE: 98 F | OXYGEN SATURATION: 99 % | HEIGHT: 66 IN | DIASTOLIC BLOOD PRESSURE: 82 MMHG | WEIGHT: 204.81 LBS | SYSTOLIC BLOOD PRESSURE: 124 MMHG | HEART RATE: 74 BPM

## 2023-06-13 DIAGNOSIS — G62.0 NEUROPATHY DUE TO CHEMOTHERAPEUTIC DRUG: ICD-10-CM

## 2023-06-13 DIAGNOSIS — Z17.0 MALIGNANT NEOPLASM OF UPPER-OUTER QUADRANT OF RIGHT BREAST IN FEMALE, ESTROGEN RECEPTOR POSITIVE: Primary | ICD-10-CM

## 2023-06-13 DIAGNOSIS — C50.411 MALIGNANT NEOPLASM OF UPPER-OUTER QUADRANT OF RIGHT BREAST IN FEMALE, ESTROGEN RECEPTOR POSITIVE: Primary | ICD-10-CM

## 2023-06-13 DIAGNOSIS — T45.1X5A NEUROPATHY DUE TO CHEMOTHERAPEUTIC DRUG: ICD-10-CM

## 2023-06-13 DIAGNOSIS — I26.99 ACUTE PULMONARY EMBOLISM, UNSPECIFIED PULMONARY EMBOLISM TYPE, UNSPECIFIED WHETHER ACUTE COR PULMONALE PRESENT: ICD-10-CM

## 2023-06-13 PROCEDURE — 3079F DIAST BP 80-89 MM HG: CPT | Mod: CPTII,S$GLB,, | Performed by: NURSE PRACTITIONER

## 2023-06-13 PROCEDURE — 99999 PR PBB SHADOW E&M-EST. PATIENT-LVL IV: CPT | Mod: PBBFAC,,, | Performed by: NURSE PRACTITIONER

## 2023-06-13 PROCEDURE — 3079F PR MOST RECENT DIASTOLIC BLOOD PRESSURE 80-89 MM HG: ICD-10-PCS | Mod: CPTII,S$GLB,, | Performed by: NURSE PRACTITIONER

## 2023-06-13 PROCEDURE — 3074F PR MOST RECENT SYSTOLIC BLOOD PRESSURE < 130 MM HG: ICD-10-PCS | Mod: CPTII,S$GLB,, | Performed by: NURSE PRACTITIONER

## 2023-06-13 PROCEDURE — 1159F PR MEDICATION LIST DOCUMENTED IN MEDICAL RECORD: ICD-10-PCS | Mod: CPTII,S$GLB,, | Performed by: NURSE PRACTITIONER

## 2023-06-13 PROCEDURE — 99999 PR PBB SHADOW E&M-EST. PATIENT-LVL IV: ICD-10-PCS | Mod: PBBFAC,,, | Performed by: NURSE PRACTITIONER

## 2023-06-13 PROCEDURE — 1160F RVW MEDS BY RX/DR IN RCRD: CPT | Mod: CPTII,S$GLB,, | Performed by: NURSE PRACTITIONER

## 2023-06-13 PROCEDURE — 99214 PR OFFICE/OUTPT VISIT, EST, LEVL IV, 30-39 MIN: ICD-10-PCS | Mod: S$GLB,,, | Performed by: NURSE PRACTITIONER

## 2023-06-13 PROCEDURE — 3008F BODY MASS INDEX DOCD: CPT | Mod: CPTII,S$GLB,, | Performed by: NURSE PRACTITIONER

## 2023-06-13 PROCEDURE — 1160F PR REVIEW ALL MEDS BY PRESCRIBER/CLIN PHARMACIST DOCUMENTED: ICD-10-PCS | Mod: CPTII,S$GLB,, | Performed by: NURSE PRACTITIONER

## 2023-06-13 PROCEDURE — 1159F MED LIST DOCD IN RCRD: CPT | Mod: CPTII,S$GLB,, | Performed by: NURSE PRACTITIONER

## 2023-06-13 PROCEDURE — 3008F PR BODY MASS INDEX (BMI) DOCUMENTED: ICD-10-PCS | Mod: CPTII,S$GLB,, | Performed by: NURSE PRACTITIONER

## 2023-06-13 PROCEDURE — 99214 OFFICE O/P EST MOD 30 MIN: CPT | Mod: S$GLB,,, | Performed by: NURSE PRACTITIONER

## 2023-06-13 PROCEDURE — 3074F SYST BP LT 130 MM HG: CPT | Mod: CPTII,S$GLB,, | Performed by: NURSE PRACTITIONER

## 2023-06-14 ENCOUNTER — PATIENT MESSAGE (OUTPATIENT)
Dept: HEMATOLOGY/ONCOLOGY | Facility: CLINIC | Age: 63
End: 2023-06-14
Payer: COMMERCIAL

## 2023-06-14 DIAGNOSIS — I26.99 OTHER ACUTE PULMONARY EMBOLISM WITHOUT ACUTE COR PULMONALE: ICD-10-CM

## 2023-06-14 DIAGNOSIS — Z17.1 MALIGNANT NEOPLASM OF UPPER-OUTER QUADRANT OF LEFT BREAST IN FEMALE, ESTROGEN RECEPTOR NEGATIVE: ICD-10-CM

## 2023-06-14 DIAGNOSIS — C50.412 MALIGNANT NEOPLASM OF UPPER-OUTER QUADRANT OF LEFT BREAST IN FEMALE, ESTROGEN RECEPTOR NEGATIVE: ICD-10-CM

## 2023-06-15 RX ORDER — APIXABAN 5 MG/1
TABLET, FILM COATED ORAL
Qty: 60 TABLET | Refills: 3 | Status: SHIPPED | OUTPATIENT
Start: 2023-06-15 | End: 2023-10-05

## 2023-06-15 RX ORDER — LETROZOLE 2.5 MG/1
TABLET, FILM COATED ORAL
Qty: 30 TABLET | Refills: 3 | Status: SHIPPED | OUTPATIENT
Start: 2023-06-15 | End: 2023-10-06

## 2023-07-05 ENCOUNTER — PATIENT MESSAGE (OUTPATIENT)
Dept: HEMATOLOGY/ONCOLOGY | Facility: CLINIC | Age: 63
End: 2023-07-05
Payer: COMMERCIAL

## 2023-08-01 ENCOUNTER — INFUSION (OUTPATIENT)
Dept: INFUSION THERAPY | Facility: HOSPITAL | Age: 63
End: 2023-08-01
Attending: INTERNAL MEDICINE
Payer: COMMERCIAL

## 2023-08-01 VITALS
RESPIRATION RATE: 16 BRPM | DIASTOLIC BLOOD PRESSURE: 91 MMHG | OXYGEN SATURATION: 98 % | SYSTOLIC BLOOD PRESSURE: 150 MMHG | HEART RATE: 74 BPM | TEMPERATURE: 98 F

## 2023-08-01 DIAGNOSIS — C50.411 MALIGNANT NEOPLASM OF UPPER-OUTER QUADRANT OF RIGHT BREAST IN FEMALE, ESTROGEN RECEPTOR POSITIVE: Primary | ICD-10-CM

## 2023-08-01 DIAGNOSIS — Z17.0 MALIGNANT NEOPLASM OF UPPER-OUTER QUADRANT OF RIGHT BREAST IN FEMALE, ESTROGEN RECEPTOR POSITIVE: Primary | ICD-10-CM

## 2023-08-01 PROCEDURE — 25000003 PHARM REV CODE 250: Performed by: NURSE PRACTITIONER

## 2023-08-01 PROCEDURE — A4216 STERILE WATER/SALINE, 10 ML: HCPCS | Performed by: NURSE PRACTITIONER

## 2023-08-01 PROCEDURE — 63600175 PHARM REV CODE 636 W HCPCS: Performed by: NURSE PRACTITIONER

## 2023-08-01 PROCEDURE — 96523 IRRIG DRUG DELIVERY DEVICE: CPT

## 2023-08-01 RX ORDER — SODIUM CHLORIDE 0.9 % (FLUSH) 0.9 %
10 SYRINGE (ML) INJECTION
Status: CANCELLED | OUTPATIENT
Start: 2023-09-26

## 2023-08-01 RX ORDER — SODIUM CHLORIDE 0.9 % (FLUSH) 0.9 %
10 SYRINGE (ML) INJECTION
Status: DISCONTINUED | OUTPATIENT
Start: 2023-08-01 | End: 2023-08-01 | Stop reason: HOSPADM

## 2023-08-01 RX ORDER — HEPARIN 100 UNIT/ML
500 SYRINGE INTRAVENOUS
Status: DISCONTINUED | OUTPATIENT
Start: 2023-08-01 | End: 2023-08-01 | Stop reason: HOSPADM

## 2023-08-01 RX ORDER — HEPARIN 100 UNIT/ML
500 SYRINGE INTRAVENOUS
Status: CANCELLED | OUTPATIENT
Start: 2023-09-26

## 2023-08-01 RX ADMIN — HEPARIN 500 UNITS: 100 SYRINGE at 10:08

## 2023-08-01 RX ADMIN — Medication 10 ML: at 10:08

## 2023-08-28 ENCOUNTER — PATIENT MESSAGE (OUTPATIENT)
Dept: HEMATOLOGY/ONCOLOGY | Facility: CLINIC | Age: 63
End: 2023-08-28
Payer: COMMERCIAL

## 2023-08-31 ENCOUNTER — PATIENT MESSAGE (OUTPATIENT)
Dept: HEMATOLOGY/ONCOLOGY | Facility: CLINIC | Age: 63
End: 2023-08-31
Payer: COMMERCIAL

## 2023-09-13 ENCOUNTER — OFFICE VISIT (OUTPATIENT)
Dept: HEMATOLOGY/ONCOLOGY | Facility: CLINIC | Age: 63
End: 2023-09-13
Payer: COMMERCIAL

## 2023-09-13 ENCOUNTER — PATIENT MESSAGE (OUTPATIENT)
Dept: HEMATOLOGY/ONCOLOGY | Facility: CLINIC | Age: 63
End: 2023-09-13

## 2023-09-13 VITALS
TEMPERATURE: 98 F | WEIGHT: 206.88 LBS | BODY MASS INDEX: 33.25 KG/M2 | OXYGEN SATURATION: 98 % | DIASTOLIC BLOOD PRESSURE: 84 MMHG | HEART RATE: 68 BPM | SYSTOLIC BLOOD PRESSURE: 153 MMHG | HEIGHT: 66 IN

## 2023-09-13 DIAGNOSIS — Z17.1 MALIGNANT NEOPLASM OF UPPER-OUTER QUADRANT OF LEFT BREAST IN FEMALE, ESTROGEN RECEPTOR NEGATIVE: ICD-10-CM

## 2023-09-13 DIAGNOSIS — K86.2 PANCREATIC CYST: ICD-10-CM

## 2023-09-13 DIAGNOSIS — Z17.0 MALIGNANT NEOPLASM OF UPPER-OUTER QUADRANT OF RIGHT BREAST IN FEMALE, ESTROGEN RECEPTOR POSITIVE: Primary | ICD-10-CM

## 2023-09-13 DIAGNOSIS — C50.412 MALIGNANT NEOPLASM OF UPPER-OUTER QUADRANT OF LEFT BREAST IN FEMALE, ESTROGEN RECEPTOR NEGATIVE: ICD-10-CM

## 2023-09-13 DIAGNOSIS — G62.0 NEUROPATHY DUE TO CHEMOTHERAPEUTIC DRUG: ICD-10-CM

## 2023-09-13 DIAGNOSIS — Z86.711 HISTORY OF PULMONARY EMBOLISM: ICD-10-CM

## 2023-09-13 DIAGNOSIS — Z79.01 CURRENT USE OF LONG TERM ANTICOAGULATION: ICD-10-CM

## 2023-09-13 DIAGNOSIS — Z79.811 LONG TERM CURRENT USE OF AROMATASE INHIBITOR: ICD-10-CM

## 2023-09-13 DIAGNOSIS — C50.411 MALIGNANT NEOPLASM OF UPPER-OUTER QUADRANT OF RIGHT BREAST IN FEMALE, ESTROGEN RECEPTOR POSITIVE: Primary | ICD-10-CM

## 2023-09-13 DIAGNOSIS — T45.1X5A NEUROPATHY DUE TO CHEMOTHERAPEUTIC DRUG: ICD-10-CM

## 2023-09-13 PROCEDURE — 1159F MED LIST DOCD IN RCRD: CPT | Mod: CPTII,S$GLB,, | Performed by: NURSE PRACTITIONER

## 2023-09-13 PROCEDURE — 1160F RVW MEDS BY RX/DR IN RCRD: CPT | Mod: CPTII,S$GLB,, | Performed by: NURSE PRACTITIONER

## 2023-09-13 PROCEDURE — 3077F PR MOST RECENT SYSTOLIC BLOOD PRESSURE >= 140 MM HG: ICD-10-PCS | Mod: CPTII,S$GLB,, | Performed by: NURSE PRACTITIONER

## 2023-09-13 PROCEDURE — 3008F PR BODY MASS INDEX (BMI) DOCUMENTED: ICD-10-PCS | Mod: CPTII,S$GLB,, | Performed by: NURSE PRACTITIONER

## 2023-09-13 PROCEDURE — 1160F PR REVIEW ALL MEDS BY PRESCRIBER/CLIN PHARMACIST DOCUMENTED: ICD-10-PCS | Mod: CPTII,S$GLB,, | Performed by: NURSE PRACTITIONER

## 2023-09-13 PROCEDURE — 3079F PR MOST RECENT DIASTOLIC BLOOD PRESSURE 80-89 MM HG: ICD-10-PCS | Mod: CPTII,S$GLB,, | Performed by: NURSE PRACTITIONER

## 2023-09-13 PROCEDURE — 1159F PR MEDICATION LIST DOCUMENTED IN MEDICAL RECORD: ICD-10-PCS | Mod: CPTII,S$GLB,, | Performed by: NURSE PRACTITIONER

## 2023-09-13 PROCEDURE — 99214 PR OFFICE/OUTPT VISIT, EST, LEVL IV, 30-39 MIN: ICD-10-PCS | Mod: S$GLB,,, | Performed by: NURSE PRACTITIONER

## 2023-09-13 PROCEDURE — 3008F BODY MASS INDEX DOCD: CPT | Mod: CPTII,S$GLB,, | Performed by: NURSE PRACTITIONER

## 2023-09-13 PROCEDURE — 99999 PR PBB SHADOW E&M-EST. PATIENT-LVL IV: CPT | Mod: PBBFAC,,, | Performed by: NURSE PRACTITIONER

## 2023-09-13 PROCEDURE — 99999 PR PBB SHADOW E&M-EST. PATIENT-LVL IV: ICD-10-PCS | Mod: PBBFAC,,, | Performed by: NURSE PRACTITIONER

## 2023-09-13 PROCEDURE — 3077F SYST BP >= 140 MM HG: CPT | Mod: CPTII,S$GLB,, | Performed by: NURSE PRACTITIONER

## 2023-09-13 PROCEDURE — 3079F DIAST BP 80-89 MM HG: CPT | Mod: CPTII,S$GLB,, | Performed by: NURSE PRACTITIONER

## 2023-09-13 PROCEDURE — 99214 OFFICE O/P EST MOD 30 MIN: CPT | Mod: S$GLB,,, | Performed by: NURSE PRACTITIONER

## 2023-09-13 RX ORDER — PANTOPRAZOLE SODIUM 20 MG/1
20 TABLET, DELAYED RELEASE ORAL EVERY MORNING
COMMUNITY
Start: 2023-08-25 | End: 2024-03-12

## 2023-09-13 NOTE — PROGRESS NOTES
Subjective:       Patient ID: Elina Ham is a 63 y.o. female.    Chief Complaint: 3 month follow up (Problems staying asleep, nausea, off balance and numbness in fingers/Recent on set of diarreah )    Diagnosis: Bilateral breast cancer  Current Treatment: Letrozole 3/8/23     Oncology History   Malignant neoplasm of upper-outer quadrant of right breast in female, estrogen receptor positive   6/3/2022 Surgery    S/p bilateral simple mastectomy, bilateral sentinel lymph node biopsy, and TE placement - 6/3/2022      1/5/2023 - 3/7/2023 Radiation Therapy         3/8/2023 -  Hormone Therapy    femara     Malignant neoplasm of upper-outer quadrant of left breast in female, estrogen receptor negative   4/27/2022 Genetic Testing    Genetic testing negative   comprehensive genetic analysis indicated that there was no deleterious mutation found in any of these genes. However, there was a variant of uncertain significance (VUS):AXIN2 c.1573C>G. This variant is predicted to be benign by in silico analysis (PolyPhen2 score 0.429).      6/3/2022 Surgery    S/p bilateral simple mastectomy, bilateral sentinel lymph node biopsy, and TE placement - 6/3/2022      8/3/2022 - 12/21/2022 Chemotherapy    OP BREAST PACLITAXEL WEEKLY FOLLOWED BY DOSE DENSE DOXORUBICIN CYCLOPHOSPHAMIDE Q2W      Pathology Significant Finding    Left breast:  Repeat marker:  ER 6%, ME 0.1%, HER2/rahul negative, Ki-67 68%     10/28/2022 Imaging Significant Findings    10/28/2022: CT cap:  Postsurgical changes, left axilla 1.9 x 1.6 nodule, right axilla 1.7 x 1.1 hypodense nodule, small bilateral axillary nodes, liver subcentimeter hypodensity in right lobe too small to characterize, 1.2 cm cyst in the tail the pancreas stable   Impression:  Stable, bilateral axillary some postoperative seromas indolent subcentimeter cyst unchanged dating back to 2016, pancreatic cyst continue to follow       12/27/2022 Imaging Significant Findings    Pulmonary emboli right  lower lobar artery and segmental branches.     No evidence of metastatic disease to the chest abdomen or pelvis.     Cystic lesion in the pancreatic tail, stable from the prior exam, most likely a simple pancreatic cyst, pseudocyst, or IPMN.     Subcentimeter liver hypodensity likely a cyst, stable..     Suspected chronic necrotic lymph node in the left axilla, smaller, right axillary lymph nodes, smaller in the interval.     Voicemail message was left for the ordering physician Dr. Oh at the time of the report.          Interval History 23 :   Patient presents to the clinic today for a scheduled follow up visit. Patient is currently taking letrozole. Patient states that she is having some peripheral neuropathy and she has not been sleeping well at night. However she did explain that it could be because of the unexpected death of her sister. Patient denies any other complaints such as abd pain, headaches, or joint pain. No labwork was completed today.     Past Medical History:   Diagnosis Date    Cancer 3/21/2022    Diabetes mellitus     Hypercholesterolemia     Hypertension       Past Surgical History:   Procedure Laterality Date    BILATERAL MASTECTOMY Bilateral 6/3/2022    Procedure: MASTECTOMY, BILATERAL;  Surgeon: Charlene Contreras MD;  Location: [facility] OR;  Service: General;  Laterality: Bilateral;     SECTION  92 and 3/15/95    COLONOSCOPY      INSERTION OF BREAST TISSUE EXPANDER Bilateral 6/3/2022    Procedure: INSERTION, TISSUE EXPANDER, BREAST (Bilateral breast reconstruction with tissue expanders and acellular dermal matrix and use of intraoperative fluoroscein angiogragry);  Surgeon: Nick Gibbs MD;  Location: [facility] OR;  Service: Plastics;  Laterality: Bilateral;    MEDIPORT INSERTION, SINGLE      SENTINEL LYMPH NODE BIOPSY Bilateral 6/3/2022    Procedure: BIOPSY, LYMPH NODE, SENTINEL;  Surgeon: Charlene Contreras MD;  Location: [facility] OR;  Service: General;  Laterality: Bilateral;   MAGTRACE Injection in office  Need SentiMag Probe     Social History     Socioeconomic History    Marital status:    Tobacco Use    Smoking status: Never    Smokeless tobacco: Never   Substance and Sexual Activity    Alcohol use: Not Currently     Alcohol/week: 1.0 standard drink of alcohol     Types: 1 Glasses of wine per week     Comment: drink socially but haven't had a glass of wine since     Drug use: Never    Sexual activity: Yes     Partners: Male     Birth control/protection: None     Comment: We take STD test for the first 2 years we started dating  (5      Family History   Problem Relation Age of Onset    Breast cancer Maternal Grandmother             Breast cancer Sister     Breast cancer Sister         remission    Breast cancer Maternal Aunt             Breast cancer Maternal Aunt             Breast cancer Maternal Aunt             Breast cancer Maternal Aunt     Breast cancer Maternal Uncle             Stroke Brother             Pancreatic cancer Brother       Review of patient's allergies indicates:  No Known Allergies   Review of Systems   Constitutional:  Positive for appetite change. Negative for unexpected weight change.   HENT:  Negative for mouth sores.    Eyes:  Negative for visual disturbance.   Respiratory:  Negative for cough and shortness of breath.    Cardiovascular:  Negative for chest pain.   Gastrointestinal:  Positive for diarrhea. Negative for abdominal pain.   Genitourinary:  Positive for frequency.   Musculoskeletal:  Positive for back pain.   Integumentary:  Negative for rash.   Neurological:  Negative for headaches.   Hematological:  Negative for adenopathy.   Psychiatric/Behavioral:  The patient is not nervous/anxious.      A complete 12 point ROS was performed in full with pertinent positives as described in interval history. Remainder of ROS done in full and are negative.        Objective:      Physical Exam  Constitutional:        Appearance: Normal appearance.   HENT:      Head: Normocephalic and atraumatic.      Nose: Nose normal.      Mouth/Throat:      Mouth: Mucous membranes are moist.   Eyes:      Extraocular Movements: Extraocular movements intact.      Conjunctiva/sclera: Conjunctivae normal.      Pupils: Pupils are equal, round, and reactive to light.   Cardiovascular:      Rate and Rhythm: Normal rate and regular rhythm.      Pulses: Normal pulses.   Pulmonary:      Effort: Pulmonary effort is normal.      Breath sounds: Normal breath sounds.   Chest:      Comments: B/L expanders in place. No palpable abnormality. No LAD  Abdominal:      General: Bowel sounds are normal.      Palpations: Abdomen is soft.   Musculoskeletal:      Cervical back: Normal range of motion and neck supple.   Neurological:      General: No focal deficit present.      Mental Status: She is alert and oriented to person, place, and time. Mental status is at baseline.   Psychiatric:         Mood and Affect: Mood normal.         Behavior: Behavior normal.       Assessment:   Stage IIIA (pT2, pN1a, cM0, G3) triple negative left breast cancer    Stage IA (pT1b, pN0, cM0, G1) Er+ CA+ right breast cancer    Pulmonary emboli. Continue eliquis 5mg BID then discuss 2.5mg BID after surgery    Pancreatic cyst  Initially mentioned on October 18, 2022 scan as a 1.2 cm cyst in the tail of the pancreas.  Repeat imaging in December of 2022 showed a stable size of the cyst.     March of 2023 shows this 13 mm cystic lesion  I do think it was possibly present on the August 22, 2022 scan, but I can not see it on the July 2016 scan but will defer to Gastroenterology  Continue follow up with Dr. Queen  5. 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 Consider neurology referral   Plan:     B/L mastectomies so no need for mammograms  Continue femara for her right breast cancer at least 5 years  Calcium + Vitamin D- BID  Last bone  density 3/8/23 with her PCP--normal repeat 3/2025  Planned reconstruction 11/2023 Sees Dr. Gibbs  and Dr. Ordaz   May remove port with reconstruction  Labs: cbc,cmp with next visit    Follow up in about 6 months (around 3/13/2024) for W/NP, Labs.

## 2023-09-29 ENCOUNTER — INFUSION (OUTPATIENT)
Dept: INFUSION THERAPY | Facility: HOSPITAL | Age: 63
End: 2023-09-29
Attending: INTERNAL MEDICINE
Payer: COMMERCIAL

## 2023-09-29 VITALS
TEMPERATURE: 98 F | SYSTOLIC BLOOD PRESSURE: 135 MMHG | HEART RATE: 68 BPM | RESPIRATION RATE: 18 BRPM | OXYGEN SATURATION: 100 % | DIASTOLIC BLOOD PRESSURE: 79 MMHG

## 2023-09-29 DIAGNOSIS — Z17.0 MALIGNANT NEOPLASM OF UPPER-OUTER QUADRANT OF RIGHT BREAST IN FEMALE, ESTROGEN RECEPTOR POSITIVE: Primary | ICD-10-CM

## 2023-09-29 DIAGNOSIS — C50.411 MALIGNANT NEOPLASM OF UPPER-OUTER QUADRANT OF RIGHT BREAST IN FEMALE, ESTROGEN RECEPTOR POSITIVE: Primary | ICD-10-CM

## 2023-09-29 PROCEDURE — 96523 IRRIG DRUG DELIVERY DEVICE: CPT

## 2023-09-29 PROCEDURE — A4216 STERILE WATER/SALINE, 10 ML: HCPCS | Performed by: NURSE PRACTITIONER

## 2023-09-29 PROCEDURE — 25000003 PHARM REV CODE 250: Performed by: NURSE PRACTITIONER

## 2023-09-29 PROCEDURE — 63600175 PHARM REV CODE 636 W HCPCS: Performed by: NURSE PRACTITIONER

## 2023-09-29 RX ORDER — SODIUM CHLORIDE 0.9 % (FLUSH) 0.9 %
10 SYRINGE (ML) INJECTION
Status: DISCONTINUED | OUTPATIENT
Start: 2023-09-29 | End: 2023-09-29 | Stop reason: HOSPADM

## 2023-09-29 RX ORDER — SODIUM CHLORIDE 0.9 % (FLUSH) 0.9 %
10 SYRINGE (ML) INJECTION
Status: CANCELLED | OUTPATIENT
Start: 2023-11-24

## 2023-09-29 RX ORDER — HEPARIN 100 UNIT/ML
500 SYRINGE INTRAVENOUS
Status: CANCELLED | OUTPATIENT
Start: 2023-11-24

## 2023-09-29 RX ORDER — HEPARIN 100 UNIT/ML
500 SYRINGE INTRAVENOUS
Status: DISCONTINUED | OUTPATIENT
Start: 2023-09-29 | End: 2023-09-29 | Stop reason: HOSPADM

## 2023-09-29 RX ADMIN — HEPARIN 500 UNITS: 100 SYRINGE at 12:09

## 2023-09-29 RX ADMIN — Medication 10 ML: at 12:09

## 2023-09-29 NOTE — PLAN OF CARE
Mediport flushed without difficulty. Tolerated well. Next appt confirmed with pt. Dc'd home in stable condition.

## 2023-10-05 DIAGNOSIS — I26.99 OTHER ACUTE PULMONARY EMBOLISM WITHOUT ACUTE COR PULMONALE: ICD-10-CM

## 2023-10-05 RX ORDER — APIXABAN 5 MG/1
5 TABLET, FILM COATED ORAL 2 TIMES DAILY
Qty: 60 TABLET | Refills: 5 | Status: SHIPPED | OUTPATIENT
Start: 2023-10-05 | End: 2023-11-21 | Stop reason: SDUPTHER

## 2023-10-06 DIAGNOSIS — Z17.1 MALIGNANT NEOPLASM OF UPPER-OUTER QUADRANT OF LEFT BREAST IN FEMALE, ESTROGEN RECEPTOR NEGATIVE: ICD-10-CM

## 2023-10-06 DIAGNOSIS — C50.412 MALIGNANT NEOPLASM OF UPPER-OUTER QUADRANT OF LEFT BREAST IN FEMALE, ESTROGEN RECEPTOR NEGATIVE: ICD-10-CM

## 2023-10-06 RX ORDER — LETROZOLE 2.5 MG/1
TABLET, FILM COATED ORAL
Qty: 30 TABLET | Refills: 5 | Status: SHIPPED | OUTPATIENT
Start: 2023-10-06

## 2023-10-24 ENCOUNTER — PATIENT MESSAGE (OUTPATIENT)
Dept: HEMATOLOGY/ONCOLOGY | Facility: CLINIC | Age: 63
End: 2023-10-24
Payer: COMMERCIAL

## 2023-10-31 ENCOUNTER — LAB VISIT (OUTPATIENT)
Dept: LAB | Facility: HOSPITAL | Age: 63
End: 2023-10-31
Attending: SURGERY
Payer: COMMERCIAL

## 2023-10-31 DIAGNOSIS — Z01.818 OTHER SPECIFIED PRE-OPERATIVE EXAMINATION: Primary | ICD-10-CM

## 2023-10-31 DIAGNOSIS — Z01.818 OTHER SPECIFIED PRE-OPERATIVE EXAMINATION: ICD-10-CM

## 2023-10-31 LAB
GROUP & RH: NORMAL
HCT VFR BLD AUTO: 37.1 % (ref 37–47)
HGB BLD-MCNC: 12.1 G/DL (ref 12–16)
INDIRECT COOMBS GEL: NORMAL
SPECIMEN OUTDATE: NORMAL

## 2023-10-31 PROCEDURE — 86850 RBC ANTIBODY SCREEN: CPT | Performed by: SURGERY

## 2023-10-31 PROCEDURE — 36415 COLL VENOUS BLD VENIPUNCTURE: CPT

## 2023-11-06 ENCOUNTER — ANESTHESIA EVENT (OUTPATIENT)
Dept: SURGERY | Facility: HOSPITAL | Age: 63
DRG: 585 | End: 2023-11-06
Payer: COMMERCIAL

## 2023-11-07 ENCOUNTER — HOSPITAL ENCOUNTER (INPATIENT)
Facility: HOSPITAL | Age: 63
LOS: 4 days | Discharge: HOME | DRG: 585 | End: 2023-11-11
Attending: SURGERY | Admitting: SURGERY
Payer: COMMERCIAL

## 2023-11-07 ENCOUNTER — ANESTHESIA (OUTPATIENT)
Dept: SURGERY | Facility: HOSPITAL | Age: 63
DRG: 585 | End: 2023-11-07
Payer: COMMERCIAL

## 2023-11-07 DIAGNOSIS — Z42.1 ENCOUNTER FOR BREAST RECONSTRUCTION FOLLOWING MASTECTOMY: ICD-10-CM

## 2023-11-07 DIAGNOSIS — Z85.3 PERSONAL HISTORY OF BREAST CANCER: ICD-10-CM

## 2023-11-07 LAB
ABORH RETYPE: NORMAL
POCT GLUCOSE: 162 MG/DL (ref 70–110)
POCT GLUCOSE: 215 MG/DL (ref 70–110)
POCT GLUCOSE: 239 MG/DL (ref 70–110)
POCT GLUCOSE: 249 MG/DL (ref 70–110)

## 2023-11-07 PROCEDURE — D9220A PRA ANESTHESIA: ICD-10-PCS | Mod: CRNA,,,

## 2023-11-07 PROCEDURE — 19370 REVJ PERI-IMPLT CAPSULE BRST: CPT | Mod: RT,,, | Performed by: SURGERY

## 2023-11-07 PROCEDURE — D9220A PRA ANESTHESIA: Mod: ANES,,, | Performed by: ANESTHESIOLOGY

## 2023-11-07 PROCEDURE — 25000003 PHARM REV CODE 250

## 2023-11-07 PROCEDURE — C9290 INJ, BUPIVACAINE LIPOSOME: HCPCS | Performed by: SURGERY

## 2023-11-07 PROCEDURE — 71000015 HC POSTOP RECOV 1ST HR: Performed by: SURGERY

## 2023-11-07 PROCEDURE — 25000003 PHARM REV CODE 250: Performed by: SURGERY

## 2023-11-07 PROCEDURE — 36590 REMOVAL TUNNELED CV CATH: CPT | Mod: 51,RT,, | Performed by: SURGERY

## 2023-11-07 PROCEDURE — P9047 ALBUMIN (HUMAN), 25%, 50ML: HCPCS | Mod: JZ,JG

## 2023-11-07 PROCEDURE — 71000033 HC RECOVERY, INTIAL HOUR: Performed by: SURGERY

## 2023-11-07 PROCEDURE — C1729 CATH, DRAINAGE: HCPCS | Performed by: SURGERY

## 2023-11-07 PROCEDURE — 11971 PR REMOVE TISSUE EXPANDER(S): ICD-10-PCS | Mod: 51,RT,, | Performed by: SURGERY

## 2023-11-07 PROCEDURE — 36590 PR REMOVAL TUNNELED CV CATH W SUBQ PORT OR PUMP: ICD-10-PCS | Mod: 51,RT,, | Performed by: SURGERY

## 2023-11-07 PROCEDURE — 37000008 HC ANESTHESIA 1ST 15 MINUTES: Performed by: SURGERY

## 2023-11-07 PROCEDURE — 63600175 PHARM REV CODE 636 W HCPCS: Performed by: ANESTHESIOLOGY

## 2023-11-07 PROCEDURE — 71000039 HC RECOVERY, EACH ADD'L HOUR: Performed by: SURGERY

## 2023-11-07 PROCEDURE — 15860 PR IV INJ TO TEST BLOOD FLOW IN FLAP/GRAFT: ICD-10-PCS | Mod: 51,,, | Performed by: SURGERY

## 2023-11-07 PROCEDURE — D9220A PRA ANESTHESIA: ICD-10-PCS | Mod: ANES,,, | Performed by: ANESTHESIOLOGY

## 2023-11-07 PROCEDURE — D9220A PRA ANESTHESIA: Mod: CRNA,,,

## 2023-11-07 PROCEDURE — 36000708 HC OR TIME LEV III 1ST 15 MIN: Performed by: SURGERY

## 2023-11-07 PROCEDURE — 63600175 PHARM REV CODE 636 W HCPCS: Performed by: SURGERY

## 2023-11-07 PROCEDURE — 15860 IV NJX TST VASC FLO FLAP/GRF: CPT | Mod: 51,,, | Performed by: SURGERY

## 2023-11-07 PROCEDURE — S2068 BREAST DIEP OR SIEA FLAP: HCPCS | Mod: 82,LT,, | Performed by: SURGERY

## 2023-11-07 PROCEDURE — 19370 PR SURGERY OF BREAST CAPSULE: ICD-10-PCS | Mod: RT,,, | Performed by: SURGERY

## 2023-11-07 PROCEDURE — 36000709 HC OR TIME LEV III EA ADD 15 MIN: Performed by: SURGERY

## 2023-11-07 PROCEDURE — 37000009 HC ANESTHESIA EA ADD 15 MINS: Performed by: SURGERY

## 2023-11-07 PROCEDURE — 82962 GLUCOSE BLOOD TEST: CPT | Performed by: SURGERY

## 2023-11-07 PROCEDURE — 71000016 HC POSTOP RECOV ADDL HR: Performed by: SURGERY

## 2023-11-07 PROCEDURE — 27201423 OPTIME MED/SURG SUP & DEVICES STERILE SUPPLY: Performed by: SURGERY

## 2023-11-07 PROCEDURE — 11971 RMVL TIS XPNDR WO INSJ IMPLT: CPT | Mod: 51,RT,, | Performed by: SURGERY

## 2023-11-07 PROCEDURE — 63600175 PHARM REV CODE 636 W HCPCS

## 2023-11-07 PROCEDURE — 11000001 HC ACUTE MED/SURG PRIVATE ROOM

## 2023-11-07 PROCEDURE — A4216 STERILE WATER/SALINE, 10 ML: HCPCS | Performed by: SURGERY

## 2023-11-07 PROCEDURE — S2068 PR  BREAST DIEP OR SIEA FLAP: ICD-10-PCS | Mod: 82,LT,, | Performed by: SURGERY

## 2023-11-07 DEVICE — COUPLER 2.0MM: Type: IMPLANTABLE DEVICE | Site: CHEST | Status: FUNCTIONAL

## 2023-11-07 RX ORDER — ENOXAPARIN SODIUM 100 MG/ML
30 INJECTION SUBCUTANEOUS EVERY 24 HOURS
Status: DISCONTINUED | OUTPATIENT
Start: 2023-11-07 | End: 2023-11-07

## 2023-11-07 RX ORDER — ONDANSETRON 2 MG/ML
4 INJECTION INTRAMUSCULAR; INTRAVENOUS DAILY PRN
Status: DISCONTINUED | OUTPATIENT
Start: 2023-11-07 | End: 2023-11-07 | Stop reason: HOSPADM

## 2023-11-07 RX ORDER — HEPARIN SODIUM 5000 [USP'U]/ML
5000 INJECTION, SOLUTION INTRAVENOUS; SUBCUTANEOUS
Status: COMPLETED | OUTPATIENT
Start: 2023-11-07 | End: 2023-11-07

## 2023-11-07 RX ORDER — MORPHINE SULFATE 4 MG/ML
2 INJECTION, SOLUTION INTRAMUSCULAR; INTRAVENOUS EVERY 4 HOURS PRN
Status: DISCONTINUED | OUTPATIENT
Start: 2023-11-07 | End: 2023-11-11 | Stop reason: HOSPADM

## 2023-11-07 RX ORDER — ONDANSETRON HYDROCHLORIDE 2 MG/ML
INJECTION, SOLUTION INTRAMUSCULAR; INTRAVENOUS
Status: DISCONTINUED | OUTPATIENT
Start: 2023-11-07 | End: 2023-11-07

## 2023-11-07 RX ORDER — HEPARIN SODIUM 5000 [USP'U]/ML
INJECTION, SOLUTION INTRAVENOUS; SUBCUTANEOUS
Status: COMPLETED
Start: 2023-11-07 | End: 2023-11-07

## 2023-11-07 RX ORDER — CEFAZOLIN SODIUM 1 G/3ML
INJECTION, POWDER, FOR SOLUTION INTRAMUSCULAR; INTRAVENOUS
Status: DISPENSED
Start: 2023-11-07 | End: 2023-11-07

## 2023-11-07 RX ORDER — MIDAZOLAM HYDROCHLORIDE 1 MG/ML
2 INJECTION INTRAMUSCULAR; INTRAVENOUS ONCE AS NEEDED
Status: COMPLETED | OUTPATIENT
Start: 2023-11-07 | End: 2023-11-07

## 2023-11-07 RX ORDER — ONDANSETRON 2 MG/ML
INJECTION INTRAMUSCULAR; INTRAVENOUS
Status: DISCONTINUED | OUTPATIENT
Start: 2023-11-07 | End: 2023-11-07

## 2023-11-07 RX ORDER — FENTANYL CITRATE 50 UG/ML
INJECTION, SOLUTION INTRAMUSCULAR; INTRAVENOUS
Status: DISCONTINUED | OUTPATIENT
Start: 2023-11-07 | End: 2023-11-07

## 2023-11-07 RX ORDER — DEXAMETHASONE SODIUM PHOSPHATE 4 MG/ML
INJECTION, SOLUTION INTRA-ARTICULAR; INTRALESIONAL; INTRAMUSCULAR; INTRAVENOUS; SOFT TISSUE
Status: DISCONTINUED | OUTPATIENT
Start: 2023-11-07 | End: 2023-11-07

## 2023-11-07 RX ORDER — CEFAZOLIN SODIUM 1 G/3ML
1 INJECTION, POWDER, FOR SOLUTION INTRAMUSCULAR; INTRAVENOUS
Status: DISCONTINUED | OUTPATIENT
Start: 2023-11-07 | End: 2023-11-07

## 2023-11-07 RX ORDER — MIDAZOLAM HYDROCHLORIDE 1 MG/ML
INJECTION INTRAMUSCULAR; INTRAVENOUS
Status: COMPLETED
Start: 2023-11-07 | End: 2023-11-07

## 2023-11-07 RX ORDER — CEFAZOLIN SODIUM 2 G/50ML
2 SOLUTION INTRAVENOUS
Status: DISCONTINUED | OUTPATIENT
Start: 2023-11-07 | End: 2023-11-07

## 2023-11-07 RX ORDER — ACETAMINOPHEN 10 MG/ML
1000 INJECTION, SOLUTION INTRAVENOUS ONCE
Status: DISCONTINUED | OUTPATIENT
Start: 2023-11-07 | End: 2023-11-07 | Stop reason: HOSPADM

## 2023-11-07 RX ORDER — HYDROMORPHONE HYDROCHLORIDE 2 MG/ML
INJECTION, SOLUTION INTRAMUSCULAR; INTRAVENOUS; SUBCUTANEOUS
Status: DISCONTINUED | OUTPATIENT
Start: 2023-11-07 | End: 2023-11-07

## 2023-11-07 RX ORDER — SODIUM CHLORIDE 0.9 % (FLUSH) 0.9 %
SYRINGE (ML) INJECTION
Status: DISCONTINUED | OUTPATIENT
Start: 2023-11-07 | End: 2023-11-07 | Stop reason: HOSPADM

## 2023-11-07 RX ORDER — HYDROMORPHONE HYDROCHLORIDE 2 MG/ML
0.2 INJECTION, SOLUTION INTRAMUSCULAR; INTRAVENOUS; SUBCUTANEOUS EVERY 5 MIN PRN
Status: DISCONTINUED | OUTPATIENT
Start: 2023-11-07 | End: 2023-11-07

## 2023-11-07 RX ORDER — ONDANSETRON 2 MG/ML
4 INJECTION INTRAMUSCULAR; INTRAVENOUS EVERY 12 HOURS PRN
Status: DISCONTINUED | OUTPATIENT
Start: 2023-11-07 | End: 2023-11-11 | Stop reason: HOSPADM

## 2023-11-07 RX ORDER — DEXMEDETOMIDINE HYDROCHLORIDE 100 UG/ML
INJECTION, SOLUTION INTRAVENOUS
Status: DISCONTINUED | OUTPATIENT
Start: 2023-11-07 | End: 2023-11-07

## 2023-11-07 RX ORDER — CYCLOBENZAPRINE HCL 10 MG
10 TABLET ORAL 3 TIMES DAILY PRN
Status: DISCONTINUED | OUTPATIENT
Start: 2023-11-07 | End: 2023-11-11 | Stop reason: HOSPADM

## 2023-11-07 RX ORDER — CEFAZOLIN SODIUM 1 G/3ML
INJECTION, POWDER, FOR SOLUTION INTRAMUSCULAR; INTRAVENOUS
Status: DISCONTINUED | OUTPATIENT
Start: 2023-11-07 | End: 2023-11-07 | Stop reason: HOSPADM

## 2023-11-07 RX ORDER — SODIUM CHLORIDE 9 MG/ML
INJECTION, SOLUTION INTRAVENOUS CONTINUOUS
Status: DISCONTINUED | OUTPATIENT
Start: 2023-11-07 | End: 2023-11-11 | Stop reason: HOSPADM

## 2023-11-07 RX ORDER — HYDROMORPHONE HYDROCHLORIDE 2 MG/ML
0.4 INJECTION, SOLUTION INTRAMUSCULAR; INTRAVENOUS; SUBCUTANEOUS EVERY 5 MIN PRN
Status: ACTIVE | OUTPATIENT
Start: 2023-11-07 | End: 2023-11-07

## 2023-11-07 RX ORDER — PAPAVERINE HYDROCHLORIDE 30 MG/ML
INJECTION INTRAMUSCULAR; INTRAVENOUS
Status: DISCONTINUED
Start: 2023-11-07 | End: 2023-11-07 | Stop reason: WASHOUT

## 2023-11-07 RX ORDER — ALBUMIN HUMAN 250 G/1000ML
SOLUTION INTRAVENOUS
Status: COMPLETED
Start: 2023-11-07 | End: 2023-11-07

## 2023-11-07 RX ORDER — CEFAZOLIN SODIUM 1 G/3ML
2 INJECTION, POWDER, FOR SOLUTION INTRAMUSCULAR; INTRAVENOUS
Status: DISCONTINUED | OUTPATIENT
Start: 2023-11-07 | End: 2023-11-07 | Stop reason: HOSPADM

## 2023-11-07 RX ORDER — LIDOCAINE HYDROCHLORIDE 10 MG/ML
INJECTION, SOLUTION EPIDURAL; INFILTRATION; INTRACAUDAL; PERINEURAL
Status: DISCONTINUED | OUTPATIENT
Start: 2023-11-07 | End: 2023-11-07

## 2023-11-07 RX ORDER — LIDOCAINE HYDROCHLORIDE 10 MG/ML
1 INJECTION, SOLUTION EPIDURAL; INFILTRATION; INTRACAUDAL; PERINEURAL ONCE
Status: DISCONTINUED | OUTPATIENT
Start: 2023-11-07 | End: 2023-11-07 | Stop reason: HOSPADM

## 2023-11-07 RX ORDER — CEFAZOLIN SODIUM 2 G/50ML
2 SOLUTION INTRAVENOUS
Status: COMPLETED | OUTPATIENT
Start: 2023-11-07 | End: 2023-11-07

## 2023-11-07 RX ORDER — PROPOFOL 10 MG/ML
VIAL (ML) INTRAVENOUS
Status: DISCONTINUED | OUTPATIENT
Start: 2023-11-07 | End: 2023-11-07

## 2023-11-07 RX ORDER — OXYCODONE AND ACETAMINOPHEN 5; 325 MG/1; MG/1
2 TABLET ORAL EVERY 4 HOURS PRN
Status: DISCONTINUED | OUTPATIENT
Start: 2023-11-07 | End: 2023-11-11 | Stop reason: HOSPADM

## 2023-11-07 RX ORDER — ACETAMINOPHEN 10 MG/ML
INJECTION, SOLUTION INTRAVENOUS
Status: DISCONTINUED | OUTPATIENT
Start: 2023-11-07 | End: 2023-11-07

## 2023-11-07 RX ORDER — PROCHLORPERAZINE EDISYLATE 5 MG/ML
5 INJECTION INTRAMUSCULAR; INTRAVENOUS EVERY 6 HOURS PRN
Status: DISCONTINUED | OUTPATIENT
Start: 2023-11-07 | End: 2023-11-11 | Stop reason: HOSPADM

## 2023-11-07 RX ORDER — METHOCARBAMOL 100 MG/ML
1000 INJECTION, SOLUTION INTRAMUSCULAR; INTRAVENOUS ONCE
Status: COMPLETED | OUTPATIENT
Start: 2023-11-07 | End: 2023-11-07

## 2023-11-07 RX ORDER — SODIUM CHLORIDE, SODIUM LACTATE, POTASSIUM CHLORIDE, CALCIUM CHLORIDE 600; 310; 30; 20 MG/100ML; MG/100ML; MG/100ML; MG/100ML
INJECTION, SOLUTION INTRAVENOUS CONTINUOUS
Status: DISCONTINUED | OUTPATIENT
Start: 2023-11-07 | End: 2023-11-11 | Stop reason: HOSPADM

## 2023-11-07 RX ORDER — ALBUMIN HUMAN 250 G/1000ML
SOLUTION INTRAVENOUS
Status: DISCONTINUED | OUTPATIENT
Start: 2023-11-07 | End: 2023-11-07

## 2023-11-07 RX ORDER — SODIUM CHLORIDE, SODIUM GLUCONATE, SODIUM ACETATE, POTASSIUM CHLORIDE AND MAGNESIUM CHLORIDE 30; 37; 368; 526; 502 MG/100ML; MG/100ML; MG/100ML; MG/100ML; MG/100ML
INJECTION, SOLUTION INTRAVENOUS CONTINUOUS
Status: DISCONTINUED | OUTPATIENT
Start: 2023-11-07 | End: 2023-11-11 | Stop reason: HOSPADM

## 2023-11-07 RX ORDER — INDOCYANINE GREEN AND WATER 25 MG
KIT INJECTION
Status: DISCONTINUED | OUTPATIENT
Start: 2023-11-07 | End: 2023-11-07

## 2023-11-07 RX ORDER — ROCURONIUM BROMIDE 10 MG/ML
INJECTION, SOLUTION INTRAVENOUS
Status: DISCONTINUED | OUTPATIENT
Start: 2023-11-07 | End: 2023-11-07

## 2023-11-07 RX ORDER — HEPARIN SODIUM 5000 [USP'U]/ML
INJECTION, SOLUTION INTRAVENOUS; SUBCUTANEOUS
Status: DISCONTINUED | OUTPATIENT
Start: 2023-11-07 | End: 2023-11-07 | Stop reason: HOSPADM

## 2023-11-07 RX ORDER — ENOXAPARIN SODIUM 100 MG/ML
30 INJECTION SUBCUTANEOUS EVERY 24 HOURS
Status: DISCONTINUED | OUTPATIENT
Start: 2023-11-07 | End: 2023-11-07 | Stop reason: HOSPADM

## 2023-11-07 RX ORDER — DIPHENHYDRAMINE HYDROCHLORIDE 50 MG/ML
25 INJECTION INTRAMUSCULAR; INTRAVENOUS EVERY 6 HOURS PRN
Status: DISCONTINUED | OUTPATIENT
Start: 2023-11-07 | End: 2023-11-07 | Stop reason: HOSPADM

## 2023-11-07 RX ORDER — SODIUM CHLORIDE 9 MG/ML
INJECTION, SOLUTION INTRAMUSCULAR; INTRAVENOUS; SUBCUTANEOUS
Status: DISPENSED
Start: 2023-11-07 | End: 2023-11-07

## 2023-11-07 RX ORDER — SEVOFLURANE 250 ML/250ML
LIQUID RESPIRATORY (INHALATION)
Status: DISPENSED
Start: 2023-11-07 | End: 2023-11-07

## 2023-11-07 RX ADMIN — ONDANSETRON 200 MG: 2 INJECTION INTRAMUSCULAR; INTRAVENOUS at 09:11

## 2023-11-07 RX ADMIN — INSULIN HUMAN 5 UNITS: 100 INJECTION, SOLUTION PARENTERAL at 01:11

## 2023-11-07 RX ADMIN — ROCURONIUM BROMIDE 20 MG: 50 INJECTION INTRAVENOUS at 08:11

## 2023-11-07 RX ADMIN — DEXMEDETOMIDINE 6 MCG: 200 INJECTION, SOLUTION INTRAVENOUS at 12:11

## 2023-11-07 RX ADMIN — SODIUM CHLORIDE, POTASSIUM CHLORIDE, SODIUM LACTATE AND CALCIUM CHLORIDE: 600; 310; 30; 20 INJECTION, SOLUTION INTRAVENOUS at 12:11

## 2023-11-07 RX ADMIN — MINERAL OIL AND WHITE PETROLATUM 0.25 G: 150; 830 OINTMENT OPHTHALMIC at 07:11

## 2023-11-07 RX ADMIN — ONDANSETRON 100 MG: 2 INJECTION INTRAMUSCULAR; INTRAVENOUS at 10:11

## 2023-11-07 RX ADMIN — OXYCODONE HYDROCHLORIDE AND ACETAMINOPHEN 2 TABLET: 5; 325 TABLET ORAL at 08:11

## 2023-11-07 RX ADMIN — DEXMEDETOMIDINE 4 MCG: 200 INJECTION, SOLUTION INTRAVENOUS at 12:11

## 2023-11-07 RX ADMIN — HYDROMORPHONE HYDROCHLORIDE 0.2 MG: 2 INJECTION, SOLUTION INTRAMUSCULAR; INTRAVENOUS; SUBCUTANEOUS at 12:11

## 2023-11-07 RX ADMIN — DEXMEDETOMIDINE 4 MCG: 200 INJECTION, SOLUTION INTRAVENOUS at 09:11

## 2023-11-07 RX ADMIN — SODIUM CHLORIDE, POTASSIUM CHLORIDE, SODIUM LACTATE AND CALCIUM CHLORIDE: 600; 310; 30; 20 INJECTION, SOLUTION INTRAVENOUS at 06:11

## 2023-11-07 RX ADMIN — CEFAZOLIN SODIUM 2 G: 2 SOLUTION INTRAVENOUS at 06:11

## 2023-11-07 RX ADMIN — ROCURONIUM BROMIDE 50 MG: 50 INJECTION INTRAVENOUS at 07:11

## 2023-11-07 RX ADMIN — HEPARIN SODIUM 5000 UNITS: 5000 INJECTION, SOLUTION INTRAVENOUS; SUBCUTANEOUS at 06:11

## 2023-11-07 RX ADMIN — ROCURONIUM BROMIDE 20 MG: 50 INJECTION INTRAVENOUS at 07:11

## 2023-11-07 RX ADMIN — METHOCARBAMOL 1000 MG: 100 INJECTION INTRAMUSCULAR; INTRAVENOUS at 12:11

## 2023-11-07 RX ADMIN — DEXMEDETOMIDINE 4 MCG: 200 INJECTION, SOLUTION INTRAVENOUS at 07:11

## 2023-11-07 RX ADMIN — ONDANSETRON 4 MG: 2 INJECTION INTRAMUSCULAR; INTRAVENOUS at 12:11

## 2023-11-07 RX ADMIN — ENOXAPARIN SODIUM 30 MG: 30 INJECTION SUBCUTANEOUS at 12:11

## 2023-11-07 RX ADMIN — SODIUM CHLORIDE, POTASSIUM CHLORIDE, SODIUM LACTATE AND CALCIUM CHLORIDE: 600; 310; 30; 20 INJECTION, SOLUTION INTRAVENOUS at 08:11

## 2023-11-07 RX ADMIN — ROCURONIUM BROMIDE 20 MG: 50 INJECTION INTRAVENOUS at 09:11

## 2023-11-07 RX ADMIN — PROCHLORPERAZINE EDISYLATE 5 MG: 5 INJECTION INTRAMUSCULAR; INTRAVENOUS at 08:11

## 2023-11-07 RX ADMIN — DEXTROSE MONOHYDRATE 2 G: 50 INJECTION, SOLUTION INTRAVENOUS at 06:11

## 2023-11-07 RX ADMIN — DEXMEDETOMIDINE 4 MCG: 200 INJECTION, SOLUTION INTRAVENOUS at 08:11

## 2023-11-07 RX ADMIN — MIDAZOLAM HYDROCHLORIDE 2 MG: 1 INJECTION, SOLUTION INTRAMUSCULAR; INTRAVENOUS at 06:11

## 2023-11-07 RX ADMIN — ONDANSETRON 4 MG: 2 INJECTION INTRAMUSCULAR; INTRAVENOUS at 02:11

## 2023-11-07 RX ADMIN — ONDANSETRON 100 MG: 2 INJECTION INTRAMUSCULAR; INTRAVENOUS at 09:11

## 2023-11-07 RX ADMIN — ROCURONIUM BROMIDE 20 MG: 50 INJECTION INTRAVENOUS at 11:11

## 2023-11-07 RX ADMIN — PROPOFOL 100 MG: 10 INJECTION, EMULSION INTRAVENOUS at 07:11

## 2023-11-07 RX ADMIN — LIDOCAINE HYDROCHLORIDE 50 MG: 10 INJECTION, SOLUTION EPIDURAL; INFILTRATION; INTRACAUDAL; PERINEURAL at 07:11

## 2023-11-07 RX ADMIN — HYDROMORPHONE HYDROCHLORIDE 0.2 MG: 2 INJECTION, SOLUTION INTRAMUSCULAR; INTRAVENOUS; SUBCUTANEOUS at 08:11

## 2023-11-07 RX ADMIN — CYCLOBENZAPRINE 10 MG: 10 TABLET, FILM COATED ORAL at 06:11

## 2023-11-07 RX ADMIN — FENTANYL CITRATE 100 MCG: 50 INJECTION, SOLUTION INTRAMUSCULAR; INTRAVENOUS at 07:11

## 2023-11-07 RX ADMIN — HYDROMORPHONE HYDROCHLORIDE 0.4 MG: 2 INJECTION, SOLUTION INTRAMUSCULAR; INTRAVENOUS; SUBCUTANEOUS at 12:11

## 2023-11-07 RX ADMIN — PROPOFOL 200 MG: 10 INJECTION, EMULSION INTRAVENOUS at 07:11

## 2023-11-07 RX ADMIN — DEXTROSE MONOHYDRATE 1 G: 50 INJECTION, SOLUTION INTRAVENOUS at 10:11

## 2023-11-07 RX ADMIN — INDOCYANINE GREEN AND WATER 8.33 MG: KIT at 09:11

## 2023-11-07 RX ADMIN — DEXAMETHASONE SODIUM PHOSPHATE 4 MG: 4 INJECTION, SOLUTION INTRA-ARTICULAR; INTRALESIONAL; INTRAMUSCULAR; INTRAVENOUS; SOFT TISSUE at 07:11

## 2023-11-07 RX ADMIN — SODIUM CHLORIDE: 9 INJECTION, SOLUTION INTRAVENOUS at 11:11

## 2023-11-07 RX ADMIN — SODIUM CHLORIDE: 9 INJECTION, SOLUTION INTRAVENOUS at 01:11

## 2023-11-07 RX ADMIN — ALBUMIN (HUMAN) 50 ML: 12.5 SOLUTION INTRAVENOUS at 07:11

## 2023-11-07 RX ADMIN — MIDAZOLAM HYDROCHLORIDE 2 MG: 1 INJECTION INTRAMUSCULAR; INTRAVENOUS at 06:11

## 2023-11-07 RX ADMIN — ONDANSETRON 200 MG: 2 INJECTION INTRAMUSCULAR; INTRAVENOUS at 08:11

## 2023-11-07 RX ADMIN — SUGAMMADEX 300 MG: 100 INJECTION, SOLUTION INTRAVENOUS at 12:11

## 2023-11-07 RX ADMIN — INSULIN HUMAN 5 UNITS: 100 INJECTION, SOLUTION PARENTERAL at 12:11

## 2023-11-07 RX ADMIN — ROCURONIUM BROMIDE 20 MG: 50 INJECTION INTRAVENOUS at 10:11

## 2023-11-07 RX ADMIN — ACETAMINOPHEN 1000 MG: 10 INJECTION, SOLUTION INTRAVENOUS at 07:11

## 2023-11-07 NOTE — ANESTHESIA POSTPROCEDURE EVALUATION
Anesthesia Post Evaluation    Patient: Elina Ham    Procedure(s) Performed: Procedure(s) (LRB):  RECONSTRUCTION, BREAST, USING BENSON SKIN FLAP (Bilateral breast recon with BENSON flaps, possible TRAM flaps, possible hernia repair with mesh, use of intraoperative fluoroscein angiography. (Bilateral)  REMOVAL, VASCULAR ACCESS PORT (Right)    Final Anesthesia Type: general      Patient location during evaluation: PACU  Patient participation: Yes- Able to Participate  Level of consciousness: awake and alert  Post-procedure vital signs: reviewed and stable  Pain management: adequate  Airway patency: patent      Anesthetic complications: no      Cardiovascular status: blood pressure returned to baseline  Respiratory status: unassisted  Hydration status: euvolemic  Follow-up not needed.          Vitals Value Taken Time   BP 94/51 11/07/23 1242   Temp ** 11/07/23 1248   Pulse 81 11/07/23 1247   Resp 20 11/07/23 1247   SpO2 100 % 11/07/23 1247   Vitals shown include unvalidated device data.      No case tracking events are documented in the log.      Pain/Loan Score: No data recorded

## 2023-11-07 NOTE — ANESTHESIA PROCEDURE NOTES
Intubation    Date/Time: 11/7/2023 7:08 AM    Performed by: Betsey Larkin CRNA  Authorized by: Ray Quiroz MD    Intubation:     Induction:  Intravenous    Intubated:  Postinduction    Mask Ventilation:  Easy with oral airway    Attempts:  1    Attempted By:  CRNA    Method of Intubation:  Direct    Blade:  Faulkner 2    Laryngeal View Grade: Grade IIA - cords partially seen      Difficult Airway Encountered?: No      Complications:  None    Airway Device:  Oral endotracheal tube    Airway Device Size:  7.0    Style/Cuff Inflation:  Cuffed (inflated to minimal occlusive pressure)    Inflation Amount (mL):  6    Tube secured:  21    Secured at:  The lips    Placement Verified By:  Capnometry    Complicating Factors:  None    Findings Post-Intubation:  BS equal bilateral

## 2023-11-07 NOTE — CARE UPDATE
Received patient from the OR, she is asleep, oral airway in place, chin lift maintained, respirations full-regular-deep-clear,hob up 30 degrees. Bilateral pulses to breasts audible with doppler and correspond to her apical pulse, bilateral breast graphs are cool to touch, their color corresponds to her natural skin pigment, bilateral breasts are soft to touch.

## 2023-11-07 NOTE — TRANSFER OF CARE
Anesthesia Transfer of Care Note    Patient: Elina Ham    Procedure(s) Performed: Procedure(s) (LRB):  RECONSTRUCTION, BREAST, USING BENSON SKIN FLAP (Bilateral breast recon with BENSON flaps, possible TRAM flaps, possible hernia repair with mesh, use of intraoperative fluoroscein angiography. (Bilateral)  REMOVAL, VASCULAR ACCESS PORT (Right)    Patient location: PACU    Anesthesia Type: general    Transport from OR: Transported from OR on room air with adequate spontaneous ventilation    Post pain: adequate analgesia    Post assessment: no apparent anesthetic complications and tolerated procedure well    Post vital signs: stable    Level of consciousness: responds to stimulation    Nausea/Vomiting: no nausea/vomiting    Complications: none    Transfer of care protocol was followed      Last vitals:

## 2023-11-07 NOTE — CARE UPDATE
Dr. Quiroz updated on her status, v/s, post op CBG of 249 - see order for insulin and see mar for time/dosage given.

## 2023-11-07 NOTE — OP NOTE
SURGEON:  Nick Gibbs MD  ASSISTANT SURGEON:  Jarocho Ordaz MD     PREOPERATIVE DIAGNOSIS:  Breast cancer and bilateral acquired amastia     POSTOPERATIVE DIAGNOSIS:  Breast cancer and bilateral acquired amastia     PROCEDURE PERFORMED:    1.      Right sided breast reconstruction with deep inferior epigastric artery  flap from right hemiabdomen (CPT code -JL)  2.      Right partial third rib resection (CPT code 76455- 59)  3.      Assistant surgeon to left sided breast reconstruction with deep inferior epigastric artery  flap from left hemiabdomen (CPT code -PR)  4.      Removal of right chest mediport  5.      Intraoperative laser angiography using indocyanine green (CPT Codes 05540 and 49559)  6. Removal right chest tissue expander  7. Right breast capsulotomy        INDICATIONS FOR PROCEDURE:  This is a very pleasant female who has a history of breast cancer, she was treated with bilateral mastectomies.  She presents for bilateral breast reconstruction revision, now using abdominal-based tissue. We discussed the options in detail with the patient and she elected to proceed with bilateral reconstruction using deep inferior epigastric artery  flaps from her abdominal wall.  We discussed in detail the risks, benefits, and potential complications of surgery including wound healing complications, flap loss, the need for additional surgery, infection, and other related complications.  The patient signed the informed consent.     FINDINGS: The right breast was reconstructed with a flap from the right hemiabdomen. In addition, please note that the left breast was reconstructed with a flap from the left hemiabdomen, which was performed by Dr. Jarocho Ordaz working in tandem. Please see his separate operative note for complete details of his portion of the procedure. I was first assistant to his portion of the case. Due to the complex nature of the operation it was necessary  to use two skilled microsurgeons.     ANESTHESIA:  General.     COMPLICATIONS:  None.     DRAINS: 15 Slovenian drains at each surgical site     SPECIMENS:  Right and left breast tissue sent to pathology      CONDITION: Stable     DISPOSTION: To PACU then admission         PROCEDURE IN DETAIL: Dr. Ordaz and I worked in tandem, due to the complex nature of the operation; this required the use of two operating microsurgeons.       Small incision was made around the right chest mediport incision, the mediport was freed, 2-0 silk suture was placed around the mediport tract.  The mediport was removed, the suture was tied down and pressure was held for 10 minutes.  Wound closed with 0 Vicryl suture in deep dermis and 3-0 monocryl subcuticualar     I then made an incision in the right mastectomy skin along the old scar using a 10-blade scalpel, and Bovie cautery was used to dissect down through subcutaneous tissue into the breast pocket. Tissue expander was removed. A capsulotomy was performed circumferentially and radially to allow final insetting of the flaps. Removed skin sent as specimen.     I then turned my attention to the abdomen.  I made an incision elliptically, first inferiorly and then superiorly, approximately 2 cm above the umbilicus, to capture all the perforating vessels.  I first began by elevating therightt manuela abdomen.  I made an incision using the 10 blade scalpel down the midline to split the abdominal flaps in half.  The umbilicus was isolated using single hooks, a 15 blade scalpel followed by curves yañez scissors.  Once the umbilicus was free of the abdominal flaps, I began my dissection medial to lateral and lateral to medial.  I identified the dominant perforators.   This was meticulously dissected circumferentially to the level of the fascia.  The fascia was then split longitudinally and the  traced through the rectus muscle.  I used a combination of sharp and blunt dissection, as well  as clips and the bipolar cautery, being a careful not to sacrifice any additional fascia or muscle, for a true  flap.  The pedicle was traced down to the origin of the inferior epigastric artery meticulously. Once the dissection was carried out to this point, I stopped and stapled the flap back up into place, and then turned my attention to the chest.       I next turned my attention to the right chest, and removed the cartilage from the most medial portion of the 3rd rib.  After elevating the pectoralis major muscle off the rib, the perichondrium was elevated and a rongeur was used to remove the rib, intercostal muscles were then removed using bipolar cautery, being careful not to injure the perforating vessels up from the EDUARDO.  Once the rib was removed and the intercostal muscle was removed, I incised the posterior the perichondrium.  This was incised sharply and blunt dissection was carried out lateral to medial.  EDUARDO and IMV were identified in their entirety.  These were dissected free and the branches were clipped and divided.  I then began the microvascular portion of procedure.       I first began by utilizing the right manuela abdomen to reconstruct the right breast.  I brought in the operating room microscope and finished preparing the IMV and EDUARDO.  I then clipped and divided the inferior epigastric artery and vein in the right hemiabdomen and brought the flap up into the right chest.  The flap artery was flushed with heparinized saline until the veins ran clear.  I placed Acland clamps on the EDUARDO, IMV and clips and divided them distally.  Using a double Acland clamp, I anastomosed the EDUARDO to the artery of the flap using interrupted 9-0 nylon in interrupted fashion.  After this was completed, a an appropriately sized  was chosen and I coupled the vein in end-to-end fashion.  Once the Acland clamps were released, the artery hand a strong pulse and the veins ran easily.      We then utilized the  left manuela abdomen to reconstruct the left breast.  Please see Dr. Ordaz's operative note for complete details of this portion of the procedure.        Intraoperative laser angiography was then carried out using indocyanine green.  We injected fluorescein into the patient's vein, and then we used the system to evaluate the perfusion to the bilateral flaps as well as the bilateral mastectomy flaps.  Everything appeared to have excellent perfusion.   We then de-epithelialized the flaps appropriately and inset the flaps using interrupted 3-0 monocryl suture followed by running 3-0 monocryl, after a 15-Australian round Aneudy drains were left in the operative bed.       We then turned our attention to the abdomen, using 0 PDS to close both fascial incisions. We then irrigated out the entire abdomen with sterile saline solution, placed two 15-Australian round Aneudy drains, closed the deep tissues of the abdomen including Carol fascia using interrupted 0 PDS suture, closed the skin using a running 3-0 monocryl.  An elliptical incision was made for the umbilicus, and this was inset using interrupted 3-0 monocryl.          We injected Exparel throughout the abdomen and chest wounds.  There was a strong Doppler signal in both flaps.  The flaps were pink and bled at the end of the case.  Sterile dressing was applied.  No complications.  I was scrubbed and present for the entire procedure.

## 2023-11-07 NOTE — OP NOTE
SURGEON:  Jarocho Ordaz MD    ASSISTANT SURGEON:  Enrique Gibbs MD       PREOPERATIVE DIAGNOSIS:  Breast cancer and bilateral acquired amastia    POSTOPERATIVE DIAGNOSIS:  Breast cancer and bilateral acquired amastia    PROCEDURE PERFORMED:     1.      Left sided breast reconstruction with deep inferior epigastric artery  flap from left hemiabdomen (CPT code -ND)    2.      Left partial third rib resection (CPT code 46517- 59)    3.      Assistant surgeon to right sided breast reconstruction with deep inferior epigastric artery  flap from right hemiabdomen (CPT code -HN)    4.      Left breast reconstruction revision with capsulotomy. (CPT code 96787)    5.      Removal of left breast tissue expander. (CPT code 68511)?    6.      Intraoperative laser angiography using indocyanine green (CPT Codes 57289 and 09113)        INDICATIONS FOR PROCEDURE:  This is a very pleasant female who has a history of breast cancer, she was treated with bilateral mastectomies.  She is status post breast reconstruction with tissue expander placement.  She presents for bilateral breast reconstruction revision, now using abdominal-based tissue. We discussed the options in detail with the patient and she elected to proceed with bilateral reconstruction using deep inferior epigastric artery  flaps from her abdominal wall.  We discussed in detail the risks, benefits, and potential complications of surgery including wound healing complications, flap loss, the need for additional surgery, infection, and other related complications.  The patient signed the informed consent.       FINDINGS: The left breast was reconstructed with a flap from the left hemiabdomen. In addition, please note that the right breast was reconstructed with a flap from the right hemiabdomen, which was performed by Dr. Nick Gibbs working in tandem. Please see his separate operative note for complete details of his portion of the  procedure. I was first assistant to his portion of the case. Due to the complex nature of the operation it was necessary to use two skilled microsurgeons.         ANESTHESIA:  General.         COMPLICATIONS:  None.         DRAINS: 15 Maldivian drains at each surgical site         SPECIMENS:  Right and left breast tissue sent to pathology         CONDITION: Stable         DISPOSTION: To PACU then admission              PROCEDURE IN DETAIL: Dr. Gibbs and I worked in tandem, due to the complex nature of the operation; this required the use of two operating microsurgeons.          I first began by revising the previous breast reconstruction.  I first made an incision in the left mastectomy skin along the old scar using a 10-blade scalpel, and Bovie cautery was used to dissect down through subcutaneous tissue into the breast pocket. The tissue expander on the left was removed and a capsulotomy was performed circumferentially and radially to allow final insetting of the flaps.         I then turned my attention to the abdomen.  I made an incision elliptically, first inferiorly and then superiorly, approximately 2 cm above the umbilicus, to capture all the perforating vessels.  I first began by elevating the left manuela abdomen.  I made an incision using the 10 blade scalpel down the midline to split the abdominal flaps in half.  The umbilicus was isolated using single hooks, a 15 blade scalpel followed by curves yañez scissors.  Once the umbilicus was free of the abdominal flaps, I began my dissection medial to lateral and lateral to medial.  I identified the dominant perforators.   This was meticulously dissected circumferentially to the level of the fascia.  The fascia was then split longitudinally and the  traced through the rectus muscle.  I used a combination of sharp and blunt dissection, as well as clips and the bipolar cautery, being a careful not to sacrifice any additional fascia or muscle, for a true   flap.  The pedicle was traced down to the origin of the inferior epigastric artery meticulously. Once the dissection was carried out to this point, I stopped and stapled the flap back up into place, and then turned my attention to the chest.          I next turned my attention to the left chest, and removed the cartilage from the most medial portion of the 3rd rib.  After elevating the pectoralis major muscle off the rib, the perichondrium was elevated and a rongeur was used to remove the rib, intercostal muscles were then removed using bipolar cautery, being careful not to injure the perforating vessels up from the EDUARDO.  Once the rib was removed and the intercostal muscle was removed, I incised the posterior the perichondrium.  This was incised sharply and blunt dissection was carried out lateral to medial.  EDUARDO and IMV were identified in their entirety.  These were dissected free and the branches were clipped and divided.  I then began the microvascular portion of procedure.          I first began by utilizing the left manuela abdomen to reconstruct the left breast.  I brought in the operating room microscope and finished preparing the IMV and EDUARDO.  I then clipped and divided the inferior epigastric artery and vein in the right hemiabdomen and brought the flap up into the left chest.  The flap artery was flushed with heparinized saline until the veins ran clear.  I placed Acland clamps on the EDUARDO, IMV and clips and divided them distally.  Using a double Acland clamp, I anastomosed the EDUARDO to the artery of the flap using interrupted 9-0 nylon in interrupted fashion.  After this was completed, a an appropriately sized  was chosen with an implanted Doppler within in, and I coupled the vein in end-to-end fashion.  Once the Acland clamps were released, the artery hand a strong pulse and the veins ran easily.         We then utilized the right manuela abdomen to reconstruct the right breast.  Please see   [facility] operative note for complete details of this portion of the procedure.            Intraoperative laser angiography was then carried out using indocyanine green.  We injected fluorescein into the patients vein, and then we used the system to evaluate the perfusion to the bilateral flaps as well as the bilateral mastectomy flaps.  Everything appeared to have excellent perfusion.   We then de-epithelialized the flaps appropriately and inset the flaps using interrupted 3-0 monocryl suture followed by running 3-0 monocryl, after a 15-Finnish round Aneudy drains were left in the operative bed.          We then turned our attention to the abdomen, using 0 PDS to close both fascial incisions. We then irrigated out the entire abdomen with sterile saline solution, placed two 15-Finnish round Aneudy drains, closed the deep tissues of the abdomen including Carol fascia using interrupted 0 PDS suture, closed the skin using a running 3-0 monocryl.  An elliptical incision was made for the umbilicus, and this was inset using interrupted 3-0 monocryl.               We injected Exparel throughout the abdomen and chest wounds.  There was a strong Doppler signal in both flaps.  The flaps were pink and bled at the end of the case.  Sterile dressing was applied.  No complications.  I was scrubbed and present for the entire procedure.

## 2023-11-07 NOTE — CARE UPDATE
Updated Dr. Quiroz on her post insulin injection CBG of 239.  See order for an additional 5units of insulin IVP and see mar documentation.

## 2023-11-07 NOTE — ANESTHESIA PREPROCEDURE EVALUATION
"                                                                                                             11/07/2023  Elina Ham is a 63 y.o., female with breast cancer for BENSON breast reconstruction.  She did well under general anesthesia in the recent past.  She denies cardiopulmonary complaints.    "  Assessment:   1. Stage IIIA (pT2, pN1a, cM0, G3) triple negative left breast cancer     2. Stage IA (pT1b, pN0, cM0, G1) Er+ IL+ right breast cancer     3. Pulmonary emboli. Continue eliquis 5mg BID then discuss 2.5mg BID after surgery     4. Pancreatic cyst   Initially mentioned on October 18, 2022 scan as a 1.2 cm cyst in the tail of the pancreas.   Repeat imaging in December of 2022 showed a stable size of the cyst.      March of 2023 shows this 13 mm cystic lesion  I do think it was possibly present on the August 22, 2022 scan, but I can not see it on the July 2016 scan but will defer to Gastroenterology   Continue follow up with Dr. Queen  5. 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 Consider neurology referral"        Pre-op Assessment    I have reviewed the Patient Summary Reports.     I have reviewed the Nursing Notes. I have reviewed the NPO Status.   I have reviewed the Medications.     Review of Systems  Anesthesia Hx:  No problems with previous Anesthesia  Denies Family Hx of Anesthesia complications.   Denies Personal Hx of Anesthesia complications.   Cardiovascular:   Exercise tolerance: good Hypertension    Pulmonary:  Pulmonary Normal    Endocrine:   Diabetes        Physical Exam  General: Well nourished, Cooperative, Alert and Oriented    Airway:  Mallampati: II   Mouth Opening: Normal  TM Distance: Normal  Tongue: Normal  Neck ROM: Normal ROM    Dental:  Intact    Chest/Lungs:  Clear to auscultation, Normal Respiratory Rate    Heart:  Rate: Normal  Rhythm: Regular Rhythm        Anesthesia Plan  Type of Anesthesia, risks & benefits " discussed:    Anesthesia Type: Gen ETT  Intra-op Monitoring Plan: Standard ASA Monitors  Post Op Pain Control Plan: multimodal analgesia and IV/PO Opioids PRN  Induction:  IV  Airway Plan: Direct, Post-Induction  Informed Consent: Informed consent signed with the Patient and all parties understand the risks and agree with anesthesia plan.  All questions answered.   ASA Score: 2  Day of Surgery Review of History & Physical: H&P Update referred to the surgeon/provider.    Ready For Surgery From Anesthesia Perspective.     .

## 2023-11-07 NOTE — CARE UPDATE
Bilateral breast doppler pulses heard and correspond to her apical pulse, bilateral breast graphs are now warm to touch and correspond to her normal skin pigment color, both flaps are soft to touch.

## 2023-11-07 NOTE — CARE UPDATE
Bilateral breast pulses audible with doppler and correspond to her apical pulses, bilateral breast graphs cool to touch , skin color corresponds to her normal skin pigment, breast flaps are soft to touch. Surgeon is aware of assessments-no new orders given.

## 2023-11-07 NOTE — CARE UPDATE
Bilateral breast doppler readings audible and correspond with her apical pulse, bilateral breast flaps remain warm to touch and their color correspond with her normal skin pigment, bilateral breast flaps remains soft to touch.

## 2023-11-07 NOTE — CARE UPDATE
Dr. Figueroa updated on her status, v/s, 3rd post op CBG of 215.  No new orders given. He approved her for transfer to her room at any time.

## 2023-11-07 NOTE — CARE UPDATE
Dr. Figueroa rounding and updated on her status, v/s, meds given, repeat of cbg's post op.  His order was to repeat cbg at 1405 and notify him.

## 2023-11-08 LAB
BASOPHILS # BLD AUTO: 0 X10(3)/MCL
BASOPHILS NFR BLD AUTO: 0 %
EOSINOPHIL # BLD AUTO: 0 X10(3)/MCL (ref 0–0.9)
EOSINOPHIL NFR BLD AUTO: 0 %
ERYTHROCYTE [DISTWIDTH] IN BLOOD BY AUTOMATED COUNT: 13.1 % (ref 11.5–17)
HCT VFR BLD AUTO: 26.3 % (ref 37–47)
HGB BLD-MCNC: 9 G/DL (ref 12–16)
IMM GRANULOCYTES # BLD AUTO: 0.03 X10(3)/MCL (ref 0–0.04)
IMM GRANULOCYTES NFR BLD AUTO: 0.4 %
LYMPHOCYTES # BLD AUTO: 1.92 X10(3)/MCL (ref 0.6–4.6)
LYMPHOCYTES NFR BLD AUTO: 24.4 %
MCH RBC QN AUTO: 30.7 PG (ref 27–31)
MCHC RBC AUTO-ENTMCNC: 34.2 G/DL (ref 33–36)
MCV RBC AUTO: 89.8 FL (ref 80–94)
MONOCYTES # BLD AUTO: 0.74 X10(3)/MCL (ref 0.1–1.3)
MONOCYTES NFR BLD AUTO: 9.4 %
NEUTROPHILS # BLD AUTO: 5.17 X10(3)/MCL (ref 2.1–9.2)
NEUTROPHILS NFR BLD AUTO: 65.8 %
NRBC BLD AUTO-RTO: 0 %
PLATELET # BLD AUTO: 239 X10(3)/MCL (ref 130–400)
PMV BLD AUTO: 9.7 FL (ref 7.4–10.4)
RBC # BLD AUTO: 2.93 X10(6)/MCL (ref 4.2–5.4)
WBC # SPEC AUTO: 7.86 X10(3)/MCL (ref 4.5–11.5)

## 2023-11-08 PROCEDURE — 25000003 PHARM REV CODE 250: Performed by: SURGERY

## 2023-11-08 PROCEDURE — 99231 SBSQ HOSP IP/OBS SF/LOW 25: CPT | Mod: ,,, | Performed by: SURGERY

## 2023-11-08 PROCEDURE — 63600175 PHARM REV CODE 636 W HCPCS: Performed by: NURSE PRACTITIONER

## 2023-11-08 PROCEDURE — 85025 COMPLETE CBC W/AUTO DIFF WBC: CPT | Performed by: SURGERY

## 2023-11-08 PROCEDURE — 63600175 PHARM REV CODE 636 W HCPCS: Performed by: SURGERY

## 2023-11-08 PROCEDURE — 63600175 PHARM REV CODE 636 W HCPCS: Performed by: ANESTHESIOLOGY

## 2023-11-08 PROCEDURE — 99231 PR SUBSEQUENT HOSPITAL CARE,LEVL I: ICD-10-PCS | Mod: ,,, | Performed by: SURGERY

## 2023-11-08 PROCEDURE — 25000003 PHARM REV CODE 250: Performed by: NURSE PRACTITIONER

## 2023-11-08 PROCEDURE — 11000001 HC ACUTE MED/SURG PRIVATE ROOM

## 2023-11-08 RX ORDER — LOSARTAN POTASSIUM 25 MG/1
100 TABLET ORAL DAILY
Status: DISCONTINUED | OUTPATIENT
Start: 2023-11-08 | End: 2023-11-11 | Stop reason: HOSPADM

## 2023-11-08 RX ORDER — PANTOPRAZOLE SODIUM 20 MG/1
20 TABLET, DELAYED RELEASE ORAL EVERY MORNING
Status: DISCONTINUED | OUTPATIENT
Start: 2023-11-08 | End: 2023-11-11 | Stop reason: HOSPADM

## 2023-11-08 RX ORDER — ENOXAPARIN SODIUM 100 MG/ML
30 INJECTION SUBCUTANEOUS EVERY 24 HOURS
Status: COMPLETED | OUTPATIENT
Start: 2023-11-08 | End: 2023-11-08

## 2023-11-08 RX ORDER — AMLODIPINE BESYLATE 5 MG/1
10 TABLET ORAL DAILY
Status: DISCONTINUED | OUTPATIENT
Start: 2023-11-08 | End: 2023-11-11 | Stop reason: HOSPADM

## 2023-11-08 RX ORDER — LOSARTAN POTASSIUM AND HYDROCHLOROTHIAZIDE 25; 100 MG/1; MG/1
1 TABLET ORAL DAILY
Status: DISCONTINUED | OUTPATIENT
Start: 2023-11-08 | End: 2023-11-08

## 2023-11-08 RX ORDER — ASPIRIN 81 MG/1
81 TABLET ORAL DAILY
Status: DISCONTINUED | OUTPATIENT
Start: 2023-11-08 | End: 2023-11-11 | Stop reason: HOSPADM

## 2023-11-08 RX ORDER — HYDROCHLOROTHIAZIDE 25 MG/1
25 TABLET ORAL DAILY
Status: DISCONTINUED | OUTPATIENT
Start: 2023-11-08 | End: 2023-11-11 | Stop reason: HOSPADM

## 2023-11-08 RX ORDER — ATORVASTATIN CALCIUM 10 MG/1
20 TABLET, FILM COATED ORAL NIGHTLY
Status: DISCONTINUED | OUTPATIENT
Start: 2023-11-08 | End: 2023-11-11 | Stop reason: HOSPADM

## 2023-11-08 RX ORDER — LETROZOLE 2.5 MG/1
2.5 TABLET, FILM COATED ORAL DAILY
Status: DISCONTINUED | OUTPATIENT
Start: 2023-11-08 | End: 2023-11-11 | Stop reason: HOSPADM

## 2023-11-08 RX ORDER — METFORMIN HYDROCHLORIDE 500 MG/1
1000 TABLET ORAL
Status: DISCONTINUED | OUTPATIENT
Start: 2023-11-08 | End: 2023-11-11 | Stop reason: HOSPADM

## 2023-11-08 RX ORDER — GLIPIZIDE 5 MG/1
10 TABLET ORAL 2 TIMES DAILY
Status: DISCONTINUED | OUTPATIENT
Start: 2023-11-08 | End: 2023-11-11 | Stop reason: HOSPADM

## 2023-11-08 RX ORDER — CEPHALEXIN 500 MG/1
500 CAPSULE ORAL EVERY 6 HOURS
Status: DISCONTINUED | OUTPATIENT
Start: 2023-11-08 | End: 2023-11-11 | Stop reason: HOSPADM

## 2023-11-08 RX ADMIN — CEPHALEXIN 500 MG: 500 CAPSULE ORAL at 12:11

## 2023-11-08 RX ADMIN — OXYCODONE HYDROCHLORIDE AND ACETAMINOPHEN 2 TABLET: 5; 325 TABLET ORAL at 07:11

## 2023-11-08 RX ADMIN — CEPHALEXIN 500 MG: 500 CAPSULE ORAL at 06:11

## 2023-11-08 RX ADMIN — OXYCODONE HYDROCHLORIDE AND ACETAMINOPHEN 2 TABLET: 5; 325 TABLET ORAL at 01:11

## 2023-11-08 RX ADMIN — OXYCODONE HYDROCHLORIDE AND ACETAMINOPHEN 2 TABLET: 5; 325 TABLET ORAL at 08:11

## 2023-11-08 RX ADMIN — CYCLOBENZAPRINE 10 MG: 10 TABLET, FILM COATED ORAL at 08:11

## 2023-11-08 RX ADMIN — ONDANSETRON 4 MG: 2 INJECTION INTRAMUSCULAR; INTRAVENOUS at 07:11

## 2023-11-08 RX ADMIN — ENOXAPARIN SODIUM 30 MG: 30 INJECTION SUBCUTANEOUS at 09:11

## 2023-11-08 NOTE — PROGRESS NOTES
PRS  POD 1 bilateral randal  She is doing well  Flaps warm and soft, excellent doppler signals  Incisions all cdi  Drains ss  Hgb 9    A/P:  Doing well after surgery yesterday.  Flaps well perfused.  - flap checks q2  - lovenox this am and restart Eliquis and ASA as per her medical team recs  - restart home meds  - OOB, ambulate  - D/C Cedeno  - switch to PO Keflex

## 2023-11-09 LAB — PSYCHE PATHOLOGY RESULT: NORMAL

## 2023-11-09 PROCEDURE — 25000003 PHARM REV CODE 250: Performed by: NURSE PRACTITIONER

## 2023-11-09 PROCEDURE — 99231 PR SUBSEQUENT HOSPITAL CARE,LEVL I: ICD-10-PCS | Mod: ,,, | Performed by: SURGERY

## 2023-11-09 PROCEDURE — 11000001 HC ACUTE MED/SURG PRIVATE ROOM

## 2023-11-09 PROCEDURE — 25000003 PHARM REV CODE 250: Performed by: SURGERY

## 2023-11-09 PROCEDURE — 99231 SBSQ HOSP IP/OBS SF/LOW 25: CPT | Mod: ,,, | Performed by: SURGERY

## 2023-11-09 RX ADMIN — CEPHALEXIN 500 MG: 500 CAPSULE ORAL at 06:11

## 2023-11-09 RX ADMIN — CYCLOBENZAPRINE 10 MG: 10 TABLET, FILM COATED ORAL at 10:11

## 2023-11-09 RX ADMIN — OXYCODONE HYDROCHLORIDE AND ACETAMINOPHEN 2 TABLET: 5; 325 TABLET ORAL at 10:11

## 2023-11-09 RX ADMIN — OXYCODONE HYDROCHLORIDE AND ACETAMINOPHEN 2 TABLET: 5; 325 TABLET ORAL at 12:11

## 2023-11-09 RX ADMIN — CEPHALEXIN 500 MG: 500 CAPSULE ORAL at 12:11

## 2023-11-09 NOTE — PROGRESS NOTES
PRS  POD 2   No complaints, walking well, eating well  AFVSS  Incisions cdi  Flaps well perfused, good doppler signals    A/P:  Doing well. Flaps well perfused.   - flap checks q4h  - no other changes to care

## 2023-11-10 PROCEDURE — 99231 SBSQ HOSP IP/OBS SF/LOW 25: CPT | Mod: ,,, | Performed by: SURGERY

## 2023-11-10 PROCEDURE — 25000003 PHARM REV CODE 250: Performed by: SURGERY

## 2023-11-10 PROCEDURE — 99231 PR SUBSEQUENT HOSPITAL CARE,LEVL I: ICD-10-PCS | Mod: ,,, | Performed by: SURGERY

## 2023-11-10 PROCEDURE — 11000001 HC ACUTE MED/SURG PRIVATE ROOM

## 2023-11-10 PROCEDURE — 25000003 PHARM REV CODE 250: Performed by: NURSE PRACTITIONER

## 2023-11-10 RX ADMIN — OXYCODONE HYDROCHLORIDE AND ACETAMINOPHEN 2 TABLET: 5; 325 TABLET ORAL at 01:11

## 2023-11-10 RX ADMIN — PANTOPRAZOLE SODIUM 20 MG: 20 TABLET, DELAYED RELEASE ORAL at 06:11

## 2023-11-10 RX ADMIN — CEPHALEXIN 500 MG: 500 CAPSULE ORAL at 12:11

## 2023-11-10 RX ADMIN — APIXABAN 5 MG: 5 TABLET, FILM COATED ORAL at 09:11

## 2023-11-10 RX ADMIN — CEPHALEXIN 500 MG: 500 CAPSULE ORAL at 06:11

## 2023-11-10 RX ADMIN — GLIPIZIDE 10 MG: 5 TABLET ORAL at 09:11

## 2023-11-10 RX ADMIN — CYCLOBENZAPRINE 10 MG: 10 TABLET, FILM COATED ORAL at 10:11

## 2023-11-10 RX ADMIN — ATORVASTATIN CALCIUM 20 MG: 10 TABLET, FILM COATED ORAL at 09:11

## 2023-11-10 NOTE — PROGRESS NOTES
PRS     POD 3 BENSON bilateral flap   No complaints, walking well, eating well  AFVSS.  Incisions C/D/I. Drains SS/thin.   Flaps well perfused, good doppler signals     A/P:    Doing well. Flaps well perfused.   - flap checks  Qshift.   - no other changes to care  - patient is stable for discharge home but does not have help available at home today. Will plan for discharge home in AM.

## 2023-11-11 PROBLEM — Z98.890 S/P BREAST RECONSTRUCTION: Status: ACTIVE | Noted: 2023-11-11

## 2023-11-11 PROCEDURE — 99231 PR SUBSEQUENT HOSPITAL CARE,LEVL I: ICD-10-PCS | Mod: ,,, | Performed by: SURGERY

## 2023-11-11 PROCEDURE — 99231 SBSQ HOSP IP/OBS SF/LOW 25: CPT | Mod: ,,, | Performed by: SURGERY

## 2023-11-11 PROCEDURE — 25000003 PHARM REV CODE 250: Performed by: NURSE PRACTITIONER

## 2023-11-11 RX ADMIN — CEPHALEXIN 500 MG: 500 CAPSULE ORAL at 05:11

## 2023-11-11 RX ADMIN — CEPHALEXIN 500 MG: 500 CAPSULE ORAL at 12:11

## 2023-11-11 NOTE — PROGRESS NOTES
PRS  Doing well today  AFVSS  Incisions cdi  Drains ss  Flaps well perfused  Follow up Monday at 9am

## 2023-11-11 NOTE — DISCHARGE INSTRUCTIONS
Patient Education        Tummy Tuck Discharge Instructions   About this topic   A tummy tuck is also called abdominoplasty. With this surgery, the doctor removes extra fat and skin from your lower and middle abdomen and also tightens the muscles in your belly. The doctor may also do liposuction at the same time as a tummy tuck.    What care is needed at home?   Do not remove dressings.  Sponge bathe only until released by your doctor.   No tub baths, swimming, hot tubs, or saunas until your incisions are healed.  Ask when your doctor says it is okay to exercise, add it into your daily routine. Walk around the house often when you get home. Try to walk a little more each day.  Your bowel movements may take some time to get back to normal. Eat small meals high in fiber. Drink 6 to 8 glasses of water each day to avoid hard stools.  To get out of bed, turn on your side, then use your arms to push yourself up to sitting. This will help take some of the pressure off of your cut sites and your abdominal muscles.  Wear binder around your belly for support and to make moving easier at all times.   Do not lift anything over 10 pounds (4.5 kg).   Ask when you may go back to your normal activities like work, driving, exercise, or sex.     HILL Drain Instructions  Be sure to wash your hands before and after touching your wound or dressing.  HILL drain in place and care:  Wash your hands every time before and after you empty the drain.  Empty the drain 2-3 times a day.  Reset the suction, compress the drain on a flat surface with the stopper open. While the drain is flat, replace the stopper.  Milk the tube often to prevent clots.  Measure the amount of fluid collected each day and write it down on the drain chart.  Look for signs of infection: swelling, redness, warmth around the wound; too much pain when touched; yellowish, greenish, or bloody discharge; foul smell coming from the cut site.     What follow-up care is needed?   Your  doctor may ask you to make visits to the office to check on your progress. Be sure to keep these visits.  If you have stitches or staples, you will need to have them taken out. Your doctor will often want to do this in 1 to 2 weeks.  If you have surgical glue over the incision, it will peel and flake off on its own. Do not pick or peel at it.  Talk to your doctor about when your drain will be removed if you have one.    Will physical activity be limited?   You may have to limit your activity for a while. Talk to your doctor about the right amount of activity for you.    What problems could happen?   Bleeding or blood clots  Infection  Change in how your skin looks or feels  Injury to nearby organs  Wound does not heal properly  Skin is numb    When do I need to call the doctor?   Signs of infection. These include a fever of 100.4°F (38°C) or higher, chills, cough, pain with passing urine.  Signs of wound infection. These include swelling, redness, warmth around the wound; too much pain when touched; yellowish, greenish, or bloody discharge; foul smell coming from the cut site; cut site opens up.  Fluid in the drain turns cloudy or smells  Drain becomes loose, comes apart, won't stay compressed, or falls out      Breast Reconstruction Discharge Instructions   About this topic   Breast reconstruction is done to rebuild the shape of your breast. You may have had a mastectomy, which is the removal of a breast, to treat cancer or other disease. Breast reconstruction may be done during or after a mastectomy. The doctor uses an implant or a flap of muscles and tissues from your body to remake your breast.     What care is needed at home?   Sleep with your head and chest raised on 2 to 3 pillows to lower swelling.  Cough and take deep breaths to help keep your lungs clear.  Be sure to wash your hands before and after touching your wound or dressing. Leave your dressing in place for 48 hours. Remove the dressing after 48  hours.  You may take a shower in 48 hours. Wash your incision with soap and water, pat dry, then leave open to air. You may cover the incision if it starts draining. Sponge bathe only until then.   Do not lift anything over 10 pounds (4.5 kg)  Ask your doctor when you may go back to your normal activities like work or exercising  Avoid using creams until your skin has healed to lower your risk of infection or told by your doctor.  Wear your supportive/surgical bra at all times except when showering. Put on another open front supportive bra when washing the old one.   Tops that button up the front are easier to put on and take off than those that slip over your head.  Your bowel movements may take some time to get back to normal. Eat small meals high in fiber to avoid hard stools. Drink 6 to 8 glasses of water each day.  Try to walk each day. Start by walking a little more than you did the day before. Walking boosts blood flow and helps prevent lung, belly, and blood problems.  What follow-up care is needed?   Your doctor may ask you to make visits to the office to check on your progress. Be sure to keep your visits.  Any drains placed in your breasts will be removed 2 to 4 days after surgery.  You may have stitches or staples. If so, your doctor will often want to remove the stitches or staples in 1 to 2 weeks.  If you have a tissue expander, your doctor may need to fill the pouch with saline. Your doctor will set the schedule for follow-up.  If you had silicone gel implants, your doctor will check them every few years using ultrasound or MRI.  Your breasts implants may need to be replaced over time.  A second smaller surgery may need to be done. The surgery would work on fixing the size, shape, and color of your nipple.  Will physical activity be limited?   You will need to limit your activity for a while. Avoid lifting, sports, and sex until your doctor says physical activity is OK. Talk with your doctor about the  right amount of activity for you.  What problems could happen?   Bleeding  Infection  Scarring or wrinkled skin over the implant  Implant may make cancer harder to find  Breast sensation may not be the same as your natural breast  Leakage of implants  Problem with breastfeeding  When do I need to call the doctor?   Signs of infection, such as a fever of 100.4°F (38°C) or higher, chills, wound that will not heal.  Signs of wound infection, such as swelling, redness, warmth around the wound; too much pain when touched; yellowish, greenish, or bloody discharge; foul smell coming from the cut site; cut site opens up.  Drain seems clogged and there is fluid under your surgery cuts  Cough, shortness of breath, chest pain  Upset stomach and throwing up that are not helped by nausea drugs  Pain that is not helped by pain drugs

## 2023-11-11 NOTE — DISCHARGE SUMMARY
[facility] Surgical - Med Surg  Plastic Surgery  Discharge Summary      Patient Name: Elina Ham  MRN: [id number]  Admission Date: 11/7/2023  Hospital Length of Stay: 4 days  Discharge Date and Time:  11/11/2023 7:43 AM  Attending Physician: Nick Gibbs MD   Discharging Provider: Nick Gibbs MD  Primary Care Provider: Sharri Bass NP     HPI: 64 yo female s/p bilateral benson flap for breast reconstruction on Tuesday.    Procedure(s) (LRB):  RECONSTRUCTION, BREAST, USING BENSON SKIN FLAP (Bilateral breast recon with BENSON flaps, possible TRAM flaps, possible hernia repair with mesh, use of intraoperative fluoroscein angiography. (Bilateral)  REMOVAL, VASCULAR ACCESS PORT (Right)     Hospital Course: Uncomplicated bilateral BENSON flap reconstruction on Tuesday.  Continued to progress well without any notable issues.  Stable for discharge today.        Significant Diagnostic Studies: N/A    Pending Diagnostic Studies:       None          There are no hospital problems to display for this patient.     Discharged Condition: good    Disposition: Home or Self Care    Follow Up:   Follow-up Information       Nick Gibbs MD. Go on 11/13/2023.    Specialty: Plastic Surgery  Why: at 9:45 am  Contact information:  [address] [id number] [phone]                           Patient Instructions:   No discharge procedures on file.  Medications:  Reconciled Home Medications:      Medication List        ASK your doctor about these medications      amLODIPine 10 MG tablet  Commonly known as: NORVASC  Take 10 mg by mouth once daily.     aspirin 81 MG EC tablet  Commonly known as: ECOTRIN  Take 81 mg by mouth.     atorvastatin 20 MG tablet  Commonly known as: LIPITOR  1 tablet Orally Once a day BEDTIME     ELIQUIS 5 mg Tab  Generic drug: apixaban  TAKE ONE TABLET BY MOUTH TWICE DAILY     glipiZIDE 10 MG tablet  Commonly known as: GLUCOTROL  Take 10 mg by mouth 2 (two) times daily.      letrozole 2.5 mg Tab  Commonly known as: FEMARA  TAKE ONE TABLET BY MOUTH ONE TIME DAILY     loratadine 10 mg tablet  Commonly known as: CLARITIN  Take 10 mg by mouth once daily.     losartan-hydrochlorothiazide 100-25 mg 100-25 mg per tablet  Commonly known as: HYZAAR  Take 1 tablet by mouth once daily.     metFORMIN 1000 MG tablet  Commonly known as: GLUCOPHAGE  Take 1,000 mg by mouth daily with breakfast.     pantoprazole 20 MG tablet  Commonly known as: PROTONIX  Take 20 mg by mouth every morning.              Nick Gibbs MD  Plastic Surgery  [facility] Surgical - Med Surg

## 2023-11-12 VITALS
RESPIRATION RATE: 20 BRPM | BODY MASS INDEX: 33.59 KG/M2 | DIASTOLIC BLOOD PRESSURE: 72 MMHG | WEIGHT: 209 LBS | HEART RATE: 103 BPM | OXYGEN SATURATION: 95 % | SYSTOLIC BLOOD PRESSURE: 111 MMHG | TEMPERATURE: 98 F | HEIGHT: 66 IN

## 2023-11-21 ENCOUNTER — PATIENT MESSAGE (OUTPATIENT)
Dept: HEMATOLOGY/ONCOLOGY | Facility: CLINIC | Age: 63
End: 2023-11-21
Payer: COMMERCIAL

## 2023-11-21 DIAGNOSIS — I26.99 OTHER ACUTE PULMONARY EMBOLISM WITHOUT ACUTE COR PULMONALE: ICD-10-CM

## 2023-12-10 ENCOUNTER — PATIENT MESSAGE (OUTPATIENT)
Dept: HEMATOLOGY/ONCOLOGY | Facility: CLINIC | Age: 63
End: 2023-12-10
Payer: COMMERCIAL

## 2023-12-10 ENCOUNTER — PATIENT MESSAGE (OUTPATIENT)
Dept: SURGERY | Facility: CLINIC | Age: 63
End: 2023-12-10
Payer: COMMERCIAL

## 2023-12-11 ENCOUNTER — PATIENT MESSAGE (OUTPATIENT)
Dept: HEMATOLOGY/ONCOLOGY | Facility: CLINIC | Age: 63
End: 2023-12-11
Payer: COMMERCIAL

## 2024-03-07 ENCOUNTER — LAB VISIT (OUTPATIENT)
Dept: LAB | Facility: HOSPITAL | Age: 64
End: 2024-03-07
Attending: INTERNAL MEDICINE
Payer: COMMERCIAL

## 2024-03-07 DIAGNOSIS — Z00.00 WELL ADULT EXAM: Primary | ICD-10-CM

## 2024-03-07 LAB
OHS QRS DURATION: 76 MS
OHS QTC CALCULATION: 431 MS

## 2024-03-07 PROCEDURE — 93005 ELECTROCARDIOGRAM TRACING: CPT

## 2024-03-07 PROCEDURE — 93010 ELECTROCARDIOGRAM REPORT: CPT | Mod: ,,, | Performed by: INTERNAL MEDICINE

## 2024-03-11 NOTE — PROGRESS NOTES
Subjective:       Patient ID: Elina Ham is a 64 y.o. female.    Chief Complaint: Follow-up (Patient is here for her 6 month visit)    Diagnosis: Bilateral breast cancer  Current Treatment: Letrozole 3/8/23     Oncology History   Malignant neoplasm of upper-outer quadrant of right breast in female, estrogen receptor positive   6/3/2022 Surgery    S/p bilateral simple mastectomy, bilateral sentinel lymph node biopsy, and TE placement - 6/3/2022      1/5/2023 - 3/7/2023 Radiation Therapy         3/8/2023 -  Hormone Therapy    femara     Malignant neoplasm of upper-outer quadrant of left breast in female, estrogen receptor negative   4/27/2022 Genetic Testing    Genetic testing negative   comprehensive genetic analysis indicated that there was no deleterious mutation found in any of these genes. However, there was a variant of uncertain significance (VUS):AXIN2 c.1573C>G. This variant is predicted to be benign by in silico analysis (PolyPhen2 score 0.429).      6/3/2022 Surgery    S/p bilateral simple mastectomy, bilateral sentinel lymph node biopsy, and TE placement - 6/3/2022      8/3/2022 - 12/21/2022 Chemotherapy    OP BREAST PACLITAXEL WEEKLY FOLLOWED BY DOSE DENSE DOXORUBICIN CYCLOPHOSPHAMIDE Q2W      Pathology Significant Finding    Left breast:  Repeat marker:  ER 6%, LA 0.1%, HER2/rahul negative, Ki-67 68%     10/28/2022 Imaging Significant Findings    10/28/2022: CT cap:  Postsurgical changes, left axilla 1.9 x 1.6 nodule, right axilla 1.7 x 1.1 hypodense nodule, small bilateral axillary nodes, liver subcentimeter hypodensity in right lobe too small to characterize, 1.2 cm cyst in the tail the pancreas stable   Impression:  Stable, bilateral axillary some postoperative seromas indolent subcentimeter cyst unchanged dating back to 2016, pancreatic cyst continue to follow       12/27/2022 Imaging Significant Findings    Pulmonary emboli right lower lobar artery and segmental branches.     No evidence of  "metastatic disease to the chest abdomen or pelvis.     Cystic lesion in the pancreatic tail, stable from the prior exam, most likely a simple pancreatic cyst, pseudocyst, or IPMN.     Subcentimeter liver hypodensity likely a cyst, stable..     Suspected chronic necrotic lymph node in the left axilla, smaller, right axillary lymph nodes, smaller in the interval.     Voicemail message was left for the ordering physician Dr. Oh at the time of the report.          Interval History 23 :   Patient presents to the clinic today for a scheduled follow up visit.  She is doing well with her letrozole, however, she has been having some balance issues when walking. "Feels like I walk crooked" She has been having headaches for the last several days. No neuro deficit. She is still undergoing reconstruction  Past Medical History:   Diagnosis Date    Cancer 3/21/2022    breast    Diabetes mellitus     Hypercholesterolemia     Hypertension     Other pulmonary embolism without acute cor pulmonale       Past Surgical History:   Procedure Laterality Date    BILATERAL MASTECTOMY Bilateral 2022    Procedure: MASTECTOMY, BILATERAL;  Surgeon: Charlene Contreras MD;  Location: [facility] OR;  Service: General;  Laterality: Bilateral;     SECTION  92 and 3/15/95    COLONOSCOPY      INSERTION OF BREAST TISSUE EXPANDER Bilateral 2022    Procedure: INSERTION, TISSUE EXPANDER, BREAST (Bilateral breast reconstruction with tissue expanders and acellular dermal matrix and use of intraoperative fluoroscein angiogragry);  Surgeon: Nick Gibbs MD;  Location: [facility] OR;  Service: Plastics;  Laterality: Bilateral;    MEDIPORT INSERTION, SINGLE Right     RECONSTRUCTION OF BREAST WITH DEEP INFERIOR EPIGASTRIC ARTERY  (BENSON) FLAP Bilateral 2023    Procedure: RECONSTRUCTION, BREAST, USING BENSON SKIN FLAP (Bilateral breast recon with BENSON flaps, possible TRAM flaps, possible hernia repair with mesh, use of " intraoperative fluoroscein angiography.;  Surgeon: Nick Gibbs MD;  Location: [facility] OR;  Service: Plastics;  Laterality: Bilateral;    REMOVAL OF VASCULAR ACCESS PORT Right 2023    Procedure: REMOVAL, VASCULAR ACCESS PORT;  Surgeon: Nick Gibbs MD;  Location: [facility] OR;  Service: Plastics;  Laterality: Right;    SENTINEL LYMPH NODE BIOPSY Bilateral 2022    Procedure: BIOPSY, LYMPH NODE, SENTINEL;  Surgeon: Charlene Contreras MD;  Location: [facility] OR;  Service: General;  Laterality: Bilateral;  MAGTRACE Injection in office  Need SentiMag Probe     Social History     Socioeconomic History    Marital status:    Tobacco Use    Smoking status: Never    Smokeless tobacco: Never   Substance and Sexual Activity    Alcohol use: Not Currently     Alcohol/week: 1.0 standard drink of alcohol     Types: 1 Glasses of wine per week     Comment: drink socially but haven't had a glass of wine since     Drug use: Never    Sexual activity: Yes     Partners: Male     Birth control/protection: None      Family History   Problem Relation Age of Onset    Breast cancer Maternal Grandmother             Breast cancer Sister     Breast cancer Sister         remission    Breast cancer Maternal Aunt             Breast cancer Maternal Aunt             Breast cancer Maternal Aunt             Breast cancer Maternal Aunt     Breast cancer Maternal Uncle             Stroke Brother             Pancreatic cancer Brother       Review of patient's allergies indicates:  No Known Allergies   Review of Systems   Constitutional:  Positive for appetite change. Negative for unexpected weight change.   HENT:  Negative for mouth sores.    Eyes:  Negative for visual disturbance.   Respiratory:  Negative for cough and shortness of breath.    Cardiovascular:  Negative for chest pain.   Gastrointestinal:  Positive for diarrhea. Negative for abdominal pain.   Genitourinary:  Positive for frequency.    Musculoskeletal:  Positive for back pain.   Integumentary:  Negative for rash.   Neurological:  Negative for headaches.   Hematological:  Negative for adenopathy.   Psychiatric/Behavioral:  The patient is not nervous/anxious.      A complete 12 point ROS was performed in full with pertinent positives as described in interval history. Remainder of ROS done in full and are negative.        Objective:      Physical Exam  Constitutional:       Appearance: Normal appearance.   HENT:      Head: Normocephalic and atraumatic.      Nose: Nose normal.      Mouth/Throat:      Mouth: Mucous membranes are moist.   Eyes:      Extraocular Movements: Extraocular movements intact.      Conjunctiva/sclera: Conjunctivae normal.      Pupils: Pupils are equal, round, and reactive to light.   Cardiovascular:      Rate and Rhythm: Normal rate and regular rhythm.      Pulses: Normal pulses.   Pulmonary:      Effort: Pulmonary effort is normal.      Breath sounds: Normal breath sounds.   Chest:      Comments: B/L expanders in place. No palpable abnormality. No LAD  Abdominal:      General: Bowel sounds are normal.      Palpations: Abdomen is soft.   Musculoskeletal:      Cervical back: Normal range of motion and neck supple.   Neurological:      General: No focal deficit present.      Mental Status: She is alert and oriented to person, place, and time. Mental status is at baseline.   Psychiatric:         Mood and Affect: Mood normal.         Behavior: Behavior normal.       Assessment:   Stage IIIA (pT2, pN1a, cM0, G3) triple negative left breast cancer    Stage IA (pT1b, pN0, cM0, G1) Er+ AZ+ right breast cancer    Pulmonary emboli. Continue eliquis 5mg BID then discuss 2.5mg BID after surgery    Pancreatic cyst  Initially mentioned on October 18, 2022 scan as a 1.2 cm cyst in the tail of the pancreas.  Repeat imaging in December of 2022 showed a stable size of the cyst.     March of 2023 shows this 13 mm cystic lesion  I do think it was  possibly present on the August 22, 2022 scan, but I can not see it on the July 2016 scan but will defer to Gastroenterology  Continue follow up with Dr. Queen  5. 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 Consider neurology referral     6. Worsening frontal headache with dizziness and postural instability.   MRI head. Explained to her to call after her MRI for results. ED precautions for worsening symptoms, neurological changes.  Plan:     B/L mastectomies so no need for mammograms  Continue femara for her right breast cancer at least 5 years (3/28)  Calcium + Vitamin D- BID  Last bone density 3/8/23 with her PCP--normal repeat 3/2025  S/P reconstruction 11/2023 Sees Dr. Gibbs  and Dr. Ordaz   Labs: cbc,cmp with next visit    Follow up in about 6 months (around 9/12/2024) for Labs and OV .

## 2024-03-12 ENCOUNTER — OFFICE VISIT (OUTPATIENT)
Dept: HEMATOLOGY/ONCOLOGY | Facility: CLINIC | Age: 64
End: 2024-03-12
Payer: COMMERCIAL

## 2024-03-12 VITALS
OXYGEN SATURATION: 100 % | DIASTOLIC BLOOD PRESSURE: 81 MMHG | WEIGHT: 210.13 LBS | HEART RATE: 72 BPM | TEMPERATURE: 98 F | SYSTOLIC BLOOD PRESSURE: 121 MMHG | HEIGHT: 66 IN | BODY MASS INDEX: 33.77 KG/M2

## 2024-03-12 DIAGNOSIS — Z79.811 LONG TERM CURRENT USE OF AROMATASE INHIBITOR: ICD-10-CM

## 2024-03-12 DIAGNOSIS — Z86.711 HISTORY OF PULMONARY EMBOLISM: ICD-10-CM

## 2024-03-12 DIAGNOSIS — R51.9 WORSENING HEADACHES: ICD-10-CM

## 2024-03-12 DIAGNOSIS — G62.0 NEUROPATHY DUE TO CHEMOTHERAPEUTIC DRUG: ICD-10-CM

## 2024-03-12 DIAGNOSIS — C50.411 MALIGNANT NEOPLASM OF UPPER-OUTER QUADRANT OF RIGHT BREAST IN FEMALE, ESTROGEN RECEPTOR POSITIVE: ICD-10-CM

## 2024-03-12 DIAGNOSIS — C50.412 MALIGNANT NEOPLASM OF UPPER-OUTER QUADRANT OF LEFT BREAST IN FEMALE, ESTROGEN RECEPTOR NEGATIVE: Primary | ICD-10-CM

## 2024-03-12 DIAGNOSIS — T45.1X5A NEUROPATHY DUE TO CHEMOTHERAPEUTIC DRUG: ICD-10-CM

## 2024-03-12 DIAGNOSIS — Z17.0 MALIGNANT NEOPLASM OF UPPER-OUTER QUADRANT OF RIGHT BREAST IN FEMALE, ESTROGEN RECEPTOR POSITIVE: ICD-10-CM

## 2024-03-12 DIAGNOSIS — Z17.1 MALIGNANT NEOPLASM OF UPPER-OUTER QUADRANT OF LEFT BREAST IN FEMALE, ESTROGEN RECEPTOR NEGATIVE: Primary | ICD-10-CM

## 2024-03-12 PROCEDURE — 1159F MED LIST DOCD IN RCRD: CPT | Mod: CPTII,S$GLB,, | Performed by: NURSE PRACTITIONER

## 2024-03-12 PROCEDURE — 3008F BODY MASS INDEX DOCD: CPT | Mod: CPTII,S$GLB,, | Performed by: NURSE PRACTITIONER

## 2024-03-12 PROCEDURE — 99214 OFFICE O/P EST MOD 30 MIN: CPT | Mod: S$GLB,,, | Performed by: NURSE PRACTITIONER

## 2024-03-12 PROCEDURE — 3074F SYST BP LT 130 MM HG: CPT | Mod: CPTII,S$GLB,, | Performed by: NURSE PRACTITIONER

## 2024-03-12 PROCEDURE — 3079F DIAST BP 80-89 MM HG: CPT | Mod: CPTII,S$GLB,, | Performed by: NURSE PRACTITIONER

## 2024-03-12 PROCEDURE — 99999 PR PBB SHADOW E&M-EST. PATIENT-LVL IV: CPT | Mod: PBBFAC,,, | Performed by: NURSE PRACTITIONER

## 2024-03-18 ENCOUNTER — PATIENT MESSAGE (OUTPATIENT)
Dept: HEMATOLOGY/ONCOLOGY | Facility: CLINIC | Age: 64
End: 2024-03-18
Payer: COMMERCIAL

## 2024-03-19 ENCOUNTER — APPOINTMENT (OUTPATIENT)
Dept: RADIOLOGY | Facility: HOSPITAL | Age: 64
End: 2024-03-19
Attending: NURSE PRACTITIONER
Payer: COMMERCIAL

## 2024-03-19 ENCOUNTER — TELEPHONE (OUTPATIENT)
Dept: HEMATOLOGY/ONCOLOGY | Facility: CLINIC | Age: 64
End: 2024-03-19
Payer: COMMERCIAL

## 2024-03-19 DIAGNOSIS — R51.9 WORSENING HEADACHES: ICD-10-CM

## 2024-03-19 PROCEDURE — A9577 INJ MULTIHANCE: HCPCS | Performed by: NURSE PRACTITIONER

## 2024-03-19 PROCEDURE — 70553 MRI BRAIN STEM W/O & W/DYE: CPT | Mod: TC

## 2024-03-19 PROCEDURE — 25500020 PHARM REV CODE 255: Performed by: NURSE PRACTITIONER

## 2024-03-19 RX ADMIN — GADOBENATE DIMEGLUMINE 20 ML: 529 INJECTION, SOLUTION INTRAVENOUS at 11:03

## 2024-03-19 NOTE — TELEPHONE ENCOUNTER
Called patient and advised of below; she thanked me for calling.        ----- Message from SHEKHAR Ferreira sent at 3/19/2024 12:49 PM CDT -----  Please let patient know her MRI of her head is normal

## 2024-04-28 ENCOUNTER — HOSPITAL ENCOUNTER (EMERGENCY)
Facility: HOSPITAL | Age: 64
Discharge: HOME | End: 2024-04-29
Attending: EMERGENCY MEDICINE
Payer: COMMERCIAL

## 2024-04-28 DIAGNOSIS — E87.6 HYPOKALEMIA: ICD-10-CM

## 2024-04-28 DIAGNOSIS — R73.9 HYPERGLYCEMIA: Primary | ICD-10-CM

## 2024-04-28 LAB — POCT GLUCOSE: 183 MG/DL (ref 70–110)

## 2024-04-28 PROCEDURE — 82962 GLUCOSE BLOOD TEST: CPT

## 2024-04-28 PROCEDURE — 99283 EMERGENCY DEPT VISIT LOW MDM: CPT

## 2024-04-29 VITALS
WEIGHT: 210 LBS | DIASTOLIC BLOOD PRESSURE: 79 MMHG | RESPIRATION RATE: 18 BRPM | HEART RATE: 75 BPM | OXYGEN SATURATION: 99 % | TEMPERATURE: 98 F | HEIGHT: 66 IN | SYSTOLIC BLOOD PRESSURE: 123 MMHG | BODY MASS INDEX: 33.75 KG/M2

## 2024-04-29 LAB
ALBUMIN SERPL-MCNC: 3.8 G/DL (ref 3.4–4.8)
ALBUMIN/GLOB SERPL: 1.2 RATIO (ref 1.1–2)
ALP SERPL-CCNC: 86 UNIT/L (ref 40–150)
ALT SERPL-CCNC: 19 UNIT/L (ref 0–55)
AST SERPL-CCNC: 18 UNIT/L (ref 5–34)
BASOPHILS # BLD AUTO: 0.01 X10(3)/MCL
BASOPHILS NFR BLD AUTO: 0.1 %
BILIRUB SERPL-MCNC: 0.6 MG/DL
BUN SERPL-MCNC: 18.3 MG/DL (ref 9.8–20.1)
CALCIUM SERPL-MCNC: 9.6 MG/DL (ref 8.4–10.2)
CHLORIDE SERPL-SCNC: 104 MMOL/L (ref 98–107)
CO2 SERPL-SCNC: 26 MMOL/L (ref 23–31)
CREAT SERPL-MCNC: 1.03 MG/DL (ref 0.55–1.02)
EOSINOPHIL # BLD AUTO: 0.03 X10(3)/MCL (ref 0–0.9)
EOSINOPHIL NFR BLD AUTO: 0.4 %
ERYTHROCYTE [DISTWIDTH] IN BLOOD BY AUTOMATED COUNT: 13.4 % (ref 11.5–17)
GFR SERPLBLD CREATININE-BSD FMLA CKD-EPI: >60 MLS/MIN/1.73/M2
GLOBULIN SER-MCNC: 3.1 GM/DL (ref 2.4–3.5)
GLUCOSE SERPL-MCNC: 151 MG/DL (ref 82–115)
HCT VFR BLD AUTO: 32.9 % (ref 37–47)
HGB BLD-MCNC: 11.2 G/DL (ref 12–16)
IMM GRANULOCYTES # BLD AUTO: 0.02 X10(3)/MCL (ref 0–0.04)
IMM GRANULOCYTES NFR BLD AUTO: 0.3 %
LYMPHOCYTES # BLD AUTO: 2.66 X10(3)/MCL (ref 0.6–4.6)
LYMPHOCYTES NFR BLD AUTO: 38.9 %
MAGNESIUM SERPL-MCNC: 1.5 MG/DL (ref 1.6–2.6)
MCH RBC QN AUTO: 29.9 PG (ref 27–31)
MCHC RBC AUTO-ENTMCNC: 34 G/DL (ref 33–36)
MCV RBC AUTO: 88 FL (ref 80–94)
MONOCYTES # BLD AUTO: 0.57 X10(3)/MCL (ref 0.1–1.3)
MONOCYTES NFR BLD AUTO: 8.3 %
NEUTROPHILS # BLD AUTO: 3.54 X10(3)/MCL (ref 2.1–9.2)
NEUTROPHILS NFR BLD AUTO: 52 %
NRBC BLD AUTO-RTO: 0 %
PLATELET # BLD AUTO: 300 X10(3)/MCL (ref 130–400)
PMV BLD AUTO: 9.8 FL (ref 7.4–10.4)
POTASSIUM SERPL-SCNC: 3.3 MMOL/L (ref 3.5–5.1)
PROT SERPL-MCNC: 6.9 GM/DL (ref 5.8–7.6)
RBC # BLD AUTO: 3.74 X10(6)/MCL (ref 4.2–5.4)
SODIUM SERPL-SCNC: 141 MMOL/L (ref 136–145)
WBC # SPEC AUTO: 6.83 X10(3)/MCL (ref 4.5–11.5)

## 2024-04-29 PROCEDURE — 80053 COMPREHEN METABOLIC PANEL: CPT | Performed by: EMERGENCY MEDICINE

## 2024-04-29 PROCEDURE — 83735 ASSAY OF MAGNESIUM: CPT | Performed by: EMERGENCY MEDICINE

## 2024-04-29 PROCEDURE — 85025 COMPLETE CBC W/AUTO DIFF WBC: CPT | Performed by: EMERGENCY MEDICINE

## 2024-04-29 RX ORDER — POTASSIUM CHLORIDE 20 MEQ/1
20 TABLET, EXTENDED RELEASE ORAL DAILY
Qty: 5 TABLET | Refills: 0 | Status: SHIPPED | OUTPATIENT
Start: 2024-04-29 | End: 2024-05-04

## 2024-04-29 NOTE — ED PROVIDER NOTES
Encounter Date: 2024    SCRIBE #1 NOTE: I, Jarocho Garcia, am scribing for, and in the presence of,  Vinicius Romano III, MD. I have scribed the following portions of the note - Other sections scribed: HPI, ROS, PE.       History     Chief Complaint   Patient presents with    Hyperglycemia     C/o high Blood sugar for the last 2 days with increased fatigue and numbness to the finger tips and toes currently being treated for breast ca.      64 year old female with pmhx of breast cancer, DM, presents to ED c/o hyperglycemia onset 3 days ago. Pt states that her blood glucose has been around low 200s for the past 3 days tonight () at around 2245 it was 260s. Pt reports additional concern of bilateral finger and toe numbness that feels similar to neuropathy, pain from surgical incision in her right breast, chronic back pain, and hip stiffness. Pt denies urinary symptoms, nausea, vomiting, chest pain, SOB, rhinorrhea. Pt states she started chemotherapy in December.    PCP: Nathaniel Marte MD    The history is provided by the patient. No  was used.     Review of patient's allergies indicates:  No Known Allergies  Past Medical History:   Diagnosis Date    Cancer 3/21/2022    breast    Diabetes mellitus     Hypercholesterolemia     Hypertension     Other pulmonary embolism without acute cor pulmonale      Past Surgical History:   Procedure Laterality Date    BILATERAL MASTECTOMY Bilateral 2022    Procedure: MASTECTOMY, BILATERAL;  Surgeon: Charlene Contreras MD;  Location: [facility] OR;  Service: General;  Laterality: Bilateral;     SECTION  92 and 3/15/95    COLONOSCOPY      INSERTION OF BREAST TISSUE EXPANDER Bilateral 2022    Procedure: INSERTION, TISSUE EXPANDER, BREAST (Bilateral breast reconstruction with tissue expanders and acellular dermal matrix and use of intraoperative fluoroscein angiogragry);  Surgeon: Nick Gibbs MD;  Location: [facility] OR;  Service:  Plastics;  Laterality: Bilateral;    MEDIPORT INSERTION, SINGLE Right     RECONSTRUCTION OF BREAST WITH DEEP INFERIOR EPIGASTRIC ARTERY  (BENSON) FLAP Bilateral 2023    Procedure: RECONSTRUCTION, BREAST, USING BENSON SKIN FLAP (Bilateral breast recon with BENSON flaps, possible TRAM flaps, possible hernia repair with mesh, use of intraoperative fluoroscein angiography.;  Surgeon: Nick Gibbs MD;  Location: [facility] OR;  Service: Plastics;  Laterality: Bilateral;    REMOVAL OF VASCULAR ACCESS PORT Right 2023    Procedure: REMOVAL, VASCULAR ACCESS PORT;  Surgeon: Nick Gibbs MD;  Location: [facility] OR;  Service: Plastics;  Laterality: Right;    SENTINEL LYMPH NODE BIOPSY Bilateral 2022    Procedure: BIOPSY, LYMPH NODE, SENTINEL;  Surgeon: Charlene Contreras MD;  Location: [facility] OR;  Service: General;  Laterality: Bilateral;  MAGTRACE Injection in office  Need SentiMag Probe     Family History   Problem Relation Name Age of Onset    Breast cancer Maternal Grandmother Phoebe Gordon             Breast cancer Sister Madison Maldonadonier     Breast cancer Sister Marti Ham         remission    Breast cancer Maternal Aunt Kiesha Casper             Breast cancer Maternal Aunt Teri Aden             Breast cancer Maternal Aunt Danna Carlos             Breast cancer Maternal Aunt Astrid Rincon     Breast cancer Maternal Uncle Jaya Gordon             Stroke Brother Ulysses Jitendra             Pancreatic cancer Brother       Social History     Tobacco Use    Smoking status: Never    Smokeless tobacco: Never   Substance Use Topics    Alcohol use: Not Currently     Alcohol/week: 1.0 standard drink of alcohol     Types: 1 Glasses of wine per week     Comment: drink socially but haven't had a glass of wine since     Drug use: Never     Review of Systems   Constitutional:  Negative for fever.   HENT:  Negative for rhinorrhea.    Respiratory:  Negative for shortness  of breath.    Cardiovascular:  Negative for chest pain.   Gastrointestinal:  Negative for abdominal pain, constipation, diarrhea, nausea and vomiting.   Genitourinary:  Negative for difficulty urinating, dysuria and frequency.   Musculoskeletal:  Positive for back pain. Negative for neck pain.   Neurological:  Positive for numbness.       Physical Exam     Initial Vitals [04/28/24 2326]   BP Pulse Resp Temp SpO2   (!) 155/84 77 18 98.2 °F (36.8 °C) 97 %      MAP       --         Physical Exam    Constitutional: She appears well-developed and well-nourished.   HENT:   Head: Normocephalic and atraumatic.   Mouth/Throat: Oropharynx is clear and moist.   Eyes: Conjunctivae are normal. Pupils are equal, round, and reactive to light.   Neck: Neck supple.   Normal range of motion.  Cardiovascular:  Normal rate, regular rhythm and normal heart sounds.           Pulmonary/Chest: Breath sounds normal. She has no wheezes. She has no rhonchi. She has no rales.   Abdominal: Abdomen is soft. Bowel sounds are normal.   Musculoskeletal:         General: Normal range of motion.      Cervical back: Normal range of motion and neck supple.     Neurological: She is alert and oriented to person, place, and time. GCS score is 15. GCS eye subscore is 4. GCS verbal subscore is 5. GCS motor subscore is 6.   Skin: Skin is warm and dry. Capillary refill takes less than 2 seconds.   Psychiatric: She has a normal mood and affect. Her behavior is normal. Judgment and thought content normal.         ED Course   Procedures  Labs Reviewed   COMPREHENSIVE METABOLIC PANEL - Abnormal; Notable for the following components:       Result Value    Potassium Level 3.3 (*)     Glucose Level 151 (*)     Creatinine 1.03 (*)     All other components within normal limits   MAGNESIUM - Abnormal; Notable for the following components:    Magnesium Level 1.50 (*)     All other components within normal limits   CBC WITH DIFFERENTIAL - Abnormal; Notable for the  following components:    RBC 3.74 (*)     Hgb 11.2 (*)     Hct 32.9 (*)     All other components within normal limits   POCT GLUCOSE - Abnormal; Notable for the following components:    POCT Glucose 183 (*)     All other components within normal limits   CBC W/ AUTO DIFFERENTIAL    Narrative:     The following orders were created for panel order CBC auto differential.  Procedure                               Abnormality         Status                     ---------                               -----------         ------                     CBC with Differential[[id number]]       Abnormal            Final result                 Please view results for these tests on the individual orders.          Imaging Results    None          Medications - No data to display  Medical Decision Making  The differential diagnosis includes, but is not limited to, hyperglycemia, hypokalemia, hypocalcemia     Patient was concerned whenever blood sugar was over 200.  Patient is having intermittent global symptoms of numbness in her fingers and toes achiness that has been going on since her cancer diagnosis and since she finished her regular chemotherapy and was switched to maintenance chemo.  Patient with a potassium of 3.3 her blood sugar is less than 200 no other electrolyte abnormalities her neuropathy will be followed up by her primary care doctor will replace her potassium over 5 days patient discharged    Problems Addressed:  Hyperglycemia: acute illness or injury  Hypokalemia: acute illness or injury    Amount and/or Complexity of Data Reviewed  Labs: ordered.    Risk  Prescription drug management.                                      Clinical Impression:  Final diagnoses:  [R73.9] Hyperglycemia (Primary)  [E87.6] Hypokalemia          ED Disposition Condition    Discharge Stable          ED Prescriptions       Medication Sig Dispense Start Date End Date Auth. Provider    potassium chloride  (K-DUR,KLOR-CON) 20 MEQ tablet Take 1  tablet (20 mEq total) by mouth once daily. for 5 days 5 tablet 4/29/2024 5/4/2024 Vinicius Romano III, MD          Follow-up Information       Follow up With Specialties Details Why Contact Info    Nathaniel Marte MD Internal Medicine In 3 days  [address] [id number] [phone]               Vinicius Romano III, MD  04/29/24 [id number]

## 2024-05-14 ENCOUNTER — ANESTHESIA (OUTPATIENT)
Dept: SURGERY | Facility: HOSPITAL | Age: 64
End: 2024-05-14
Payer: COMMERCIAL

## 2024-05-14 ENCOUNTER — HOSPITAL ENCOUNTER (OUTPATIENT)
Facility: HOSPITAL | Age: 64
Discharge: HOME | End: 2024-05-14
Attending: SURGERY | Admitting: SURGERY
Payer: COMMERCIAL

## 2024-05-14 ENCOUNTER — ANESTHESIA EVENT (OUTPATIENT)
Dept: SURGERY | Facility: HOSPITAL | Age: 64
End: 2024-05-14
Payer: COMMERCIAL

## 2024-05-14 DIAGNOSIS — N65.0 DEFORMITY OF RECONSTRUCTED BREAST: ICD-10-CM

## 2024-05-14 DIAGNOSIS — Z42.1 ENCOUNTER FOR BREAST RECONSTRUCTION FOLLOWING MASTECTOMY: ICD-10-CM

## 2024-05-14 DIAGNOSIS — Z85.3 PERSONAL HISTORY OF BREAST CANCER: ICD-10-CM

## 2024-05-14 LAB
GLUCOSE SERPL-MCNC: 241 MG/DL (ref 70–110)
GLUCOSE SERPL-MCNC: 257 MG/DL (ref 70–110)
POCT GLUCOSE: 180 MG/DL (ref 70–110)
POCT GLUCOSE: 233 MG/DL (ref 70–110)

## 2024-05-14 PROCEDURE — 71000016 HC POSTOP RECOV ADDL HR: Performed by: SURGERY

## 2024-05-14 PROCEDURE — D9220A PRA ANESTHESIA: Mod: CRNA,,,

## 2024-05-14 PROCEDURE — 37000009 HC ANESTHESIA EA ADD 15 MINS: Performed by: SURGERY

## 2024-05-14 PROCEDURE — 63600175 PHARM REV CODE 636 W HCPCS: Performed by: SURGERY

## 2024-05-14 PROCEDURE — 82962 GLUCOSE BLOOD TEST: CPT | Mod: 91 | Performed by: SURGERY

## 2024-05-14 PROCEDURE — 63600175 PHARM REV CODE 636 W HCPCS: Performed by: ANESTHESIOLOGY

## 2024-05-14 PROCEDURE — 71000039 HC RECOVERY, EACH ADD'L HOUR: Performed by: SURGERY

## 2024-05-14 PROCEDURE — 25000003 PHARM REV CODE 250: Performed by: ANESTHESIOLOGY

## 2024-05-14 PROCEDURE — 25000003 PHARM REV CODE 250

## 2024-05-14 PROCEDURE — C9290 INJ, BUPIVACAINE LIPOSOME: HCPCS | Performed by: SURGERY

## 2024-05-14 PROCEDURE — 71000015 HC POSTOP RECOV 1ST HR: Performed by: SURGERY

## 2024-05-14 PROCEDURE — 36000707: Performed by: SURGERY

## 2024-05-14 PROCEDURE — 63600175 PHARM REV CODE 636 W HCPCS

## 2024-05-14 PROCEDURE — 82962 GLUCOSE BLOOD TEST: CPT | Performed by: SURGERY

## 2024-05-14 PROCEDURE — 27201423 OPTIME MED/SURG SUP & DEVICES STERILE SUPPLY: Performed by: SURGERY

## 2024-05-14 PROCEDURE — 19380 REVJ RECONSTRUCTED BREAST: CPT | Mod: 50,,, | Performed by: SURGERY

## 2024-05-14 PROCEDURE — C1729 CATH, DRAINAGE: HCPCS | Performed by: SURGERY

## 2024-05-14 PROCEDURE — 25000003 PHARM REV CODE 250: Performed by: SURGERY

## 2024-05-14 PROCEDURE — 36000706: Performed by: SURGERY

## 2024-05-14 PROCEDURE — 71000033 HC RECOVERY, INTIAL HOUR: Performed by: SURGERY

## 2024-05-14 PROCEDURE — D9220A PRA ANESTHESIA: Mod: ANES,,, | Performed by: ANESTHESIOLOGY

## 2024-05-14 PROCEDURE — 37000008 HC ANESTHESIA 1ST 15 MINUTES: Performed by: SURGERY

## 2024-05-14 RX ORDER — ACETAMINOPHEN 325 MG/1
650 TABLET ORAL ONCE
Status: DISCONTINUED | OUTPATIENT
Start: 2024-05-14 | End: 2024-05-14

## 2024-05-14 RX ORDER — SODIUM CHLORIDE, SODIUM LACTATE, POTASSIUM CHLORIDE, CALCIUM CHLORIDE 600; 310; 30; 20 MG/100ML; MG/100ML; MG/100ML; MG/100ML
INJECTION, SOLUTION INTRAVENOUS CONTINUOUS
Status: DISCONTINUED | OUTPATIENT
Start: 2024-05-14 | End: 2024-05-14 | Stop reason: HOSPADM

## 2024-05-14 RX ORDER — CYCLOBENZAPRINE HCL 10 MG
10 TABLET ORAL 3 TIMES DAILY PRN
Status: DISCONTINUED | OUTPATIENT
Start: 2024-05-14 | End: 2024-05-14 | Stop reason: HOSPADM

## 2024-05-14 RX ORDER — ONDANSETRON HYDROCHLORIDE 2 MG/ML
4 INJECTION, SOLUTION INTRAVENOUS ONCE AS NEEDED
Status: COMPLETED | OUTPATIENT
Start: 2024-05-14 | End: 2024-05-14

## 2024-05-14 RX ORDER — ONDANSETRON HYDROCHLORIDE 2 MG/ML
INJECTION, SOLUTION INTRAMUSCULAR; INTRAVENOUS
Status: DISCONTINUED | OUTPATIENT
Start: 2024-05-14 | End: 2024-05-14

## 2024-05-14 RX ORDER — LIDOCAINE HYDROCHLORIDE 10 MG/ML
INJECTION INFILTRATION; PERINEURAL
Status: DISCONTINUED
Start: 2024-05-14 | End: 2024-05-14 | Stop reason: HOSPADM

## 2024-05-14 RX ORDER — SODIUM CHLORIDE 9 MG/ML
INJECTION, SOLUTION INTRAVENOUS CONTINUOUS
OUTPATIENT
Start: 2024-05-14

## 2024-05-14 RX ORDER — EPINEPHRINE 0.1 MG/ML
INJECTION INTRAVENOUS
Status: DISCONTINUED | OUTPATIENT
Start: 2024-05-14 | End: 2024-05-14 | Stop reason: HOSPADM

## 2024-05-14 RX ORDER — ROCURONIUM BROMIDE 10 MG/ML
INJECTION, SOLUTION INTRAVENOUS
Status: DISCONTINUED | OUTPATIENT
Start: 2024-05-14 | End: 2024-05-14

## 2024-05-14 RX ORDER — CEFAZOLIN SODIUM 2 G/50ML
2 SOLUTION INTRAVENOUS
OUTPATIENT
Start: 2024-05-14 | End: 2024-05-15

## 2024-05-14 RX ORDER — MEPERIDINE HYDROCHLORIDE 25 MG/ML
12.5 INJECTION INTRAMUSCULAR; INTRAVENOUS; SUBCUTANEOUS EVERY 10 MIN PRN
Status: DISCONTINUED | OUTPATIENT
Start: 2024-05-14 | End: 2024-05-14 | Stop reason: HOSPADM

## 2024-05-14 RX ORDER — SODIUM CHLORIDE, SODIUM LACTATE, POTASSIUM CHLORIDE, CALCIUM CHLORIDE 600; 310; 30; 20 MG/100ML; MG/100ML; MG/100ML; MG/100ML
INJECTION, SOLUTION INTRAVENOUS CONTINUOUS
Status: ACTIVE | OUTPATIENT
Start: 2024-05-14 | End: 2024-05-14

## 2024-05-14 RX ORDER — DEXAMETHASONE SODIUM PHOSPHATE 4 MG/ML
INJECTION, SOLUTION INTRA-ARTICULAR; INTRALESIONAL; INTRAMUSCULAR; INTRAVENOUS; SOFT TISSUE
Status: DISCONTINUED | OUTPATIENT
Start: 2024-05-14 | End: 2024-05-14

## 2024-05-14 RX ORDER — ACETAMINOPHEN 325 MG/1
650 TABLET ORAL
Status: COMPLETED | OUTPATIENT
Start: 2024-05-14 | End: 2024-05-14

## 2024-05-14 RX ORDER — MORPHINE SULFATE 4 MG/ML
2 INJECTION, SOLUTION INTRAMUSCULAR; INTRAVENOUS EVERY 4 HOURS PRN
Status: DISCONTINUED | OUTPATIENT
Start: 2024-05-14 | End: 2024-05-14 | Stop reason: HOSPADM

## 2024-05-14 RX ORDER — CEFAZOLIN SODIUM 1 G/3ML
2 INJECTION, POWDER, FOR SOLUTION INTRAMUSCULAR; INTRAVENOUS
Status: COMPLETED | OUTPATIENT
Start: 2024-05-14 | End: 2024-05-14

## 2024-05-14 RX ORDER — OXYCODONE AND ACETAMINOPHEN 5; 325 MG/1; MG/1
2 TABLET ORAL EVERY 4 HOURS PRN
Status: DISCONTINUED | OUTPATIENT
Start: 2024-05-14 | End: 2024-05-14 | Stop reason: HOSPADM

## 2024-05-14 RX ORDER — ONDANSETRON HYDROCHLORIDE 2 MG/ML
4 INJECTION, SOLUTION INTRAVENOUS ONCE
Status: COMPLETED | OUTPATIENT
Start: 2024-05-14 | End: 2024-05-14

## 2024-05-14 RX ORDER — ONDANSETRON HYDROCHLORIDE 2 MG/ML
4 INJECTION, SOLUTION INTRAVENOUS EVERY 12 HOURS PRN
Status: DISCONTINUED | OUTPATIENT
Start: 2024-05-14 | End: 2024-05-14 | Stop reason: HOSPADM

## 2024-05-14 RX ORDER — LIDOCAINE HYDROCHLORIDE 10 MG/ML
INJECTION, SOLUTION EPIDURAL; INFILTRATION; INTRACAUDAL; PERINEURAL
Status: DISCONTINUED | OUTPATIENT
Start: 2024-05-14 | End: 2024-05-14

## 2024-05-14 RX ORDER — MUPIROCIN 20 MG/G
OINTMENT TOPICAL 2 TIMES DAILY
OUTPATIENT
Start: 2024-05-14 | End: 2024-05-16

## 2024-05-14 RX ORDER — FENTANYL CITRATE 50 UG/ML
INJECTION, SOLUTION INTRAMUSCULAR; INTRAVENOUS
Status: DISCONTINUED | OUTPATIENT
Start: 2024-05-14 | End: 2024-05-14

## 2024-05-14 RX ORDER — PROPOFOL 10 MG/ML
VIAL (ML) INTRAVENOUS
Status: DISCONTINUED | OUTPATIENT
Start: 2024-05-14 | End: 2024-05-14

## 2024-05-14 RX ORDER — PROCHLORPERAZINE EDISYLATE 5 MG/ML
5 INJECTION INTRAMUSCULAR; INTRAVENOUS EVERY 6 HOURS PRN
Status: DISCONTINUED | OUTPATIENT
Start: 2024-05-14 | End: 2024-05-14 | Stop reason: HOSPADM

## 2024-05-14 RX ORDER — DEXMEDETOMIDINE HYDROCHLORIDE 100 UG/ML
INJECTION, SOLUTION INTRAVENOUS
Status: DISCONTINUED | OUTPATIENT
Start: 2024-05-14 | End: 2024-05-14

## 2024-05-14 RX ORDER — FAMOTIDINE 20 MG/1
40 TABLET, FILM COATED ORAL ONCE
Status: COMPLETED | OUTPATIENT
Start: 2024-05-14 | End: 2024-05-14

## 2024-05-14 RX ORDER — METHOCARBAMOL 100 MG/ML
1000 INJECTION, SOLUTION INTRAMUSCULAR; INTRAVENOUS ONCE AS NEEDED
Status: COMPLETED | OUTPATIENT
Start: 2024-05-14 | End: 2024-05-14

## 2024-05-14 RX ORDER — HEPARIN SODIUM 5000 [USP'U]/ML
5000 INJECTION, SOLUTION INTRAVENOUS; SUBCUTANEOUS ONCE
Status: COMPLETED | OUTPATIENT
Start: 2024-05-14 | End: 2024-05-14

## 2024-05-14 RX ORDER — EPINEPHRINE 1 MG/ML
INJECTION, SOLUTION, CONCENTRATE INTRAVENOUS
Status: DISCONTINUED
Start: 2024-05-14 | End: 2024-05-14 | Stop reason: HOSPADM

## 2024-05-14 RX ORDER — LIDOCAINE HYDROCHLORIDE 10 MG/ML
INJECTION, SOLUTION EPIDURAL; INFILTRATION; INTRACAUDAL; PERINEURAL
Status: DISCONTINUED | OUTPATIENT
Start: 2024-05-14 | End: 2024-05-14 | Stop reason: HOSPADM

## 2024-05-14 RX ORDER — CALCIUM CARB/MAGNESIUM CARB 311-232MG
1 TABLET ORAL NIGHTLY
COMMUNITY
Start: 2024-05-01

## 2024-05-14 RX ORDER — HYDROMORPHONE HYDROCHLORIDE 2 MG/ML
0.4 INJECTION, SOLUTION INTRAMUSCULAR; INTRAVENOUS; SUBCUTANEOUS EVERY 10 MIN PRN
Status: DISCONTINUED | OUTPATIENT
Start: 2024-05-14 | End: 2024-05-14 | Stop reason: HOSPADM

## 2024-05-14 RX ADMIN — DEXMEDETOMIDINE 4 MCG: 200 INJECTION, SOLUTION INTRAVENOUS at 07:05

## 2024-05-14 RX ADMIN — SODIUM CHLORIDE, POTASSIUM CHLORIDE, SODIUM LACTATE AND CALCIUM CHLORIDE: 600; 310; 30; 20 INJECTION, SOLUTION INTRAVENOUS at 08:05

## 2024-05-14 RX ADMIN — SODIUM CHLORIDE, POTASSIUM CHLORIDE, SODIUM LACTATE AND CALCIUM CHLORIDE: 600; 310; 30; 20 INJECTION, SOLUTION INTRAVENOUS at 06:05

## 2024-05-14 RX ADMIN — INSULIN HUMAN 2 UNITS: 100 INJECTION, SOLUTION PARENTERAL at 12:05

## 2024-05-14 RX ADMIN — FENTANYL CITRATE 75 MCG: 50 INJECTION, SOLUTION INTRAMUSCULAR; INTRAVENOUS at 06:05

## 2024-05-14 RX ADMIN — ONDANSETRON 4 MG: 2 INJECTION INTRAMUSCULAR; INTRAVENOUS at 06:05

## 2024-05-14 RX ADMIN — LIDOCAINE HYDROCHLORIDE 50 MG: 10 INJECTION, SOLUTION EPIDURAL; INFILTRATION; INTRACAUDAL; PERINEURAL at 08:05

## 2024-05-14 RX ADMIN — CEFAZOLIN 2 G: 330 INJECTION, POWDER, FOR SOLUTION INTRAMUSCULAR; INTRAVENOUS at 06:05

## 2024-05-14 RX ADMIN — DEXMEDETOMIDINE 4 MCG: 200 INJECTION, SOLUTION INTRAVENOUS at 08:05

## 2024-05-14 RX ADMIN — FAMOTIDINE 40 MG: 20 TABLET, FILM COATED ORAL at 06:05

## 2024-05-14 RX ADMIN — DEXAMETHASONE SODIUM PHOSPHATE 4 MG: 4 INJECTION, SOLUTION INTRA-ARTICULAR; INTRALESIONAL; INTRAMUSCULAR; INTRAVENOUS; SOFT TISSUE at 07:05

## 2024-05-14 RX ADMIN — LIDOCAINE HYDROCHLORIDE 50 MG: 10 INJECTION, SOLUTION EPIDURAL; INFILTRATION; INTRACAUDAL; PERINEURAL at 06:05

## 2024-05-14 RX ADMIN — ROCURONIUM BROMIDE 50 MG: 50 INJECTION INTRAVENOUS at 06:05

## 2024-05-14 RX ADMIN — SODIUM CHLORIDE, POTASSIUM CHLORIDE, SODIUM LACTATE AND CALCIUM CHLORIDE: 600; 310; 30; 20 INJECTION, SOLUTION INTRAVENOUS at 09:05

## 2024-05-14 RX ADMIN — ONDANSETRON 4 MG: 2 INJECTION INTRAMUSCULAR; INTRAVENOUS at 09:05

## 2024-05-14 RX ADMIN — METHOCARBAMOL 1000 MG: 100 INJECTION INTRAMUSCULAR; INTRAVENOUS at 09:05

## 2024-05-14 RX ADMIN — FENTANYL CITRATE 25 MCG: 50 INJECTION, SOLUTION INTRAMUSCULAR; INTRAVENOUS at 06:05

## 2024-05-14 RX ADMIN — FENTANYL CITRATE 50 MCG: 50 INJECTION, SOLUTION INTRAMUSCULAR; INTRAVENOUS at 08:05

## 2024-05-14 RX ADMIN — ACETAMINOPHEN 650 MG: 325 TABLET, FILM COATED ORAL at 06:05

## 2024-05-14 RX ADMIN — PROPOFOL 200 MG: 10 INJECTION, EMULSION INTRAVENOUS at 06:05

## 2024-05-14 RX ADMIN — SUGAMMADEX 200 MG: 100 INJECTION, SOLUTION INTRAVENOUS at 08:05

## 2024-05-14 RX ADMIN — ONDANSETRON 4 MG: 2 INJECTION INTRAMUSCULAR; INTRAVENOUS at 08:05

## 2024-05-14 RX ADMIN — HEPARIN SODIUM 5000 UNITS: 5000 INJECTION, SOLUTION INTRAVENOUS; SUBCUTANEOUS at 06:05

## 2024-05-14 NOTE — DISCHARGE SUMMARY
[facility] Surgical - Periop Services  Discharge Note  Short Stay    Procedure(s) (LRB):  BREAST REVISION SURGERY (bilateral removal of excess axillary skin and subcutaneous tissue, use of liposuction and direct incision) (Bilateral)  LIPOSUCTION (bilateral removal of excess axillary skin and subcutaneous tissue, use of liposuction and direct incision) (Bilateral)      OUTCOME: Patient tolerated treatment/procedure well without complication and is now ready for discharge.    DISPOSITION: Home or Self Care    FINAL DIAGNOSIS:  <principal problem not specified>    FOLLOWUP: In clinic    DISCHARGE INSTRUCTIONS:  No discharge procedures on file.     TIME SPENT ON DISCHARGE: 5 minutes

## 2024-05-14 NOTE — TRANSFER OF CARE
Anesthesia Transfer of Care Note    Patient: Elina Ham    Procedure(s) Performed: Procedure(s) (LRB):  BREAST REVISION SURGERY (bilateral removal of excess axillary skin and subcutaneous tissue, use of liposuction and direct incision) (Bilateral)  LIPOSUCTION (bilateral removal of excess axillary skin and subcutaneous tissue, use of liposuction and direct incision) (Bilateral)    Patient location: PACU    Anesthesia Type: general    Transport from OR: Transported from OR on room air with adequate spontaneous ventilation    Post pain: adequate analgesia    Post assessment: no apparent anesthetic complications and tolerated procedure well    Post vital signs: stable    Level of consciousness: responds to stimulation    Nausea/Vomiting: no nausea/vomiting    Complications: none    Transfer of care protocol was followed      Last vitals:

## 2024-05-14 NOTE — PROGRESS NOTES
Ankit drain with compressed bulb intact noted, surrounding area soft and warm to the touch, color WDL.

## 2024-05-14 NOTE — PROGRESS NOTES
At 0058 cbg checked, result was 233 mg/dl.  Sree Wharton was notied of CBG results, no new orders noted.

## 2024-05-14 NOTE — OP NOTE
[facility] [address] [place] [address]    PATIENT NAME:      BARB CRAFT   YOB: 1960  CSN:               [id number]  MRN:               [id number]  ADMIT DATE:        05/14/2024 05:28:00  PHYSICIAN:         Nick Gibbs MD                          OPERATIVE REPORT      DATE OF SURGERY:    05/14/2024 00:00:00    SURGEON:  Nick Gibbs MD    ASSISTANT:  Angela Corona, nurse practitioner, who is essential for facilitation   of this procedure, deep protection, and closure.    PREOPERATIVE DIAGNOSIS:  Excess axillary tissue, status post bilateral   mastectomy and bilateral breast reconstruction with BENSON flaps.    POSTOPERATIVE DIAGNOSIS:  Excess axillary tissue, status post bilateral   mastectomy and bilateral breast reconstruction with BENSON flaps.    PROCEDURE:    1. Bilateral axillary liposuction.  2. Bilateral excision of redundant axillary skin and subcutaneous tissue   measuring approximately 15 x 8 cm per side.    INDICATIONS FOR PROCEDURE:  This is a 64-year-old female.  She has a history of   left-sided breast cancer.  She is status post bilateral breast reconstruction.    She subsequently had potential reconstruction with bilateral BENSON flaps and she   did very well.  She now presents for additional reconstructive surgery.  She has   severe excess of axillary adipose tissue that she now presents today for   removal.    DESCRIPTION OF PROCEDURE:  The patient was identified in the preoperative   holding area.  She was marked.  She was taken to the operating room, placed in   supine position.  General endotracheal anesthesia provided.  She was prepped and   draped in a sterile surgical fashion.  Time-out was taken and everyone in   agreement.  Used her previous drain site scar on the right and left sides, make   a small stab incision in this area with 15 blade and through these, I    infiltrated approximately 400 mL of tumescent solution on the right axillary   area and 400 mL of tumescent solution on the left.  After allowing time for   vasoconstriction, I performed power-assisted liposuction in both areas.    Approximately, 1000 mL Lipo aspirate total was removed.  After this was   accomplished, we then starting the right side delineated an elliptical type   excision of redundant skin and subcutaneous tissue.  Skin was incised and   subcutaneous tissue was further removed with electrocautery and subsequently did   this exact same procedure on the patient's left side as well.  Incised the skin   and removing excess with electrocautery.  Hemostasis ensured.  A 15 round Aneudy   drain was placed in each wound bed, exited through the stab incision for   liposuction and secured to skin with 2-0 silk sutures.  I then proceeded to   close both incisions with 0 Vicryl sutures in the deep dermis, subcutaneous   tissue, 2-0 subcuticular Stratafix.  Dermabond tape was used as dressing.  There   were no complications.  All counts correct.        ______________________________  MD VAHE Rodriguez/AQS  DD:  05/14/2024  Time:  11:07AM  DT:  05/14/2024  Time:  05:32PM  Job #:  [id number]      OPERATIVE REPORT

## 2024-05-14 NOTE — ANESTHESIA PREPROCEDURE EVALUATION
05/14/2024  Elina Ham is a 64 y.o., female.  Pre-operative evaluation for Procedure(s) (LRB):  BREAST REVISION SURGERY (bilateral removal of excess axillary skin and subcutaneous tissue, use of liposuction and direct incision) (Bilateral)  LIPOSUCTION (bilateral removal of excess axillary skin and subcutaneous tissue, use of liposuction and direct incision) (Bilateral)    Elina Ham is a 64 y.o. female   11/7/2023:         Patient Active Problem List   Diagnosis    Malignant neoplasm of upper-outer quadrant of right breast in female, estrogen receptor positive    Malignant neoplasm of upper-outer quadrant of left breast in female, estrogen receptor negative    Venous insufficiency    Type 2 diabetes mellitus, without long-term current use of insulin    Hypertension    Diabetes mellitus without complication    Port-A-Cath in place    Pancreatic cyst    S/P breast reconstruction       Review of patient's allergies indicates:  No Known Allergies    No current facility-administered medications on file prior to encounter.     Current Outpatient Medications on File Prior to Encounter   Medication Sig Dispense Refill    amLODIPine (NORVASC) 10 MG tablet Take 10 mg by mouth once daily.      apixaban (ELIQUIS) 5 mg Tab Take 1 tablet (5 mg total) by mouth 2 (two) times daily. 60 tablet 5    aspirin (ECOTRIN) 81 MG EC tablet Take 81 mg by mouth.      atorvastatin (LIPITOR) 20 MG tablet 1 tablet Orally Once a day BEDTIME      glipiZIDE (GLUCOTROL) 10 MG tablet Take 10 mg by mouth 2 (two) times daily.      letrozole (FEMARA) 2.5 mg Tab TAKE ONE TABLET BY MOUTH ONE TIME DAILY 30 tablet 5    loratadine (CLARITIN) 10 mg tablet Take 10 mg by mouth once daily.      losartan-hydrochlorothiazide 100-25 mg (HYZAAR) 100-25 mg per tablet Take 1 tablet by mouth once daily.      metFORMIN (GLUCOPHAGE) 1000 MG tablet Take  "1,000 mg by mouth daily with breakfast.         Past Surgical History:   Procedure Laterality Date    BILATERAL MASTECTOMY Bilateral 2022    Procedure: MASTECTOMY, BILATERAL;  Surgeon: Charlene Contreras MD;  Location: [facility] OR;  Service: General;  Laterality: Bilateral;     SECTION  92 and 3/15/95    COLONOSCOPY      INSERTION OF BREAST TISSUE EXPANDER Bilateral 2022    Procedure: INSERTION, TISSUE EXPANDER, BREAST (Bilateral breast reconstruction with tissue expanders and acellular dermal matrix and use of intraoperative fluoroscein angiogragry);  Surgeon: Nick Gibbs MD;  Location: [facility] OR;  Service: Plastics;  Laterality: Bilateral;    MEDIPORT INSERTION, SINGLE Right     RECONSTRUCTION OF BREAST WITH DEEP INFERIOR EPIGASTRIC ARTERY  (BENSON) FLAP Bilateral 2023    Procedure: RECONSTRUCTION, BREAST, USING BENSON SKIN FLAP (Bilateral breast recon with BENSON flaps, possible TRAM flaps, possible hernia repair with mesh, use of intraoperative fluoroscein angiography.;  Surgeon: Nick Gibbs MD;  Location: [facility] OR;  Service: Plastics;  Laterality: Bilateral;    REMOVAL OF VASCULAR ACCESS PORT Right 2023    Procedure: REMOVAL, VASCULAR ACCESS PORT;  Surgeon: Nick Gibbs MD;  Location: [facility] OR;  Service: Plastics;  Laterality: Right;    SENTINEL LYMPH NODE BIOPSY Bilateral 2022    Procedure: BIOPSY, LYMPH NODE, SENTINEL;  Surgeon: Charleen Contreras MD;  Location: [facility];  Service: General;  Laterality: Bilateral;  MAGTRACE Injection in office  Need SentiMag Probe     CBC: No results for input(s): "WBC", "RBC", "HGB", "HCT", "PLT", "MCV", "MCH", "MCHC" in the last 72 hours.    CMP: No results for input(s): "NA", "K", "CL", "CO2", "BUN", "CREATININE", "GLU", "MG", "PHOS", "CALCIUM", "ALBUMIN", "PROT", "ALKPHOS", "ALT", "AST", "BILITOT" in the last 72 hours.    INR  No results for input(s): "PT", "INR", "PROTIME", "APTT" in the last 72 hours.        Diagnostic " Studies:  CXR :    EKG :      2D Echo 1/18/2023:  EF=60%. PASP=20.    Pre-op Assessment    I have reviewed the Patient Summary Reports.     I have reviewed the Nursing Notes. I have reviewed the NPO Status.   I have reviewed the Medications.     Review of Systems  Anesthesia Hx:  No problems with previous Anesthesia   History of prior surgery of interest to airway management or planning:  Previous anesthesia: General 11/7/2023 BreastSx: EM DL Mil2 [id number].ET7:  MediPOrt:; B Mastectomy:; with general anesthesia.  Procedure performed at an [facility].       Denies Family Hx of Anesthesia complications.    Denies Personal Hx of Anesthesia complications.                    Social:  Alcohol Use, Non-Smoker 1 glass wine /week      Hematology/Oncology:       -- Anemia:               Hematology Comments: Eliquis      --  Cancer in past history:       Breast       surgery       EENT/Dental:  EENT/Dental Normal           Cardiovascular:     Denies Pacemaker. Hypertension   Denies MI.     Denies CABG/stent.    Denies Angina.  Denies CHF.       ECG has been reviewed. Echo 1/18/2023:  EF=60%. PASP=20.                     Hypertension         Pulmonary:  Pulmonary Normal   Denies COPD.  Denies Asthma.                    Renal/:  Renal/ Normal  Denies Chronic Renal Disease.                Hepatic/GI:  Hepatic/GI Normal    Denies Hiatal Hernia.  Denies GERD.             Musculoskeletal:  Musculoskeletal Normal                Neurological:  Neurology Normal   Denies CVA.    Denies Seizures.                                Endocrine:  Diabetes Denies Hypothyroidism.  Denies Hyperthyroidism.  Diabetes                    Obesity / BMI > 30  Dermatological:  Skin Normal    Psych:  Psychiatric Normal                    Physical Exam  General: Well nourished, Cooperative, Alert and Oriented    Airway:  Mallampati: II / I  Mouth Opening: Normal  TM Distance: Normal  Tongue: Normal  Neck ROM: Normal  ROM    Dental:  Intact        Anesthesia Plan  Type of Anesthesia, risks & benefits discussed:    Anesthesia Type: Gen ETT  Intra-op Monitoring Plan: Standard ASA Monitors  Post Op Pain Control Plan: multimodal analgesia  Induction:  IV  Airway Plan: Direct, Post-Induction  Informed Consent: Informed consent signed with the Patient and all parties understand the risks and agree with anesthesia plan.  All questions answered.   ASA Score: 2  Day of Surgery Review of History & Physical: H&P Update referred to the surgeon/provider.I have interviewed and examined the patient. I have reviewed the patient's H&P dated:     Ready For Surgery From Anesthesia Perspective.     .

## 2024-05-14 NOTE — ANESTHESIA PROCEDURE NOTES
Intubation    Date/Time: 5/14/2024 6:54 AM    Performed by: Betsey Larkin CRNA  Authorized by: Yola Romero MD    Intubation:     Induction:  Intravenous    Intubated:  Postinduction    Mask Ventilation:  Easy with oral airway    Attempts:  1    Attempted By:  CRNA    Method of Intubation:  Direct    Blade:  [place] 2    Laryngeal View Grade: Grade I - full view of cords      Difficult Airway Encountered?: No      Complications:  None    Airway Device:  Oral endotracheal tube    Airway Device Size:  7.0    Style/Cuff Inflation:  Cuffed (inflated to minimal occlusive pressure)    Inflation Amount (mL):  6    Tube secured:  21    Secured at:  The lips    Placement Verified By:  Capnometry    Complicating Factors:  None    Findings Post-Intubation:  BS equal bilateral

## 2024-05-14 NOTE — DISCHARGE INSTRUCTIONS
Breast Reconstruction Discharge Instructions     What care is needed at home?   Sleep with your head and chest raised on 2 to 3 pillows to lower swelling.  Your doctor may suggest you use an ice pack over the painful part. Never put ice right on the skin. Do not leave the ice on more than 10 to 15 minutes at a time.  Cough and take deep breaths to help keep your lungs clear.  Be sure to wash your hands before and after touching your wound or dressing.   Leave your dressing in place. Do not remove the dressing. Keep clean, dry, and intact at all times.  Sponge bathe only. You may shower when released by your doctor.  Do not lift anything over 10 pounds (4.5 kg)  Ask your doctor when you may go back to your normal activities like work or exercising  Avoid using creams until your skin has healed to lower your risk of infection or told by your doctor.  Wear your supportive/surgical bra at all times except when showering. Put on another open front supportive bra when washing the old one.   Tops that button up the front are easier to put on and take off than those that slip over your head.  Your bowel movements may take some time to get back to normal. Eat small meals high in fiber to avoid hard stools. Drink 6 to 8 glasses of water each day.  Try to walk each day. Start by walking a little more than you did the day before. Walking boosts blood flow and helps prevent lung, belly, and blood problems.  What follow-up care is needed?   Your doctor may ask you to make visits to the office to check on your progress. Be sure to keep your visits.  Any drains placed in your breasts will be removed 2 to 4 days after surgery.  You may have stitches or staples. If so, your doctor will often want to remove the stitches or staples in 1 to 2 weeks.  If you have a tissue expander, your doctor may need to fill the pouch with saline. Your doctor will set the schedule for follow-up.  If you had silicone gel implants, your doctor will check  them every few years using ultrasound or MRI.  Your breasts implants may need to be replaced over time.  A second smaller surgery may need to be done. The surgery would work on fixing the size, shape, and color of your nipple.  Will physical activity be limited?   You will need to limit your activity for a while. Avoid lifting, sports, and sex until your doctor says physical activity is OK. Talk with your doctor about the right amount of activity for you.  What problems could happen?   Bleeding  Infection  Scarring or wrinkled skin over the implant  Implant may make cancer harder to find  Breast sensation may not be the same as your natural breast  Leakage of implants  Problem with breastfeeding  When do I need to call the doctor?   Signs of infection, such as a fever of 100.4°F (38°C) or higher, chills, wound that will not heal.  Signs of wound infection, such as swelling, redness, warmth around the wound; too much pain when touched; yellowish, greenish, or bloody discharge; foul smell coming from the cut site; cut site opens up.  Drain seems clogged and there is fluid under your surgery cuts  Cough, shortness of breath, chest pain  Upset stomach and throwing up that are not helped by nausea drugs  Pain that is not helped by pain drugs.

## 2024-05-14 NOTE — ANESTHESIA POSTPROCEDURE EVALUATION
Anesthesia Post Evaluation    Patient: Elina Ham    Procedure(s) Performed: Procedure(s) (LRB):  BREAST REVISION SURGERY (bilateral removal of excess axillary skin and subcutaneous tissue, use of liposuction and direct incision) (Bilateral)  LIPOSUCTION (bilateral removal of excess axillary skin and subcutaneous tissue, use of liposuction and direct incision) (Bilateral)    Final Anesthesia Type: general      Patient location during evaluation: PACU  Patient participation: Yes- Able to Participate  Level of consciousness: awake and alert, awake and oriented  Post-procedure vital signs: reviewed and stable  Pain management: adequate  Airway patency: patent    PONV status at discharge: No PONV  Anesthetic complications: no      Cardiovascular status: blood pressure returned to baseline, hemodynamically stable and stable  Respiratory status: unassisted, spontaneous ventilation and room air  Hydration status: euvolemic  Follow-up not needed.              Vitals Value Taken Time   /73 05/14/24 1232   Temp 36 °C (96.8 °F) 05/14/24 0853   Pulse 67 05/14/24 1232   Resp 15 05/14/24 0955   SpO2 99 % 05/14/24 1232   Vitals shown include unfiled device data.      Event Time   Out of Recovery 09:59:00         Pain/Loan Score: Pain Rating Prior to Med Admin: 0 (5/14/2024  6:18 AM)  Loan Score: 10 (5/14/2024 12:15 PM)

## 2024-05-14 NOTE — PROGRESS NOTES
Op site dressing noted, surrounding area soft and warm to the touch, color WDL. Surgical bra noted over all dressings

## 2024-05-15 LAB
POCT GLUCOSE: 241 MG/DL (ref 70–110)
POCT GLUCOSE: 257 MG/DL (ref 70–110)

## 2024-05-16 LAB — PSYCHE PATHOLOGY RESULT: NORMAL

## 2024-05-19 VITALS
SYSTOLIC BLOOD PRESSURE: 115 MMHG | DIASTOLIC BLOOD PRESSURE: 75 MMHG | OXYGEN SATURATION: 97 % | BODY MASS INDEX: 33.52 KG/M2 | HEIGHT: 66 IN | TEMPERATURE: 97 F | HEART RATE: 67 BPM | WEIGHT: 208.56 LBS | RESPIRATION RATE: 18 BRPM

## 2024-07-19 DIAGNOSIS — C50.412 MALIGNANT NEOPLASM OF UPPER-OUTER QUADRANT OF LEFT BREAST IN FEMALE, ESTROGEN RECEPTOR NEGATIVE: ICD-10-CM

## 2024-07-19 DIAGNOSIS — Z17.1 MALIGNANT NEOPLASM OF UPPER-OUTER QUADRANT OF LEFT BREAST IN FEMALE, ESTROGEN RECEPTOR NEGATIVE: ICD-10-CM

## 2024-07-19 RX ORDER — LETROZOLE 2.5 MG/1
TABLET, FILM COATED ORAL
Qty: 30 TABLET | Refills: 2 | Status: SHIPPED | OUTPATIENT
Start: 2024-07-19

## 2024-09-12 ENCOUNTER — LAB VISIT (OUTPATIENT)
Dept: LAB | Facility: HOSPITAL | Age: 64
End: 2024-09-12
Attending: NURSE PRACTITIONER
Payer: COMMERCIAL

## 2024-09-12 ENCOUNTER — OFFICE VISIT (OUTPATIENT)
Dept: HEMATOLOGY/ONCOLOGY | Facility: CLINIC | Age: 64
End: 2024-09-12
Payer: COMMERCIAL

## 2024-09-12 VITALS
WEIGHT: 208.19 LBS | DIASTOLIC BLOOD PRESSURE: 83 MMHG | RESPIRATION RATE: 18 BRPM | TEMPERATURE: 98 F | HEIGHT: 66 IN | OXYGEN SATURATION: 97 % | SYSTOLIC BLOOD PRESSURE: 127 MMHG | BODY MASS INDEX: 33.46 KG/M2 | HEART RATE: 92 BPM

## 2024-09-12 DIAGNOSIS — G62.0 NEUROPATHY DUE TO CHEMOTHERAPEUTIC DRUG: ICD-10-CM

## 2024-09-12 DIAGNOSIS — Z17.1 MALIGNANT NEOPLASM OF UPPER-OUTER QUADRANT OF LEFT BREAST IN FEMALE, ESTROGEN RECEPTOR NEGATIVE: Primary | ICD-10-CM

## 2024-09-12 DIAGNOSIS — K86.2 PANCREATIC CYST: ICD-10-CM

## 2024-09-12 DIAGNOSIS — C50.412 MALIGNANT NEOPLASM OF UPPER-OUTER QUADRANT OF LEFT BREAST IN FEMALE, ESTROGEN RECEPTOR NEGATIVE: Primary | ICD-10-CM

## 2024-09-12 DIAGNOSIS — I26.99 OTHER ACUTE PULMONARY EMBOLISM WITHOUT ACUTE COR PULMONALE: ICD-10-CM

## 2024-09-12 DIAGNOSIS — C50.412 MALIGNANT NEOPLASM OF UPPER-OUTER QUADRANT OF LEFT BREAST IN FEMALE, ESTROGEN RECEPTOR NEGATIVE: ICD-10-CM

## 2024-09-12 DIAGNOSIS — Z79.811 LONG TERM CURRENT USE OF AROMATASE INHIBITOR: ICD-10-CM

## 2024-09-12 DIAGNOSIS — T45.1X5A NEUROPATHY DUE TO CHEMOTHERAPEUTIC DRUG: ICD-10-CM

## 2024-09-12 DIAGNOSIS — Z17.1 MALIGNANT NEOPLASM OF UPPER-OUTER QUADRANT OF LEFT BREAST IN FEMALE, ESTROGEN RECEPTOR NEGATIVE: ICD-10-CM

## 2024-09-12 LAB
ALBUMIN SERPL-MCNC: 4.5 G/DL (ref 3.4–4.8)
ALBUMIN/GLOB SERPL: 1.4 RATIO (ref 1.1–2)
ALP SERPL-CCNC: 104 UNIT/L (ref 40–150)
ALT SERPL-CCNC: 29 UNIT/L (ref 0–55)
ANION GAP SERPL CALC-SCNC: 12 MEQ/L
AST SERPL-CCNC: 21 UNIT/L (ref 5–34)
BASOPHILS # BLD AUTO: 0.02 X10(3)/MCL
BASOPHILS NFR BLD AUTO: 0.3 %
BILIRUB SERPL-MCNC: 1.1 MG/DL
BUN SERPL-MCNC: 10.6 MG/DL (ref 9.8–20.1)
CALCIUM SERPL-MCNC: 10.7 MG/DL (ref 8.4–10.2)
CHLORIDE SERPL-SCNC: 102 MMOL/L (ref 98–107)
CO2 SERPL-SCNC: 26 MMOL/L (ref 23–31)
CREAT SERPL-MCNC: 1.05 MG/DL (ref 0.55–1.02)
CREAT/UREA NIT SERPL: 10
EOSINOPHIL # BLD AUTO: 0.04 X10(3)/MCL (ref 0–0.9)
EOSINOPHIL NFR BLD AUTO: 0.5 %
ERYTHROCYTE [DISTWIDTH] IN BLOOD BY AUTOMATED COUNT: 12.8 % (ref 11.5–17)
GFR SERPLBLD CREATININE-BSD FMLA CKD-EPI: 60 ML/MIN/1.73/M2
GLOBULIN SER-MCNC: 3.3 GM/DL (ref 2.4–3.5)
GLUCOSE SERPL-MCNC: 217 MG/DL (ref 82–115)
HCT VFR BLD AUTO: 39.7 % (ref 37–47)
HGB BLD-MCNC: 13.5 G/DL (ref 12–16)
IMM GRANULOCYTES # BLD AUTO: 0.01 X10(3)/MCL (ref 0–0.04)
IMM GRANULOCYTES NFR BLD AUTO: 0.1 %
LYMPHOCYTES # BLD AUTO: 3.3 X10(3)/MCL (ref 0.6–4.6)
LYMPHOCYTES NFR BLD AUTO: 42.3 %
MCH RBC QN AUTO: 29.6 PG (ref 27–31)
MCHC RBC AUTO-ENTMCNC: 34 G/DL (ref 33–36)
MCV RBC AUTO: 87.1 FL (ref 80–94)
MONOCYTES # BLD AUTO: 0.51 X10(3)/MCL (ref 0.1–1.3)
MONOCYTES NFR BLD AUTO: 6.5 %
NEUTROPHILS # BLD AUTO: 3.92 X10(3)/MCL (ref 2.1–9.2)
NEUTROPHILS NFR BLD AUTO: 50.3 %
PLATELET # BLD AUTO: 339 X10(3)/MCL (ref 130–400)
PMV BLD AUTO: 9.1 FL (ref 7.4–10.4)
POTASSIUM SERPL-SCNC: 3.4 MMOL/L (ref 3.5–5.1)
PROT SERPL-MCNC: 7.8 GM/DL (ref 5.8–7.6)
RBC # BLD AUTO: 4.56 X10(6)/MCL (ref 4.2–5.4)
SODIUM SERPL-SCNC: 140 MMOL/L (ref 136–145)
WBC # BLD AUTO: 7.8 X10(3)/MCL (ref 4.5–11.5)

## 2024-09-12 PROCEDURE — 3008F BODY MASS INDEX DOCD: CPT | Mod: CPTII,S$GLB,, | Performed by: NURSE PRACTITIONER

## 2024-09-12 PROCEDURE — 85025 COMPLETE CBC W/AUTO DIFF WBC: CPT

## 2024-09-12 PROCEDURE — 99214 OFFICE O/P EST MOD 30 MIN: CPT | Mod: S$GLB,,, | Performed by: NURSE PRACTITIONER

## 2024-09-12 PROCEDURE — 99999 PR PBB SHADOW E&M-EST. PATIENT-LVL IV: CPT | Mod: PBBFAC,,, | Performed by: NURSE PRACTITIONER

## 2024-09-12 PROCEDURE — 36415 COLL VENOUS BLD VENIPUNCTURE: CPT

## 2024-09-12 PROCEDURE — 3079F DIAST BP 80-89 MM HG: CPT | Mod: CPTII,S$GLB,, | Performed by: NURSE PRACTITIONER

## 2024-09-12 PROCEDURE — 80053 COMPREHEN METABOLIC PANEL: CPT

## 2024-09-12 PROCEDURE — 3074F SYST BP LT 130 MM HG: CPT | Mod: CPTII,S$GLB,, | Performed by: NURSE PRACTITIONER

## 2024-09-12 PROCEDURE — 1159F MED LIST DOCD IN RCRD: CPT | Mod: CPTII,S$GLB,, | Performed by: NURSE PRACTITIONER

## 2024-09-12 NOTE — PROGRESS NOTES
Subjective:       Patient ID: Elina Ham is a 64 y.o. female.    Chief Complaint: Pt is in office for 6m f/u    Diagnosis: Bilateral breast cancer  Current Treatment: Letrozole 3/8/23     Oncology History   Malignant neoplasm of upper-outer quadrant of right breast in female, estrogen receptor positive   6/3/2022 Surgery    S/p bilateral simple mastectomy, bilateral sentinel lymph node biopsy, and TE placement - 6/3/2022      1/5/2023 - 3/7/2023 Radiation Therapy         3/8/2023 -  Hormone Therapy    femara     Malignant neoplasm of upper-outer quadrant of left breast in female, estrogen receptor negative   4/27/2022 Genetic Testing    Genetic testing negative   comprehensive genetic analysis indicated that there was no deleterious mutation found in any of these genes. However, there was a variant of uncertain significance (VUS):AXIN2 c.[id number]>G. This variant is predicted to be benign by in silico analysis (PolyPhen2 score 0.429).      6/3/2022 Surgery    S/p bilateral simple mastectomy, bilateral sentinel lymph node biopsy, and TE placement - 6/3/2022      8/3/2022 - 12/21/2022 Chemotherapy    OP BREAST PACLITAXEL WEEKLY FOLLOWED BY DOSE DENSE DOXORUBICIN CYCLOPHOSPHAMIDE Q2W      Pathology Significant Finding    Left breast:  Repeat marker:  ER 6%, NV 0.1%, HER2/rahul negative, Ki-67 68%     10/28/2022 Imaging Significant Findings    10/28/2022: CT cap:  Postsurgical changes, left axilla 1.9 x 1.6 nodule, right axilla 1.7 x 1.1 hypodense nodule, small bilateral axillary nodes, liver subcentimeter hypodensity in right lobe too small to characterize, 1.2 cm cyst in the tail the pancreas stable   Impression:  Stable, bilateral axillary some postoperative seromas indolent subcentimeter cyst unchanged dating back to 2016, pancreatic cyst continue to follow       12/27/2022 Imaging Significant Findings    Pulmonary emboli right lower lobar artery and segmental branches.     No evidence of metastatic disease to the  "chest abdomen or pelvis.     Cystic lesion in the pancreatic tail, stable from the prior exam, most likely a simple pancreatic cyst, pseudocyst, or IPMN.     Subcentimeter liver hypodensity likely a cyst, stable..     Suspected chronic necrotic lymph node in the left axilla, smaller, right axillary lymph nodes, smaller in the interval.     Voicemail message was left for the ordering physician Dr. Oh at the time of the report.          2024   Patient presents to the clinic today for 6m scheduled follow up visit.  She is doing well with her letrozole. She has completed reconstruction and doing well. Her headaches have resolved. She has been watching her sugars more closely. She denies weight loss, night sweats, fatigue, abdominal pain.  "  Past Medical History:   Diagnosis Date    Cancer 3/21/2022    breast    Diabetes mellitus     Hypercholesterolemia     Hypertension     Other pulmonary embolism without acute cor pulmonale       Past Surgical History:   Procedure Laterality Date    BILATERAL MASTECTOMY Bilateral 2022    Procedure: MASTECTOMY, BILATERAL;  Surgeon: Charlene Contreras MD;  Location: [facility] OR;  Service: General;  Laterality: Bilateral;    BREAST REVISION SURGERY Bilateral 2024    Procedure: BREAST REVISION SURGERY (bilateral removal of excess axillary skin and subcutaneous tissue, use of liposuction and direct incision);  Surgeon: Nick Gibbs MD;  Location: [facility] OR;  Service: Plastics;  Laterality: Bilateral;     SECTION  92 and 3/15/95    COLONOSCOPY      INSERTION OF BREAST TISSUE EXPANDER Bilateral 2022    Procedure: INSERTION, TISSUE EXPANDER, BREAST (Bilateral breast reconstruction with tissue expanders and acellular dermal matrix and use of intraoperative fluoroscein angiogragry);  Surgeon: Nick Gibbs MD;  Location: [facility] OR;  Service: Plastics;  Laterality: Bilateral;    LIPOSUCTION Bilateral 2024    Procedure: LIPOSUCTION (bilateral removal of " excess axillary skin and subcutaneous tissue, use of liposuction and direct incision);  Surgeon: Nick Gibbs MD;  Location: [facility] OR;  Service: Plastics;  Laterality: Bilateral;    MEDIPORT INSERTION, SINGLE Right     RECONSTRUCTION OF BREAST WITH DEEP INFERIOR EPIGASTRIC ARTERY  (BENSON) FLAP Bilateral 2023    Procedure: RECONSTRUCTION, BREAST, USING BENSON SKIN FLAP (Bilateral breast recon with BENSON flaps, possible TRAM flaps, possible hernia repair with mesh, use of intraoperative fluoroscein angiography.;  Surgeon: Nick Gibbs MD;  Location: [facility] OR;  Service: Plastics;  Laterality: Bilateral;    REMOVAL OF VASCULAR ACCESS PORT Right 2023    Procedure: REMOVAL, VASCULAR ACCESS PORT;  Surgeon: Nick Gibbs MD;  Location: [facility] OR;  Service: Plastics;  Laterality: Right;    SENTINEL LYMPH NODE BIOPSY Bilateral 2022    Procedure: BIOPSY, LYMPH NODE, SENTINEL;  Surgeon: Charlene Contreras MD;  Location: [facility] OR;  Service: General;  Laterality: Bilateral;  MAGTRACE Injection in office  Need SentiMag Probe     Social History     Socioeconomic History    Marital status:    Tobacco Use    Smoking status: Never    Smokeless tobacco: Never   Substance and Sexual Activity    Alcohol use: Not Currently     Alcohol/week: 1.0 standard drink of alcohol     Types: 1 Glasses of wine per week     Comment: drink socially but haven't had a glass of wine since     Drug use: Never    Sexual activity: Yes     Partners: Male     Birth control/protection: None      Family History   Problem Relation Name Age of Onset    Heart attack Mother      Breast cancer Sister Madison Kaur     Breast cancer Sister Marti Ham         remission    Stroke Brother Ulysses Lawrence             Pancreatic cancer Brother      Breast cancer Maternal Aunt Kiesha Casper             Breast cancer Maternal Aunt Teri Aden             Breast cancer Maternal Aunt Danna Gonzalez              Breast cancer Maternal Aunt Astrid Rincon     Breast cancer Maternal Uncle Jaya Reeves    Breast cancer Maternal Grandmother Phoebe Gordon               Review of patient's allergies indicates:  No Known Allergies   Review of Systems   Constitutional:  Positive for appetite change. Negative for unexpected weight change.   HENT:  Negative for mouth sores.    Eyes:  Negative for visual disturbance.   Respiratory:  Negative for cough and shortness of breath.    Cardiovascular:  Negative for chest pain.   Gastrointestinal:  Positive for diarrhea. Negative for abdominal pain.   Genitourinary:  Positive for frequency.   Musculoskeletal:  Positive for back pain.   Integumentary:  Negative for rash.   Neurological:  Negative for headaches.   Hematological:  Negative for adenopathy.   Psychiatric/Behavioral:  The patient is not nervous/anxious.      A complete 12 point ROS was performed in full with pertinent positives as described in interval history. Remainder of ROS done in full and are negative.        Objective:      Physical Exam  Constitutional:       Appearance: Normal appearance.   HENT:      Head: Normocephalic and atraumatic.      Nose: Nose normal.      Mouth/Throat:      Mouth: Mucous membranes are moist.   Eyes:      Extraocular Movements: Extraocular movements intact.      Conjunctiva/sclera: Conjunctivae normal.      Pupils: Pupils are equal, round, and reactive to light.   Cardiovascular:      Rate and Rhythm: Normal rate and regular rhythm.      Pulses: Normal pulses.   Pulmonary:      Effort: Pulmonary effort is normal.      Breath sounds: Normal breath sounds.   Chest:      Comments: B/L reconstruction.  No palpable abnormality. No LAD  Abdominal:      General: Bowel sounds are normal.      Palpations: Abdomen is soft.   Musculoskeletal:      Cervical back: Normal range of motion and neck supple.   Neurological:      General: No focal deficit present.      Mental Status:  She is alert and oriented to person, place, and time. Mental status is at baseline.   Psychiatric:         Mood and Affect: Mood normal.         Behavior: Behavior normal.       Assessment:   Stage IIIA (pT2, pN1a, cM0, G3) triple negative left breast cancer    Stage IA (pT1b, pN0, cM0, G1) Er+ IN+ right breast cancer    Pulmonary emboli. Continue eliquis 2.5 mg BID X 2 years. This will be around 12/2024    Pancreatic cyst  Initially mentioned on October 18, 2022 scan as a 1.2 cm cyst in the tail of the pancreas.  Repeat imaging in December of 2022 showed a stable size of the cyst.     March of 2023 shows this 13 mm cystic lesion  I do think it was possibly present on the August 22, 2022 scan, but I can not see it on the July 2016 scan but will defer to Gastroenterology  Continue follow up with Dr. Queen    5. 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       Plan:     B/L mastectomies so no need for mammograms  Continue femara for her right breast cancer at least 5 years (3/28)  Calcium + Vitamin D- BID  Last bone density 3/8/23 with her PCP--normal repeat 3/2025  S/P reconstruction 11/2023 Sees Dr. Gibbs  and Dr. Ordaz   Discuss discontinuation of eliquis at next visit      Follow up in about 6 months (around 3/12/2025) for CBC, CMP and OV.

## 2024-10-08 DIAGNOSIS — C50.412 MALIGNANT NEOPLASM OF UPPER-OUTER QUADRANT OF LEFT BREAST IN FEMALE, ESTROGEN RECEPTOR NEGATIVE: ICD-10-CM

## 2024-10-08 DIAGNOSIS — Z17.1 MALIGNANT NEOPLASM OF UPPER-OUTER QUADRANT OF LEFT BREAST IN FEMALE, ESTROGEN RECEPTOR NEGATIVE: ICD-10-CM

## 2024-10-08 RX ORDER — LETROZOLE 2.5 MG/1
TABLET, FILM COATED ORAL
Qty: 30 TABLET | Refills: 5 | Status: SHIPPED | OUTPATIENT
Start: 2024-10-08

## 2024-10-21 ENCOUNTER — PATIENT MESSAGE (OUTPATIENT)
Dept: HEMATOLOGY/ONCOLOGY | Facility: CLINIC | Age: 64
End: 2024-10-21
Payer: COMMERCIAL

## 2024-11-21 DIAGNOSIS — I26.99 OTHER ACUTE PULMONARY EMBOLISM WITHOUT ACUTE COR PULMONALE: ICD-10-CM

## 2024-12-19 DIAGNOSIS — I26.99 OTHER ACUTE PULMONARY EMBOLISM WITHOUT ACUTE COR PULMONALE: ICD-10-CM

## 2024-12-19 RX ORDER — APIXABAN 2.5 MG/1
2.5 TABLET, FILM COATED ORAL 2 TIMES DAILY
Qty: 60 TABLET | Refills: 3 | Status: SHIPPED | OUTPATIENT
Start: 2024-12-19

## 2025-02-28 ENCOUNTER — HOSPITAL ENCOUNTER (EMERGENCY)
Facility: HOSPITAL | Age: 65
Discharge: HOME | End: 2025-02-28
Attending: ORTHOPAEDIC SURGERY
Payer: COMMERCIAL

## 2025-02-28 VITALS
DIASTOLIC BLOOD PRESSURE: 90 MMHG | TEMPERATURE: 99 F | OXYGEN SATURATION: 99 % | WEIGHT: 203 LBS | RESPIRATION RATE: 17 BRPM | BODY MASS INDEX: 32.62 KG/M2 | HEIGHT: 66 IN | SYSTOLIC BLOOD PRESSURE: 169 MMHG | HEART RATE: 79 BPM

## 2025-02-28 DIAGNOSIS — R07.9 CHEST PAIN: ICD-10-CM

## 2025-02-28 DIAGNOSIS — J10.1 INFLUENZA A: Primary | ICD-10-CM

## 2025-02-28 DIAGNOSIS — E87.6 HYPOKALEMIA: ICD-10-CM

## 2025-02-28 LAB
ALBUMIN SERPL-MCNC: 4.1 G/DL (ref 3.4–4.8)
ALBUMIN/GLOB SERPL: 1 RATIO (ref 1.1–2)
ALP SERPL-CCNC: 120 UNIT/L (ref 40–150)
ALT SERPL-CCNC: 33 UNIT/L (ref 0–55)
ANION GAP SERPL CALC-SCNC: 14 MEQ/L
AST SERPL-CCNC: 29 UNIT/L (ref 5–34)
BASOPHILS # BLD AUTO: 0.03 X10(3)/MCL
BASOPHILS NFR BLD AUTO: 0.6 %
BILIRUB SERPL-MCNC: 0.6 MG/DL
BUN SERPL-MCNC: 12.2 MG/DL (ref 9.8–20.1)
CALCIUM SERPL-MCNC: 9.9 MG/DL (ref 8.4–10.2)
CHLORIDE SERPL-SCNC: 101 MMOL/L (ref 98–107)
CO2 SERPL-SCNC: 26 MMOL/L (ref 23–31)
CREAT SERPL-MCNC: 1.03 MG/DL (ref 0.55–1.02)
CREAT/UREA NIT SERPL: 12
EOSINOPHIL # BLD AUTO: 0.06 X10(3)/MCL (ref 0–0.9)
EOSINOPHIL NFR BLD AUTO: 1.2 %
ERYTHROCYTE [DISTWIDTH] IN BLOOD BY AUTOMATED COUNT: 12.3 % (ref 11.5–17)
FLUAV AG UPPER RESP QL IA.RAPID: DETECTED
FLUBV AG UPPER RESP QL IA.RAPID: NOT DETECTED
GFR SERPLBLD CREATININE-BSD FMLA CKD-EPI: >60 ML/MIN/1.73/M2
GLOBULIN SER-MCNC: 4.1 GM/DL (ref 2.4–3.5)
GLUCOSE SERPL-MCNC: 176 MG/DL (ref 82–115)
HCT VFR BLD AUTO: 38.7 % (ref 37–47)
HGB BLD-MCNC: 13 G/DL (ref 12–16)
IMM GRANULOCYTES # BLD AUTO: 0.01 X10(3)/MCL (ref 0–0.04)
IMM GRANULOCYTES NFR BLD AUTO: 0.2 %
INR PPP: 1
LYMPHOCYTES # BLD AUTO: 2.57 X10(3)/MCL (ref 0.6–4.6)
LYMPHOCYTES NFR BLD AUTO: 51.2 %
MCH RBC QN AUTO: 29.7 PG (ref 27–31)
MCHC RBC AUTO-ENTMCNC: 33.6 G/DL (ref 33–36)
MCV RBC AUTO: 88.6 FL (ref 80–94)
MONOCYTES # BLD AUTO: 0.49 X10(3)/MCL (ref 0.1–1.3)
MONOCYTES NFR BLD AUTO: 9.8 %
NEUTROPHILS # BLD AUTO: 1.86 X10(3)/MCL (ref 2.1–9.2)
NEUTROPHILS NFR BLD AUTO: 37 %
NRBC BLD AUTO-RTO: 0 %
PLATELET # BLD AUTO: 282 X10(3)/MCL (ref 130–400)
PMV BLD AUTO: 9.6 FL (ref 7.4–10.4)
POTASSIUM SERPL-SCNC: 3 MMOL/L (ref 3.5–5.1)
PROT SERPL-MCNC: 8.2 GM/DL (ref 5.8–7.6)
PROTHROMBIN TIME: 12.5 SECONDS (ref 12.5–14.5)
RBC # BLD AUTO: 4.37 X10(6)/MCL (ref 4.2–5.4)
SARS-COV-2 RNA RESP QL NAA+PROBE: NOT DETECTED
SODIUM SERPL-SCNC: 141 MMOL/L (ref 136–145)
TROPONIN I SERPL-MCNC: 0.03 NG/ML (ref 0–0.04)
WBC # BLD AUTO: 5.02 X10(3)/MCL (ref 4.5–11.5)

## 2025-02-28 PROCEDURE — 99285 EMERGENCY DEPT VISIT HI MDM: CPT | Mod: 25

## 2025-02-28 PROCEDURE — 0240U COVID/FLU A&B PCR: CPT | Performed by: NURSE PRACTITIONER

## 2025-02-28 PROCEDURE — 85610 PROTHROMBIN TIME: CPT | Performed by: NURSE PRACTITIONER

## 2025-02-28 PROCEDURE — 84484 ASSAY OF TROPONIN QUANT: CPT | Performed by: NURSE PRACTITIONER

## 2025-02-28 PROCEDURE — 93010 ELECTROCARDIOGRAM REPORT: CPT | Mod: ,,, | Performed by: INTERNAL MEDICINE

## 2025-02-28 PROCEDURE — 25000003 PHARM REV CODE 250: Performed by: NURSE PRACTITIONER

## 2025-02-28 PROCEDURE — 85025 COMPLETE CBC W/AUTO DIFF WBC: CPT | Performed by: NURSE PRACTITIONER

## 2025-02-28 PROCEDURE — 93005 ELECTROCARDIOGRAM TRACING: CPT

## 2025-02-28 PROCEDURE — 80053 COMPREHEN METABOLIC PANEL: CPT | Performed by: NURSE PRACTITIONER

## 2025-02-28 RX ORDER — POTASSIUM CHLORIDE 20 MEQ/1
20 TABLET, EXTENDED RELEASE ORAL 2 TIMES DAILY
Qty: 10 TABLET | Refills: 0 | Status: SHIPPED | OUTPATIENT
Start: 2025-02-28 | End: 2025-03-05

## 2025-02-28 RX ORDER — OSELTAMIVIR PHOSPHATE 75 MG/1
75 CAPSULE ORAL 2 TIMES DAILY
Qty: 10 CAPSULE | Refills: 0 | Status: SHIPPED | OUTPATIENT
Start: 2025-02-28 | End: 2025-03-05

## 2025-02-28 RX ADMIN — POTASSIUM BICARBONATE 50 MEQ: 978 TABLET, EFFERVESCENT ORAL at 10:02

## 2025-03-01 NOTE — ED PROVIDER NOTES
Encounter Date: 2025       History     Chief Complaint   Patient presents with    Chest Pain     Pt c/o mid sternal cp, non-productive cough, congestion, and unsure of fever x2 days.  Denies n/v.      See MDM    The history is provided by the patient. No  was used.     Review of patient's allergies indicates:  No Known Allergies  Past Medical History:   Diagnosis Date    Cancer 3/21/2022    breast    Diabetes mellitus     Hypercholesterolemia     Hypertension     Other pulmonary embolism without acute cor pulmonale      Past Surgical History:   Procedure Laterality Date    BILATERAL MASTECTOMY Bilateral 2022    Procedure: MASTECTOMY, BILATERAL;  Surgeon: Charlene Contreras MD;  Location: [facility] OR;  Service: General;  Laterality: Bilateral;    BREAST REVISION SURGERY Bilateral 2024    Procedure: BREAST REVISION SURGERY (bilateral removal of excess axillary skin and subcutaneous tissue, use of liposuction and direct incision);  Surgeon: Nick Gibbs MD;  Location: [facility] OR;  Service: Plastics;  Laterality: Bilateral;     SECTION  92 and 3/15/95    COLONOSCOPY      INSERTION OF BREAST TISSUE EXPANDER Bilateral 2022    Procedure: INSERTION, TISSUE EXPANDER, BREAST (Bilateral breast reconstruction with tissue expanders and acellular dermal matrix and use of intraoperative fluoroscein angiogragry);  Surgeon: Nick Gibbs MD;  Location: [facility] OR;  Service: Plastics;  Laterality: Bilateral;    LIPOSUCTION Bilateral 2024    Procedure: LIPOSUCTION (bilateral removal of excess axillary skin and subcutaneous tissue, use of liposuction and direct incision);  Surgeon: Nick Gibbs MD;  Location: [facility] OR;  Service: Plastics;  Laterality: Bilateral;    MEDIPORT INSERTION, SINGLE Right     RECONSTRUCTION OF BREAST WITH DEEP INFERIOR EPIGASTRIC ARTERY  (BENSON) FLAP Bilateral 2023    Procedure: RECONSTRUCTION, BREAST, USING BENSON SKIN FLAP (Bilateral breast  recon with BENSON flaps, possible TRAM flaps, possible hernia repair with mesh, use of intraoperative fluoroscein angiography.;  Surgeon: Nick Gibbs MD;  Location: [facility] OR;  Service: Plastics;  Laterality: Bilateral;    REMOVAL OF VASCULAR ACCESS PORT Right 2023    Procedure: REMOVAL, VASCULAR ACCESS PORT;  Surgeon: Nick Gibbs MD;  Location: [facility] OR;  Service: Plastics;  Laterality: Right;    SENTINEL LYMPH NODE BIOPSY Bilateral 2022    Procedure: BIOPSY, LYMPH NODE, SENTINEL;  Surgeon: Charlene Contreras MD;  Location: [facility] OR;  Service: General;  Laterality: Bilateral;  MAGTRACE Injection in office  Need SentiMag Probe     Family History   Problem Relation Name Age of Onset    Heart attack Mother      Breast cancer Sister Madison Kaur     Breast cancer Sister Marti Ham         remission    Stroke Brother Ulysses Lawrence             Pancreatic cancer Brother      Breast cancer Maternal Aunt Kiesha Casper             Breast cancer Maternal Aunt Teri Aden             Breast cancer Maternal Aunt Danna Carlos             Breast cancer Maternal Aunt Astrid Rincon     Breast cancer Maternal Uncle Jaya Gordon             Breast cancer Maternal Grandmother Phoebe Gordon              Social History[1]  Review of Systems   Constitutional:  Positive for fever.   HENT:  Positive for congestion.    Respiratory:  Positive for cough. Negative for shortness of breath.    Cardiovascular:  Positive for chest pain.   Gastrointestinal:  Negative for abdominal pain.   Genitourinary:  Negative for difficulty urinating and dysuria.   Musculoskeletal:  Negative for gait problem.   Skin:  Negative for color change.   Neurological:  Negative for dizziness, speech difficulty and headaches.   Psychiatric/Behavioral:  Negative for hallucinations and suicidal ideas.    All other systems reviewed and are negative.      Physical Exam     Initial Vitals [25 1953]   BP  Pulse Resp Temp SpO2   (!) 140/98 105 20 98.3 °F (36.8 °C) 98 %      MAP       --         Physical Exam    Nursing note and vitals reviewed.  Constitutional: She appears well-developed and well-nourished.   HENT:   Head: Normocephalic.   Eyes: EOM are normal.   Neck:   Normal range of motion.  Cardiovascular:  Normal rate, regular rhythm, normal heart sounds and intact distal pulses.           Pulmonary/Chest: Breath sounds normal. No respiratory distress.   Abdominal: Abdomen is soft. Bowel sounds are normal. There is no abdominal tenderness.   Musculoskeletal:         General: Normal range of motion.      Cervical back: Normal range of motion.     Neurological: She is alert and oriented to person, place, and time. She has normal strength.   Skin: Skin is warm and dry.   Psychiatric: She has a normal mood and affect. Her behavior is normal. Judgment and thought content normal.         ED Course   Procedures  Labs Reviewed   COVID/FLU A&B PCR - Abnormal       Result Value    Influenza A PCR Detected (*)     Influenza B PCR Not Detected      SARS-CoV-2 PCR Not Detected      Narrative:     The Xpert Xpress SARS-CoV-2/FLU/RSV plus is a rapid, multiplexed real-time PCR test intended for the simultaneous qualitative detection and differentiation of SARS-CoV-2, Influenza A, Influenza B, and respiratory syncytial virus (RSV) viral RNA in either nasopharyngeal swab or nasal swab specimens.         COMPREHENSIVE METABOLIC PANEL - Abnormal    Sodium 141      Potassium 3.0 (*)     Chloride 101      CO2 26      Glucose 176 (*)     Blood Urea Nitrogen 12.2      Creatinine 1.03 (*)     Calcium 9.9      Protein Total 8.2 (*)     Albumin 4.1      Globulin 4.1 (*)     Albumin/Globulin Ratio 1.0 (*)     Bilirubin Total 0.6            ALT 33      AST 29      eGFR >60      Anion Gap 14.0      BUN/Creatinine Ratio 12     CBC WITH DIFFERENTIAL - Abnormal    WBC 5.02      RBC 4.37      Hgb 13.0      Hct 38.7      MCV 88.6      MCH  29.7      MCHC 33.6      RDW 12.3      Platelet 282      MPV 9.6      Neut % 37.0      Lymph % 51.2      Mono % 9.8      Eos % 1.2      Basophil % 0.6      Imm Grans % 0.2      Neut # 1.86 (*)     Lymph # 2.57      Mono # 0.49      Eos # 0.06      Baso # 0.03      Imm Gran # 0.01      NRBC% 0.0     TROPONIN I - Normal    Troponin-I 0.025     PROTIME-INR - Normal    PT 12.5      INR 1.0      Narrative:     Protimes are used to monitor anticoagulant agents such as warfarin. PT INR values are based on the current patient normal mean and the YELITZA value for the specific instrument reagent used.  **Routine theraputic target values for the INR are 2.0-3.0**   CBC W/ AUTO DIFFERENTIAL    Narrative:     The following orders were created for panel order CBC auto differential.  Procedure                               Abnormality         Status                     ---------                               -----------         ------                     CBC with Differential[[id number]]       Abnormal            Final result                 Please view results for these tests on the individual orders.     EKG Readings: (Independently Interpreted)   Rhythm: Normal Sinus Rhythm. Heart Rate: 93. Ectopy: No Ectopy. Conduction: Normal. ST Segments: Normal ST Segments. T Waves: Normal. Axis: Normal. Clinical Impression: Normal Sinus Rhythm       Imaging Results              X-Ray Chest AP Portable (Final result)  Result time 02/28/25 20:12:15      Final result by Rojelio Aguilar MD (02/28/25 20:12:15)                   Impression:      No acute cardiopulmonary process identified.      Electronically signed by: Rojelio Aguilar  Date:    02/28/2025  Time:    20:12               Narrative:    EXAMINATION:  XR CHEST AP PORTABLE    CLINICAL HISTORY:  Chest pain    TECHNIQUE:  One view    COMPARISON:  November 7, 2023.    FINDINGS:  Cardiopericardial silhouette is within normal limits. Lungs are without dense focal or segmental consolidation,  congestive process, pleural effusions or pneumothorax.                                       Medications   potassium bicarbonate disintegrating tablet 50 mEq (has no administration in time range)     Medical Decision Making  Historian:  Patient.  Patient is a Black or  65 y.o. female that presents with cough, congestion, chest pain, subjective fever that has been present 2 days. Associated symptoms nothing. Surrounding information is nothing. Exacerbated by nothing. Relieved by nothing. Patient treatment prior to arrival none. Risk factors include none. Other history pertaining to this complaint nothing.   Assessment:  See physical exam.  DD:  COVID, flu, acute coronary syndrome, cardiac ischemia, pneumonia, bronchitis  ED Course: History was obtained.  Physical was performed.  Patient appears to have influenza.  She also had hypokalemia.  We will give her potassium in the ER.  Will put her on potassium at home.. Medical or surgical consults:  None. Social determinants that affect healthcare:  None.       Amount and/or Complexity of Data Reviewed  Labs:      Details: Positive flu.  Hypokalemia    Risk  Prescription drug management.  Risk Details: Potassium and Tamiflu                                      Clinical Impression:  Final diagnoses:  [R07.9] Chest pain  [J10.1] Influenza A (Primary)  [E87.6] Hypokalemia          ED Disposition Condition    Discharge Stable          ED Prescriptions       Medication Sig Dispense Start Date End Date Auth. Provider    oseltamivir (TAMIFLU) 75 MG capsule Take 1 capsule (75 mg total) by mouth 2 (two) times daily. for 5 days 10 capsule 2/28/2025 3/5/2025 Ricky Romo FNP    potassium chloride SA (K-DUR,KLOR-CON) 20 MEQ tablet Take 1 tablet (20 mEq total) by mouth 2 (two) times daily. for 5 days 10 tablet 2/28/2025 3/5/2025 Ricky Romo FNP          Follow-up Information       Follow up With Specialties Details Why Contact Info    Your Primary Care  Provider  Call in 3 days ed follow up                [1]   Social History  Tobacco Use    Smoking status: Never    Smokeless tobacco: Never   Substance Use Topics    Alcohol use: Not Currently     Alcohol/week: 1.0 standard drink of alcohol     Types: 1 Glasses of wine per week     Comment: drink socially but haven't had a glass of wine since 2/13    Drug use: Never        Ricky Romo, P  02/28/25 0188

## 2025-03-02 LAB
OHS QRS DURATION: 82 MS
OHS QTC CALCULATION: 457 MS

## 2025-03-20 ENCOUNTER — PATIENT MESSAGE (OUTPATIENT)
Dept: HEMATOLOGY/ONCOLOGY | Facility: CLINIC | Age: 65
End: 2025-03-20
Payer: COMMERCIAL

## 2025-03-20 DIAGNOSIS — I26.99 OTHER ACUTE PULMONARY EMBOLISM WITHOUT ACUTE COR PULMONALE: Primary | ICD-10-CM

## 2025-03-28 ENCOUNTER — HOSPITAL ENCOUNTER (OUTPATIENT)
Dept: RADIOLOGY | Facility: HOSPITAL | Age: 65
Discharge: HOME | End: 2025-03-28
Attending: NURSE PRACTITIONER
Payer: COMMERCIAL

## 2025-03-28 DIAGNOSIS — Z78.0 MENOPAUSE: ICD-10-CM

## 2025-03-28 DIAGNOSIS — Z85.3 PERSONAL HISTORY OF MALIGNANT NEOPLASM OF BREAST: ICD-10-CM

## 2025-03-28 DIAGNOSIS — Z13.820 SPECIAL SCREENING FOR OSTEOPOROSIS: ICD-10-CM

## 2025-03-28 PROCEDURE — 77080 DXA BONE DENSITY AXIAL: CPT | Mod: XU,TC

## 2025-03-28 PROCEDURE — 77081 DXA BONE DENSITY APPENDICULR: CPT | Mod: TC

## 2025-03-29 DIAGNOSIS — C50.412 MALIGNANT NEOPLASM OF UPPER-OUTER QUADRANT OF LEFT BREAST IN FEMALE, ESTROGEN RECEPTOR NEGATIVE: ICD-10-CM

## 2025-03-29 DIAGNOSIS — Z17.1 MALIGNANT NEOPLASM OF UPPER-OUTER QUADRANT OF LEFT BREAST IN FEMALE, ESTROGEN RECEPTOR NEGATIVE: ICD-10-CM

## 2025-03-31 RX ORDER — LETROZOLE 2.5 MG/1
2.5 TABLET, FILM COATED ORAL
Qty: 30 TABLET | Refills: 1 | Status: SHIPPED | OUTPATIENT
Start: 2025-03-31

## 2025-04-05 DIAGNOSIS — I26.99 OTHER ACUTE PULMONARY EMBOLISM WITHOUT ACUTE COR PULMONALE: ICD-10-CM

## 2025-04-07 RX ORDER — APIXABAN 2.5 MG/1
2.5 TABLET, FILM COATED ORAL 2 TIMES DAILY
Qty: 60 TABLET | Refills: 1 | Status: SHIPPED | OUTPATIENT
Start: 2025-04-07

## 2025-05-02 ENCOUNTER — RESULTS FOLLOW-UP (OUTPATIENT)
Dept: HEMATOLOGY/ONCOLOGY | Facility: CLINIC | Age: 65
End: 2025-05-02

## 2025-05-02 ENCOUNTER — LAB VISIT (OUTPATIENT)
Dept: LAB | Facility: HOSPITAL | Age: 65
End: 2025-05-02
Attending: INTERNAL MEDICINE
Payer: COMMERCIAL

## 2025-05-02 ENCOUNTER — OFFICE VISIT (OUTPATIENT)
Dept: HEMATOLOGY/ONCOLOGY | Facility: CLINIC | Age: 65
End: 2025-05-02
Payer: COMMERCIAL

## 2025-05-02 VITALS
HEIGHT: 66 IN | DIASTOLIC BLOOD PRESSURE: 85 MMHG | BODY MASS INDEX: 33.05 KG/M2 | TEMPERATURE: 98 F | RESPIRATION RATE: 20 BRPM | HEART RATE: 75 BPM | WEIGHT: 205.63 LBS | SYSTOLIC BLOOD PRESSURE: 136 MMHG | OXYGEN SATURATION: 98 %

## 2025-05-02 DIAGNOSIS — C50.412 MALIGNANT NEOPLASM OF UPPER-OUTER QUADRANT OF LEFT BREAST IN FEMALE, ESTROGEN RECEPTOR NEGATIVE: Primary | ICD-10-CM

## 2025-05-02 DIAGNOSIS — Z17.0 MALIGNANT NEOPLASM OF UPPER-OUTER QUADRANT OF RIGHT BREAST IN FEMALE, ESTROGEN RECEPTOR POSITIVE: ICD-10-CM

## 2025-05-02 DIAGNOSIS — T45.1X5A NEUROPATHY DUE TO CHEMOTHERAPEUTIC DRUG: ICD-10-CM

## 2025-05-02 DIAGNOSIS — C50.411 MALIGNANT NEOPLASM OF UPPER-OUTER QUADRANT OF RIGHT BREAST IN FEMALE, ESTROGEN RECEPTOR POSITIVE: ICD-10-CM

## 2025-05-02 DIAGNOSIS — I26.99 OTHER ACUTE PULMONARY EMBOLISM WITHOUT ACUTE COR PULMONALE: ICD-10-CM

## 2025-05-02 DIAGNOSIS — G62.0 NEUROPATHY DUE TO CHEMOTHERAPEUTIC DRUG: ICD-10-CM

## 2025-05-02 DIAGNOSIS — Z79.811 LONG TERM CURRENT USE OF AROMATASE INHIBITOR: ICD-10-CM

## 2025-05-02 DIAGNOSIS — Z17.1 MALIGNANT NEOPLASM OF UPPER-OUTER QUADRANT OF LEFT BREAST IN FEMALE, ESTROGEN RECEPTOR NEGATIVE: Primary | ICD-10-CM

## 2025-05-02 LAB
ALBUMIN SERPL-MCNC: 4 G/DL (ref 3.4–4.8)
ALBUMIN/GLOB SERPL: 1 RATIO (ref 1.1–2)
ALP SERPL-CCNC: 103 UNIT/L (ref 40–150)
ALT SERPL-CCNC: 21 UNIT/L (ref 0–55)
ANION GAP SERPL CALC-SCNC: 10 MEQ/L
AST SERPL-CCNC: 16 UNIT/L (ref 11–45)
BASOPHILS # BLD AUTO: 0.02 X10(3)/MCL
BASOPHILS NFR BLD AUTO: 0.3 %
BILIRUB SERPL-MCNC: 1 MG/DL
BUN SERPL-MCNC: 17.8 MG/DL (ref 9.8–20.1)
CALCIUM SERPL-MCNC: 10 MG/DL (ref 8.4–10.2)
CHLORIDE SERPL-SCNC: 102 MMOL/L (ref 98–107)
CO2 SERPL-SCNC: 29 MMOL/L (ref 23–31)
CREAT SERPL-MCNC: 1.04 MG/DL (ref 0.55–1.02)
CREAT/UREA NIT SERPL: 17
EOSINOPHIL # BLD AUTO: 0.03 X10(3)/MCL (ref 0–0.9)
EOSINOPHIL NFR BLD AUTO: 0.4 %
ERYTHROCYTE [DISTWIDTH] IN BLOOD BY AUTOMATED COUNT: 12.4 % (ref 11.5–17)
GFR SERPLBLD CREATININE-BSD FMLA CKD-EPI: >60 ML/MIN/1.73/M2
GLOBULIN SER-MCNC: 4 GM/DL (ref 2.4–3.5)
GLUCOSE SERPL-MCNC: 186 MG/DL (ref 82–115)
HCT VFR BLD AUTO: 38.2 % (ref 37–47)
HGB BLD-MCNC: 12.9 G/DL (ref 12–16)
IMM GRANULOCYTES # BLD AUTO: 0.02 X10(3)/MCL (ref 0–0.04)
IMM GRANULOCYTES NFR BLD AUTO: 0.3 %
LYMPHOCYTES # BLD AUTO: 3.5 X10(3)/MCL (ref 0.6–4.6)
LYMPHOCYTES NFR BLD AUTO: 51.9 %
MCH RBC QN AUTO: 29.9 PG (ref 27–31)
MCHC RBC AUTO-ENTMCNC: 33.8 G/DL (ref 33–36)
MCV RBC AUTO: 88.4 FL (ref 80–94)
MONOCYTES # BLD AUTO: 0.41 X10(3)/MCL (ref 0.1–1.3)
MONOCYTES NFR BLD AUTO: 6.1 %
NEUTROPHILS # BLD AUTO: 2.77 X10(3)/MCL (ref 2.1–9.2)
NEUTROPHILS NFR BLD AUTO: 41 %
PLATELET # BLD AUTO: 316 X10(3)/MCL (ref 130–400)
PMV BLD AUTO: 9 FL (ref 7.4–10.4)
POTASSIUM SERPL-SCNC: 3 MMOL/L (ref 3.5–5.1)
PROT SERPL-MCNC: 8 GM/DL (ref 5.8–7.6)
RBC # BLD AUTO: 4.32 X10(6)/MCL (ref 4.2–5.4)
SODIUM SERPL-SCNC: 141 MMOL/L (ref 136–145)
WBC # BLD AUTO: 6.75 X10(3)/MCL (ref 4.5–11.5)

## 2025-05-02 PROCEDURE — 99999 PR PBB SHADOW E&M-EST. PATIENT-LVL IV: CPT | Mod: PBBFAC,,, | Performed by: NURSE PRACTITIONER

## 2025-05-02 PROCEDURE — 85025 COMPLETE CBC W/AUTO DIFF WBC: CPT

## 2025-05-02 PROCEDURE — 80053 COMPREHEN METABOLIC PANEL: CPT

## 2025-05-02 NOTE — PROGRESS NOTES
Subjective:       Patient ID: Elina Ham is a 65 y.o. female.    Chief Complaint: 6m f/u    Diagnosis: Bilateral breast cancer  Current Treatment: Letrozole 3/8/23     Oncology History   Malignant neoplasm of upper-outer quadrant of right breast in female, estrogen receptor positive   6/3/2022 Surgery    S/p bilateral simple mastectomy, bilateral sentinel lymph node biopsy, and TE placement - 6/3/2022      1/5/2023 - 3/7/2023 Radiation Therapy         3/8/2023 -  Hormone Therapy    femara     Malignant neoplasm of upper-outer quadrant of left breast in female, estrogen receptor negative   4/27/2022 Genetic Testing    Genetic testing negative   comprehensive genetic analysis indicated that there was no deleterious mutation found in any of these genes. However, there was a variant of uncertain significance (VUS):AXIN2 c.1573C>G. This variant is predicted to be benign by in silico analysis (PolyPhen2 score 0.429).      6/3/2022 Surgery    S/p bilateral simple mastectomy, bilateral sentinel lymph node biopsy, and TE placement - 6/3/2022      8/3/2022 - 12/21/2022 Chemotherapy    OP BREAST PACLITAXEL WEEKLY FOLLOWED BY DOSE DENSE DOXORUBICIN CYCLOPHOSPHAMIDE Q2W      Pathology Significant Finding    Left breast:  Repeat marker:  ER 6%, WY 0.1%, HER2/rahul negative, Ki-67 68%     10/28/2022 Imaging Significant Findings    10/28/2022: CT cap:  Postsurgical changes, left axilla 1.9 x 1.6 nodule, right axilla 1.7 x 1.1 hypodense nodule, small bilateral axillary nodes, liver subcentimeter hypodensity in right lobe too small to characterize, 1.2 cm cyst in the tail the pancreas stable   Impression:  Stable, bilateral axillary some postoperative seromas indolent subcentimeter cyst unchanged dating back to 2016, pancreatic cyst continue to follow       12/27/2022 Imaging Significant Findings    Pulmonary emboli right lower lobar artery and segmental branches.     No evidence of metastatic disease to the chest abdomen or  pelvis.     Cystic lesion in the pancreatic tail, stable from the prior exam, most likely a simple pancreatic cyst, pseudocyst, or IPMN.     Subcentimeter liver hypodensity likely a cyst, stable..     Suspected chronic necrotic lymph node in the left axilla, smaller, right axillary lymph nodes, smaller in the interval.     Voicemail message was left for the ordering physician Dr. Oh at the time of the report.          2025   Patient presents to the clinic today for 6m scheduled follow up visit.  She is doing well with her letrozole. She has completed reconstruction and doing well.  She denies weight loss, night sweats, fatigue, abdominal pain.    Past Medical History:   Diagnosis Date    Cancer 3/21/2022    breast    Diabetes mellitus     Hypercholesterolemia     Hypertension     Other pulmonary embolism without acute cor pulmonale       Past Surgical History:   Procedure Laterality Date    BILATERAL MASTECTOMY Bilateral 2022    Procedure: MASTECTOMY, BILATERAL;  Surgeon: Charlene Contreras MD;  Location: [facility] OR;  Service: General;  Laterality: Bilateral;    BREAST REVISION SURGERY Bilateral 2024    Procedure: BREAST REVISION SURGERY (bilateral removal of excess axillary skin and subcutaneous tissue, use of liposuction and direct incision);  Surgeon: Nick Gibbs MD;  Location: [facility] OR;  Service: Plastics;  Laterality: Bilateral;    BREAST SURGERY  6/3/2022     SECTION  92 and 3/15/95    COLONOSCOPY      INSERTION OF BREAST TISSUE EXPANDER Bilateral 2022    Procedure: INSERTION, TISSUE EXPANDER, BREAST (Bilateral breast reconstruction with tissue expanders and acellular dermal matrix and use of intraoperative fluoroscein angiogragry);  Surgeon: Nick Gibbs MD;  Location: [facility] OR;  Service: Plastics;  Laterality: Bilateral;    LIPOSUCTION Bilateral 2024    Procedure: LIPOSUCTION (bilateral removal of excess axillary skin and subcutaneous tissue, use of liposuction  and direct incision);  Surgeon: Nick Gibbs MD;  Location: [facility] OR;  Service: Plastics;  Laterality: Bilateral;    MEDIPORT INSERTION, SINGLE Right     RECONSTRUCTION OF BREAST WITH DEEP INFERIOR EPIGASTRIC ARTERY  (BENSON) FLAP Bilateral 11/07/2023    Procedure: RECONSTRUCTION, BREAST, USING BENSON SKIN FLAP (Bilateral breast recon with BENSON flaps, possible TRAM flaps, possible hernia repair with mesh, use of intraoperative fluoroscein angiography.;  Surgeon: Nick Gibbs MD;  Location: [facility] OR;  Service: Plastics;  Laterality: Bilateral;    REMOVAL OF VASCULAR ACCESS PORT Right 11/07/2023    Procedure: REMOVAL, VASCULAR ACCESS PORT;  Surgeon: Nick Gibbs MD;  Location: [facility] OR;  Service: Plastics;  Laterality: Right;    SENTINEL LYMPH NODE BIOPSY Bilateral 06/03/2022    Procedure: BIOPSY, LYMPH NODE, SENTINEL;  Surgeon: Charlene Contreras MD;  Location: [facility] OR;  Service: General;  Laterality: Bilateral;  MAGTRACE Injection in office  Need SentiMag Probe    TUBAL LIGATION  3/15/1995     Social History     Socioeconomic History    Marital status:    Tobacco Use    Smoking status: Never    Smokeless tobacco: Never   Substance and Sexual Activity    Alcohol use: Not Currently     Alcohol/week: 1.0 standard drink of alcohol     Types: 1 Glasses of wine per week     Comment: drink socially but haven't had a glass of wine since 2/13    Drug use: Never    Sexual activity: Yes     Partners: Male     Birth control/protection: None     Comment: We take STD test for the first 2 years we started dating  (5     Social Drivers of Health     Food Insecurity: No Food Insecurity (9/18/2024)    Received from [facility]    Hunger Vital Sign     Worried About Running Out of Food in the Last Year: Never true     Ran Out of Food in the Last Year: Never true   Physical Activity: Inactive (9/18/2024)    Received from [facility]    Exercise Vital Sign     Days of  Exercise per Week: 0 days     Minutes of Exercise per Session: 0 min      Family History   Problem Relation Name Age of Onset    Heart attack Mother Mother     Hypertension Mother Mother     Breast cancer Sister Madison Kaur     Cancer Sister Madison Maldonadonier     Breast cancer Sister Marti Ham         remission    Cancer Sister Marti Ham     Stroke Brother Ulysses Lawrence             Diabetes Brother Ulysses Lawrence     Pancreatic cancer Brother      Breast cancer Maternal Aunt Kiesha Shirley             Breast cancer Maternal Aunt Teri Aden             Breast cancer Maternal Aunt Danna Gonzalez             Breast cancer Maternal Aunt Astrid Rincon     Breast cancer Maternal Uncle Jaya Gordon             Breast cancer Maternal Grandmother Phoebe Gordon               Review of patient's allergies indicates:  No Known Allergies   Review of Systems   Constitutional:  Positive for appetite change. Negative for unexpected weight change.   HENT:  Negative for mouth sores.    Eyes:  Negative for visual disturbance.   Respiratory:  Negative for cough and shortness of breath.    Cardiovascular:  Negative for chest pain.   Gastrointestinal:  Positive for diarrhea. Negative for abdominal pain.   Genitourinary:  Positive for frequency.   Musculoskeletal:  Positive for back pain.   Integumentary:  Negative for rash.   Neurological:  Negative for headaches.   Hematological:  Negative for adenopathy.   Psychiatric/Behavioral:  The patient is not nervous/anxious.      A complete 12 point ROS was performed in full with pertinent positives as described in interval history. Remainder of ROS done in full and are negative.        Objective:      Physical Exam  Constitutional:       Appearance: Normal appearance.   HENT:      Head: Normocephalic and atraumatic.      Nose: Nose normal.      Mouth/Throat:      Mouth: Mucous membranes are moist.   Eyes:      Extraocular Movements:  Extraocular movements intact.      Conjunctiva/sclera: Conjunctivae normal.      Pupils: Pupils are equal, round, and reactive to light.   Cardiovascular:      Rate and Rhythm: Normal rate and regular rhythm.      Pulses: Normal pulses.   Pulmonary:      Effort: Pulmonary effort is normal.      Breath sounds: Normal breath sounds.   Chest:      Comments: B/L reconstruction.  No palpable abnormality. No LAD  Abdominal:      General: Bowel sounds are normal.      Palpations: Abdomen is soft.   Musculoskeletal:      Cervical back: Normal range of motion and neck supple.   Neurological:      General: No focal deficit present.      Mental Status: She is alert and oriented to person, place, and time. Mental status is at baseline.   Psychiatric:         Mood and Affect: Mood normal.         Behavior: Behavior normal.       Assessment:   Stage IIIA (pT2, pN1a, cM0, G3) triple negative left breast cancer    Stage IA (pT1b, pN0, cM0, G1) Er+ WY+ right breast cancer    History of Pulmonary emboli. Completed eliquis X 2 years    Pancreatic cyst  Initially mentioned on October 18, 2022 scan as a 1.2 cm cyst in the tail of the pancreas.  Repeat imaging in December of 2022 showed a stable size of the cyst.     March of 2023 shows this 13 mm cystic lesion  I do think it was possibly present on the August 22, 2022 scan, but I can not see it on the July 2016 scan but will defer to Gastroenterology  Continue follow up with Dr. Queen    5. 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       Plan:     B/L mastectomies so no need for mammograms  Continue femara for her right breast cancer at least 5 years (3/28)  Calcium + Vitamin D- BID  Normal bone density-repeat 3/28/27  S/P reconstruction 11/2023 Sees Dr. Gibbs  and Dr. Ordaz   Discontinue eliquis    Follow up in about 6 months (around 11/2/2025) for OV and labs.

## 2025-05-04 ENCOUNTER — HOSPITAL ENCOUNTER (EMERGENCY)
Facility: HOSPITAL | Age: 65
Discharge: HOME | End: 2025-05-05
Attending: EMERGENCY MEDICINE
Payer: COMMERCIAL

## 2025-05-04 DIAGNOSIS — R10.31 RIGHT GROIN PAIN: Primary | ICD-10-CM

## 2025-05-04 LAB
BACTERIA #/AREA URNS AUTO: ABNORMAL /HPF
BILIRUB UR QL STRIP.AUTO: NEGATIVE
CLARITY UR: CLEAR
COLOR UR AUTO: COLORLESS
GLUCOSE UR QL STRIP: NORMAL
HGB UR QL STRIP: ABNORMAL
KETONES UR QL STRIP: NEGATIVE
LEUKOCYTE ESTERASE UR QL STRIP: NEGATIVE
NITRITE UR QL STRIP: NEGATIVE
PH UR STRIP: 5.5 [PH]
PROT UR QL STRIP: NEGATIVE
RBC #/AREA URNS AUTO: ABNORMAL /HPF
SP GR UR STRIP.AUTO: 1.01 (ref 1–1.03)
SQUAMOUS #/AREA URNS LPF: ABNORMAL /HPF
UROBILINOGEN UR STRIP-ACNC: NORMAL
WBC #/AREA URNS AUTO: ABNORMAL /HPF

## 2025-05-04 PROCEDURE — 99283 EMERGENCY DEPT VISIT LOW MDM: CPT

## 2025-05-04 PROCEDURE — 81001 URINALYSIS AUTO W/SCOPE: CPT

## 2025-05-05 VITALS
BODY MASS INDEX: 31.86 KG/M2 | DIASTOLIC BLOOD PRESSURE: 88 MMHG | HEIGHT: 67 IN | WEIGHT: 203 LBS | HEART RATE: 77 BPM | SYSTOLIC BLOOD PRESSURE: 147 MMHG | OXYGEN SATURATION: 99 % | TEMPERATURE: 98 F | RESPIRATION RATE: 17 BRPM

## 2025-05-05 LAB
ALBUMIN SERPL-MCNC: 4.3 G/DL (ref 3.4–4.8)
ALBUMIN/GLOB SERPL: 1.1 RATIO (ref 1.1–2)
ALP SERPL-CCNC: 112 UNIT/L (ref 40–150)
ALT SERPL-CCNC: 21 UNIT/L (ref 0–55)
ANION GAP SERPL CALC-SCNC: 12 MEQ/L
AST SERPL-CCNC: 18 UNIT/L (ref 11–45)
BASOPHILS # BLD AUTO: 0.02 X10(3)/MCL
BASOPHILS NFR BLD AUTO: 0.3 %
BILIRUB SERPL-MCNC: 0.7 MG/DL
BUN SERPL-MCNC: 14.8 MG/DL (ref 9.8–20.1)
CALCIUM SERPL-MCNC: 10.9 MG/DL (ref 8.4–10.2)
CHLORIDE SERPL-SCNC: 102 MMOL/L (ref 98–107)
CO2 SERPL-SCNC: 25 MMOL/L (ref 23–31)
CREAT SERPL-MCNC: 0.83 MG/DL (ref 0.55–1.02)
CREAT/UREA NIT SERPL: 18
EOSINOPHIL # BLD AUTO: 0.06 X10(3)/MCL (ref 0–0.9)
EOSINOPHIL NFR BLD AUTO: 0.8 %
ERYTHROCYTE [DISTWIDTH] IN BLOOD BY AUTOMATED COUNT: 12.5 % (ref 11.5–17)
GFR SERPLBLD CREATININE-BSD FMLA CKD-EPI: >60 ML/MIN/1.73/M2
GLOBULIN SER-MCNC: 3.9 GM/DL (ref 2.4–3.5)
GLUCOSE SERPL-MCNC: 128 MG/DL (ref 82–115)
HCT VFR BLD AUTO: 38.8 % (ref 37–47)
HGB BLD-MCNC: 13 G/DL (ref 12–16)
IMM GRANULOCYTES # BLD AUTO: 0.02 X10(3)/MCL (ref 0–0.04)
IMM GRANULOCYTES NFR BLD AUTO: 0.3 %
LYMPHOCYTES # BLD AUTO: 2.74 X10(3)/MCL (ref 0.6–4.6)
LYMPHOCYTES NFR BLD AUTO: 35.7 %
MCH RBC QN AUTO: 29.5 PG (ref 27–31)
MCHC RBC AUTO-ENTMCNC: 33.5 G/DL (ref 33–36)
MCV RBC AUTO: 88 FL (ref 80–94)
MONOCYTES # BLD AUTO: 0.53 X10(3)/MCL (ref 0.1–1.3)
MONOCYTES NFR BLD AUTO: 6.9 %
NEUTROPHILS # BLD AUTO: 4.3 X10(3)/MCL (ref 2.1–9.2)
NEUTROPHILS NFR BLD AUTO: 56 %
NRBC BLD AUTO-RTO: 0 %
PLATELET # BLD AUTO: 322 X10(3)/MCL (ref 130–400)
PMV BLD AUTO: 9.6 FL (ref 7.4–10.4)
POTASSIUM SERPL-SCNC: 3.1 MMOL/L (ref 3.5–5.1)
PROT SERPL-MCNC: 8.2 GM/DL (ref 5.8–7.6)
RBC # BLD AUTO: 4.41 X10(6)/MCL (ref 4.2–5.4)
SODIUM SERPL-SCNC: 139 MMOL/L (ref 136–145)
WBC # BLD AUTO: 7.67 X10(3)/MCL (ref 4.5–11.5)

## 2025-05-05 PROCEDURE — 85025 COMPLETE CBC W/AUTO DIFF WBC: CPT

## 2025-05-05 PROCEDURE — 80053 COMPREHEN METABOLIC PANEL: CPT

## 2025-05-05 NOTE — FIRST PROVIDER EVALUATION
"Medical screening examination initiated.  I have conducted a focused provider triage encounter, findings are as follows:    Brief history of present illness:  arrived to ED due to painful "knot" to R groin that has been present for 2 weeks. Describes pain as waxing and waning. Denies pain currently. Reports heavy lifting 2 days ago.     Vitals:    05/04/25 2058   BP: (!) 160/96   Pulse: 89   Resp: 18   Temp: 98.4 °F (36.9 °C)   TempSrc: Oral   SpO2: 99%   Weight: 92.1 kg (203 lb)   Height: 5' 7" (1.702 m)       Pertinent physical exam:  awake, alert, has non-labored breathing, ambulatory into triage.    Brief workup plan:  labs     Preliminary workup initiated; this workup will be continued and followed by the physician or advanced practice provider that is assigned to the patient when roomed.  "

## 2025-05-28 DIAGNOSIS — C50.412 MALIGNANT NEOPLASM OF UPPER-OUTER QUADRANT OF LEFT BREAST IN FEMALE, ESTROGEN RECEPTOR NEGATIVE: ICD-10-CM

## 2025-05-28 DIAGNOSIS — Z17.1 MALIGNANT NEOPLASM OF UPPER-OUTER QUADRANT OF LEFT BREAST IN FEMALE, ESTROGEN RECEPTOR NEGATIVE: ICD-10-CM

## 2025-05-28 RX ORDER — LETROZOLE 2.5 MG/1
2.5 TABLET, FILM COATED ORAL
Qty: 30 TABLET | Refills: 3 | Status: SHIPPED | OUTPATIENT
Start: 2025-05-28

## 2025-08-02 ENCOUNTER — HOSPITAL ENCOUNTER (EMERGENCY)
Facility: HOSPITAL | Age: 65
Discharge: HOME | End: 2025-08-02
Attending: EMERGENCY MEDICINE
Payer: COMMERCIAL

## 2025-08-02 VITALS
TEMPERATURE: 99 F | WEIGHT: 205 LBS | SYSTOLIC BLOOD PRESSURE: 137 MMHG | HEIGHT: 66 IN | OXYGEN SATURATION: 99 % | DIASTOLIC BLOOD PRESSURE: 78 MMHG | HEART RATE: 78 BPM | BODY MASS INDEX: 32.95 KG/M2 | RESPIRATION RATE: 20 BRPM

## 2025-08-02 DIAGNOSIS — N30.90 CYSTITIS: ICD-10-CM

## 2025-08-02 DIAGNOSIS — R10.9 ABDOMINAL PAIN, UNSPECIFIED ABDOMINAL LOCATION: Primary | ICD-10-CM

## 2025-08-02 DIAGNOSIS — N34.2 URETHRITIS: ICD-10-CM

## 2025-08-02 LAB
ALBUMIN SERPL-MCNC: 4.1 G/DL (ref 3.4–4.8)
ALBUMIN/GLOB SERPL: 1 RATIO (ref 1.1–2)
ALP SERPL-CCNC: 127 UNIT/L (ref 40–150)
ALT SERPL-CCNC: 23 UNIT/L (ref 0–55)
ANION GAP SERPL CALC-SCNC: 10 MEQ/L
AST SERPL-CCNC: 19 UNIT/L (ref 11–45)
BACTERIA #/AREA URNS AUTO: ABNORMAL /HPF
BASOPHILS # BLD AUTO: 0.04 X10(3)/MCL
BASOPHILS NFR BLD AUTO: 0.3 %
BILIRUB SERPL-MCNC: 1.4 MG/DL
BILIRUB UR QL STRIP.AUTO: ABNORMAL
BUN SERPL-MCNC: 16.4 MG/DL (ref 9.8–20.1)
CALCIUM SERPL-MCNC: 10.4 MG/DL (ref 8.4–10.2)
CHLORIDE SERPL-SCNC: 100 MMOL/L (ref 98–107)
CLARITY UR: ABNORMAL
CO2 SERPL-SCNC: 27 MMOL/L (ref 23–31)
COLOR UR AUTO: ABNORMAL
CREAT SERPL-MCNC: 0.97 MG/DL (ref 0.55–1.02)
CREAT/UREA NIT SERPL: 17
EOSINOPHIL # BLD AUTO: 0.03 X10(3)/MCL (ref 0–0.9)
EOSINOPHIL NFR BLD AUTO: 0.2 %
ERYTHROCYTE [DISTWIDTH] IN BLOOD BY AUTOMATED COUNT: 12.5 % (ref 11.5–17)
GFR SERPLBLD CREATININE-BSD FMLA CKD-EPI: >60 ML/MIN/1.73/M2
GLOBULIN SER-MCNC: 4.2 GM/DL (ref 2.4–3.5)
GLUCOSE SERPL-MCNC: 184 MG/DL (ref 82–115)
GLUCOSE UR QL STRIP: ABNORMAL
HCT VFR BLD AUTO: 39.6 % (ref 37–47)
HGB BLD-MCNC: 13.1 G/DL (ref 12–16)
HGB UR QL STRIP: ABNORMAL
IMM GRANULOCYTES # BLD AUTO: 0.04 X10(3)/MCL (ref 0–0.04)
IMM GRANULOCYTES NFR BLD AUTO: 0.3 %
KETONES UR QL STRIP: ABNORMAL
LEUKOCYTE ESTERASE UR QL STRIP: ABNORMAL
LYMPHOCYTES # BLD AUTO: 3.18 X10(3)/MCL (ref 0.6–4.6)
LYMPHOCYTES NFR BLD AUTO: 25.5 %
MCH RBC QN AUTO: 29.2 PG (ref 27–31)
MCHC RBC AUTO-ENTMCNC: 33.1 G/DL (ref 33–36)
MCV RBC AUTO: 88.2 FL (ref 80–94)
MONOCYTES # BLD AUTO: 0.74 X10(3)/MCL (ref 0.1–1.3)
MONOCYTES NFR BLD AUTO: 5.9 %
MUCOUS THREADS URNS QL MICRO: ABNORMAL /LPF
NEUTROPHILS # BLD AUTO: 8.45 X10(3)/MCL (ref 2.1–9.2)
NEUTROPHILS NFR BLD AUTO: 67.8 %
NITRITE UR QL STRIP: ABNORMAL
NRBC BLD AUTO-RTO: 0 %
PH UR STRIP: 6.5 [PH]
PLATELET # BLD AUTO: 317 X10(3)/MCL (ref 130–400)
PMV BLD AUTO: 9.6 FL (ref 7.4–10.4)
POTASSIUM SERPL-SCNC: 3.3 MMOL/L (ref 3.5–5.1)
PROT SERPL-MCNC: 8.3 GM/DL (ref 5.8–7.6)
PROT UR QL STRIP: ABNORMAL
RBC # BLD AUTO: 4.49 X10(6)/MCL (ref 4.2–5.4)
RBC #/AREA URNS AUTO: >100 /HPF
SODIUM SERPL-SCNC: 137 MMOL/L (ref 136–145)
SP GR UR STRIP.AUTO: 1.01 (ref 1–1.03)
SQUAMOUS #/AREA URNS LPF: ABNORMAL /HPF
UROBILINOGEN UR STRIP-ACNC: ABNORMAL
WBC # BLD AUTO: 12.48 X10(3)/MCL (ref 4.5–11.5)
WBC #/AREA URNS AUTO: >100 /HPF

## 2025-08-02 PROCEDURE — 25000003 PHARM REV CODE 250: Performed by: PHYSICIAN ASSISTANT

## 2025-08-02 PROCEDURE — 96374 THER/PROPH/DIAG INJ IV PUSH: CPT

## 2025-08-02 PROCEDURE — 63600175 PHARM REV CODE 636 W HCPCS: Performed by: PHYSICIAN ASSISTANT

## 2025-08-02 PROCEDURE — 96361 HYDRATE IV INFUSION ADD-ON: CPT

## 2025-08-02 PROCEDURE — 80053 COMPREHEN METABOLIC PANEL: CPT | Performed by: PHYSICIAN ASSISTANT

## 2025-08-02 PROCEDURE — 85025 COMPLETE CBC W/AUTO DIFF WBC: CPT | Performed by: PHYSICIAN ASSISTANT

## 2025-08-02 PROCEDURE — 99285 EMERGENCY DEPT VISIT HI MDM: CPT | Mod: 25

## 2025-08-02 PROCEDURE — 96375 TX/PRO/DX INJ NEW DRUG ADDON: CPT

## 2025-08-02 PROCEDURE — 87086 URINE CULTURE/COLONY COUNT: CPT | Performed by: PHYSICIAN ASSISTANT

## 2025-08-02 PROCEDURE — 81001 URINALYSIS AUTO W/SCOPE: CPT | Performed by: PHYSICIAN ASSISTANT

## 2025-08-02 PROCEDURE — 25500020 PHARM REV CODE 255: Performed by: PHYSICIAN ASSISTANT

## 2025-08-02 RX ORDER — PHENAZOPYRIDINE HYDROCHLORIDE 200 MG/1
200 TABLET, FILM COATED ORAL 3 TIMES DAILY
Qty: 6 TABLET | Refills: 0 | Status: SHIPPED | OUTPATIENT
Start: 2025-08-02 | End: 2025-08-12

## 2025-08-02 RX ORDER — KETOROLAC TROMETHAMINE 10 MG/1
10 TABLET, FILM COATED ORAL EVERY 6 HOURS
Qty: 20 TABLET | Refills: 0 | Status: SHIPPED | OUTPATIENT
Start: 2025-08-02 | End: 2025-08-07

## 2025-08-02 RX ORDER — SODIUM CHLORIDE 9 MG/ML
1000 INJECTION, SOLUTION INTRAVENOUS
Status: COMPLETED | OUTPATIENT
Start: 2025-08-02 | End: 2025-08-02

## 2025-08-02 RX ORDER — CEFDINIR 300 MG/1
300 CAPSULE ORAL 2 TIMES DAILY
Qty: 20 CAPSULE | Refills: 0 | Status: SHIPPED | OUTPATIENT
Start: 2025-08-02 | End: 2025-08-12

## 2025-08-02 RX ORDER — CEFTRIAXONE 1 G/1
1 INJECTION, POWDER, FOR SOLUTION INTRAMUSCULAR; INTRAVENOUS
Status: COMPLETED | OUTPATIENT
Start: 2025-08-02 | End: 2025-08-02

## 2025-08-02 RX ORDER — KETOROLAC TROMETHAMINE 30 MG/ML
15 INJECTION, SOLUTION INTRAMUSCULAR; INTRAVENOUS
Status: COMPLETED | OUTPATIENT
Start: 2025-08-02 | End: 2025-08-02

## 2025-08-02 RX ADMIN — CEFTRIAXONE SODIUM 1 G: 1 INJECTION, POWDER, FOR SOLUTION INTRAMUSCULAR; INTRAVENOUS at 05:08

## 2025-08-02 RX ADMIN — IOHEXOL 100 ML: 350 INJECTION, SOLUTION INTRAVENOUS at 04:08

## 2025-08-02 RX ADMIN — SODIUM CHLORIDE 1000 ML: 9 INJECTION, SOLUTION INTRAVENOUS at 03:08

## 2025-08-02 RX ADMIN — KETOROLAC TROMETHAMINE 15 MG: 30 INJECTION, SOLUTION INTRAMUSCULAR at 03:08

## 2025-08-04 LAB — BACTERIA UR CULT: ABNORMAL

## 2025-08-27 DIAGNOSIS — N95.0 ABNORMAL VAGINAL BLEEDING IN POSTMENOPAUSAL PATIENT: Primary | ICD-10-CM

## 2025-08-27 DIAGNOSIS — N92.0 SPOTTING: ICD-10-CM

## 2025-09-02 ENCOUNTER — HOSPITAL ENCOUNTER (OUTPATIENT)
Dept: RADIOLOGY | Facility: HOSPITAL | Age: 65
Discharge: HOME | End: 2025-09-02
Attending: NURSE PRACTITIONER
Payer: COMMERCIAL

## 2025-09-02 DIAGNOSIS — N92.0 SPOTTING: ICD-10-CM

## 2025-09-02 DIAGNOSIS — N95.0 ABNORMAL VAGINAL BLEEDING IN POSTMENOPAUSAL PATIENT: ICD-10-CM

## 2025-09-02 PROCEDURE — 76856 US EXAM PELVIC COMPLETE: CPT | Mod: TC

## (undated) DEVICE — DRAPE INCISE IOBAN 2 23X23IN

## (undated) DEVICE — COVER PROBE US 5.5X58L NON LTX

## (undated) DEVICE — ELECTRODE PATIENT RETURN DISP

## (undated) DEVICE — CARTRIDGE MICROCLIP SFINE BLUE

## (undated) DEVICE — SUT MCRYL PLUS 3-0 PS2 27IN

## (undated) DEVICE — DRAPE OPMI STERILE

## (undated) DEVICE — APPLICATOR CHLORAPREP ORN 26ML

## (undated) DEVICE — GEL AQUASONIC 100 STERILE20GM

## (undated) DEVICE — PENCIL SMOKE EVAC ROCKER 70MM

## (undated) DEVICE — DRESSING TRANS 4X4 TEGADERM

## (undated) DEVICE — SPONGE LAP STRL 18X18IN

## (undated) DEVICE — SHEET XLGE DRAPE

## (undated) DEVICE — SET FLUID TRANSFER ASEPTIC

## (undated) DEVICE — ELECTRODE BLADE INSULATED 1 IN

## (undated) DEVICE — BLADE SURG STAINLESS STEEL #11

## (undated) DEVICE — BLANKET SNUGGLE WARM LOWER BDY

## (undated) DEVICE — GLOVE SENSICARE PI SURG 6

## (undated) DEVICE — GLOVE SENSICARE PI GRN 6.5

## (undated) DEVICE — PACK SPY-PHI DRUG DRAPE

## (undated) DEVICE — DRAPE STERI INSTRUMENT 1018

## (undated) DEVICE — SUT SILK 2.0 BLK 18

## (undated) DEVICE — Device

## (undated) DEVICE — COUNT NDL FOAM MAGNET 40COUNT

## (undated) DEVICE — DRAPE MEDIUM SHEET 40X70IN

## (undated) DEVICE — STAPLER SKIN PROXIMATE WIDE

## (undated) DEVICE — SUT PROLENE 4-0 FS-2 BL MON

## (undated) DEVICE — DRESSING TEGADERM CHG 3.5X4.5

## (undated) DEVICE — PAD ABD 8X10 STERILE

## (undated) DEVICE — BLADE SURG STAINLESS STEEL #10

## (undated) DEVICE — COVER PROBE WITH BAND 6X96IN

## (undated) DEVICE — SUPPORT ULNA NERVE PROTECTOR

## (undated) DEVICE — SUT SILK 3-0 BLK BR SH 30IN

## (undated) DEVICE — CLIP HORIZON LIG TI MED-LG

## (undated) DEVICE — ADHESIVE DERMABOND ADVANCED

## (undated) DEVICE — MICRO CLIP

## (undated) DEVICE — GLOVE PROTEXIS LTX MICRO 8

## (undated) DEVICE — PENCIL ELECSURG ROCKER 15FT

## (undated) DEVICE — DRAIN SURG HUBLESS 30CM 15FR

## (undated) DEVICE — SUT PDS PLUS 1 TP1 96IN

## (undated) DEVICE — TUBING SILICON CLR 3/16IN 10FT

## (undated) DEVICE — NDL SYR 10ML 18X1.5 LL BLUNT

## (undated) DEVICE — CLAMP BIOVER VEN SM SGL 20G/MM

## (undated) DEVICE — SPONGE IV DRAIN 4X4 STERILE

## (undated) DEVICE — NDL SAFETY 22G X 1.5 ECLIPSE

## (undated) DEVICE — SOL NACL IRR 1000ML BTL

## (undated) DEVICE — NDL SAFETY 21G X 1 1/2 ECLPSE

## (undated) DEVICE — GLOVE PROTEXIS PI SYN SURG 6.0

## (undated) DEVICE — SUT STRATAFIX MCRYL 27IN 2-0

## (undated) DEVICE — LIGATING CLIP LARGE

## (undated) DEVICE — SUT CTD VICRYL 0 UND BR

## (undated) DEVICE — DRAPE FLUID WARMER ORS 44X44IN

## (undated) DEVICE — GAUZE VISTEC XR DTECT 16 4X4IN

## (undated) DEVICE — ILLUMINATOR PHOTONBLADE 8/11IN

## (undated) DEVICE — BANDAGE GAUZE COT STRL 4.5X4.1

## (undated) DEVICE — SUT 9/0 5IN ETHILON BLK MON

## (undated) DEVICE — SYS CLSR DERMABOND PRINEO 22CM

## (undated) DEVICE — NDL HYPO REG 25G X 1 1/2

## (undated) DEVICE — BLADE EZ CLEAN 2 1/2

## (undated) DEVICE — CLAMP BIOVER VEN MIC DLB 15G

## (undated) DEVICE — TOWEL OR DISP STRL BLUE 4/PK

## (undated) DEVICE — KIT IRR SUCTION HND PIECE

## (undated) DEVICE — BANDAGE KERLIX AMD

## (undated) DEVICE — TIP SUCTION YANKAUER

## (undated) DEVICE — GOWN X-LG STERILE BACK

## (undated) DEVICE — CLOSURE SKIN STERI STRIP 1/2X4

## (undated) DEVICE — TUBING INFILTRATION SNG SPIKE

## (undated) DEVICE — BAG MEDI-PLAST DECANTER C-FLOW

## (undated) DEVICE — RESERVOIR JACKSON-PRATT 100CC

## (undated) DEVICE — SUT STRATAFIX 4-0 30CM PS-2

## (undated) DEVICE — BOWL UTILITY BLUE 32OZ

## (undated) DEVICE — DRAPE UTILITY W/ TAPE 20X30IN

## (undated) DEVICE — TRAY CATH FOL SIL DRN BAG 16FR

## (undated) DEVICE — CLIP LIGATING MEDIUM

## (undated) DEVICE — TUBE PAL ASPIR CONN STRL 12FT

## (undated) DEVICE — SURGICAL BRA

## (undated) DEVICE — SPONGE KERLIX ANTIMIC 6X6.75IN

## (undated) DEVICE — GLOVE PROTEXIS PI SYN SURG 6.5

## (undated) DEVICE — SUT CTD VICRYL 3-0 CR/SH

## (undated) DEVICE — SOL ELECTROLUBE ANTI-STIC

## (undated) DEVICE — BINDER ABDOMINAL UNIV XLN 10IN

## (undated) DEVICE — SPONGE LAP 18X18 PREWASHED

## (undated) DEVICE — SEE MEDLINE ITEM 157196

## (undated) DEVICE — SOL IRRI STRL WATER 1000ML

## (undated) DEVICE — GLOVE SENSICARE PI MICRO 7

## (undated) DEVICE — DRESSING XEROFORM FOIL PK 1X8

## (undated) DEVICE — CATH PROTECTIV IV 24GA .75IN

## (undated) DEVICE — COVER PROXIMA MAYO STAND

## (undated) DEVICE — COVER C-ARM STRAP BAND 44X80IN

## (undated) DEVICE — DRAPE ORTH SPLIT 77X108IN

## (undated) DEVICE — GLOVE 6.0 PROTEXIS PI MICRO

## (undated) DEVICE — PAD ABDOMINAL STERILE 8X10IN

## (undated) DEVICE — GLOVE SIGNATURE MICRO LTX 8

## (undated) DEVICE — GAUZE SPONGE 4X4 12PLY

## (undated) DEVICE — TUBE SUCTION MEDI-VAC STERILE

## (undated) DEVICE — BLANKET HYPER ADULT 24X60IN

## (undated) DEVICE — CONTAINER SPECIMEN SCREW 4OZ

## (undated) DEVICE — GLOVE SENSICARE PI SURG 6.5

## (undated) DEVICE — SYR 30CC LUER LOCK

## (undated) DEVICE — SOL IRR NACL .9% 1000CC

## (undated) DEVICE — BLADE SURG STAINLESS STEEL #15

## (undated) DEVICE — COVER TABLE HVY DTY 60X90IN

## (undated) DEVICE — SOL NACL .9P 500ML

## (undated) DEVICE — SUT GUT PL. 4-0 27 FS-2

## (undated) DEVICE — CLAMP MICROVASCULAR DOUBLE

## (undated) DEVICE — CLAMP BIOVER VEN MIC SGL 15G

## (undated) DEVICE — GAUZE DRAIN N WVN 6PLY 4X4IN

## (undated) DEVICE — GLOVE PROTEXIS BLUE LATEX 7

## (undated) DEVICE — BLADE 4IN EDGE INSULATED

## (undated) DEVICE — DRAPE FULL SHEET 70X100IN

## (undated) DEVICE — PACK SCROTO-PAK LONE STAR

## (undated) DEVICE — CORD BIPOLAR 12 FOOT

## (undated) DEVICE — CLIP LIGATING HEMOCLP SMALL